# Patient Record
Sex: FEMALE | Employment: OTHER | URBAN - METROPOLITAN AREA
[De-identification: names, ages, dates, MRNs, and addresses within clinical notes are randomized per-mention and may not be internally consistent; named-entity substitution may affect disease eponyms.]

---

## 2017-09-16 ENCOUNTER — HOSPITAL ENCOUNTER (EMERGENCY)
Age: 72
Discharge: HOME | End: 2017-09-16
Attending: EMERGENCY MEDICINE
Payer: MEDICARE

## 2017-09-16 ENCOUNTER — APPOINTMENT (OUTPATIENT)
Dept: CT IMAGING | Age: 72
End: 2017-09-16
Attending: PHYSICIAN ASSISTANT
Payer: MEDICARE

## 2017-09-16 VITALS
HEART RATE: 97 BPM | DIASTOLIC BLOOD PRESSURE: 106 MMHG | WEIGHT: 225 LBS | RESPIRATION RATE: 20 BRPM | TEMPERATURE: 98.7 F | SYSTOLIC BLOOD PRESSURE: 147 MMHG | HEIGHT: 61 IN | BODY MASS INDEX: 42.48 KG/M2 | OXYGEN SATURATION: 96 %

## 2017-09-16 DIAGNOSIS — R31.9 URINARY TRACT INFECTION WITH HEMATURIA, SITE UNSPECIFIED: Primary | ICD-10-CM

## 2017-09-16 DIAGNOSIS — N39.0 URINARY TRACT INFECTION WITH HEMATURIA, SITE UNSPECIFIED: Primary | ICD-10-CM

## 2017-09-16 LAB
ALBUMIN SERPL-MCNC: 3.6 G/DL (ref 3.5–5)
ALBUMIN/GLOB SERPL: 0.8 {RATIO} (ref 1.1–2.2)
ALP SERPL-CCNC: 151 U/L (ref 45–117)
ALT SERPL-CCNC: 25 U/L (ref 12–78)
ANION GAP SERPL CALC-SCNC: 8 MMOL/L (ref 5–15)
APPEARANCE UR: ABNORMAL
AST SERPL-CCNC: 26 U/L (ref 15–37)
BACTERIA URNS QL MICRO: ABNORMAL /HPF
BASOPHILS # BLD: 0 K/UL (ref 0–0.1)
BASOPHILS NFR BLD: 0 % (ref 0–1)
BILIRUB SERPL-MCNC: 0.7 MG/DL (ref 0.2–1)
BILIRUB UR QL CFM: NEGATIVE
BUN SERPL-MCNC: 18 MG/DL (ref 6–20)
BUN/CREAT SERPL: 20 (ref 12–20)
CALCIUM SERPL-MCNC: 9.2 MG/DL (ref 8.5–10.1)
CHLORIDE SERPL-SCNC: 98 MMOL/L (ref 97–108)
CO2 SERPL-SCNC: 31 MMOL/L (ref 21–32)
COLOR UR: ABNORMAL
CREAT SERPL-MCNC: 0.9 MG/DL (ref 0.55–1.02)
EOSINOPHIL # BLD: 0.1 K/UL (ref 0–0.4)
EOSINOPHIL NFR BLD: 1 % (ref 0–7)
EPITH CASTS URNS QL MICRO: ABNORMAL /LPF
ERYTHROCYTE [DISTWIDTH] IN BLOOD BY AUTOMATED COUNT: 12.7 % (ref 11.5–14.5)
GLOBULIN SER CALC-MCNC: 4.8 G/DL (ref 2–4)
GLUCOSE BLD STRIP.AUTO-MCNC: 251 MG/DL (ref 65–100)
GLUCOSE SERPL-MCNC: 314 MG/DL (ref 65–100)
GLUCOSE UR STRIP.AUTO-MCNC: NEGATIVE MG/DL
HCT VFR BLD AUTO: 41.1 % (ref 35–47)
HGB BLD-MCNC: 14.2 G/DL (ref 11.5–16)
HGB UR QL STRIP: ABNORMAL
HYALINE CASTS URNS QL MICRO: ABNORMAL /LPF (ref 0–5)
KETONES UR QL STRIP.AUTO: 15 MG/DL
LEUKOCYTE ESTERASE UR QL STRIP.AUTO: ABNORMAL
LIPASE SERPL-CCNC: 113 U/L (ref 73–393)
LYMPHOCYTES # BLD: 2.8 K/UL (ref 0.8–3.5)
LYMPHOCYTES NFR BLD: 28 % (ref 12–49)
MCH RBC QN AUTO: 30.9 PG (ref 26–34)
MCHC RBC AUTO-ENTMCNC: 34.5 G/DL (ref 30–36.5)
MCV RBC AUTO: 89.5 FL (ref 80–99)
MONOCYTES # BLD: 0.7 K/UL (ref 0–1)
MONOCYTES NFR BLD: 7 % (ref 5–13)
NEUTS SEG # BLD: 6.3 K/UL (ref 1.8–8)
NEUTS SEG NFR BLD: 64 % (ref 32–75)
NITRITE UR QL STRIP.AUTO: POSITIVE
PH UR STRIP: 5 [PH] (ref 5–8)
PLATELET # BLD AUTO: 77 K/UL (ref 150–400)
POTASSIUM SERPL-SCNC: 4.4 MMOL/L (ref 3.5–5.1)
PROT SERPL-MCNC: 8.4 G/DL (ref 6.4–8.2)
PROT UR STRIP-MCNC: >300 MG/DL
RBC # BLD AUTO: 4.59 M/UL (ref 3.8–5.2)
RBC #/AREA URNS HPF: >100 /HPF (ref 0–5)
SERVICE CMNT-IMP: ABNORMAL
SODIUM SERPL-SCNC: 137 MMOL/L (ref 136–145)
SP GR UR REFRACTOMETRY: 1.02 (ref 1–1.03)
UA: UC IF INDICATED,UAUC: ABNORMAL
UROBILINOGEN UR QL STRIP.AUTO: 1 EU/DL (ref 0.2–1)
WBC # BLD AUTO: 9.9 K/UL (ref 3.6–11)
WBC URNS QL MICRO: >100 /HPF (ref 0–4)

## 2017-09-16 PROCEDURE — 87186 SC STD MICRODIL/AGAR DIL: CPT | Performed by: PHYSICIAN ASSISTANT

## 2017-09-16 PROCEDURE — 96365 THER/PROPH/DIAG IV INF INIT: CPT

## 2017-09-16 PROCEDURE — 96375 TX/PRO/DX INJ NEW DRUG ADDON: CPT

## 2017-09-16 PROCEDURE — 83690 ASSAY OF LIPASE: CPT | Performed by: PHYSICIAN ASSISTANT

## 2017-09-16 PROCEDURE — 85025 COMPLETE CBC W/AUTO DIFF WBC: CPT | Performed by: PHYSICIAN ASSISTANT

## 2017-09-16 PROCEDURE — 87086 URINE CULTURE/COLONY COUNT: CPT | Performed by: PHYSICIAN ASSISTANT

## 2017-09-16 PROCEDURE — 74176 CT ABD & PELVIS W/O CONTRAST: CPT

## 2017-09-16 PROCEDURE — 82962 GLUCOSE BLOOD TEST: CPT

## 2017-09-16 PROCEDURE — 87077 CULTURE AEROBIC IDENTIFY: CPT | Performed by: PHYSICIAN ASSISTANT

## 2017-09-16 PROCEDURE — 36415 COLL VENOUS BLD VENIPUNCTURE: CPT | Performed by: PHYSICIAN ASSISTANT

## 2017-09-16 PROCEDURE — 81001 URINALYSIS AUTO W/SCOPE: CPT | Performed by: PHYSICIAN ASSISTANT

## 2017-09-16 PROCEDURE — 74011250636 HC RX REV CODE- 250/636: Performed by: PHYSICIAN ASSISTANT

## 2017-09-16 PROCEDURE — 87147 CULTURE TYPE IMMUNOLOGIC: CPT | Performed by: PHYSICIAN ASSISTANT

## 2017-09-16 PROCEDURE — 74011000258 HC RX REV CODE- 258: Performed by: PHYSICIAN ASSISTANT

## 2017-09-16 PROCEDURE — 99283 EMERGENCY DEPT VISIT LOW MDM: CPT

## 2017-09-16 PROCEDURE — 80053 COMPREHEN METABOLIC PANEL: CPT | Performed by: PHYSICIAN ASSISTANT

## 2017-09-16 PROCEDURE — 96361 HYDRATE IV INFUSION ADD-ON: CPT

## 2017-09-16 RX ORDER — PANTOPRAZOLE SODIUM 20 MG/1
20 TABLET, DELAYED RELEASE ORAL DAILY
COMMUNITY
End: 2017-12-18

## 2017-09-16 RX ORDER — CEPHALEXIN 500 MG/1
500 CAPSULE ORAL 3 TIMES DAILY
Qty: 28 CAP | Refills: 0 | Status: SHIPPED | OUTPATIENT
Start: 2017-09-16 | End: 2017-09-23

## 2017-09-16 RX ORDER — KETOROLAC TROMETHAMINE 30 MG/ML
15 INJECTION, SOLUTION INTRAMUSCULAR; INTRAVENOUS
Status: COMPLETED | OUTPATIENT
Start: 2017-09-16 | End: 2017-09-16

## 2017-09-16 RX ADMIN — KETOROLAC TROMETHAMINE 15 MG: 30 INJECTION, SOLUTION INTRAMUSCULAR at 20:52

## 2017-09-16 RX ADMIN — CEFTRIAXONE SODIUM 1 G: 1 INJECTION, POWDER, FOR SOLUTION INTRAMUSCULAR; INTRAVENOUS at 20:50

## 2017-09-16 RX ADMIN — SODIUM CHLORIDE 1000 ML: 900 INJECTION, SOLUTION INTRAVENOUS at 19:40

## 2017-09-16 NOTE — ED PROVIDER NOTES
HPI Comments: 82 yo female with hx of diverticulitis, HTN, hemochromatosis, fibromyalgia, DM, pancreatic CA, depression and irregular heart beat here for evaluation of hematuria. States noticed blood in urine around 1800 this evening. Has noted to have bilateral back pain. +Nausea; no vomiting. Denies fever, CP, SOB. Former smoker. Patient is a 81 y.o. female presenting with hematuria. The history is provided by the patient. Blood in Urine    This is a new problem. The current episode started 1 to 2 hours ago. The problem occurs every urination. The quality of the pain is described as burning. The pain is at a severity of 8/10. The pain is mild. There has been no fever. Associated symptoms include chills, nausea, hematuria and flank pain. Pertinent negatives include no vomiting, no frequency, no abdominal pain and no back pain. She has tried nothing for the symptoms.         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    Unspecified sleep apnea     NO C-PAP       Past Surgical History:   Procedure Laterality Date    HX APPENDECTOMY      HX BREAST LUMPECTOMY Left     lumpectomy BENIGN    HX COLONOSCOPY      HX HYSTERECTOMY      hysto    HX ORTHOPAEDIC Left     shoulder CYST REMOVAL    HX OTHER SURGICAL  12/31/13    pyloric-sparing whipple,bx of portal and hepatic nodes by Dr Royce Duncan HX SALPINGO-OOPHORECTOMY Bilateral     HX TONSILLECTOMY      HX UROLOGICAL      BLADDER [facility]         Family History:   Problem Relation Age of Onset    Heart Failure Mother     Diabetes Mother     Hypertension Mother     Heart Failure Father     Kidney Disease Father      WAS ON DIALYSIS    Diabetes Sister     Depression Sister     Depression Sister     Other Sister      DIFFICULTY INTUBATING    Asthma Sister        Social History     Social History    Marital status:      Spouse name: N/A    Number of children: N/A    Years of education: N/A     Occupational History    Not on file. Social History Main Topics    Smoking status: Former Smoker     Packs/day: 1.00     Years: 17.00     Quit date: 12/27/1983    Smokeless tobacco: Never Used    Alcohol use No    Drug use: No    Sexual activity: Not on file     Other Topics Concern    Not on file     Social History Narrative         ALLERGIES: Latex; Tylenol [acetaminophen]; Claritin [loratadine]; Codeine; and Percocet [oxycodone-acetaminophen]    Review of Systems   Constitutional: Positive for chills. HENT: Negative for ear discharge. Eyes: Negative for photophobia, pain, discharge and visual disturbance. Respiratory: Negative for apnea, cough, chest tightness and shortness of breath. Cardiovascular: Negative for chest pain, palpitations and leg swelling. Gastrointestinal: Positive for nausea. Negative for abdominal distention, abdominal pain, blood in stool and vomiting. Genitourinary: Positive for flank pain and hematuria. Negative for difficulty urinating, dysuria and frequency. Musculoskeletal: Negative for back pain, gait problem, joint swelling, myalgias and neck pain. Skin: Negative for color change and pallor. Neurological: Negative for dizziness, syncope, weakness, numbness and headaches. Psychiatric/Behavioral: Negative for behavioral problems and confusion. The patient is not nervous/anxious. Vitals:    09/16/17 1916   BP: 127/87   Pulse: 97   Resp: 20   Temp: 98.7 °F (37.1 °C)   SpO2: 96%   Weight: 102.1 kg (225 lb)   Height: 5' 1\" (1.549 m)            Physical Exam   Constitutional: She is oriented to person, place, and time.  She appears well-developed and well-nourished. HENT:   Head: Normocephalic and atraumatic. Right Ear: External ear normal.   Left Ear: External ear normal.   Nose: Nose normal.   Mouth/Throat: Oropharynx is clear and moist.   Eyes: Conjunctivae and EOM are normal. Pupils are equal, round, and reactive to light. Right eye exhibits no discharge. Left eye exhibits no discharge. Neck: Normal range of motion. Neck supple. Cardiovascular: Normal rate, regular rhythm, normal heart sounds and intact distal pulses. Pulmonary/Chest: Effort normal and breath sounds normal.   Abdominal: Soft. Bowel sounds are normal. She exhibits no distension. There is tenderness (Mild suprapubic). There is no rebound and no guarding. No CVA tenderness    Musculoskeletal: Normal range of motion. She exhibits no edema or tenderness. Neurological: She is alert and oriented to person, place, and time. No cranial nerve deficit. Coordination normal.   Skin: Skin is warm and dry. No rash noted. Psychiatric: She has a normal mood and affect. Her behavior is normal. Judgment and thought content normal.   Nursing note and vitals reviewed. MDM  Number of Diagnoses or Management Options  Urinary tract infection with hematuria, site unspecified:   Diagnosis management comments: 82 yo female with hematuria x today; stable appearing; no fever or elevated WBC; abd soft; will treat UA with Ro[place]hpin and have close followup; return if fever or worsening of symptoms. EDWIN Lieberman         Amount and/or Complexity of Data Reviewed  Clinical lab tests: ordered and reviewed  Tests in the radiology section of CPT®: ordered and reviewed  Discuss the patient with other providers: yes  Independent visualization of images, tracings, or specimens: yes      ED Course       Procedures    Patient has been reassessed. Feeling much better. Reviewed labs, medications and radiographics with patient. Ready to discharge home.       Discussed case with attending Physician Amber; in to see. Agrees with care and will D/C with follow up.      9:42 PM  Patient's results have been reviewed with them. Patient and/or family have verbally conveyed their understanding and agreement of the patient's signs, symptoms, diagnosis, treatment and prognosis and additionally agree to follow up as recommended or return to the Emergency Room should their condition change prior to follow-up. Discharge instructions have also been provided to the patient with some educational information regarding their diagnosis as well a list of reasons why they would want to return to the ER prior to their follow-up appointment should their condition change. EDWIN Covington      Recent Results (from the past 24 hour(s))   CBC WITH AUTOMATED DIFF    Collection Time: 09/16/17  7:41 PM   Result Value Ref Range    WBC 9.9 3.6 - 11.0 K/uL    RBC 4.59 3.80 - 5.20 M/uL    HGB 14.2 11.5 - 16.0 g/dL    HCT 41.1 35.0 - 47.0 %    MCV 89.5 80.0 - 99.0 FL    MCH 30.9 26.0 - 34.0 PG    MCHC 34.5 30.0 - 36.5 g/dL    RDW 12.7 11.5 - 14.5 %    PLATELET 77 (L) 657 - 400 K/uL    NEUTROPHILS 64 32 - 75 %    LYMPHOCYTES 28 12 - 49 %    MONOCYTES 7 5 - 13 %    EOSINOPHILS 1 0 - 7 %    BASOPHILS 0 0 - 1 %    ABS. NEUTROPHILS 6.3 1.8 - 8.0 K/UL    ABS. LYMPHOCYTES 2.8 0.8 - 3.5 K/UL    ABS. MONOCYTES 0.7 0.0 - 1.0 K/UL    ABS. EOSINOPHILS 0.1 0.0 - 0.4 K/UL    ABS.  BASOPHILS 0.0 0.0 - 0.1 K/UL   METABOLIC PANEL, COMPREHENSIVE    Collection Time: 09/16/17  7:41 PM   Result Value Ref Range    Sodium 137 136 - 145 mmol/L    Potassium 4.4 3.5 - 5.1 mmol/L    Chloride 98 97 - 108 mmol/L    CO2 31 21 - 32 mmol/L    Anion gap 8 5 - 15 mmol/L    Glucose 314 (H) 65 - 100 mg/dL    BUN 18 6 - 20 MG/DL    Creatinine 0.90 0.55 - 1.02 MG/DL    BUN/Creatinine ratio 20 12 - 20      GFR est AA >60 >60 ml/min/1.73m2    GFR est non-AA >60 >60 ml/min/1.73m2    Calcium 9.2 8.5 - 10.1 MG/DL    Bilirubin, total 0.7 0.2 - 1.0 MG/DL    ALT (SGPT) 25 12 - 78 U/L    AST (SGOT) 26 15 - 37 U/L    Alk. phosphatase 151 (H) 45 - 117 U/L    Protein, total 8.4 (H) 6.4 - 8.2 g/dL    Albumin 3.6 3.5 - 5.0 g/dL    Globulin 4.8 (H) 2.0 - 4.0 g/dL    A-G Ratio 0.8 (L) 1.1 - 2.2     LIPASE    Collection Time: 09/16/17  7:41 PM   Result Value Ref Range    Lipase 113 73 - 393 U/L   URINALYSIS W/ REFLEX CULTURE    Collection Time: 09/16/17  7:41 PM   Result Value Ref Range    Color RED      Appearance TURBID (A) CLEAR      Specific gravity 1.025 1.003 - 1.030      pH (UA) 5.0 5.0 - 8.0      Protein >300 (A) NEG mg/dL    Glucose NEGATIVE  NEG mg/dL    Ketone 15 (A) NEG mg/dL    Blood LARGE (A) NEG      Urobilinogen 1.0 0.2 - 1.0 EU/dL    Nitrites POSITIVE (A) NEG      Leukocyte Esterase LARGE (A) NEG      Bilirubin UA, confirm NEGATIVE  NEG      WBC >100 (H) 0 - 4 /hpf    RBC >100 (H) 0 - 5 /hpf    Epithelial cells MODERATE (A) FEW /lpf    Bacteria 1+ (A) NEG /hpf    UA:UC IF INDICATED URINE CULTURE ORDERED (A) CNI      Hyaline cast 2-5 0 - 5 /lpf   GLUCOSE, POC    Collection Time: 09/16/17  9:32 PM   Result Value Ref Range    Glucose (POC) 251 (H) 65 - 100 mg/dL    Performed by Jovana Wiggins (PCT)        Ct Abd Pelv Wo Cont    Result Date: 9/16/2017  EXAM:  CT ABD PELV WO CONT INDICATION: flank pain/hematuria COMPARISON: 4/19/2015 CONTRAST:  None. TECHNIQUE: Thin axial images were obtained through the abdomen and pelvis. Coronal and sagittal reconstructions were generated. Oral contrast was not administered. CT dose reduction was achieved through use of a standardized protocol tailored for this examination and automatic exposure control for dose modulation. The absence of intravenous contrast material reduces the sensitivity for evaluation of the solid parenchymal organs of the abdomen. FINDINGS: LUNG BASES: Clear. INCIDENTALLY IMAGED HEART AND MEDIASTINUM: Unremarkable. LIVER: No mass or biliary dilatation. GALLBLADDER: Prior cholecystectomy SPLEEN: No mass. PANCREAS: No mass or ductal dilatation. ADRENALS: Unremarkable. KIDNEYS/URETERS: No mass, calculus, or hydronephrosis. STOMACH: Unremarkable. SMALL BOWEL: No dilatation or wall thickening. COLON: No dilatation or wall thickening. Multiple sigmoid diverticula. Ventral diastases containing a portion of the transverse colon. APPENDIX: Unremarkable. PERITONEUM: No ascites or pneumoperitoneum. RETROPERITONEUM: No lymphadenopathy or aortic aneurysm. REPRODUCTIVE ORGANS: Prior hysterectomy URINARY BLADDER: No mass or calculus. BONES: No destructive bone lesion. ADDITIONAL COMMENTS: Disc desiccation at the lumbosacral junction. Disc bulge at L4-5.      IMPRESSION: No acute abdominal or pelvic process seen

## 2017-09-17 NOTE — DISCHARGE INSTRUCTIONS
Urinary Tract Infection in Women: Care Instructions  Your Care Instructions    A urinary tract infection, or UTI, is a general term for an infection anywhere between the kidneys and the urethra (where urine comes out). Most UTIs are bladder infections. They often cause pain or burning when you urinate. UTIs are caused by bacteria and can be cured with antibiotics. Be sure to complete your treatment so that the infection goes away. Follow-up care is a key part of your treatment and safety. Be sure to make and go to all appointments, and call your doctor if you are having problems. It's also a good idea to know your test results and keep a list of the medicines you take. How can you care for yourself at home? · Take your antibiotics as directed. Do not stop taking them just because you feel better. You need to take the full course of antibiotics. · Drink extra water and other fluids for the next day or two. This may help wash out the bacteria that are causing the infection. (If you have kidney, heart, or liver disease and have to limit fluids, talk with your doctor before you increase your fluid intake.)  · Avoid drinks that are carbonated or have caffeine. They can irritate the bladder. · Urinate often. Try to empty your bladder each time. · To relieve pain, take a hot bath or lay a heating pad set on low over your lower belly or genital area. Never go to sleep with a heating pad in place. To prevent UTIs  · Drink plenty of water each day. This helps you urinate often, which clears bacteria from your system. (If you have kidney, heart, or liver disease and have to limit fluids, talk with your doctor before you increase your fluid intake.)  · Urinate when you need to. · Urinate right after you have sex. · Change sanitary pads often. · Avoid douches, bubble baths, feminine hygiene sprays, and other feminine hygiene products that have deodorants.   · After going to the bathroom, wipe from front to back.  When should you call for help? Call your doctor now or seek immediate medical care if:  · Symptoms such as fever, chills, nausea, or vomiting get worse or appear for the first time. · You have new pain in your back just below your rib cage. This is called flank pain. · There is new blood or pus in your urine. · You have any problems with your antibiotic medicine. Watch closely for changes in your health, and be sure to contact your doctor if:  · You are not getting better after taking an antibiotic for 2 days. · Your symptoms go away but then come back. Where can you learn more? Go to http://chung-vick.info/. Enter A093 in the search box to learn more about \"Urinary Tract Infection in Women: Care Instructions. \"  Current as of: November 28, 2016  Content Version: 11.3  © 9546-9529 [employer]. Care instructions adapted under license by [employer] (which disclaims liability or warranty for this information). If you have questions about a medical condition or this instruction, always ask your healthcare professional. [address] any warranty or liability for your use of this information. We hope that we have addressed all of your medical concerns. The examination and treatment you received in the Emergency Department were for an emergent problem and were not intended as complete care. It is important that you follow up with your healthcare provider(s) for ongoing care. If your symptoms worsen or do not improve as expected, and you are unable to reach your usual health care provider(s), you should return to the Emergency Department. Today's healthcare is undergoing tremendous change, and patient satisfaction surveys are one of the many tools to assess the quality of medical care. You may receive a survey from the [employer] organization regarding your experience in the Emergency Department.   I hope that your experience has been completely positive, particularly the medical care that I provided. As such, please participate in the survey; anything less than excellent does not meet my expectations or intentions. [address] [facility] and [facility] participate in nationally recognized quality of care measures. If your blood pressure is greater than 120/80, as reported below, we urge that you seek medical care to address the potential of high blood pressure, commonly known as hypertension. Hypertension can be hereditary or can be caused by certain medical conditions, pain, stress, or \"white coat syndrome. \"       Please make an appointment with your health care provider(s) for follow up of your Emergency Department visit. VITALS:   Patient Vitals for the past 8 hrs:   Temp Pulse Resp BP SpO2   09/16/17 1916 98.7 °F (37.1 °C) 97 20 127/87 96 %          Thank you for allowing us to provide you with medical care today. We realize that you have many choices for your emergency care needs. Please choose us in the future for any continued health care needs. Gabi Gabriel, [facility].   Office: [phone]            Recent Results (from the past 24 hour(s))   CBC WITH AUTOMATED DIFF    Collection Time: 09/16/17  7:41 PM   Result Value Ref Range    WBC 9.9 3.6 - 11.0 K/uL    RBC 4.59 3.80 - 5.20 M/uL    HGB 14.2 11.5 - 16.0 g/dL    HCT 41.1 35.0 - 47.0 %    MCV 89.5 80.0 - 99.0 FL    MCH 30.9 26.0 - 34.0 PG    MCHC 34.5 30.0 - 36.5 g/dL    RDW 12.7 11.5 - 14.5 %    PLATELET 77 (L) 882 - 400 K/uL    NEUTROPHILS 64 32 - 75 %    LYMPHOCYTES 28 12 - 49 %    MONOCYTES 7 5 - 13 %    EOSINOPHILS 1 0 - 7 %    BASOPHILS 0 0 - 1 %    ABS. NEUTROPHILS 6.3 1.8 - 8.0 K/UL    ABS. LYMPHOCYTES 2.8 0.8 - 3.5 K/UL    ABS. MONOCYTES 0.7 0.0 - 1.0 K/UL    ABS. EOSINOPHILS 0.1 0.0 - 0.4 K/UL    ABS.  BASOPHILS 0.0 0.0 - 0.1 K/UL   METABOLIC PANEL, COMPREHENSIVE Collection Time: 09/16/17  7:41 PM   Result Value Ref Range    Sodium 137 136 - 145 mmol/L    Potassium 4.4 3.5 - 5.1 mmol/L    Chloride 98 97 - 108 mmol/L    CO2 31 21 - 32 mmol/L    Anion gap 8 5 - 15 mmol/L    Glucose 314 (H) 65 - 100 mg/dL    BUN 18 6 - 20 MG/DL    Creatinine 0.90 0.55 - 1.02 MG/DL    BUN/Creatinine ratio 20 12 - 20      GFR est AA >60 >60 ml/min/1.73m2    GFR est non-AA >60 >60 ml/min/1.73m2    Calcium 9.2 8.5 - 10.1 MG/DL    Bilirubin, total 0.7 0.2 - 1.0 MG/DL    ALT (SGPT) 25 12 - 78 U/L    AST (SGOT) 26 15 - 37 U/L    Alk. phosphatase 151 (H) 45 - 117 U/L    Protein, total 8.4 (H) 6.4 - 8.2 g/dL    Albumin 3.6 3.5 - 5.0 g/dL    Globulin 4.8 (H) 2.0 - 4.0 g/dL    A-G Ratio 0.8 (L) 1.1 - 2.2     LIPASE    Collection Time: 09/16/17  7:41 PM   Result Value Ref Range    Lipase 113 73 - 393 U/L   URINALYSIS W/ REFLEX CULTURE    Collection Time: 09/16/17  7:41 PM   Result Value Ref Range    Color RED      Appearance TURBID (A) CLEAR      Specific gravity 1.025 1.003 - 1.030      pH (UA) 5.0 5.0 - 8.0      Protein >300 (A) NEG mg/dL    Glucose NEGATIVE  NEG mg/dL    Ketone 15 (A) NEG mg/dL    Blood LARGE (A) NEG      Urobilinogen 1.0 0.2 - 1.0 EU/dL    Nitrites POSITIVE (A) NEG      Leukocyte Esterase LARGE (A) NEG      Bilirubin UA, confirm NEGATIVE  NEG      WBC >100 (H) 0 - 4 /hpf    RBC >100 (H) 0 - 5 /hpf    Epithelial cells MODERATE (A) FEW /lpf    Bacteria 1+ (A) NEG /hpf    UA:UC IF INDICATED URINE CULTURE ORDERED (A) CNI      Hyaline cast 2-5 0 - 5 /lpf       Ct Abd Pelv Wo Cont    Result Date: 9/16/2017  EXAM:  CT ABD PELV WO CONT INDICATION: flank pain/hematuria COMPARISON: 4/19/2015 CONTRAST:  None. TECHNIQUE: Thin axial images were obtained through the abdomen and pelvis. Coronal and sagittal reconstructions were generated. Oral contrast was not administered.  CT dose reduction was achieved through use of a standardized protocol tailored for this examination and automatic exposure control for dose modulation. The absence of intravenous contrast material reduces the sensitivity for evaluation of the solid parenchymal organs of the abdomen. FINDINGS: LUNG BASES: Clear. INCIDENTALLY IMAGED HEART AND MEDIASTINUM: Unremarkable. LIVER: No mass or biliary dilatation. GALLBLADDER: Prior cholecystectomy SPLEEN: No mass. PANCREAS: No mass or ductal dilatation. ADRENALS: Unremarkable. KIDNEYS/URETERS: No mass, calculus, or hydronephrosis. STOMACH: Unremarkable. SMALL BOWEL: No dilatation or wall thickening. COLON: No dilatation or wall thickening. Multiple sigmoid diverticula. Ventral diastases containing a portion of the transverse colon. APPENDIX: Unremarkable. PERITONEUM: No ascites or pneumoperitoneum. RETROPERITONEUM: No lymphadenopathy or aortic aneurysm. REPRODUCTIVE ORGANS: Prior hysterectomy URINARY BLADDER: No mass or calculus. BONES: No destructive bone lesion. ADDITIONAL COMMENTS: Disc desiccation at the lumbosacral junction. Disc bulge at L4-5.      IMPRESSION: No acute abdominal or pelvic process seen

## 2017-09-19 LAB
BACTERIA SPEC CULT: ABNORMAL
BACTERIA SPEC CULT: ABNORMAL
CC UR VC: ABNORMAL
SERVICE CMNT-IMP: ABNORMAL

## 2017-09-19 RX ORDER — NITROFURANTOIN MACROCRYSTALS 50 MG/1
50 CAPSULE ORAL 4 TIMES DAILY
Qty: 20 CAP | Refills: 0 | Status: SHIPPED | OUTPATIENT
Start: 2017-09-19 | End: 2017-09-24

## 2017-09-26 ENCOUNTER — HOSPITAL ENCOUNTER (EMERGENCY)
Age: 72
Discharge: HOME | End: 2017-09-26
Attending: EMERGENCY MEDICINE
Payer: MEDICARE

## 2017-09-26 VITALS
TEMPERATURE: 98.3 F | WEIGHT: 223 LBS | HEIGHT: 61 IN | HEART RATE: 78 BPM | BODY MASS INDEX: 42.1 KG/M2 | RESPIRATION RATE: 17 BRPM | OXYGEN SATURATION: 77 % | DIASTOLIC BLOOD PRESSURE: 73 MMHG | SYSTOLIC BLOOD PRESSURE: 134 MMHG

## 2017-09-26 DIAGNOSIS — N82.4 FISTULA, COLOVAGINAL: Primary | ICD-10-CM

## 2017-09-26 PROCEDURE — 99284 EMERGENCY DEPT VISIT MOD MDM: CPT

## 2017-09-26 PROCEDURE — 74011250637 HC RX REV CODE- 250/637: Performed by: EMERGENCY MEDICINE

## 2017-09-26 RX ORDER — IBUPROFEN 600 MG/1
600 TABLET ORAL
Qty: 20 TAB | Refills: 0 | Status: SHIPPED | OUTPATIENT
Start: 2017-09-26 | End: 2017-12-18

## 2017-09-26 RX ORDER — IBUPROFEN 600 MG/1
600 TABLET ORAL
Status: COMPLETED | OUTPATIENT
Start: 2017-09-26 | End: 2017-09-26

## 2017-09-26 RX ADMIN — IBUPROFEN 600 MG: 600 TABLET, FILM COATED ORAL at 15:03

## 2017-09-26 NOTE — ED TRIAGE NOTES
The patient states she was seen here in the ER 9 days ago and was diagnosed with a bladder infection and was given Keflex 500 mgs and Nitrofur 50 mg. She followed up with OBGYN and had a CT scan yesterday and have not received the results at this time. States she can not stand the pain at this time. The patient also states she has been passing fecal matter from her vagina since Sunday.

## 2017-09-26 NOTE — DISCHARGE INSTRUCTIONS
VITALS:   Patient Vitals for the past 8 hrs:   Temp Pulse Resp BP SpO2   09/26/17 1535 - 78 16 131/77 98 %   09/26/17 1303 98.3 °F (36.8 °C) 77 16 130/56 96 %                  No results found for this or any previous visit (from the past 24 hour(s)). No results found.

## 2017-09-26 NOTE — ED NOTES
Rounding on patient and aware of waiting of CT results from [address] advises her pain is improved with less cramping pain.

## 2017-09-27 NOTE — ED PROVIDER NOTES
Patient is a 81 y.o. female presenting with abdominal pain. The history is provided by the patient. Abdominal Pain    This is a new problem. The current episode started more than 1 week ago. The problem occurs constantly. The problem has not changed since onset. The pain is located in the perineum. The quality of the pain is cramping. The pain is at a severity of 10/10. Associated symptoms include back pain. Pertinent negatives include no fever, no diarrhea, no nausea and no dysuria. The patient's surgical history includes appendectomy and hysterectomy. 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    Unspecified sleep apnea     NO C-PAP       Past Surgical History:   Procedure Laterality Date    HX APPENDECTOMY      HX BREAST LUMPECTOMY Left     lumpectomy BENIGN    HX COLONOSCOPY      HX HYSTERECTOMY      hysto    HX ORTHOPAEDIC Left     shoulder CYST REMOVAL    HX OTHER SURGICAL  12/31/13    pyloric-sparing whipple,bx of portal and hepatic nodes by Dr Mauro Montiel HX SALPINGO-OOPHORECTOMY Bilateral     HX TONSILLECTOMY      HX UROLOGICAL      BLADDER SILVANA         Family History:   Problem Relation Age of Onset    Heart Failure Mother     Diabetes Mother     Hypertension Mother     Heart Failure Father     Kidney Disease Father      WAS ON DIALYSIS    Diabetes Sister     Depression Sister     Depression Sister     Other Sister      DIFFICULTY INTUBATING    Asthma Sister        Social History     Social History    Marital status:      Spouse name: N/A    Number of children: N/A    Years of education: N/A     Occupational History    Not on file. Social History Main Topics    Smoking status: Former Smoker     Packs/day: 1.00     Years: 17.00     Quit date: 12/27/1983    Smokeless tobacco: Never Used    Alcohol use No    Drug use: No    Sexual activity: Not on file     Other Topics Concern    Not on file     Social History Narrative         ALLERGIES: Latex; Tylenol [acetaminophen]; Claritin [loratadine]; Codeine; and Percocet [oxycodone-acetaminophen]    Review of Systems   Constitutional: Negative for chills and fever. Respiratory: Negative for chest tightness and shortness of breath. Gastrointestinal: Positive for abdominal pain. Negative for diarrhea and nausea. Genitourinary: Positive for pelvic pain and vaginal discharge. Negative for dysuria. Musculoskeletal: Positive for back pain. All other systems reviewed and are negative. Vitals:    09/26/17 1303 09/26/17 1535 09/26/17 1727 09/26/17 1728   BP: 130/56 131/77 134/73 134/73   Pulse: 77 78     Resp: 16 16  17   Temp: 98.3 °F (36.8 °C)      SpO2: 96% 98% 93% (!) 77%   Weight: 101.2 kg (223 lb)      Height: 5' 1\" (1.549 m)               Physical Exam   Constitutional: She is oriented to person, place, and time. She appears well-developed and well-nourished. No distress. HENT:   Head: Normocephalic and atraumatic. Eyes: Conjunctivae and EOM are normal.   Neck: Normal range of motion. Cardiovascular: Normal rate, regular rhythm, normal heart sounds and intact distal pulses. No murmur heard. Pulmonary/Chest: Effort normal and breath sounds normal. No stridor. No respiratory distress. Abdominal: Soft. Bowel sounds are normal. There is no tenderness. Genitourinary:   Genitourinary Comments: See procedure note   Musculoskeletal: Normal range of motion. She exhibits no edema, tenderness or deformity. Neurological: She is alert and oriented to person, place, and time. No cranial nerve deficit.    Skin: Skin is warm and dry. She is not diaphoretic. Psychiatric: She has a normal mood and affect. Nursing note and vitals reviewed. 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 Jonathan Carty will have patient seen in office this week)      ED Course       Pelvic Exam  Date/Time: 9/27/2017 7:45 AM  Performed by: attending  Type of exam performed: speculum and bimanual.    External genitalia appearance: normal.    Vaginal exam:  discharge. The amount of discharge was:  mild. The discharge was yellow and thick. Cervix assessment: no cervcix noted. Specimen(s) collected:  none. Bimanual exam: mass in vaginal floor with some drainage.     Patient tolerance: Patient tolerated the procedure well with no immediate complications

## 2017-10-05 ENCOUNTER — HOSPITAL ENCOUNTER (OUTPATIENT)
Dept: CT IMAGING | Age: 72
Discharge: HOME | End: 2017-10-05
Attending: COLON & RECTAL SURGERY
Payer: MEDICARE

## 2017-10-05 DIAGNOSIS — N82.3 RECTOVAGINAL FISTULA: ICD-10-CM

## 2017-10-05 DIAGNOSIS — K57.32 DIVERTICULITIS OF COLON (WITHOUT MENTION OF HEMORRHAGE)(562.11): ICD-10-CM

## 2017-10-05 PROCEDURE — 74177 CT ABD & PELVIS W/CONTRAST: CPT

## 2017-10-05 PROCEDURE — 74011636320 HC RX REV CODE- 636/320: Performed by: RADIOLOGY

## 2017-10-05 RX ADMIN — IOPAMIDOL 100 ML: 755 INJECTION, SOLUTION INTRAVENOUS at 13:00

## 2017-10-05 RX ADMIN — DIATRIZOATE MEGLUMINE AND DIATRIZOATE SODIUM 60 ML: 660; 100 LIQUID ORAL; RECTAL at 13:00

## 2017-12-08 ENCOUNTER — HOSPITAL ENCOUNTER (OUTPATIENT)
Dept: GENERAL RADIOLOGY | Age: 72
Discharge: HOME | End: 2017-12-08
Attending: COLON & RECTAL SURGERY
Payer: MEDICARE

## 2017-12-08 DIAGNOSIS — Z01.818 PREOP EXAMINATION: ICD-10-CM

## 2017-12-08 PROCEDURE — 74270 X-RAY XM COLON 1CNTRST STD: CPT

## 2017-12-18 ENCOUNTER — HOSPITAL ENCOUNTER (OUTPATIENT)
Dept: PREADMISSION TESTING | Age: 72
Discharge: HOME | DRG: 331 | End: 2017-12-18
Payer: MEDICARE

## 2017-12-18 ENCOUNTER — HOSPITAL ENCOUNTER (OUTPATIENT)
Dept: GENERAL RADIOLOGY | Age: 72
Discharge: HOME | DRG: 331 | End: 2017-12-18
Attending: COLON & RECTAL SURGERY
Payer: MEDICARE

## 2017-12-18 VITALS
RESPIRATION RATE: 20 BRPM | BODY MASS INDEX: 38.89 KG/M2 | TEMPERATURE: 99.1 F | HEART RATE: 75 BPM | HEIGHT: 61 IN | SYSTOLIC BLOOD PRESSURE: 126 MMHG | OXYGEN SATURATION: 96 % | DIASTOLIC BLOOD PRESSURE: 70 MMHG | WEIGHT: 206 LBS

## 2017-12-18 LAB
ALBUMIN SERPL-MCNC: 3.6 G/DL (ref 3.5–5)
ALBUMIN/GLOB SERPL: 0.8 {RATIO} (ref 1.1–2.2)
ALP SERPL-CCNC: 157 U/L (ref 45–117)
ALT SERPL-CCNC: 48 U/L (ref 12–78)
ANION GAP SERPL CALC-SCNC: 11 MMOL/L (ref 5–15)
AST SERPL-CCNC: 35 U/L (ref 15–37)
BASOPHILS # BLD: 0 K/UL (ref 0–0.1)
BASOPHILS NFR BLD: 0 % (ref 0–1)
BILIRUB SERPL-MCNC: 0.5 MG/DL (ref 0.2–1)
BUN SERPL-MCNC: 23 MG/DL (ref 6–20)
BUN/CREAT SERPL: 20 (ref 12–20)
CALCIUM SERPL-MCNC: 9.1 MG/DL (ref 8.5–10.1)
CHLORIDE SERPL-SCNC: 101 MMOL/L (ref 97–108)
CO2 SERPL-SCNC: 25 MMOL/L (ref 21–32)
CREAT SERPL-MCNC: 1.14 MG/DL (ref 0.55–1.02)
EOSINOPHIL # BLD: 0.1 K/UL (ref 0–0.4)
EOSINOPHIL NFR BLD: 2 % (ref 0–7)
ERYTHROCYTE [DISTWIDTH] IN BLOOD BY AUTOMATED COUNT: 13.1 % (ref 11.5–14.5)
GLOBULIN SER CALC-MCNC: 4.3 G/DL (ref 2–4)
GLUCOSE SERPL-MCNC: 158 MG/DL (ref 65–100)
HCT VFR BLD AUTO: 36.6 % (ref 35–47)
HGB BLD-MCNC: 12.4 G/DL (ref 11.5–16)
LYMPHOCYTES # BLD: 2.3 K/UL (ref 0.8–3.5)
LYMPHOCYTES NFR BLD: 29 % (ref 12–49)
MCH RBC QN AUTO: 30.4 PG (ref 26–34)
MCHC RBC AUTO-ENTMCNC: 33.9 G/DL (ref 30–36.5)
MCV RBC AUTO: 89.7 FL (ref 80–99)
MONOCYTES # BLD: 0.5 K/UL (ref 0–1)
MONOCYTES NFR BLD: 7 % (ref 5–13)
NEUTS SEG # BLD: 4.9 K/UL (ref 1.8–8)
NEUTS SEG NFR BLD: 62 % (ref 32–75)
PLATELET # BLD AUTO: 87 K/UL (ref 150–400)
POTASSIUM SERPL-SCNC: 4.6 MMOL/L (ref 3.5–5.1)
PROT SERPL-MCNC: 7.9 G/DL (ref 6.4–8.2)
RBC # BLD AUTO: 4.08 M/UL (ref 3.8–5.2)
SODIUM SERPL-SCNC: 137 MMOL/L (ref 136–145)
WBC # BLD AUTO: 7.9 K/UL (ref 3.6–11)

## 2017-12-18 PROCEDURE — 85025 COMPLETE CBC W/AUTO DIFF WBC: CPT | Performed by: COLON & RECTAL SURGERY

## 2017-12-18 PROCEDURE — 36415 COLL VENOUS BLD VENIPUNCTURE: CPT | Performed by: COLON & RECTAL SURGERY

## 2017-12-18 PROCEDURE — 80053 COMPREHEN METABOLIC PANEL: CPT | Performed by: COLON & RECTAL SURGERY

## 2017-12-18 PROCEDURE — 93005 ELECTROCARDIOGRAM TRACING: CPT

## 2017-12-18 PROCEDURE — 71020 XR CHEST PA LAT: CPT

## 2017-12-18 NOTE — PERIOP NOTES
[facility]  PREOPERATIVE INSTRUCTIONS    Surgery Date:   12/20/2017      Surgery arrival time given by surgeon: NO  (If [facility] staff will call you between 3pm - 7pm the day before surgery with your arrival time. If your surgery is on a Monday, we will call you the preceding Friday. Please call [phone] after 7pm if you did not receive your arrival time.)  1. Report  to the 2nd Floor Admitting Desk on the day of your surgery. Bring your insurance card, photo identification, and any copayment (if applicable). 2. You must have a responsible adult to drive you home and stay with you the first 24 hours after surgery if you are going home the same day of your surgery. 3. Nothing to eat or drink after midnight the night before surgery. This means NO water, gum, mints, coffee, juice, etc.    4. MEDICATIONS TO TAKE THE MORNING OF SURGERY WITH A SIP OF WATER:Prilosec,Metoprolol,Cymbalta  5. No alcoholic beverages 24 hours before and after your surgery. 6. If you are being admitted to the hospital,please leave personal belongings/luggage in your car until you have an assigned hospital room number. ( The hospital discharge time is 12 PM NOON. Your adult  should be at the hospital prior to the noon discharge time unless otherwise instructed.)   7. STOP Aspirin and/or any non-steroidal anti-inflammatory drugs (i.e. Ibuprofen, Naproxen, Advil, Aleve) as directed by your surgeon. You may take Tylenol. Stop herbal supplements 1 week prior to  surgery. 8. If you are currently taking Plavix, Coumadin,or any other blood-thinning/ anticoagulant medication contact your surgeon for instructions. 9. Wear comfortable clothes. Wear your glasses instead of contacts. Please leave all money, jewelry and valuables at home. No make up, particularly mascara, the day of surgery. 10.  REMOVE ALL body piercings, rings,and jewelry and leave at home. Wear your hair loose or down, no pony-tails, buns, or any metal hair clips.    11. If you shower the morning of surgery, please do not apply any lotions, powders, or deodorants afterwards. Do not shave any body area within 24 hours of your surgery. 12. Please follow all instructions to avoid any potential surgical cancellation. 13. Should your physical condition change, (i.e. fever, cold, flu, etc.) please notify your surgeon as soon as possible. 14. It is important to be on time. If a situation occurs where you may be delayed, please call:  [phone] / [phone] on the day of surgery. 15. The [facility] staff can be reached at [phone]. 16. Special instructions: free  parking,Bring completed PTA medication list with you on the day of your surgery. call  about liquid diet  17. The patient was contacted  in person. She  verbalize  understanding of all instructions does not  need reinforcement.

## 2017-12-18 NOTE — PERIOP NOTES
CMP and CBC results from 12/18/17 faxed via EMR to [facility]. Patient states that the [facility] is aware that she refuses all blood or blood products.

## 2017-12-19 ENCOUNTER — ANESTHESIA EVENT (OUTPATIENT)
Dept: SURGERY | Age: 72
DRG: 331 | End: 2017-12-19
Payer: MEDICARE

## 2017-12-19 LAB
ATRIAL RATE: 69 BPM
CALCULATED P AXIS, ECG09: 31 DEGREES
CALCULATED R AXIS, ECG10: -23 DEGREES
CALCULATED T AXIS, ECG11: 32 DEGREES
DIAGNOSIS, 93000: NORMAL
P-R INTERVAL, ECG05: 158 MS
Q-T INTERVAL, ECG07: 404 MS
QRS DURATION, ECG06: 90 MS
QTC CALCULATION (BEZET), ECG08: 432 MS
VENTRICULAR RATE, ECG03: 69 BPM

## 2017-12-19 NOTE — PERIOP NOTES
Patient with history of +ESBL in urine on 9/16/2017. Enter placed in [facility] under signed and held for contact isolation.   DOS: 12/20/2017

## 2017-12-19 NOTE — PERIOP NOTES
I spoke to Zuleika Velázquez Dr and she reviewed the chart result from 9/16/2017 and confirmed that the patient needs to be on contact isolation for 12/20/17 surgery.

## 2017-12-20 ENCOUNTER — ANESTHESIA (OUTPATIENT)
Dept: SURGERY | Age: 72
DRG: 331 | End: 2017-12-20
Payer: MEDICARE

## 2017-12-20 ENCOUNTER — HOSPITAL ENCOUNTER (INPATIENT)
Age: 72
LOS: 6 days | Discharge: HOME HEALTH | DRG: 331 | End: 2017-12-26
Attending: COLON & RECTAL SURGERY | Admitting: COLON & RECTAL SURGERY
Payer: MEDICARE

## 2017-12-20 DIAGNOSIS — Z90.49 S/P SMALL BOWEL RESECTION: Primary | ICD-10-CM

## 2017-12-20 PROBLEM — Z93.2 ILEOSTOMY STATUS (HCC): Status: ACTIVE | Noted: 2017-12-20

## 2017-12-20 LAB
ANION GAP SERPL CALC-SCNC: 9 MMOL/L (ref 5–15)
BASOPHILS # BLD: 0 K/UL (ref 0–0.1)
BASOPHILS NFR BLD: 0 % (ref 0–1)
BUN SERPL-MCNC: 19 MG/DL (ref 6–20)
BUN/CREAT SERPL: 18 (ref 12–20)
CALCIUM SERPL-MCNC: 8.3 MG/DL (ref 8.5–10.1)
CHLORIDE SERPL-SCNC: 103 MMOL/L (ref 97–108)
CO2 SERPL-SCNC: 25 MMOL/L (ref 21–32)
CREAT SERPL-MCNC: 1.05 MG/DL (ref 0.55–1.02)
EOSINOPHIL # BLD: 0.1 K/UL (ref 0–0.4)
EOSINOPHIL NFR BLD: 1 % (ref 0–7)
ERYTHROCYTE [DISTWIDTH] IN BLOOD BY AUTOMATED COUNT: 13.1 % (ref 11.5–14.5)
GLUCOSE BLD STRIP.AUTO-MCNC: 106 MG/DL (ref 65–100)
GLUCOSE BLD STRIP.AUTO-MCNC: 144 MG/DL (ref 65–100)
GLUCOSE BLD STRIP.AUTO-MCNC: 203 MG/DL (ref 65–100)
GLUCOSE SERPL-MCNC: 154 MG/DL (ref 65–100)
HCT VFR BLD AUTO: 31 % (ref 35–47)
HGB BLD-MCNC: 10.8 G/DL (ref 11.5–16)
LYMPHOCYTES # BLD: 2.3 K/UL (ref 0.8–3.5)
LYMPHOCYTES NFR BLD: 18 % (ref 12–49)
MCH RBC QN AUTO: 30.4 PG (ref 26–34)
MCHC RBC AUTO-ENTMCNC: 34.8 G/DL (ref 30–36.5)
MCV RBC AUTO: 87.3 FL (ref 80–99)
MONOCYTES # BLD: 0.7 K/UL (ref 0–1)
MONOCYTES NFR BLD: 5 % (ref 5–13)
NEUTS SEG # BLD: 9.8 K/UL (ref 1.8–8)
NEUTS SEG NFR BLD: 76 % (ref 32–75)
PLATELET # BLD AUTO: 101 K/UL (ref 150–400)
POTASSIUM SERPL-SCNC: 4.6 MMOL/L (ref 3.5–5.1)
RBC # BLD AUTO: 3.55 M/UL (ref 3.8–5.2)
SERVICE CMNT-IMP: ABNORMAL
SODIUM SERPL-SCNC: 137 MMOL/L (ref 136–145)
WBC # BLD AUTO: 12.8 K/UL (ref 3.6–11)

## 2017-12-20 PROCEDURE — 74011250636 HC RX REV CODE- 250/636: Performed by: ANESTHESIOLOGY

## 2017-12-20 PROCEDURE — 77030008684 HC TU ET CUF COVD -B: Performed by: ANESTHESIOLOGY

## 2017-12-20 PROCEDURE — 36415 COLL VENOUS BLD VENIPUNCTURE: CPT | Performed by: COLON & RECTAL SURGERY

## 2017-12-20 PROCEDURE — 76210000016 HC OR PH I REC 1 TO 1.5 HR: Performed by: COLON & RECTAL SURGERY

## 2017-12-20 PROCEDURE — 77030018809 HC RETRCTR ALXSO DISP AMR -B: Performed by: COLON & RECTAL SURGERY

## 2017-12-20 PROCEDURE — 74011250636 HC RX REV CODE- 250/636

## 2017-12-20 PROCEDURE — 88304 TISSUE EXAM BY PATHOLOGIST: CPT | Performed by: COLON & RECTAL SURGERY

## 2017-12-20 PROCEDURE — 77030026438 HC STYL ET INTUB CARD -A: Performed by: ANESTHESIOLOGY

## 2017-12-20 PROCEDURE — 77030021678 HC GLIDESCP STAT DISP VERT -B: Performed by: ANESTHESIOLOGY

## 2017-12-20 PROCEDURE — 77030018521 HC STPLR ENDOSCOPIC J&J -C: Performed by: COLON & RECTAL SURGERY

## 2017-12-20 PROCEDURE — 76060000041 HC ANESTHESIA 5 TO 5.5 HR: Performed by: COLON & RECTAL SURGERY

## 2017-12-20 PROCEDURE — 82962 GLUCOSE BLOOD TEST: CPT

## 2017-12-20 PROCEDURE — 77030011244 HC DRN WND HUBLS J&J -B: Performed by: COLON & RECTAL SURGERY

## 2017-12-20 PROCEDURE — 77030013567 HC DRN WND RESERV BARD -A: Performed by: COLON & RECTAL SURGERY

## 2017-12-20 PROCEDURE — 74011000250 HC RX REV CODE- 250

## 2017-12-20 PROCEDURE — 0DNW0ZZ RELEASE PERITONEUM, OPEN APPROACH: ICD-10-PCS | Performed by: COLON & RECTAL SURGERY

## 2017-12-20 PROCEDURE — 65270000029 HC RM PRIVATE

## 2017-12-20 PROCEDURE — 74011000258 HC RX REV CODE- 258: Performed by: COLON & RECTAL SURGERY

## 2017-12-20 PROCEDURE — 77030034850: Performed by: COLON & RECTAL SURGERY

## 2017-12-20 PROCEDURE — 76010000137 HC OR TIME 5 TO 5.5 HR: Performed by: COLON & RECTAL SURGERY

## 2017-12-20 PROCEDURE — 74011250636 HC RX REV CODE- 250/636: Performed by: COLON & RECTAL SURGERY

## 2017-12-20 PROCEDURE — 74011636637 HC RX REV CODE- 636/637: Performed by: COLON & RECTAL SURGERY

## 2017-12-20 PROCEDURE — 77030002996 HC SUT SLK J&J -A: Performed by: COLON & RECTAL SURGERY

## 2017-12-20 PROCEDURE — 74011250637 HC RX REV CODE- 250/637: Performed by: COLON & RECTAL SURGERY

## 2017-12-20 PROCEDURE — 77030026102 HC DEV TISS ENSEAL G2 J&J -F: Performed by: COLON & RECTAL SURGERY

## 2017-12-20 PROCEDURE — 85025 COMPLETE CBC W/AUTO DIFF WBC: CPT | Performed by: COLON & RECTAL SURGERY

## 2017-12-20 PROCEDURE — 0DBB0ZZ EXCISION OF ILEUM, OPEN APPROACH: ICD-10-PCS | Performed by: COLON & RECTAL SURGERY

## 2017-12-20 PROCEDURE — 77030018836 HC SOL IRR NACL ICUM -A: Performed by: COLON & RECTAL SURGERY

## 2017-12-20 PROCEDURE — 88307 TISSUE EXAM BY PATHOLOGIST: CPT | Performed by: COLON & RECTAL SURGERY

## 2017-12-20 PROCEDURE — 77030011264 HC ELECTRD BLD EXT COVD -A: Performed by: COLON & RECTAL SURGERY

## 2017-12-20 PROCEDURE — 77030002916 HC SUT ETHLN J&J -A: Performed by: COLON & RECTAL SURGERY

## 2017-12-20 PROCEDURE — 77030020782 HC GWN BAIR PAWS FLX 3M -B

## 2017-12-20 PROCEDURE — C1765 ADHESION BARRIER: HCPCS | Performed by: COLON & RECTAL SURGERY

## 2017-12-20 PROCEDURE — 80048 BASIC METABOLIC PNL TOTAL CA: CPT | Performed by: COLON & RECTAL SURGERY

## 2017-12-20 PROCEDURE — 77030002966 HC SUT PDS J&J -A: Performed by: COLON & RECTAL SURGERY

## 2017-12-20 PROCEDURE — 77030012890

## 2017-12-20 PROCEDURE — 77030018673: Performed by: COLON & RECTAL SURGERY

## 2017-12-20 PROCEDURE — C9290 INJ, BUPIVACAINE LIPOSOME: HCPCS | Performed by: COLON & RECTAL SURGERY

## 2017-12-20 PROCEDURE — 77030002933 HC SUT MCRYL J&J -A: Performed by: COLON & RECTAL SURGERY

## 2017-12-20 PROCEDURE — 77030011265 HC ELECTRD BLD HEX COVD -A: Performed by: COLON & RECTAL SURGERY

## 2017-12-20 PROCEDURE — 77030031139 HC SUT VCRL2 J&J -A: Performed by: COLON & RECTAL SURGERY

## 2017-12-20 PROCEDURE — 77030011640 HC PAD GRND REM COVD -A: Performed by: COLON & RECTAL SURGERY

## 2017-12-20 PROCEDURE — 77030009978 HC RELD STPLR TCR J&J -B: Performed by: COLON & RECTAL SURGERY

## 2017-12-20 PROCEDURE — 77030032490 HC SLV COMPR SCD KNE COVD -B: Performed by: COLON & RECTAL SURGERY

## 2017-12-20 PROCEDURE — 77030027138 HC INCENT SPIROMETER -A

## 2017-12-20 PROCEDURE — 77030032490 HC SLV COMPR SCD KNE COVD -B

## 2017-12-20 RX ORDER — SODIUM CHLORIDE 0.9 % (FLUSH) 0.9 %
5-10 SYRINGE (ML) INJECTION EVERY 8 HOURS
Status: DISCONTINUED | OUTPATIENT
Start: 2017-12-20 | End: 2017-12-26 | Stop reason: HOSPADM

## 2017-12-20 RX ORDER — ALVIMOPAN 12 MG/1
12 CAPSULE ORAL ONCE
Status: COMPLETED | OUTPATIENT
Start: 2017-12-20 | End: 2017-12-20

## 2017-12-20 RX ORDER — ENOXAPARIN SODIUM 100 MG/ML
40 INJECTION SUBCUTANEOUS EVERY 24 HOURS
Status: DISCONTINUED | OUTPATIENT
Start: 2017-12-21 | End: 2017-12-26 | Stop reason: HOSPADM

## 2017-12-20 RX ORDER — PROPOFOL 10 MG/ML
INJECTION, EMULSION INTRAVENOUS AS NEEDED
Status: DISCONTINUED | OUTPATIENT
Start: 2017-12-20 | End: 2017-12-20 | Stop reason: HOSPADM

## 2017-12-20 RX ORDER — SUCCINYLCHOLINE CHLORIDE 20 MG/ML
INJECTION INTRAMUSCULAR; INTRAVENOUS AS NEEDED
Status: DISCONTINUED | OUTPATIENT
Start: 2017-12-20 | End: 2017-12-20 | Stop reason: HOSPADM

## 2017-12-20 RX ORDER — SODIUM CHLORIDE, SODIUM LACTATE, POTASSIUM CHLORIDE, CALCIUM CHLORIDE 600; 310; 30; 20 MG/100ML; MG/100ML; MG/100ML; MG/100ML
125 INJECTION, SOLUTION INTRAVENOUS CONTINUOUS
Status: DISCONTINUED | OUTPATIENT
Start: 2017-12-20 | End: 2017-12-20 | Stop reason: HOSPADM

## 2017-12-20 RX ORDER — METOPROLOL TARTRATE 50 MG/1
50 TABLET ORAL 2 TIMES DAILY
Status: DISCONTINUED | OUTPATIENT
Start: 2017-12-21 | End: 2017-12-26 | Stop reason: HOSPADM

## 2017-12-20 RX ORDER — MAGNESIUM SULFATE 100 %
4 CRYSTALS MISCELLANEOUS AS NEEDED
Status: DISCONTINUED | OUTPATIENT
Start: 2017-12-20 | End: 2017-12-26 | Stop reason: HOSPADM

## 2017-12-20 RX ORDER — BACLOFEN 10 MG/1
10 TABLET ORAL
Status: DISCONTINUED | OUTPATIENT
Start: 2017-12-20 | End: 2017-12-26 | Stop reason: HOSPADM

## 2017-12-20 RX ORDER — MIDAZOLAM HYDROCHLORIDE 1 MG/ML
2 INJECTION, SOLUTION INTRAMUSCULAR; INTRAVENOUS
Status: DISCONTINUED | OUTPATIENT
Start: 2017-12-20 | End: 2017-12-20 | Stop reason: HOSPADM

## 2017-12-20 RX ORDER — MORPHINE SULFATE IN 0.9 % NACL 150MG/30ML
PATIENT CONTROLLED ANALGESIA SYRINGE INTRAVENOUS
Status: DISCONTINUED | OUTPATIENT
Start: 2017-12-20 | End: 2017-12-21

## 2017-12-20 RX ORDER — GLYCOPYRROLATE 0.2 MG/ML
INJECTION INTRAMUSCULAR; INTRAVENOUS AS NEEDED
Status: DISCONTINUED | OUTPATIENT
Start: 2017-12-20 | End: 2017-12-20 | Stop reason: HOSPADM

## 2017-12-20 RX ORDER — DEXTROSE, SODIUM CHLORIDE, AND POTASSIUM CHLORIDE 5; .45; .15 G/100ML; G/100ML; G/100ML
125 INJECTION INTRAVENOUS CONTINUOUS
Status: DISCONTINUED | OUTPATIENT
Start: 2017-12-20 | End: 2017-12-21

## 2017-12-20 RX ORDER — ONDANSETRON 2 MG/ML
4 INJECTION INTRAMUSCULAR; INTRAVENOUS
Status: DISCONTINUED | OUTPATIENT
Start: 2017-12-20 | End: 2017-12-26 | Stop reason: HOSPADM

## 2017-12-20 RX ORDER — DIPHENHYDRAMINE HYDROCHLORIDE 50 MG/ML
12.5 INJECTION, SOLUTION INTRAMUSCULAR; INTRAVENOUS AS NEEDED
Status: DISCONTINUED | OUTPATIENT
Start: 2017-12-20 | End: 2017-12-20 | Stop reason: HOSPADM

## 2017-12-20 RX ORDER — NALOXONE HYDROCHLORIDE 0.4 MG/ML
0.2 INJECTION, SOLUTION INTRAMUSCULAR; INTRAVENOUS; SUBCUTANEOUS
Status: DISCONTINUED | OUTPATIENT
Start: 2017-12-20 | End: 2017-12-26 | Stop reason: HOSPADM

## 2017-12-20 RX ORDER — METOCLOPRAMIDE HYDROCHLORIDE 5 MG/ML
10 INJECTION INTRAMUSCULAR; INTRAVENOUS EVERY 8 HOURS
Status: DISCONTINUED | OUTPATIENT
Start: 2017-12-20 | End: 2017-12-26

## 2017-12-20 RX ORDER — SODIUM CHLORIDE 0.9 % (FLUSH) 0.9 %
5-10 SYRINGE (ML) INJECTION AS NEEDED
Status: DISCONTINUED | OUTPATIENT
Start: 2017-12-20 | End: 2017-12-26 | Stop reason: HOSPADM

## 2017-12-20 RX ORDER — NALOXONE HYDROCHLORIDE 0.4 MG/ML
0.2 INJECTION, SOLUTION INTRAMUSCULAR; INTRAVENOUS; SUBCUTANEOUS
Status: DISCONTINUED | OUTPATIENT
Start: 2017-12-20 | End: 2017-12-23

## 2017-12-20 RX ORDER — ONDANSETRON 2 MG/ML
INJECTION INTRAMUSCULAR; INTRAVENOUS AS NEEDED
Status: DISCONTINUED | OUTPATIENT
Start: 2017-12-20 | End: 2017-12-20 | Stop reason: HOSPADM

## 2017-12-20 RX ORDER — NEOSTIGMINE METHYLSULFATE 1 MG/ML
INJECTION INTRAVENOUS AS NEEDED
Status: DISCONTINUED | OUTPATIENT
Start: 2017-12-20 | End: 2017-12-20 | Stop reason: HOSPADM

## 2017-12-20 RX ORDER — HYDROMORPHONE HYDROCHLORIDE 2 MG/ML
.25-1 INJECTION, SOLUTION INTRAMUSCULAR; INTRAVENOUS; SUBCUTANEOUS
Status: DISCONTINUED | OUTPATIENT
Start: 2017-12-20 | End: 2017-12-20 | Stop reason: HOSPADM

## 2017-12-20 RX ORDER — DULOXETIN HYDROCHLORIDE 30 MG/1
120 CAPSULE, DELAYED RELEASE ORAL DAILY
Status: DISCONTINUED | OUTPATIENT
Start: 2017-12-21 | End: 2017-12-26 | Stop reason: HOSPADM

## 2017-12-20 RX ORDER — LORAZEPAM 2 MG/ML
1 INJECTION INTRAMUSCULAR
Status: DISCONTINUED | OUTPATIENT
Start: 2017-12-20 | End: 2017-12-23

## 2017-12-20 RX ORDER — INSULIN LISPRO 100 [IU]/ML
INJECTION, SOLUTION INTRAVENOUS; SUBCUTANEOUS EVERY 6 HOURS
Status: DISCONTINUED | OUTPATIENT
Start: 2017-12-21 | End: 2017-12-23

## 2017-12-20 RX ORDER — FENTANYL CITRATE 50 UG/ML
INJECTION, SOLUTION INTRAMUSCULAR; INTRAVENOUS AS NEEDED
Status: DISCONTINUED | OUTPATIENT
Start: 2017-12-20 | End: 2017-12-20 | Stop reason: HOSPADM

## 2017-12-20 RX ORDER — PANTOPRAZOLE SODIUM 40 MG/1
40 TABLET, DELAYED RELEASE ORAL DAILY
Status: DISCONTINUED | OUTPATIENT
Start: 2017-12-21 | End: 2017-12-26 | Stop reason: HOSPADM

## 2017-12-20 RX ORDER — DEXTROSE 50 % IN WATER (D50W) INTRAVENOUS SYRINGE
12.5-25 AS NEEDED
Status: DISCONTINUED | OUTPATIENT
Start: 2017-12-20 | End: 2017-12-26 | Stop reason: HOSPADM

## 2017-12-20 RX ORDER — FLUMAZENIL 0.1 MG/ML
0.2 INJECTION INTRAVENOUS
Status: DISCONTINUED | OUTPATIENT
Start: 2017-12-20 | End: 2017-12-20 | Stop reason: HOSPADM

## 2017-12-20 RX ORDER — ROCURONIUM BROMIDE 10 MG/ML
INJECTION, SOLUTION INTRAVENOUS AS NEEDED
Status: DISCONTINUED | OUTPATIENT
Start: 2017-12-20 | End: 2017-12-20 | Stop reason: HOSPADM

## 2017-12-20 RX ORDER — MIDAZOLAM HYDROCHLORIDE 1 MG/ML
INJECTION, SOLUTION INTRAMUSCULAR; INTRAVENOUS AS NEEDED
Status: DISCONTINUED | OUTPATIENT
Start: 2017-12-20 | End: 2017-12-20 | Stop reason: HOSPADM

## 2017-12-20 RX ORDER — LIDOCAINE HYDROCHLORIDE 10 MG/ML
0.1 INJECTION, SOLUTION EPIDURAL; INFILTRATION; INTRACAUDAL; PERINEURAL AS NEEDED
Status: DISCONTINUED | OUTPATIENT
Start: 2017-12-20 | End: 2017-12-20 | Stop reason: HOSPADM

## 2017-12-20 RX ORDER — TRIAMTERENE/HYDROCHLOROTHIAZID 37.5-25 MG
1 TABLET ORAL DAILY
Status: DISCONTINUED | OUTPATIENT
Start: 2017-12-21 | End: 2017-12-26 | Stop reason: HOSPADM

## 2017-12-20 RX ADMIN — ROCURONIUM BROMIDE 10 MG: 10 INJECTION, SOLUTION INTRAVENOUS at 17:25

## 2017-12-20 RX ADMIN — SODIUM CHLORIDE, SODIUM LACTATE, POTASSIUM CHLORIDE, AND CALCIUM CHLORIDE 125 ML/HR: 600; 310; 30; 20 INJECTION, SOLUTION INTRAVENOUS at 12:16

## 2017-12-20 RX ADMIN — SODIUM CHLORIDE, SODIUM LACTATE, POTASSIUM CHLORIDE, AND CALCIUM CHLORIDE: 600; 310; 30; 20 INJECTION, SOLUTION INTRAVENOUS at 17:28

## 2017-12-20 RX ADMIN — FENTANYL CITRATE 50 MCG: 50 INJECTION, SOLUTION INTRAMUSCULAR; INTRAVENOUS at 15:27

## 2017-12-20 RX ADMIN — FENTANYL CITRATE 50 MCG: 50 INJECTION, SOLUTION INTRAMUSCULAR; INTRAVENOUS at 15:58

## 2017-12-20 RX ADMIN — PROPOFOL 150 MG: 10 INJECTION, EMULSION INTRAVENOUS at 15:34

## 2017-12-20 RX ADMIN — ROCURONIUM BROMIDE 10 MG: 10 INJECTION, SOLUTION INTRAVENOUS at 16:45

## 2017-12-20 RX ADMIN — FENTANYL CITRATE 50 MCG: 50 INJECTION, SOLUTION INTRAMUSCULAR; INTRAVENOUS at 16:16

## 2017-12-20 RX ADMIN — FENTANYL CITRATE 50 MCG: 50 INJECTION, SOLUTION INTRAMUSCULAR; INTRAVENOUS at 18:13

## 2017-12-20 RX ADMIN — Medication: at 21:06

## 2017-12-20 RX ADMIN — FENTANYL CITRATE 50 MCG: 50 INJECTION, SOLUTION INTRAMUSCULAR; INTRAVENOUS at 15:23

## 2017-12-20 RX ADMIN — SUCCINYLCHOLINE CHLORIDE 100 MG: 20 INJECTION INTRAMUSCULAR; INTRAVENOUS at 15:34

## 2017-12-20 RX ADMIN — HYDROMORPHONE HYDROCHLORIDE 1 MG: 2 INJECTION INTRAMUSCULAR; INTRAVENOUS; SUBCUTANEOUS at 20:58

## 2017-12-20 RX ADMIN — HYDROMORPHONE HYDROCHLORIDE 1 MG: 2 INJECTION INTRAMUSCULAR; INTRAVENOUS; SUBCUTANEOUS at 21:10

## 2017-12-20 RX ADMIN — CEFOXITIN SODIUM 2 G: 2 POWDER, FOR SOLUTION INTRAVENOUS at 22:54

## 2017-12-20 RX ADMIN — SODIUM CHLORIDE, SODIUM LACTATE, POTASSIUM CHLORIDE, AND CALCIUM CHLORIDE: 600; 310; 30; 20 INJECTION, SOLUTION INTRAVENOUS at 15:51

## 2017-12-20 RX ADMIN — DEXTROSE MONOHYDRATE, SODIUM CHLORIDE, AND POTASSIUM CHLORIDE 125 ML/HR: 50; 4.5; 1.49 INJECTION, SOLUTION INTRAVENOUS at 21:07

## 2017-12-20 RX ADMIN — FENTANYL CITRATE 50 MCG: 50 INJECTION, SOLUTION INTRAMUSCULAR; INTRAVENOUS at 17:02

## 2017-12-20 RX ADMIN — CEFOXITIN 2 G: 2 INJECTION, POWDER, FOR SOLUTION INTRAVENOUS at 15:23

## 2017-12-20 RX ADMIN — NEOSTIGMINE METHYLSULFATE 3 MG: 1 INJECTION INTRAVENOUS at 19:45

## 2017-12-20 RX ADMIN — ROCURONIUM BROMIDE 15 MG: 10 INJECTION, SOLUTION INTRAVENOUS at 16:13

## 2017-12-20 RX ADMIN — ROCURONIUM BROMIDE 5 MG: 10 INJECTION, SOLUTION INTRAVENOUS at 15:58

## 2017-12-20 RX ADMIN — ONDANSETRON 4 MG: 2 INJECTION INTRAMUSCULAR; INTRAVENOUS at 15:50

## 2017-12-20 RX ADMIN — METOCLOPRAMIDE 10 MG: 5 INJECTION, SOLUTION INTRAMUSCULAR; INTRAVENOUS at 22:53

## 2017-12-20 RX ADMIN — ROCURONIUM BROMIDE 30 MG: 10 INJECTION, SOLUTION INTRAVENOUS at 15:38

## 2017-12-20 RX ADMIN — SODIUM CHLORIDE, SODIUM LACTATE, POTASSIUM CHLORIDE, AND CALCIUM CHLORIDE: 600; 310; 30; 20 INJECTION, SOLUTION INTRAVENOUS at 20:04

## 2017-12-20 RX ADMIN — ROCURONIUM BROMIDE 10 MG: 10 INJECTION, SOLUTION INTRAVENOUS at 17:03

## 2017-12-20 RX ADMIN — FENTANYL CITRATE 50 MCG: 50 INJECTION, SOLUTION INTRAMUSCULAR; INTRAVENOUS at 17:30

## 2017-12-20 RX ADMIN — MIDAZOLAM HYDROCHLORIDE 2 MG: 1 INJECTION, SOLUTION INTRAMUSCULAR; INTRAVENOUS at 15:23

## 2017-12-20 RX ADMIN — FENTANYL CITRATE 50 MCG: 50 INJECTION, SOLUTION INTRAMUSCULAR; INTRAVENOUS at 16:44

## 2017-12-20 RX ADMIN — FENTANYL CITRATE 50 MCG: 50 INJECTION, SOLUTION INTRAMUSCULAR; INTRAVENOUS at 17:45

## 2017-12-20 RX ADMIN — FENTANYL CITRATE 50 MCG: 50 INJECTION, SOLUTION INTRAMUSCULAR; INTRAVENOUS at 18:04

## 2017-12-20 RX ADMIN — INSULIN LISPRO 3 UNITS: 100 INJECTION, SOLUTION INTRAVENOUS; SUBCUTANEOUS at 23:44

## 2017-12-20 RX ADMIN — GLYCOPYRROLATE 0.6 MG: 0.2 INJECTION INTRAMUSCULAR; INTRAVENOUS at 19:45

## 2017-12-20 RX ADMIN — ROCURONIUM BROMIDE 20 MG: 10 INJECTION, SOLUTION INTRAVENOUS at 18:14

## 2017-12-20 RX ADMIN — ALVIMOPAN 12 MG: 12 CAPSULE ORAL at 12:16

## 2017-12-20 RX ADMIN — ROCURONIUM BROMIDE 10 MG: 10 INJECTION, SOLUTION INTRAVENOUS at 19:10

## 2017-12-20 NOTE — IP AVS SNAPSHOT
[facility] 
 
 
 [address] [facility] 
[phone] Patient: Karl Garcia MRN: [id number] ZI About your hospitalization You were admitted on:  2017 You last received care in the:  SF 4M POST SURG ORT 2 You were discharged on:  2017 Why you were hospitalized Your primary diagnosis was:  Ileostomy Status (Hcc) Your diagnoses also included:  Essential Hypertension, Dm Type 2 (Diabetes Mellitus, Type 2) (Hcc), Anxiety, Thrombocytopenia (Hcc) Things You Need To Do (next 8 weeks) Follow up with [facility] nursing Phone:  [phone] [facility] [address] [facility] Dec 27, 2017 Go to Antonino Guzman, [address] [place] [address] follow-up appointment. [facility] will call you to let you know a pick-up time. Phone:  [phone] [facility] [address] Discharge Orders None A check salvador indicates which time of day the medication should be taken. My Medications TAKE these medications as instructed Instructions Each Dose to Equal  
 Morning Noon Evening Bedtime ATIVAN 1 mg tablet Generic drug:  LORazepam  
   
Your last dose was: Your next dose is: Take 1 mg by mouth nightly. Indications: anxiety 1 mg  
    
   
   
   
  
 baclofen 10 mg tablet Commonly known as:  LIORESAL Your last dose was: Your next dose is: Take 10 mg by mouth daily as needed. 10 mg  
    
   
   
   
  
 CYMBALTA 60 mg capsule Generic drug:  DULoxetine Your last dose was: Your next dose is: Take 120 mg by mouth daily. 120 mg  
    
   
   
   
  
 * insulin lispro 100 unit/mL injection Commonly known as:  HUMALOG Your last dose was: Your next dose is: 20 Units by SubCUTAneous route two (2) times a day. Before breakfast and lunch 20 Units * insulin lispro 100 unit/mL injection Commonly known as:  HUMALOG Your last dose was: Your next dose is:    
   
   
 24 Units by SubCUTAneous route every evening. Before dinner 24 Units MAXZIDE-25MG 37.5-25 mg per tablet Generic drug:  triamterene-hydroCHLOROthiazide Your last dose was: Your next dose is: Take 1 Tab by mouth daily. Indications: HYPERTENSION  
 1 Tab  
    
   
   
   
  
 metoprolol tartrate 50 mg tablet Commonly known as:  LOPRESSOR Your last dose was: Your next dose is: Take 50 mg by mouth two (2) times a day. 50 mg MOTRIN 600 mg tablet Generic drug:  ibuprofen Your last dose was: Your next dose is: Take 600 mg by mouth every six (6) hours as needed for Pain. 600 mg NovoLIN N 100 unit/mL injection Generic drug:  insulin NPH Your last dose was: Your next dose is:    
   
   
 74 Units by SubCUTAneous route nightly. 74 Units  
    
   
   
   
  
 omeprazole 40 mg capsule Commonly known as:  PRILOSEC Your last dose was: Your next dose is: Take 40 mg by mouth daily. 40 mg  
    
   
   
   
  
 traMADol 50 mg tablet Commonly known as:  ULTRAM  
   
Your last dose was: Your next dose is: Take 1 Tab by mouth every six (6) hours as needed. Max Daily Amount: 200 mg.  
 50 mg  
    
   
   
   
  
 ZOFRAN (AS HYDROCHLORIDE) 4 mg tablet Generic drug:  ondansetron hcl Your last dose was: Your next dose is: Take 4 mg by mouth every four (4) hours as needed for Nausea. 4 mg * Notice:   This list has 2 medication(s) that are the same as other medications prescribed for you. Read the directions carefully, and ask your doctor or other care provider to review them with you. Where to Get Your Medications Information on where to get these meds will be given to you by the nurse or doctor. ! Ask your nurse or doctor about these medications  
  traMADol 50 mg tablet Discharge Instructions Primitivo Navarro MD, FACS Maira Copeland. Eder Huerta MD, FACS Naye Condon MD, FACS Angela Jeffrey. Seema Campos MD, FACS Yobani Killian MD, FACS MD Ángel Wesley MD 
 
[facility] Discharge Instructions for Esthela Corado 1. Diagnosis: reversal loop ileostomy via midline incision, with complex abdominal closure 2. Regular diet 3. Do not drive for 2 weeks or until after your next doctors appointment. 4. May take a shower. 5. No lifting any objects weighing more than 25 pounds. Do not do any housework, such as vacuuming, scrubbing, etc for at least a month. 6. Wear your abdominal binder snug every day 7. When you get tired during the day, take naps, as you need your rest. 
8. Multiple bowel movements are normal each day for a while. 9. May walk as desired. May go up and down stairs. 10. Wound care: cover right abdominal open wound with clean, dry 4x4, twice a day and when wet 11. Please take all dressings off when in shower. Please gently was all your incisions with soap and water daily 12. Take pain medication as prescribed: (NO DRIVING WHILE ON PAIN MEDICATIONS). Ultram 50mg EVERY 4-6 HOURS AS NEEDED. Other Medications: Tylenol 650mg every 6 hours as needed for pain (OTC) 13. See me in the office in 10-14 days. Call as soon as discharged for an appointment . Call the Exchange [phone], if you have any questions or problems after office hours. Introducing [address]!    
 Dear Ariela Gutierrez: 
 Thank you for requesting a [employer] account. Our records indicate that you already have an active [employer] account. You can access your account anytime at [email or web address] Did you know that you can access your hospital and ER discharge instructions at any time in [employer]? You can also review all of your test results from your hospital stay or ER visit. Additional Information If you have questions, please visit the Frequently Asked Questions section of the [employer] website at [email or web address] Remember, [employer] is NOT to be used for urgent needs. For medical emergencies, dial 911. Now available from your iPhone and Android! Providers Seen During Your Hospitalization Provider Specialty Primary office phone Christiano Brown MD Colon and Rectal Surgery [phone] Your Primary Care Physician (PCP) Primary Care Physician Office Phone Office Fax Michelle Brandy [phone] You are allergic to the following Allergen Reactions Tylenol (Acetaminophen) Other (comments) Liver disorder. HAS HEMATOMACROSIS Claritin (Loratadine) Other (comments) Panic attack Codeine Other (comments)  
 hallucinations Percocet (Oxycodone-Acetaminophen) Other (comments)  
 hallucinations Recent Documentation Weight Breastfeeding? BMI OB Status Smoking Status 93.4 kg No 38.92 kg/m2 Hysterectomy Former Smoker Emergency Contacts Name Discharge Info Relation Home Work Mobile Margarette Estrella DISCHARGE CAREGIVER [3] Daughter [21] [phone] [facility] DISCHARGE CAREGIVER [3] Brother [24] [phone] Patient Belongings  The following personal items are in your possession at time of discharge: 
  Dental Appliances: None  Visual Aid: Glasses, At bedside   Hearing Aids/Status: Does not own  Home Medications: None   Jewelry: None Clothing: Pants, Undergarments, Shirt, Footwear, Socks (placed in PACU)    Other Valuables: None Please provide this summary of care documentation to your next provider. Signatures-by signing, you are acknowledging that this After Visit Summary has been reviewed with you and you have received a copy. Patient Signature:  ____________________________________________________________ Date:  ____________________________________________________________  
  
Nanetta Rockford Provider Signature:  ____________________________________________________________ Date:  ____________________________________________________________

## 2017-12-20 NOTE — H&P
Colon and Rectal Surgery History and Physical    Subjective:      Juana Benítez is a 81 y.o. female who is well known to me for a history of complicated diverticular disease, colovaginal fistula. Underwent laparoscopic sigmoid colectomy, colorectal anstomosis, diverting loop ileostomy in October.  Now in need of reversal. 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    Hyponatremia 09/11/2013    Cholangitis 09/11/2013    Hemochromatosis 09/10/2013    Type II or unspecified type diabetes mellitus without mention of complication, not stated as uncontrolled 09/10/2013    Unspecified essential hypertension 09/10/2013     Past Medical History:   Diagnosis Date    Arthritis     BACK    Depression     Diabetes ([address]     TYPE II    Diverticulitis     Fatty liver     hemachromatosis    Fibromyalgia     Hemochromatosis     Hypertension     Irregular heart beat     Pancreatic cancer ([address] 2013    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    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    Unspecified sleep apnea     NO C-PAP      Past Surgical History:   Procedure Laterality Date    HX APPENDECTOMY      HX BREAST LUMPECTOMY Left     lumpectomy BENIGN    HX COLECTOMY      sigmoid    HX COLONOSCOPY      HX HYSTERECTOMY      hysto    HX ORTHOPAEDIC Left     shoulder CYST REMOVAL    HX OTHER SURGICAL  12/31/13    pyloric-sparing whipple,bx of portal and hepatic nodes by Dr Rush Press      ileostomy    HX SALPINGO-OOPHORECTOMY Bilateral     HX TONSILLECTOMY      HX UROLOGICAL  2008    [facility]      Social History   Substance Use Topics    Smoking status: Former Smoker Packs/day: 1.00     Years: 17.00     Quit date: 12/27/1983    Smokeless tobacco: Never Used    Alcohol use No      Family History   Problem Relation Age of Onset    Heart Failure Mother     Diabetes Mother     Hypertension Mother     Heart Failure Father     Kidney Disease Father      WAS ON DIALYSIS    Diabetes Sister     Depression Sister     Depression Sister     Other Sister      DIFFICULTY INTUBATING    Asthma Sister       Prior to Admission medications    Medication Sig Start Date End Date Taking? Authorizing Provider   insulin NPH (NOVOLIN N) 100 unit/mL injection 74 Units by SubCUTAneous route nightly. Yes Historical Provider   metoprolol (LOPRESSOR) 50 mg tablet Take 50 mg by mouth two (2) times a day. Yes Historical Provider   omeprazole (PRILOSEC) 40 mg capsule Take 40 mg by mouth daily. Yes Historical Provider   baclofen (LIORESAL) 10 mg tablet Take 10 mg by mouth daily as needed. Yes Historical Provider   insulin lispro (HUMALOG) 100 unit/mL injection 20 Units by SubCUTAneous route two (2) times a day. Before breakfast and lunch   Yes Historical Provider   insulin lispro (HUMALOG) 100 unit/mL injection 24 Units by SubCUTAneous route every evening. Before dinner   Yes Historical Provider   triamterene-hydrochlorothiazide (MAXZIDE-25MG) 37.5-25 mg per tablet Take 1 Tab by mouth daily. Indications: HYPERTENSION   Yes Phys Other, MD   DULoxetine (CYMBALTA) 60 mg capsule Take 120 mg by mouth two (2) times a day. Yes Phys Other, MD   LORazepam (ATIVAN) 1 mg tablet Take 1 mg by mouth nightly. Indications: anxiety    Historical Provider   ondansetron hcl (ZOFRAN) 4 mg tablet Take 4 mg by mouth every four (4) hours as needed for Nausea. Historical Provider   ibuprofen (MOTRIN) 600 mg tablet Take 600 mg by mouth every six (6) hours as needed for Pain. Historical Provider     Allergies   Allergen Reactions    Tylenol [Acetaminophen] Other (comments)     Liver disorder.  HAS HEMATOMACROSIS    Claritin [Loratadine] Other (comments)     Panic attack    Codeine Other (comments)     hallucinations    Percocet [Oxycodone-Acetaminophen] Other (comments)     hallucinations        Review of Systems:    A comprehensive review of systems was negative except for that written in the History of Present Illness. Objective:     Visit Vitals    /83 (BP 1 Location: Right arm, BP Patient Position: At rest)    Pulse 67    Temp 98.3 °F (36.8 °C)    Resp 17    SpO2 96%        Physical Exam:   Gen: a&o xe, nad  Lungs: CTA b  CV: rrr  Abd: loop ileostomy right abdomen    Imaging:  images and reports reviewed    Lab Review:    Recent Results (from the past 24 hour(s))   GLUCOSE, POC    Collection Time: 12/20/17 12:15 PM   Result Value Ref Range    Glucose (POC) 106 (H) 65 - 100 mg/dL    Performed by Braden Sylvester and radiology: images and reports reviewed      Assessment:     Ileostomy status    Plan:     Proceed with reversal loop ileostomy. Reviewed risks, benefits, alternatives with pt. Understands risks and agrees to proceed. All questions answered. Pt refusing type and screen, refusing blood products if needed as she is a Jehova's witness.     Signed By: Pito Cali MD     December 20, 2017

## 2017-12-20 NOTE — ANESTHESIA PREPROCEDURE EVALUATION
Anesthetic History   No history of anesthetic complications            Review of Systems / Medical History  Patient summary reviewed, nursing notes reviewed and pertinent labs reviewed    Pulmonary        Sleep apnea           Neuro/Psych         Psychiatric history     Cardiovascular    Hypertension                   GI/Hepatic/Renal           Liver disease     Endo/Other    Diabetes    Arthritis     Other Findings   Comments: Hemochromatosis controlled with phlebotomy,          Physical Exam    Airway  Mallampati: III  TM Distance: < 4 cm  Neck ROM: normal range of motion   Mouth opening: Diminished (comment)     Cardiovascular    Rhythm: regular  Rate: normal         Dental    Dentition: Bridges and Caps/crowns     Pulmonary  Breath sounds clear to auscultation               Abdominal         Other Findings            Anesthetic Plan    ASA: 3  Anesthesia type: general            Anesthetic plan and risks discussed with: Patient

## 2017-12-21 LAB
ANION GAP SERPL CALC-SCNC: 7 MMOL/L (ref 5–15)
BASOPHILS # BLD: 0 K/UL (ref 0–0.1)
BASOPHILS NFR BLD: 0 % (ref 0–1)
BUN SERPL-MCNC: 18 MG/DL (ref 6–20)
BUN/CREAT SERPL: 16 (ref 12–20)
CALCIUM SERPL-MCNC: 8.1 MG/DL (ref 8.5–10.1)
CHLORIDE SERPL-SCNC: 103 MMOL/L (ref 97–108)
CO2 SERPL-SCNC: 27 MMOL/L (ref 21–32)
CREAT SERPL-MCNC: 1.12 MG/DL (ref 0.55–1.02)
EOSINOPHIL # BLD: 0 K/UL (ref 0–0.4)
EOSINOPHIL NFR BLD: 0 % (ref 0–7)
ERYTHROCYTE [DISTWIDTH] IN BLOOD BY AUTOMATED COUNT: 13.2 % (ref 11.5–14.5)
GLUCOSE BLD STRIP.AUTO-MCNC: 173 MG/DL (ref 65–100)
GLUCOSE BLD STRIP.AUTO-MCNC: 205 MG/DL (ref 65–100)
GLUCOSE BLD STRIP.AUTO-MCNC: 237 MG/DL (ref 65–100)
GLUCOSE SERPL-MCNC: 256 MG/DL (ref 65–100)
HCT VFR BLD AUTO: 32.8 % (ref 35–47)
HGB BLD-MCNC: 11.3 G/DL (ref 11.5–16)
LYMPHOCYTES # BLD: 0.9 K/UL (ref 0.8–3.5)
LYMPHOCYTES NFR BLD: 8 % (ref 12–49)
MAGNESIUM SERPL-MCNC: 1.6 MG/DL (ref 1.6–2.4)
MCH RBC QN AUTO: 30.7 PG (ref 26–34)
MCHC RBC AUTO-ENTMCNC: 34.5 G/DL (ref 30–36.5)
MCV RBC AUTO: 89.1 FL (ref 80–99)
MONOCYTES # BLD: 0.8 K/UL (ref 0–1)
MONOCYTES NFR BLD: 6 % (ref 5–13)
NEUTS SEG # BLD: 10.6 K/UL (ref 1.8–8)
NEUTS SEG NFR BLD: 86 % (ref 32–75)
PHOSPHATE SERPL-MCNC: 5.5 MG/DL (ref 2.6–4.7)
PLATELET # BLD AUTO: 172 K/UL (ref 150–400)
POTASSIUM SERPL-SCNC: 5.1 MMOL/L (ref 3.5–5.1)
RBC # BLD AUTO: 3.68 M/UL (ref 3.8–5.2)
SERVICE CMNT-IMP: ABNORMAL
SODIUM SERPL-SCNC: 137 MMOL/L (ref 136–145)
WBC # BLD AUTO: 12.4 K/UL (ref 3.6–11)

## 2017-12-21 PROCEDURE — 74011636637 HC RX REV CODE- 636/637: Performed by: COLON & RECTAL SURGERY

## 2017-12-21 PROCEDURE — 36415 COLL VENOUS BLD VENIPUNCTURE: CPT | Performed by: COLON & RECTAL SURGERY

## 2017-12-21 PROCEDURE — 77030036554

## 2017-12-21 PROCEDURE — 74011000258 HC RX REV CODE- 258: Performed by: COLON & RECTAL SURGERY

## 2017-12-21 PROCEDURE — 85025 COMPLETE CBC W/AUTO DIFF WBC: CPT | Performed by: COLON & RECTAL SURGERY

## 2017-12-21 PROCEDURE — 83036 HEMOGLOBIN GLYCOSYLATED A1C: CPT | Performed by: COLON & RECTAL SURGERY

## 2017-12-21 PROCEDURE — 83735 ASSAY OF MAGNESIUM: CPT | Performed by: COLON & RECTAL SURGERY

## 2017-12-21 PROCEDURE — 74011250637 HC RX REV CODE- 250/637: Performed by: COLON & RECTAL SURGERY

## 2017-12-21 PROCEDURE — 65270000029 HC RM PRIVATE

## 2017-12-21 PROCEDURE — 77010033678 HC OXYGEN DAILY

## 2017-12-21 PROCEDURE — 82962 GLUCOSE BLOOD TEST: CPT

## 2017-12-21 PROCEDURE — 80048 BASIC METABOLIC PNL TOTAL CA: CPT | Performed by: COLON & RECTAL SURGERY

## 2017-12-21 PROCEDURE — 74011250636 HC RX REV CODE- 250/636: Performed by: COLON & RECTAL SURGERY

## 2017-12-21 PROCEDURE — 84100 ASSAY OF PHOSPHORUS: CPT | Performed by: COLON & RECTAL SURGERY

## 2017-12-21 RX ORDER — TRAMADOL HYDROCHLORIDE 50 MG/1
50 TABLET ORAL
Status: DISCONTINUED | OUTPATIENT
Start: 2017-12-21 | End: 2017-12-26 | Stop reason: HOSPADM

## 2017-12-21 RX ORDER — INSULIN GLARGINE 100 [IU]/ML
19 INJECTION, SOLUTION SUBCUTANEOUS DAILY
Status: DISCONTINUED | OUTPATIENT
Start: 2017-12-21 | End: 2017-12-26 | Stop reason: HOSPADM

## 2017-12-21 RX ORDER — HYDROMORPHONE HYDROCHLORIDE 2 MG/ML
0.5 INJECTION, SOLUTION INTRAMUSCULAR; INTRAVENOUS; SUBCUTANEOUS
Status: DISCONTINUED | OUTPATIENT
Start: 2017-12-21 | End: 2017-12-26 | Stop reason: HOSPADM

## 2017-12-21 RX ORDER — KETOROLAC TROMETHAMINE 30 MG/ML
15 INJECTION, SOLUTION INTRAMUSCULAR; INTRAVENOUS EVERY 8 HOURS
Status: COMPLETED | OUTPATIENT
Start: 2017-12-21 | End: 2017-12-26

## 2017-12-21 RX ORDER — SODIUM CHLORIDE 9 MG/ML
75 INJECTION, SOLUTION INTRAVENOUS CONTINUOUS
Status: DISCONTINUED | OUTPATIENT
Start: 2017-12-21 | End: 2017-12-22

## 2017-12-21 RX ADMIN — METOCLOPRAMIDE 10 MG: 5 INJECTION, SOLUTION INTRAMUSCULAR; INTRAVENOUS at 06:16

## 2017-12-21 RX ADMIN — ENOXAPARIN SODIUM 40 MG: 40 INJECTION SUBCUTANEOUS at 12:28

## 2017-12-21 RX ADMIN — Medication 10 ML: at 14:30

## 2017-12-21 RX ADMIN — METOCLOPRAMIDE 10 MG: 5 INJECTION, SOLUTION INTRAMUSCULAR; INTRAVENOUS at 22:51

## 2017-12-21 RX ADMIN — SODIUM CHLORIDE 75 ML/HR: 900 INJECTION, SOLUTION INTRAVENOUS at 20:22

## 2017-12-21 RX ADMIN — PANTOPRAZOLE SODIUM 40 MG: 40 TABLET, DELAYED RELEASE ORAL at 08:35

## 2017-12-21 RX ADMIN — METOPROLOL TARTRATE 50 MG: 50 TABLET ORAL at 18:50

## 2017-12-21 RX ADMIN — CEFOXITIN SODIUM 2 G: 2 POWDER, FOR SOLUTION INTRAVENOUS at 06:16

## 2017-12-21 RX ADMIN — KETOROLAC TROMETHAMINE 15 MG: 30 INJECTION, SOLUTION INTRAMUSCULAR at 14:26

## 2017-12-21 RX ADMIN — Medication 10 ML: at 06:17

## 2017-12-21 RX ADMIN — DULOXETINE HYDROCHLORIDE 120 MG: 30 CAPSULE, DELAYED RELEASE ORAL at 12:28

## 2017-12-21 RX ADMIN — METOPROLOL TARTRATE 50 MG: 50 TABLET ORAL at 08:35

## 2017-12-21 RX ADMIN — METOCLOPRAMIDE 10 MG: 5 INJECTION, SOLUTION INTRAMUSCULAR; INTRAVENOUS at 14:26

## 2017-12-21 RX ADMIN — TRIAMTERENE AND HYDROCHLOROTHIAZIDE 1 TABLET: 37.5; 25 TABLET ORAL at 08:35

## 2017-12-21 RX ADMIN — LORAZEPAM 1 MG: 2 INJECTION INTRAMUSCULAR; INTRAVENOUS at 20:37

## 2017-12-21 RX ADMIN — HYDROMORPHONE HYDROCHLORIDE 0.5 MG: 2 INJECTION INTRAMUSCULAR; INTRAVENOUS; SUBCUTANEOUS at 14:26

## 2017-12-21 RX ADMIN — INSULIN LISPRO 3 UNITS: 100 INJECTION, SOLUTION INTRAVENOUS; SUBCUTANEOUS at 06:15

## 2017-12-21 RX ADMIN — SODIUM CHLORIDE 75 ML/HR: 900 INJECTION, SOLUTION INTRAVENOUS at 08:35

## 2017-12-21 RX ADMIN — Medication 10 ML: at 22:51

## 2017-12-21 RX ADMIN — INSULIN GLARGINE 19 UNITS: 100 INJECTION, SOLUTION SUBCUTANEOUS at 12:29

## 2017-12-21 RX ADMIN — INSULIN LISPRO 3 UNITS: 100 INJECTION, SOLUTION INTRAVENOUS; SUBCUTANEOUS at 12:31

## 2017-12-21 RX ADMIN — INSULIN LISPRO 2 UNITS: 100 INJECTION, SOLUTION INTRAVENOUS; SUBCUTANEOUS at 18:49

## 2017-12-21 RX ADMIN — HYDROMORPHONE HYDROCHLORIDE 0.5 MG: 2 INJECTION INTRAMUSCULAR; INTRAVENOUS; SUBCUTANEOUS at 19:08

## 2017-12-21 RX ADMIN — KETOROLAC TROMETHAMINE 15 MG: 30 INJECTION, SOLUTION INTRAMUSCULAR at 22:51

## 2017-12-21 RX ADMIN — LORAZEPAM 1 MG: 2 INJECTION INTRAMUSCULAR; INTRAVENOUS at 09:35

## 2017-12-21 RX ADMIN — Medication 10 ML: at 05:05

## 2017-12-21 RX ADMIN — DEXTROSE MONOHYDRATE, SODIUM CHLORIDE, AND POTASSIUM CHLORIDE 125 ML/HR: 50; 4.5; 1.49 INJECTION, SOLUTION INTRAVENOUS at 05:05

## 2017-12-21 NOTE — PROGRESS NOTES
0930: Patient with complaints of anxiety. Pulling at lines and gown. Reports \"I just need to get out of this bed. Reports taking ativan 3 times daily PRN. Assisted to chair. IV ativan given. Will continue to monitor. 1340: Patient reports increased anxiety with morphine PCA. Reports 9/10 pain to abdomen. Requesting a change in pain medication. Notified Dr. Sheila Tucker. Orders to discontinue morphine PCA. Orders for dilaudid, toradol and tramadol. Will continue to monitor pain. 1415: Patient dressing changed per order. Patient tolerated well. Reports decrease in pain after medication dose. No complaints of anxiety. Bedside shift change report given to [address] (oncoming nurse) by Belinda Husain RN (offgoing nurse). Report included the following information SBAR, Kardex, Intake/Output, MAR and Recent Results.

## 2017-12-21 NOTE — PROGRESS NOTES
Rounded on patient. Patient speaks fluent Tulio Sandoval. No more cultural navigation needs at this time.

## 2017-12-21 NOTE — PROGRESS NOTES
12/21/2017  4:22 PM  CM met with pt for assessment. Demographics and PCP were confirmed. Pt is a 81year old,  female who lives in a private residence alone (0 exterior steps, 0 interior steps). PTA, pt was able to complete ADLs with the use of DME. Pt has a RW to assist during recovery. Pt has prescription drug coverage, and prefers [facility] DR LEONEL MARCOS at [facility]. No discharge service needs indicated. CM will continue to monitor for finalized discharge recommendations. Damien Hummel MA    Care Management Interventions  PCP Verified by CM: Yes Marisela Camargo  Palliative Care Criteria Met (RRAT>21 & CHF Dx)?: No  Mode of Transport at Discharge:  Other (see comment) (Friend)  MyChart Signup: No  Discharge Durable Medical Equipment: No  Physical Therapy Consult: No  Occupational Therapy Consult: No  Speech Therapy Consult: No  Current Support Network: Lives Alone  Confirm Follow Up Transport: Friends  Plan discussed with Pt/Family/Caregiver: Yes  Discharge Location  Discharge Placement: Home with family assistance

## 2017-12-21 NOTE — PROGRESS NOTES
CRS  Pt without complaints  Flowsheet, labs reviewed  Abd: dressing intact  Drains: serosang    Plan:  Clears, hyperglycemia- adjust maint ivf, take dextrose out. Await diabetes team recs. Keep gandara today. Mobilize. Abdominal binder at all times.  pulm toilet

## 2017-12-21 NOTE — BRIEF OP NOTE
BRIEF OPERATIVE NOTE    Date of Procedure: 12/20/2017   Preoperative Diagnosis: 1) loop ileostomy status, 2) history of sigmoid colectomy, colorectal anastomosis, diverting loop ileostomy for diverticulitis with colovaginal fistula  Postoperative Diagnosis: 1)same, 2)dense intra-abdominal adhesions   Procedure(s):  1) laparotomy, 2) lysis of adhesions >2hrs, 3) sb resection x2 (loop ileostomy, additional segment of terminal ileum), 4) side-to-side terminal ileal to terminal ileal anastomosis, 5) repair serosal rent terminal ileum, 6) mobilization of fascial flaps with closure of abdomen with interrupted figure of 8, 7) 19Fr drain placement into abdominal cavity (superior drain), 8)19Fr drain placement anterior to fascial closure    Surgeon(s) and Role:     * Samantha Medina MD - Primary         Assistant Staff:       Surgical Staff:  Circ-1: Nabeel Gleason RN  Circ-Relief: Treva Hadley RN  Scrub Tech-1: Francisco J Washburn  Surg Asst-1: Mushtaq Zheng;  Oryao Hendrickson  Float Staff: Kush Daniels  Event Time In   Incision Start 1559   Incision Close      Anesthesia: General   Estimated Blood Loss: 150mL  Specimens:   ID Type Source Tests Collected by Time Destination   1 : loop ileostomy, small bowel Preservative Abdomen  Samantha Medina MD 12/20/2017 6368 Pathology      Findings: dense intra-abdominal adhesions requiring midline laparotomy to reverse loop ileostomy   Complications: none  Implants: * No implants in log *

## 2017-12-21 NOTE — ANESTHESIA POSTPROCEDURE EVALUATION
Post-Anesthesia Evaluation and Assessment    Patient: Lisa Stafford MRN: [id number]  SSN: [id number]    YOB: 1945  Age: 81 y.o. Sex: female       Cardiovascular Function/Vital Signs  Visit Vitals    /48    Pulse 77    Temp 36.8 °C (98.2 °F)    Resp 10    SpO2 100%       Patient is status post general anesthesia for Procedure(s): ILEOSTOMY REVERSAL, lysis of adhensions. .    Nausea/Vomiting: None    Postoperative hydration reviewed and adequate. Pain:  Pain Scale 1: Numeric (0 - 10) (12/20/17 2111)  Pain Intensity 1: 8 (12/20/17 2111)   Managed    Neurological Status:   Neuro (WDL): Within Defined Limits (12/20/17 2112)  Neuro  Neurologic State: Alert;Eyes open spontaneously (12/20/17 2112)  Orientation Level: Oriented X4 (12/20/17 2112)  Cognition: Follows commands (12/20/17 2112)  Speech: Clear (12/20/17 2112)  LUE Motor Response: Purposeful (12/20/17 2112)  LLE Motor Response: Purposeful (12/20/17 2112)  RUE Motor Response: Purposeful (12/20/17 2112)  RLE Motor Response: Purposeful (12/20/17 2112)   At baseline    Mental Status and Level of Consciousness: Arousable    Pulmonary Status:   O2 Device: Nasal cannula (12/20/17 2112)   Adequate oxygenation and airway patent    Complications related to anesthesia: None    Post-anesthesia assessment completed.  No concerns    Signed By: Brian Parish MD     December 20, 2017

## 2017-12-21 NOTE — PERIOP NOTES
TRANSFER - OUT REPORT:    Verbal report given to Neil(name) on Kirsten Kaur  being transferred to [facility] routine post - op       Report consisted of patients Situation, Background, Assessment and   Recommendations(SBAR). Information from the following report(s) SBAR, Kardex, Procedure Summary, Intake/Output, MAR and Recent Results was reviewed with the receiving nurse. Lines:   Peripheral IV 12/20/17 Left Forearm (Active)   Site Assessment Clean, dry, & intact 12/20/2017  9:27 PM   Phlebitis Assessment 0 12/20/2017  9:27 PM   Infiltration Assessment 0 12/20/2017  9:27 PM   Dressing Status Clean, dry, & intact; Occlusive 12/20/2017  9:27 PM   Dressing Type Tape;Transparent 12/20/2017  9:27 PM   Hub Color/Line Status Pink; Infusing 12/20/2017  9:27 PM   Action Taken Open ports on tubing capped 12/20/2017 12:14 PM   Alcohol Cap Used Yes 12/20/2017 12:14 PM        Opportunity for questions and clarification was provided.       Patient transported with:   O2 @ 2 liters  Registered Nurse

## 2017-12-21 NOTE — PROGRESS NOTES
Bedside and Verbal shift change report given to Keith Mejia (oncoming nurse) by Nory Ortez RN (offgoing nurse). Report included the following information SBAR, Kardex, Procedure Summary, MAR and Accordion.

## 2017-12-21 NOTE — PROGRESS NOTES
Primary Nurse Francy Fernandez and Ravi Joya RN performed a dual skin assessment on this patient Impairment noted- see wound doc flow sheet  Anderson score is 18

## 2017-12-21 NOTE — DIABETES MGMT
DTC Consult Note         Recommendations/ Comments:  Plan:   1. Hemoglobin A1C with next lab draw   2. Continuing Humalog every 6 hours while NPO; switch to before meals and bedtime when resuming po - normal sensitivity scale   3. Start Lantus 19 units while inpatient      Obtained weight per RN: 206lb - 93.6kg     Entered into [address] [place] [address] - MD Mamta Choi. Omer Lazcano, MPH, RN, BSN, [address]   [phone] (office)  [phone] (pager)        CrCl cannot be calculated (Unknown ideal weight.). POC Glucose last 24hrs:   Lab Results   Component Value Date/Time     (H) 12/21/2017 04:10 AM     (H) 12/20/2017 08:36 PM    GLUCPOC 237 (H) 12/21/2017 05:09 AM    GLUCPOC 203 (H) 12/20/2017 11:18 PM    GLUCPOC 144 (H) 12/20/2017 08:51 PM        Inpatient medications for glucose management:  1. Correction Scale: Lispro (Humalog) Normal Sensitivity scale to cover for glucose > 139 mg/dL Every 6 hours     Consult received from Pito Cali MD for  []    Assessment of home management  []    Meal planning  []    Meter / Monitoring  []    New Diagnosis  []    Insulin education  []    Insulin pump notification  []    Medication recommendations  [x]    Hospital glucose management  []    Angelatopher Mcdaniel  []    Outpatient Education - Information forwarded to [address] [place] [address] who will follow-up with pt 1 week after discharge    Chart reviewed and initial evaluation complete on Cb Vivar is a 82 yo female with a past medical history relevant for  DM type 2, per Dr. Pito Cali MD's H&P dated 12/20/2017. Lab Results   Component Value Date/Time    Hemoglobin A1c 7.7 04/20/2015 02:30 AM       Thank you. Mamta Shukla.  Omer Lazcano, MPH, BSN, [address] [place] [address]

## 2017-12-21 NOTE — PROGRESS NOTES
Problem: Falls - Risk of  Goal: *Absence of Falls  Document Ghassan Fall Risk and appropriate interventions in the flowsheet.    Outcome: Progressing Towards Goal  Fall Risk Interventions:  Mobility Interventions: Bed/chair exit alarm, Communicate number of staff needed for ambulation/transfer, Mechanical lift, OT consult for ADLs, Patient to call before getting OOB         Medication Interventions: Bed/chair exit alarm, Patient to call before getting OOB, Teach patient to arise slowly    Elimination Interventions: Bed/chair exit alarm, Call light in reach, Patient to call for help with toileting needs, Toilet paper/wipes in reach, Toileting schedule/hourly rounds

## 2017-12-22 LAB
ANION GAP SERPL CALC-SCNC: 5 MMOL/L (ref 5–15)
BASOPHILS # BLD: 0 K/UL (ref 0–0.1)
BASOPHILS NFR BLD: 0 % (ref 0–1)
BUN SERPL-MCNC: 14 MG/DL (ref 6–20)
BUN/CREAT SERPL: 15 (ref 12–20)
CALCIUM SERPL-MCNC: 8.2 MG/DL (ref 8.5–10.1)
CHLORIDE SERPL-SCNC: 101 MMOL/L (ref 97–108)
CO2 SERPL-SCNC: 28 MMOL/L (ref 21–32)
CREAT SERPL-MCNC: 0.94 MG/DL (ref 0.55–1.02)
EOSINOPHIL # BLD: 0.3 K/UL (ref 0–0.4)
EOSINOPHIL NFR BLD: 3 % (ref 0–7)
ERYTHROCYTE [DISTWIDTH] IN BLOOD BY AUTOMATED COUNT: 13.6 % (ref 11.5–14.5)
EST. AVERAGE GLUCOSE BLD GHB EST-MCNC: 120 MG/DL
GLUCOSE BLD STRIP.AUTO-MCNC: 123 MG/DL (ref 65–100)
GLUCOSE BLD STRIP.AUTO-MCNC: 154 MG/DL (ref 65–100)
GLUCOSE BLD STRIP.AUTO-MCNC: 156 MG/DL (ref 65–100)
GLUCOSE BLD STRIP.AUTO-MCNC: 160 MG/DL (ref 65–100)
GLUCOSE BLD STRIP.AUTO-MCNC: 161 MG/DL (ref 65–100)
GLUCOSE BLD STRIP.AUTO-MCNC: 180 MG/DL (ref 65–100)
GLUCOSE BLD STRIP.AUTO-MCNC: 82 MG/DL (ref 65–100)
GLUCOSE SERPL-MCNC: 184 MG/DL (ref 65–100)
HBA1C MFR BLD: 5.8 % (ref 4.2–6.3)
HCT VFR BLD AUTO: 29.7 % (ref 35–47)
HGB BLD-MCNC: 9.9 G/DL (ref 11.5–16)
LYMPHOCYTES # BLD: 1.5 K/UL (ref 0.8–3.5)
LYMPHOCYTES NFR BLD: 13 % (ref 12–49)
MAGNESIUM SERPL-MCNC: 1.7 MG/DL (ref 1.6–2.4)
MCH RBC QN AUTO: 30.1 PG (ref 26–34)
MCHC RBC AUTO-ENTMCNC: 33.3 G/DL (ref 30–36.5)
MCV RBC AUTO: 90.3 FL (ref 80–99)
MONOCYTES # BLD: 0.9 K/UL (ref 0–1)
MONOCYTES NFR BLD: 8 % (ref 5–13)
NEUTS SEG # BLD: 8.3 K/UL (ref 1.8–8)
NEUTS SEG NFR BLD: 76 % (ref 32–75)
PHOSPHATE SERPL-MCNC: 2.3 MG/DL (ref 2.6–4.7)
PLATELET # BLD AUTO: 93 K/UL (ref 150–400)
POTASSIUM SERPL-SCNC: 4.5 MMOL/L (ref 3.5–5.1)
RBC # BLD AUTO: 3.29 M/UL (ref 3.8–5.2)
SERVICE CMNT-IMP: ABNORMAL
SERVICE CMNT-IMP: NORMAL
SODIUM SERPL-SCNC: 134 MMOL/L (ref 136–145)
WBC # BLD AUTO: 11 K/UL (ref 3.6–11)

## 2017-12-22 PROCEDURE — 82962 GLUCOSE BLOOD TEST: CPT

## 2017-12-22 PROCEDURE — 85025 COMPLETE CBC W/AUTO DIFF WBC: CPT | Performed by: COLON & RECTAL SURGERY

## 2017-12-22 PROCEDURE — 83735 ASSAY OF MAGNESIUM: CPT | Performed by: COLON & RECTAL SURGERY

## 2017-12-22 PROCEDURE — 65270000029 HC RM PRIVATE

## 2017-12-22 PROCEDURE — 36415 COLL VENOUS BLD VENIPUNCTURE: CPT | Performed by: COLON & RECTAL SURGERY

## 2017-12-22 PROCEDURE — 74011250637 HC RX REV CODE- 250/637: Performed by: COLON & RECTAL SURGERY

## 2017-12-22 PROCEDURE — 74011250636 HC RX REV CODE- 250/636: Performed by: COLON & RECTAL SURGERY

## 2017-12-22 PROCEDURE — 84100 ASSAY OF PHOSPHORUS: CPT | Performed by: COLON & RECTAL SURGERY

## 2017-12-22 PROCEDURE — 80048 BASIC METABOLIC PNL TOTAL CA: CPT | Performed by: COLON & RECTAL SURGERY

## 2017-12-22 PROCEDURE — 74011636637 HC RX REV CODE- 636/637: Performed by: COLON & RECTAL SURGERY

## 2017-12-22 RX ORDER — TRAMADOL HYDROCHLORIDE 50 MG/1
50 TABLET ORAL
Qty: 50 TAB | Refills: 0 | Status: SHIPPED | OUTPATIENT
Start: 2017-12-22 | End: 2018-02-10

## 2017-12-22 RX ORDER — INSULIN LISPRO 100 [IU]/ML
20 INJECTION, SOLUTION INTRAVENOUS; SUBCUTANEOUS
Status: DISCONTINUED | OUTPATIENT
Start: 2017-12-23 | End: 2017-12-22

## 2017-12-22 RX ORDER — INSULIN LISPRO 100 [IU]/ML
24 INJECTION, SOLUTION INTRAVENOUS; SUBCUTANEOUS
Status: DISCONTINUED | OUTPATIENT
Start: 2017-12-22 | End: 2017-12-22

## 2017-12-22 RX ADMIN — LORAZEPAM 1 MG: 2 INJECTION INTRAMUSCULAR; INTRAVENOUS at 18:44

## 2017-12-22 RX ADMIN — INSULIN LISPRO 2 UNITS: 100 INJECTION, SOLUTION INTRAVENOUS; SUBCUTANEOUS at 06:32

## 2017-12-22 RX ADMIN — PANTOPRAZOLE SODIUM 40 MG: 40 TABLET, DELAYED RELEASE ORAL at 09:03

## 2017-12-22 RX ADMIN — INSULIN LISPRO 24 UNITS: 100 INJECTION, SOLUTION INTRAVENOUS; SUBCUTANEOUS at 18:04

## 2017-12-22 RX ADMIN — KETOROLAC TROMETHAMINE 15 MG: 30 INJECTION, SOLUTION INTRAMUSCULAR at 22:16

## 2017-12-22 RX ADMIN — Medication 10 ML: at 22:17

## 2017-12-22 RX ADMIN — INSULIN LISPRO 2 UNITS: 100 INJECTION, SOLUTION INTRAVENOUS; SUBCUTANEOUS at 18:04

## 2017-12-22 RX ADMIN — KETOROLAC TROMETHAMINE 15 MG: 30 INJECTION, SOLUTION INTRAMUSCULAR at 18:05

## 2017-12-22 RX ADMIN — DULOXETINE HYDROCHLORIDE 120 MG: 30 CAPSULE, DELAYED RELEASE ORAL at 09:03

## 2017-12-22 RX ADMIN — METOCLOPRAMIDE 10 MG: 5 INJECTION, SOLUTION INTRAMUSCULAR; INTRAVENOUS at 06:32

## 2017-12-22 RX ADMIN — INSULIN GLARGINE 19 UNITS: 100 INJECTION, SOLUTION SUBCUTANEOUS at 09:02

## 2017-12-22 RX ADMIN — HYDROMORPHONE HYDROCHLORIDE 0.5 MG: 2 INJECTION INTRAMUSCULAR; INTRAVENOUS; SUBCUTANEOUS at 14:02

## 2017-12-22 RX ADMIN — METOCLOPRAMIDE 10 MG: 5 INJECTION, SOLUTION INTRAMUSCULAR; INTRAVENOUS at 22:16

## 2017-12-22 RX ADMIN — Medication 10 ML: at 18:09

## 2017-12-22 RX ADMIN — KETOROLAC TROMETHAMINE 15 MG: 30 INJECTION, SOLUTION INTRAMUSCULAR at 06:32

## 2017-12-22 RX ADMIN — METOPROLOL TARTRATE 50 MG: 50 TABLET ORAL at 09:03

## 2017-12-22 RX ADMIN — METOCLOPRAMIDE 10 MG: 5 INJECTION, SOLUTION INTRAMUSCULAR; INTRAVENOUS at 18:07

## 2017-12-22 RX ADMIN — INSULIN LISPRO 2 UNITS: 100 INJECTION, SOLUTION INTRAVENOUS; SUBCUTANEOUS at 12:25

## 2017-12-22 RX ADMIN — HYDROMORPHONE HYDROCHLORIDE 0.5 MG: 2 INJECTION INTRAMUSCULAR; INTRAVENOUS; SUBCUTANEOUS at 04:25

## 2017-12-22 RX ADMIN — TRAMADOL HYDROCHLORIDE 50 MG: 50 TABLET, FILM COATED ORAL at 12:25

## 2017-12-22 RX ADMIN — ENOXAPARIN SODIUM 40 MG: 40 INJECTION SUBCUTANEOUS at 12:25

## 2017-12-22 RX ADMIN — INSULIN LISPRO 2 UNITS: 100 INJECTION, SOLUTION INTRAVENOUS; SUBCUTANEOUS at 00:59

## 2017-12-22 RX ADMIN — METOPROLOL TARTRATE 50 MG: 50 TABLET ORAL at 18:04

## 2017-12-22 NOTE — DISCHARGE INSTRUCTIONS
Primitivo Brown MD, [address] Nina Ortiz MD, [address] [facility] Nela Montez MD, FACS  Pauly Camp. Lynsey Redding MD, Yissel Silverio MD, MD Isis Syed MD    [facility] Discharge Instructions for Hilario Serna    1. Diagnosis: reversal loop ileostomy via midline incision, with complex abdominal closure  2. Regular diet  3. Do not drive for 2 weeks or until after your next doctors appointment. 4. May take a shower. 5. No lifting any objects weighing more than 25 pounds. Do not do any housework, such as vacuuming, scrubbing, etc for at least a month. 6. Wear your abdominal binder snug every day      7. When you get tired during the day, take naps, as you need your rest.  8. Multiple bowel movements are normal each day for a while. 9. May walk as desired. May go up and down stairs. 10. Wound care: cover right abdominal open wound with clean, dry 4x4, twice a day and when wet  11. Please take all dressings off when in shower. Please gently was all your incisions with soap and water daily    12. Take pain medication as prescribed: (NO DRIVING WHILE ON PAIN MEDICATIONS). Ultram 50mg EVERY 4-6 HOURS AS NEEDED. Other Medications: Tylenol 650mg every 6 hours as needed for pain (OTC)    13. See me in the office in 10-14 days. Call as soon as discharged for an appointment . Call the Exchange [phone], if you have any questions or problems after office hours.

## 2017-12-22 NOTE — OP NOTES
[address] REPORT    Trinidad Sams  MR#: [id number]  : 1945  ACCOUNT #: [de-identified]   DATE OF SERVICE: 2017    PREOPERATIVE DIAGNOSES  1. Loop ileostomy status. 2.  History of sigmoid colectomy, colorectal anastomosis, diverting loop ileostomy for diverticulitis with colovaginal fistula (10/2017). POSTOPERATIVE DIAGNOSES  1. Loop ileostomy status. 2.  History of sigmoid colectomy, colorectal anastomosis, diverting loop ileostomy for diverticulitis with colovaginal fistula (10/2017). 3.  Dense intra-abdominal adhesions. PROCEDURES PERFORMED  1. Laparotomy. 2.  Lysis of adhesions greater than 2 hours. 3.  Small bowel resection x2 (loop ileostomy, additional segment of terminal ileum). 4.  Side-to-side terminal ileal to terminal ileal anastomosis. 5.  Repair of serosal rent in the terminal ileum. 6.  Mobilization of fascial flaps with closure of abdomen with interrupted figure-of-eight fascial stitches. 7.  A 19-Qatari drain placement into the abdominal cavity. 8.  A 19-Qatari drain cavity anterior to the fascial closure. SURGEON: Cara Finley MD.      ANESTHESIA:  GET.    ESTIMATED BLOOD LOSS:  150 mL. URINE OUTPUT: 50 mL. FLUIDS: 3 liters of crystalloid. SPECIMENS REMOVED: loop ileostomy. COMPLICATIONS: none. INDICATIONS:  A very pleasant 61-year-old female who is well known to me for history of a colovaginal fistula secondary to diverticular disease. She has undergone a sigmoid colectomy with diverting loop ileostomy back in October. She has recovered. She has undergone a barium enema with no findings of a stricture or fistula. She has also undergone a flexible sigmoidoscopy with no findings of a stricture or obvious fistula. Therefore, she is here today for reversal.      FINDINGS: Initially, I tried to disconnect the loop ileostomy from the skin and subcu tissue down to the fascia.   My hope was to simply free this up and perform the anastomosis extracorporeally before delivering this back through the aperture. However, there were dense adhesions at the level of fascia. Therefore, a midline laparotomy incision was needed. There were dense intra-abdominal adhesions, which required over 2 hours in order to be able to free up the two ends of small bowel to perform the anastomosis. DESCRIPTION OF PROCEDURE: The patient was brought to the operating theater and a standard pause observed by the OR staff with the patient's name and procedure clarified by all. She was placed supine on the operating room table, and padded according to standard  protocol. She was then prepped and draped in standard surgical fashion. Please note that the loop ileostomy had already been oversewn prior to prepping and draping. I first disconnected the loop ileostomy from the surrounding skin on the right abdomen. Dissection proceeded down through subcutaneous tissue down towards the rectus sheath. However, there were dense adhesions noted circumferentially. At the level of the rectus sheath, I tried to mobilize the loop ileostomy off of the fascia. However, the small bowel loops were interdigitating with the rectus sheath. Therefore, despite trying to perform meticulous dissection, I ultimately decided to perform a midline laparotomy incision for better visualization and to have a better sense of the intra-abdominal adhesions. Next, a midline incision was made through her previous midline incision. Dissection proceeded through subcutaneous tissue until the fascia was encountered. Please note that the fascia and subcu tissue were quite thickened and edematous. Upon gaining entrance to abdominal cavity, swept away adhesions to the anterior abdominal wall. Next, Kocher clamps were placed on each fascial margin. There were dense adhesions noted to the anterior abdominal walls bilaterally.   Eventually, I was able to free up the loops of the small bowel off of the anterior abdominal wall. Next, I inspected the loop ileostomy as it was exiting out the right abdominal wall. At this point, I was able to free up the loop ileostomy from the aperture. I delivered this into the abdominal cavity. At this point, it was apparent that there was not sufficient mobility of each limb of small bowel in order to perform a primary anastomosis. Therefore, this required extensive lysis of adhesions between loops of small bowel, especially proximal limb. Eventually, I was able to free up enough mobility of the terminal ileum to perform a primary anastomosis. Therefore, I proceeded first with resection of the loop ileostomy. Using a 75 mm ZION stapler, blue cartridge, I divided the small bowel upstream and downstream from the loop ileostomy. Next, the remaining mesentery to the CMT section of loop ileostomy was then divided using the EnSeal energy device. In this manner, the small bowel resection x1, or resection of ileostomy was performed. Next, I prepared for a side-to-side anastomosis. An additional length of terminal ileum was resected, which looked particularly ragged. This will be sent with the first specimen. This is the second small bowel resection. Next, in a controlled fashion, enterotomies were created to accommodate the stapler. A side-to-side anastomosis was then performed between the two limbs of terminal ileum. Next, reload was used to amputate off the enterotomies en bloc. Next, I palpated the anastomosis. This appeared to be at least 2 cm in dimension and probably closer to 3. Next, a side-to-side anastomosis was then oversewn using interrupted 3-0 silk Lembert stitches. The mesenteric defect was then closed using 3-0 silk stitches. Next, irrigation was performed. I inspected the loops of small bowel that I was able to free up.   Please note that it was far too hazardous to try to free up all of the loops of small bowel.  There was one area of questionable serosal rent. Nonetheless, this was oversewn using interrupted 3-0 silk Lembert stitches. This was in the terminal ileum just downstream from the anastomosis. Next, Tisseel was placed over the anastomosis. I reinspected all quadrants. Next, I closed the aperture first posteriorly by reapproximating the posterior rectus sheath and peritoneum over the defect using interrupted 0 PDS stitches. Next, all sponge count and instrument counts correct, and Exparel was injected into both rectus sheaths. Next, a 6 pack of Seprafilm was placed over the viscera. The fascia appeared to be of quite poor quality. Therefore, I decided to mobilize the fascial margins almost to the anterior axillary line bilaterally. At the midline, there was pretty poor quality again noted. Next, I closed the anterior rectus sheath over the level of the aperture using interrupted 0 PDS figure-of-eight stitches. Next, interrupted figure-of-eight 0 PDS stitches were used to reapproximate the fascia at the midline incision. Next, there was an extensive cavity between the skin and fascia. She has a rather large pannus. Therefore, a drain was placed anterior to the rectus sheath in between the subcutaneous fat in the fascia anteriorly. Please note that a 23 Russian drain had previously been placed into the abdominal cavity prior to closing the fascia. Next, the subcutaneous fat was then reapproximated using 2-0 Vicryl. The skin was then reapproximated using 2-0 nylon mattress stitches. The previous ileostomy site was reapproximated with one 2-0 nylon stitch. This was then packed using a Kerlix, Sami, and 2-0 nylon was used to secure the abdominal drain to the abdominal wall. The procedure was then terminated. TING Leyva MD       [facility] / Stephanie Duran  D: 12/20/2017 20:13     T: 12/21/2017 09:34  JOB #: [id number]  CC: Marisol Celaya MD

## 2017-12-22 NOTE — PROGRESS NOTES
CRS  Pt sitting in a chair. +flatus  Flowsheet, labs reviewed  Abd: soft, approp tender  Packing in RLQ incision  Midline incision intact  Drains: serosang    Plan:  Adv diet, dc gandara, restart home diabetic meds, mobilize.  Partners covering this weekend

## 2017-12-22 NOTE — PROGRESS NOTES
Bedside and Verbal shift change report given to April RN (oncoming nurse) by Luis Alberto Bernard RN (offgoing nurse). Report included the following information SBAR, Kardex, Procedure Summary, Intake/Output, MAR and Recent Results.

## 2017-12-22 NOTE — DIABETES MGMT
DTC Progress Note        Recommendations/ Comments: Glucose was > 180, now is within target range. Will continue to follow as needed. Estimated Creatinine Clearance: 58 mL/min (based on Cr of 0.94). POC Glucose last 24hrs:   Lab Results   Component Value Date/Time     (H) 12/22/2017 04:26 AM    GLUCPOC 156 (H) 12/22/2017 07:28 AM    GLUCPOC 180 (H) 12/22/2017 05:13 AM    GLUCPOC 160 (H) 12/22/2017 12:53 AM        Inpatient medications for glucose management:  1. Correction Scale: Lispro (Humalog) Normal Sensitivity scale to cover for glucose > 139 mg/dL Every 6 hours     Consult received from Shanice Hester MD for  []    Assessment of home management  []    Meal planning  []    Meter / Monitoring  []    New Diagnosis  []    Insulin education  []    Insulin pump notification  []    Medication recommendations  [x]    Hospital glucose management  []    Jaylon Gee  []    Outpatient Education - Information forwarded to [address] [place] [address] who will follow-up with pt 1 week after discharge    Chart reviewed and initial evaluation complete on Cb Vivar is a 80 yo female with a past medical history relevant for  DM type 2, per Dr. Shanice Hester MD's H&P dated 12/20/2017. Lab Results   Component Value Date/Time    Hemoglobin A1c 5.8 12/21/2017 04:10 AM    Hemoglobin A1c 7.7 04/20/2015 02:30 AM       Thank you. Arturo Marks.  Torres Thompson, MPH, BSN, [address]

## 2017-12-22 NOTE — PROGRESS NOTES
Bedside and Verbal shift change report given to Reji Sims RN (oncoming nurse) by Margaret Melvin RN (offgoing nurse). Report included the following information SBAR, Kardex, Procedure Summary, Intake/Output, MAR and Recent Results.

## 2017-12-23 PROBLEM — F41.9 ANXIETY: Status: ACTIVE | Noted: 2017-12-23

## 2017-12-23 LAB
GLUCOSE BLD STRIP.AUTO-MCNC: 117 MG/DL (ref 65–100)
GLUCOSE BLD STRIP.AUTO-MCNC: 130 MG/DL (ref 65–100)
GLUCOSE BLD STRIP.AUTO-MCNC: 146 MG/DL (ref 65–100)
GLUCOSE BLD STRIP.AUTO-MCNC: 158 MG/DL (ref 65–100)
SERVICE CMNT-IMP: ABNORMAL

## 2017-12-23 PROCEDURE — 74011250637 HC RX REV CODE- 250/637: Performed by: INTERNAL MEDICINE

## 2017-12-23 PROCEDURE — 74011636637 HC RX REV CODE- 636/637: Performed by: COLON & RECTAL SURGERY

## 2017-12-23 PROCEDURE — 74011250636 HC RX REV CODE- 250/636: Performed by: COLON & RECTAL SURGERY

## 2017-12-23 PROCEDURE — 65270000029 HC RM PRIVATE

## 2017-12-23 PROCEDURE — 82962 GLUCOSE BLOOD TEST: CPT

## 2017-12-23 PROCEDURE — 74011250637 HC RX REV CODE- 250/637: Performed by: COLON & RECTAL SURGERY

## 2017-12-23 RX ORDER — LORAZEPAM 1 MG/1
1 TABLET ORAL
Status: DISCONTINUED | OUTPATIENT
Start: 2017-12-23 | End: 2017-12-26 | Stop reason: HOSPADM

## 2017-12-23 RX ORDER — LORAZEPAM 1 MG/1
1 TABLET ORAL EVERY 4 HOURS
Status: DISCONTINUED | OUTPATIENT
Start: 2017-12-23 | End: 2017-12-26

## 2017-12-23 RX ORDER — INSULIN LISPRO 100 [IU]/ML
INJECTION, SOLUTION INTRAVENOUS; SUBCUTANEOUS
Status: DISCONTINUED | OUTPATIENT
Start: 2017-12-23 | End: 2017-12-26 | Stop reason: HOSPADM

## 2017-12-23 RX ADMIN — Medication 10 ML: at 14:31

## 2017-12-23 RX ADMIN — METOCLOPRAMIDE 10 MG: 5 INJECTION, SOLUTION INTRAMUSCULAR; INTRAVENOUS at 14:30

## 2017-12-23 RX ADMIN — INSULIN GLARGINE 19 UNITS: 100 INJECTION, SOLUTION SUBCUTANEOUS at 10:16

## 2017-12-23 RX ADMIN — METOCLOPRAMIDE 10 MG: 5 INJECTION, SOLUTION INTRAMUSCULAR; INTRAVENOUS at 06:40

## 2017-12-23 RX ADMIN — Medication 10 ML: at 23:12

## 2017-12-23 RX ADMIN — METOPROLOL TARTRATE 50 MG: 50 TABLET ORAL at 19:52

## 2017-12-23 RX ADMIN — DULOXETINE HYDROCHLORIDE 120 MG: 30 CAPSULE, DELAYED RELEASE ORAL at 10:16

## 2017-12-23 RX ADMIN — METOCLOPRAMIDE 10 MG: 5 INJECTION, SOLUTION INTRAMUSCULAR; INTRAVENOUS at 23:12

## 2017-12-23 RX ADMIN — LORAZEPAM 1 MG: 1 TABLET ORAL at 19:52

## 2017-12-23 RX ADMIN — PANTOPRAZOLE SODIUM 40 MG: 40 TABLET, DELAYED RELEASE ORAL at 10:17

## 2017-12-23 RX ADMIN — HYDROMORPHONE HYDROCHLORIDE 0.5 MG: 2 INJECTION INTRAMUSCULAR; INTRAVENOUS; SUBCUTANEOUS at 04:24

## 2017-12-23 RX ADMIN — KETOROLAC TROMETHAMINE 15 MG: 30 INJECTION, SOLUTION INTRAMUSCULAR at 14:30

## 2017-12-23 RX ADMIN — Medication 10 ML: at 06:40

## 2017-12-23 RX ADMIN — LORAZEPAM 1 MG: 1 TABLET ORAL at 23:12

## 2017-12-23 RX ADMIN — KETOROLAC TROMETHAMINE 15 MG: 30 INJECTION, SOLUTION INTRAMUSCULAR at 23:13

## 2017-12-23 RX ADMIN — TRIAMTERENE AND HYDROCHLOROTHIAZIDE 1 TABLET: 37.5; 25 TABLET ORAL at 10:16

## 2017-12-23 RX ADMIN — INSULIN LISPRO 2 UNITS: 100 INJECTION, SOLUTION INTRAVENOUS; SUBCUTANEOUS at 12:00

## 2017-12-23 RX ADMIN — LORAZEPAM 1 MG: 2 INJECTION INTRAMUSCULAR; INTRAVENOUS at 10:16

## 2017-12-23 RX ADMIN — METOPROLOL TARTRATE 50 MG: 50 TABLET ORAL at 10:17

## 2017-12-23 RX ADMIN — KETOROLAC TROMETHAMINE 15 MG: 30 INJECTION, SOLUTION INTRAMUSCULAR at 06:40

## 2017-12-23 RX ADMIN — ENOXAPARIN SODIUM 40 MG: 40 INJECTION SUBCUTANEOUS at 14:29

## 2017-12-23 NOTE — PROGRESS NOTES
Bedside, Verbal and Written shift change report given to Galindo Oh (oncoming nurse) by Royal Baumann RN (offgoing nurse). Report included the following information SBAR, Kardex, Intake/Output, MAR and Recent Results.

## 2017-12-23 NOTE — PROGRESS NOTES
Bedside shift change report given to Ashley (oncoming nurse) by Tanmay whittaker (offgoing nurse). Report included the following information SBAR, Kardex, Intake/Output and MAR.

## 2017-12-23 NOTE — PROGRESS NOTES
Says she is anxious this am  +flatus. No bms yet  Visit Vitals    /67 (BP 1 Location: Right arm, BP Patient Position: Sitting)    Pulse 80    Temp 98 °F (36.7 °C)    Resp 16    Wt 93.4 kg (206 lb)    SpO2 98%    Breastfeeding No    BMI 38.92 kg/m2       Date 12/22/17 0700 - 12/23/17 0659 12/23/17 0700 - 12/24/17 0659   Shift [id number] 1900-0659 24 Hour Total 0700-1859 1900-0659 24 Hour Total   I  N  T  A  K  E   Shift Total  (mL/kg)         O  U  T  P  U  T   Urine  (mL/kg/hr) 350  (0.3)  350  (0.2)         Urine Voided 100  100         Urine Output (mL) ([REMOVED] Urinary Catheter 12/20/17 2- way; Law) 250  250       Drains 55 70 125         Output (ml) (Shaun Drain #1 12/20/17 Left;Mid Abdomen) 30 40 70         Output (ml) (Marce Naegeli #2 12/20/17 Left Abdomen) 25 30 55       Shift Total  (mL/kg) 405  (4.3) 70  (0.7) 475  (5.1)      NET -405 -70 -475      Weight (kg) 93.4 93.4 93.4 93.4 93.4 93.4     abd softly  Distended  Drains serosang    A/P continue present management  Ativan for anxiety  ambulation

## 2017-12-24 LAB
ANION GAP SERPL CALC-SCNC: 12 MMOL/L (ref 5–15)
BUN SERPL-MCNC: 11 MG/DL (ref 6–20)
BUN/CREAT SERPL: 13 (ref 12–20)
CALCIUM SERPL-MCNC: 8.7 MG/DL (ref 8.5–10.1)
CHLORIDE SERPL-SCNC: 102 MMOL/L (ref 97–108)
CO2 SERPL-SCNC: 25 MMOL/L (ref 21–32)
CREAT SERPL-MCNC: 0.85 MG/DL (ref 0.55–1.02)
ERYTHROCYTE [DISTWIDTH] IN BLOOD BY AUTOMATED COUNT: 13.3 % (ref 11.5–14.5)
GLUCOSE BLD STRIP.AUTO-MCNC: 108 MG/DL (ref 65–100)
GLUCOSE BLD STRIP.AUTO-MCNC: 135 MG/DL (ref 65–100)
GLUCOSE BLD STRIP.AUTO-MCNC: 167 MG/DL (ref 65–100)
GLUCOSE BLD STRIP.AUTO-MCNC: 176 MG/DL (ref 65–100)
GLUCOSE SERPL-MCNC: 122 MG/DL (ref 65–100)
HCT VFR BLD AUTO: 30.2 % (ref 35–47)
HGB BLD-MCNC: 10.1 G/DL (ref 11.5–16)
MAGNESIUM SERPL-MCNC: 1.5 MG/DL (ref 1.6–2.4)
MCH RBC QN AUTO: 30.1 PG (ref 26–34)
MCHC RBC AUTO-ENTMCNC: 33.4 G/DL (ref 30–36.5)
MCV RBC AUTO: 90.1 FL (ref 80–99)
PHOSPHATE SERPL-MCNC: 2.3 MG/DL (ref 2.6–4.7)
PLATELET # BLD AUTO: 128 K/UL (ref 150–400)
POTASSIUM SERPL-SCNC: 3.5 MMOL/L (ref 3.5–5.1)
RBC # BLD AUTO: 3.35 M/UL (ref 3.8–5.2)
SERVICE CMNT-IMP: ABNORMAL
SODIUM SERPL-SCNC: 139 MMOL/L (ref 136–145)
WBC # BLD AUTO: 8.2 K/UL (ref 3.6–11)

## 2017-12-24 PROCEDURE — 85027 COMPLETE CBC AUTOMATED: CPT | Performed by: INTERNAL MEDICINE

## 2017-12-24 PROCEDURE — 36415 COLL VENOUS BLD VENIPUNCTURE: CPT | Performed by: INTERNAL MEDICINE

## 2017-12-24 PROCEDURE — 74011250637 HC RX REV CODE- 250/637: Performed by: INTERNAL MEDICINE

## 2017-12-24 PROCEDURE — 80048 BASIC METABOLIC PNL TOTAL CA: CPT | Performed by: INTERNAL MEDICINE

## 2017-12-24 PROCEDURE — 65270000029 HC RM PRIVATE

## 2017-12-24 PROCEDURE — 82962 GLUCOSE BLOOD TEST: CPT

## 2017-12-24 PROCEDURE — 74011636637 HC RX REV CODE- 636/637: Performed by: INTERNAL MEDICINE

## 2017-12-24 PROCEDURE — 74011636637 HC RX REV CODE- 636/637: Performed by: COLON & RECTAL SURGERY

## 2017-12-24 PROCEDURE — 74011250636 HC RX REV CODE- 250/636: Performed by: COLON & RECTAL SURGERY

## 2017-12-24 PROCEDURE — 74011250637 HC RX REV CODE- 250/637: Performed by: COLON & RECTAL SURGERY

## 2017-12-24 PROCEDURE — 74011250636 HC RX REV CODE- 250/636: Performed by: INTERNAL MEDICINE

## 2017-12-24 PROCEDURE — 83735 ASSAY OF MAGNESIUM: CPT | Performed by: INTERNAL MEDICINE

## 2017-12-24 PROCEDURE — 84100 ASSAY OF PHOSPHORUS: CPT | Performed by: INTERNAL MEDICINE

## 2017-12-24 RX ORDER — MAGNESIUM SULFATE 1 G/100ML
1 INJECTION INTRAVENOUS
Status: DISPENSED | OUTPATIENT
Start: 2017-12-24 | End: 2017-12-24

## 2017-12-24 RX ORDER — MAGNESIUM SULFATE 1 G/100ML
1 INJECTION INTRAVENOUS ONCE
Status: COMPLETED | OUTPATIENT
Start: 2017-12-24 | End: 2017-12-24

## 2017-12-24 RX ADMIN — METOCLOPRAMIDE 10 MG: 5 INJECTION, SOLUTION INTRAMUSCULAR; INTRAVENOUS at 06:41

## 2017-12-24 RX ADMIN — LORAZEPAM 1 MG: 1 TABLET ORAL at 04:02

## 2017-12-24 RX ADMIN — LORAZEPAM 1 MG: 1 TABLET ORAL at 10:43

## 2017-12-24 RX ADMIN — KETOROLAC TROMETHAMINE 15 MG: 30 INJECTION, SOLUTION INTRAMUSCULAR at 17:45

## 2017-12-24 RX ADMIN — LORAZEPAM 1 MG: 1 TABLET ORAL at 23:43

## 2017-12-24 RX ADMIN — METOCLOPRAMIDE 10 MG: 5 INJECTION, SOLUTION INTRAMUSCULAR; INTRAVENOUS at 17:44

## 2017-12-24 RX ADMIN — KETOROLAC TROMETHAMINE 15 MG: 30 INJECTION, SOLUTION INTRAMUSCULAR at 22:22

## 2017-12-24 RX ADMIN — LORAZEPAM 1 MG: 1 TABLET ORAL at 13:48

## 2017-12-24 RX ADMIN — INSULIN LISPRO 2 UNITS: 100 INJECTION, SOLUTION INTRAVENOUS; SUBCUTANEOUS at 17:44

## 2017-12-24 RX ADMIN — KETOROLAC TROMETHAMINE 15 MG: 30 INJECTION, SOLUTION INTRAMUSCULAR at 06:41

## 2017-12-24 RX ADMIN — Medication 10 ML: at 06:42

## 2017-12-24 RX ADMIN — Medication 10 ML: at 13:49

## 2017-12-24 RX ADMIN — LORAZEPAM 1 MG: 1 TABLET ORAL at 21:08

## 2017-12-24 RX ADMIN — INSULIN GLARGINE 19 UNITS: 100 INJECTION, SOLUTION SUBCUTANEOUS at 10:42

## 2017-12-24 RX ADMIN — MAGNESIUM SULFATE HEPTAHYDRATE 1 G: 1 INJECTION, SOLUTION INTRAVENOUS at 17:43

## 2017-12-24 RX ADMIN — METOPROLOL TARTRATE 50 MG: 50 TABLET ORAL at 17:45

## 2017-12-24 RX ADMIN — DULOXETINE HYDROCHLORIDE 120 MG: 30 CAPSULE, DELAYED RELEASE ORAL at 10:43

## 2017-12-24 RX ADMIN — ENOXAPARIN SODIUM 40 MG: 40 INJECTION SUBCUTANEOUS at 11:58

## 2017-12-24 RX ADMIN — MAGNESIUM SULFATE HEPTAHYDRATE 1 G: 1 INJECTION, SOLUTION INTRAVENOUS at 10:44

## 2017-12-24 RX ADMIN — LORAZEPAM 1 MG: 1 TABLET ORAL at 17:45

## 2017-12-24 RX ADMIN — PANTOPRAZOLE SODIUM 40 MG: 40 TABLET, DELAYED RELEASE ORAL at 10:43

## 2017-12-24 RX ADMIN — TRIAMTERENE AND HYDROCHLOROTHIAZIDE 1 TABLET: 37.5; 25 TABLET ORAL at 10:43

## 2017-12-24 RX ADMIN — INSULIN LISPRO 2 UNITS: 100 INJECTION, SOLUTION INTRAVENOUS; SUBCUTANEOUS at 11:58

## 2017-12-24 RX ADMIN — METOCLOPRAMIDE 10 MG: 5 INJECTION, SOLUTION INTRAMUSCULAR; INTRAVENOUS at 22:22

## 2017-12-24 RX ADMIN — Medication 10 ML: at 21:08

## 2017-12-24 RX ADMIN — METOPROLOL TARTRATE 50 MG: 50 TABLET ORAL at 10:43

## 2017-12-24 NOTE — ROUTINE PROCESS
1940: Received patient in bed, with day shift nurse changing abd dressing. No s/s of distress at this time, will continue monitor. 2033: Patient seen by Dr. Minda Mckeon, new orders received. Bedside and Verbal shift change report given to Shabbir Jones (oncoming nurse) by Chang Merida (offgoing nurse). Report included the following information SBAR, Kardex, Intake/Output and MAR.

## 2017-12-24 NOTE — PROGRESS NOTES
Feels better  Appreciate Hospitalist assistance  Visit Vitals    /79 (BP 1 Location: Right arm, BP Patient Position: At rest)    Pulse 79    Temp 98.1 °F (36.7 °C)    Resp 16    Wt 93.4 kg (206 lb)    SpO2 94%    Breastfeeding No    BMI 38.92 kg/m2       Date 12/23/17 0700 - 12/24/17 0659 12/24/17 0700 - 12/25/17 0659   Shift 0700-1859 1900-0659 24 Hour Total 0700-1859 1900-0659 24 Hour Total   I  N  T  A  K  E   Shift Total  (mL/kg)         O  U  T  P  U  T   Urine  (mL/kg/hr)            Urine Occurrence(s)  2 x 2 x       Drains 90 35 125 55  55      Output (ml) (Shaun Drain #1 12/20/17 Left;Mid Abdomen) 20 5 25 30  30      Output (ml) (Shaun Drain #2 12/20/17 Left Abdomen) 70 30 100 25  25    Shift Total  (mL/kg) 90  (1) 35  (0.4) 125  (1.3) 55  (0.6)  55  (0.6)   NET -90 -35 -125 -55  -55   Weight (kg) 93.4 93.4 93.4 93.4 93.4 93.4     abd soft    A/P had 2bms   isnt interested in more food.  Continue full liquids for now  Home in the next few days

## 2017-12-24 NOTE — PROGRESS NOTES
Jg Chung [place] [address]  [address] [facility], [address] [place] [address]  [phone]      Medical Progress Note      NAME: Ira Tomas   :  1945  MRM:  [id number]    Date/Time: 2017  11:20 AM         Subjective:     Chief Complaint:  \"I'm okay\"     Pt no longer anxious once home dosing of ativan resumed. No complaints. Tolerating diet. No BM yet. ROS:  (bold if positive, if negative)      Tolerating diet      Objective:       Vitals:          Last 24hrs VS reviewed since prior progress note. Most recent are:    Visit Vitals    /70 (BP 1 Location: Right arm, BP Patient Position: At rest)    Pulse 85    Temp 97.5 °F (36.4 °C)    Resp 16    Wt 93.4 kg (206 lb)    SpO2 98%    Breastfeeding No    BMI 38.92 kg/m2     SpO2 Readings from Last 6 Encounters:   17 98%   17 96%   17 (!) 77%   17 96%   10/30/15 97%   04/22/15 96%    O2 Flow Rate (L/min): 3 l/min     Intake/Output Summary (Last 24 hours) at 17 1120  Last data filed at 17 2314   Gross per 24 hour   Intake                0 ml   Output              125 ml   Net             -125 ml          Exam:     Physical Exam:    Gen:  Well-developed, well-nourished, in no acute distress  HEENT:  Pink conjunctivae, PERRL, hearing intact to voice, moist mucous membranes  Neck:  Supple, without masses, thyroid non-tender  Resp:  No accessory muscle use, clear breath sounds without wheezes rales or rhonchi  Card:  No murmurs, normal S1, S2 without thrills, bruits or peripheral edema  Abd: Binder in place. Dressing C/D/I. AILEEN drains bilaterally with serosanguinous output.  Normoactive BS   Musc:  No cyanosis or clubbing  Skin:  No rashes or ulcers, skin turgor is good  Neuro:  Cranial nerves 3-12 are grossly intact,  strength is 5/5 bilaterally and dorsi / plantarflexion is 5/5 bilaterally, follows commands appropriately  Psych:  Good insight, oriented to person, place and time, alert      Medications Reviewed: (see below)    Lab Data Reviewed: (see below)    ______________________________________________________________________    Medications:     Current Facility-Administered Medications   Medication Dose Route Frequency    insulin lispro (HUMALOG) injection   SubCUTAneous QID WITH MEALS    LORazepam (ATIVAN) tablet 1 mg  1 mg Oral Q4H    LORazepam (ATIVAN) tablet 1 mg  1 mg Oral QHS PRN    insulin glargine (LANTUS) injection 19 Units  19 Units SubCUTAneous DAILY    traMADol (ULTRAM) tablet 50 mg  50 mg Oral Q6H PRN    ketorolac (TORADOL) injection 15 mg  15 mg IntraVENous Q8H    HYDROmorphone (DILAUDID) injection 0.5 mg  0.5 mg IntraVENous Q4H PRN    baclofen (LIORESAL) tablet 10 mg  10 mg Oral DAILY PRN    DULoxetine (CYMBALTA) capsule 120 mg  120 mg Oral DAILY    metoprolol tartrate (LOPRESSOR) tablet 50 mg  50 mg Oral BID    pantoprazole (PROTONIX) tablet 40 mg  40 mg Oral DAILY    triamterene-hydroCHLOROthiazide (MAXZIDE) 37.5-25 mg per tablet 1 Tab  1 Tab Oral DAILY    sodium chloride (NS) flush 5-10 mL  5-10 mL IntraVENous Q8H    sodium chloride (NS) flush 5-10 mL  5-10 mL IntraVENous PRN    naloxone (NARCAN) injection 0.2 mg  0.2 mg IntraVENous EVERY 2 MINUTES AS NEEDED    ondansetron (ZOFRAN) injection 4 mg  4 mg IntraVENous Q4H PRN    enoxaparin (LOVENOX) injection 40 mg  40 mg SubCUTAneous Q24H    glucose chewable tablet 16 g  4 Tab Oral PRN    dextrose (D50W) injection syrg 12.5-25 g  12.5-25 g IntraVENous PRN    glucagon (GLUCAGEN) injection 1 mg  1 mg IntraMUSCular PRN    metoclopramide HCl (REGLAN) injection 10 mg  10 mg IntraVENous Q8H            Lab Review:     Recent Labs      12/24/17   0416  12/22/17   0426   WBC  8.2  11.0   HGB  10.1*  9.9*   HCT  30.2*  29.7*   PLT  128*  93*     Recent Labs      12/24/17   0416  12/22/17   0426   NA  139  134*   K  3.5  4.5   CL  102  101   CO2  25  28   GLU  122*  184*   BUN  11  14   CREA  0.85  0.94   CA 8.7  8.2*   MG  1.5*  1.7   PHOS  2.3*  2.3*     No components found for: [place]         Assessment / Plan:   Colovaginal fisula s/p sigmoid colectomy: now s/p reversal loop ileostomy  -defer management of pain, DVT prophylaxis and discharge planning to surgery   -cautious use of toradol considering concurrent HCTZ use (watch K and Cr)       DM type 2 (diabetes mellitus, type 2) (HCC) (9/10/2013)  -ISS   -BG checks AC TID and qHS   -continue current dose of Lantus   -will ask pharmacy to med rec pt as unusual that she would typically be taking NPH just qHS       Essential hypertension (9/10/2013)  -continue home Maxzide and metoprolol   -monitor K and Mg      Thrombocytopenia ([address] (4/20/2015) - appears to be chronic  -monitor while on Lovenox       Anxiety (12/23/2017)  -resume ativan; would argue that this medication at that frequency is not ideal however, would need to be tapered as could go in benzo withdrawal. Would likely benefit from outpt psych eval to perhaps start a different SSRI or SNRI for anxiety with ativan PRN   -continue Cymbalta     Will sign off. Thank you for the consult.  Please call with questions/concerns or if pt has a clinical change    Total time spent with patient: [phone] [facility] discussed with: Patient    Discussed:  Code Status, Care Plan and D/C Planning    Prophylaxis:  Lovenox    Disposition:  As per surgery           ___________________________________________________    Attending Physician: Yanelis Patricia MD

## 2017-12-24 NOTE — PROGRESS NOTES
[facility]: MED RECONCILIATION  (Consult for clarification of medication list)    Comments/Recommendations:   · Pharmacist confirmed with patient and [facility] DR LEONEL MARCOS at [facility] that patient has a prescription for Novolin NPH 74 units SQ nightly    Information obtained from: patient, [facility] pharmacy    Significant PMH/Disease States:   Past Medical History:   Diagnosis Date    Arthritis     BACK    Depression     Diabetes ([facility] [address]     TYPE II    Difficult intubation     easy mask ventilation but required videolaryngoscope to intubate    Diverticulitis     Fatty liver     hemachromatosis    Fibromyalgia     Hemochromatosis     Hypertension     Irregular heart beat     Pancreatic cancer ([facility] [address] 2013    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 Allergies: Tylenol [acetaminophen]; Claritin [loratadine]; Codeine; and Percocet [oxycodone-acetaminophen]    Prior to Admission Medications:     Medication Documentation Review Audit       Reviewed by ALEXANDRA DalalD (Pharmacist) on 12/24/17 at 1404         Medication Sig Documenting Provider Last Dose Status Taking?      baclofen (LIORESAL) 10 mg tablet Take 10 mg by mouth daily as needed. Historical Provider 12/19/2017 2000 Active Yes    DULoxetine (CYMBALTA) 60 mg capsule Take 120 mg by mouth daily. Karrie Garcia MD 12/20/2017 0900 Active Yes             Med Note COLTEN Sutton Apr 19, 2015  7:57 PM): .      ibuprofen (MOTRIN) 600 mg tablet Take 600 mg by mouth every six (6) hours as needed for Pain. Historical Provider Not Taking Unknown time Active No             Med Note COLTEN Sutton Apr 19, 2015  7:57 PM): .      insulin lispro (HUMALOG) 100 unit/mL injection 20 Units by SubCUTAneous route two (2) times a day.  Before breakfast and lunch Historical Provider 12/20/2017 Unknown time Active Yes             Med Note (Jason Manzo   Wed Dec 20, 2017 12:37 PM): Took 10 units      insulin lispro (HUMALOG) 100 unit/mL injection 24 Units by SubCUTAneous route every evening. Before dinner Historical Provider 12/19/2017 1800 Active Yes             Med Note (COLTEN CROWE   Kelsie Apr 19, 2015  8:00 PM): Before dinner      insulin NPH (NOVOLIN N) 100 unit/mL injection 74 Units by SubCUTAneous route nightly. Historical Provider 12/19/2017 2200 Active Yes    LORazepam (ATIVAN) 1 mg tablet Take 1 mg by mouth nightly. Indications: anxiety Historical Provider Not Taking Unknown time Active No    metoprolol (LOPRESSOR) 50 mg tablet Take 50 mg by mouth two (2) times a day. Historical Provider 12/20/2017 0900 Active Yes    omeprazole (PRILOSEC) 40 mg capsule Take 40 mg by mouth daily. Historical Provider 12/20/2017 0900 Active Yes             Med Note Jessica Barrett   Mon Dec 18, 2017  1:34 PM):        ondansetron hcl (ZOFRAN) 4 mg tablet Take 4 mg by mouth every four (4) hours as needed for Nausea. Historical Provider Not Taking Unknown time Active No    triamterene-hydrochlorothiazide (MAXZIDE-25MG) 37.5-25 mg per tablet Take 1 Tab by mouth daily. Indications: HYPERTENSION Phys Other, MD 12/19/2017 1400 Active Yes                  Thank you for the consult,  Ike Chadwick

## 2017-12-24 NOTE — CONSULTS
Jg Chung [facility] [place] [address]  [address] [facility], [address] [place] [address]  [phone]    Consult History and Physical Exam    NAME:  Jeni Agustin   :   1945   MRN:  [id number]     PCP:  Idris Manzo MD   Referring: Jose Lucas MD     Date/Time:  2017         Subjective:   REASON FOR CONSULT: anxiety    CHIEF COMPLAINT: \"I'm anxious\"     HISTORY OF PRESENT ILLNESS:     The patient is a 82 yo hx of HTN, DM, colovaginal fistula s/p sigmoid colectomy, now s/p reversal loop ileostomy. Medicine consulted for medical management. The patient tolerated her surgery well. Denied chest pain, SOB, fevers, chills, nausea, vomiting, diarrhea. No BMs yet, but has flatus. Today, the patient has had increasing anxiety. At home, she takes ativan 1mg every 4h and she has not gotten it here. Allergies   Allergen Reactions    Tylenol [Acetaminophen] Other (comments)     Liver disorder. HAS HEMATOMACROSIS    Claritin [Loratadine] Other (comments)     Panic attack    Codeine Other (comments)     hallucinations    Percocet [Oxycodone-Acetaminophen] Other (comments)     hallucinations        Prior to Admission medications    Medication Sig Start Date End Date Taking? Authorizing Provider   traMADol (ULTRAM) 50 mg tablet Take 1 Tab by mouth every six (6) hours as needed. Max Daily Amount: 200 mg. 17  Yes Jose Lucas MD   insulin NPH (NOVOLIN N) 100 unit/mL injection 74 Units by SubCUTAneous route nightly. Yes Historical Provider   metoprolol (LOPRESSOR) 50 mg tablet Take 50 mg by mouth two (2) times a day. Yes Historical Provider   omeprazole (PRILOSEC) 40 mg capsule Take 40 mg by mouth daily. Yes Historical Provider   baclofen (LIORESAL) 10 mg tablet Take 10 mg by mouth daily as needed. Yes Historical Provider   insulin lispro (HUMALOG) 100 unit/mL injection 20 Units by SubCUTAneous route two (2) times a day.  Before breakfast and lunch   Yes Historical Provider   insulin lispro (HUMALOG) 100 unit/mL injection 24 Units by SubCUTAneous route every evening. Before dinner   Yes Historical Provider   triamterene-hydrochlorothiazide (MAXZIDE-25MG) 37.5-25 mg per tablet Take 1 Tab by mouth daily. Indications: HYPERTENSION   Yes Phys Other, MD   DULoxetine (CYMBALTA) 60 mg capsule Take 120 mg by mouth daily. Yes Phys Other, MD   LORazepam (ATIVAN) 1 mg tablet Take 1 mg by mouth nightly. Indications: anxiety    Historical Provider   ondansetron hcl (ZOFRAN) 4 mg tablet Take 4 mg by mouth every four (4) hours as needed for Nausea. Historical Provider   ibuprofen (MOTRIN) 600 mg tablet Take 600 mg by mouth every six (6) hours as needed for Pain.     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    Unspecified sleep apnea     NO C-PAP        Past Surgical History:   Procedure Laterality Date    HX APPENDECTOMY      HX BREAST LUMPECTOMY Left     lumpectomy BENIGN    HX COLECTOMY      sigmoid    HX COLONOSCOPY      HX HYSTERECTOMY      hysto    HX ORTHOPAEDIC Left     shoulder CYST REMOVAL    HX OTHER SURGICAL  12/31/13    pyloric-sparing whipple,bx of portal and hepatic nodes by Dr Santo Plantopher      ileostomy    HX SALPINGO-OOPHORECTOMY Bilateral     HX TONSILLECTOMY      HX UROLOGICAL  2008    [facility]       Social History   Substance Use Topics    Smoking status: Former Smoker     Packs/day: 1.00     Years: 17.00     Quit date: 12/27/1983    Smokeless tobacco: Never Used    Alcohol use No Family History   Problem Relation Age of Onset    Heart Failure Mother     Diabetes Mother     Hypertension Mother     Heart Failure Father     Kidney Disease Father      WAS ON DIALYSIS    Diabetes Sister     Depression Sister     Depression Sister     Other Sister      DIFFICULTY INTUBATING    Asthma Sister         Review of Systems:  (bold if positive, if negative)    Gen:  Eyes:  ENT:  CVS:  Pulm:  GI:    :    MS:  Skin:  Psych:  , anxiety,Endo:    Hem:  Renal:    Neuro:          Objective:      VITALS:    Vital signs reviewed; most recent are:    Visit Vitals    /67 (BP 1 Location: Right arm, BP Patient Position: At rest)    Pulse 73    Temp 98 °F (36.7 °C)    Resp 14    Wt 93.4 kg (206 lb)    SpO2 97%    Breastfeeding No    BMI 38.92 kg/m2     SpO2 Readings from Last 6 Encounters:   12/23/17 97%   12/18/17 96%   09/26/17 (!) 77%   09/16/17 96%   10/30/15 97%   04/22/15 96%    O2 Flow Rate (L/min): 3 l/min     Intake/Output Summary (Last 24 hours) at 12/23/17 1930  Last data filed at 12/23/17 1626   Gross per 24 hour   Intake                0 ml   Output              160 ml   Net             -160 ml        Exam:     Physical Exam:    Gen:  Well-developed, well-nourished, obese, mild distress  HEENT:  Pink conjunctivae, PERRL, hearing intact to voice, moist mucous membranes  Neck:  Supple, without masses, thyroid non-tender  Resp:  No accessory muscle use, clear breath sounds without wheezes rales or rhonchi  Card:  No murmurs, normal S1, S2 without thrills, bruits or peripheral edema  Abd:  Soft, non-tender, non-distended, normoactive bowel sounds are present, wound dressing c/d/i. Had drain  Lymph:  No cervical adenopathy  Musc:  No cyanosis or clubbing  Skin:  No rashes  Neuro:  Cranial nerves 3-12 are grossly intact, follows commands appropriately  Psych:  Alert with good insight.   Oriented to person, place, and time     Labs:    Recent Labs      12/22/17   0426   WBC  11.0 HGB  9.9*   HCT  29.7*   PLT  93*     Recent Labs      12/22/17   0426   NA  134*   K  4.5   CL  101   CO2  28   GLU  184*   BUN  14   CREA  0.94   CA  8.2*   MG  1.7   PHOS  2.3*     Lab Results   Component Value Date/Time    Glucose (POC) 146 12/23/2017 05:08 PM    Glucose (POC) 158 12/23/2017 11:47 AM       No results for input(s): INR in the last 72 hours. No lab exists for component: INREXT    **Old Records reviewed in [facility]**       Assessment/Plan:       Principal Problem:    80 yo hx of HTN, DM, colovaginal fistula s/p sigmoid colectomy, now s/p reversal loop ileostomy. Medicine consulted for medical management    1) Colovaginal fisula s/p sigmoid colectomy: now s/p reversal loop ileostomy. Management of wound care, therapy, DVT prophylaxis, pain control, per Gen surg. Would limit Toradol use given concominant use of triamterene/HCTZ. This could cause renal failure and hyperkalemia! 2) Anxiety: acute on chronic. Will resume home ativan every 4h. Cont cymbalta    3) HTN: cont metoprolol, triamterene/HCTZ. Watch potassium closely    4) DM type 2 (diabetes mellitus, type 2): controlled, A1C 5.8%. On Lantus due to poor intake. Cont SSI    5) Thrombocytopenia: likely from surg.   Will monitor closely    Total time spent with patient: [address] [place] [address] discussed with: Patient, nursing    Discussed:  Care Plan    Prophylaxis:  Lovenox           ___________________________________________________    Attending Physician: Tari Adams MD

## 2017-12-25 LAB
ANION GAP SERPL CALC-SCNC: 10 MMOL/L (ref 5–15)
BASOPHILS # BLD: 0 K/UL (ref 0–0.1)
BASOPHILS NFR BLD: 0 % (ref 0–1)
BUN SERPL-MCNC: 10 MG/DL (ref 6–20)
BUN/CREAT SERPL: 11 (ref 12–20)
CALCIUM SERPL-MCNC: 8.7 MG/DL (ref 8.5–10.1)
CHLORIDE SERPL-SCNC: 101 MMOL/L (ref 97–108)
CO2 SERPL-SCNC: 26 MMOL/L (ref 21–32)
CREAT SERPL-MCNC: 0.87 MG/DL (ref 0.55–1.02)
EOSINOPHIL # BLD: 0.4 K/UL (ref 0–0.4)
EOSINOPHIL NFR BLD: 6 % (ref 0–7)
ERYTHROCYTE [DISTWIDTH] IN BLOOD BY AUTOMATED COUNT: 13.4 % (ref 11.5–14.5)
GLUCOSE BLD STRIP.AUTO-MCNC: 119 MG/DL (ref 65–100)
GLUCOSE BLD STRIP.AUTO-MCNC: 120 MG/DL (ref 65–100)
GLUCOSE BLD STRIP.AUTO-MCNC: 122 MG/DL (ref 65–100)
GLUCOSE BLD STRIP.AUTO-MCNC: 145 MG/DL (ref 65–100)
GLUCOSE SERPL-MCNC: 99 MG/DL (ref 65–100)
HCT VFR BLD AUTO: 26.9 % (ref 35–47)
HGB BLD-MCNC: 9.4 G/DL (ref 11.5–16)
LYMPHOCYTES # BLD: 2.5 K/UL (ref 0.8–3.5)
LYMPHOCYTES NFR BLD: 35 % (ref 12–49)
MAGNESIUM SERPL-MCNC: 1.8 MG/DL (ref 1.6–2.4)
MCH RBC QN AUTO: 30.5 PG (ref 26–34)
MCHC RBC AUTO-ENTMCNC: 34.9 G/DL (ref 30–36.5)
MCV RBC AUTO: 87.3 FL (ref 80–99)
MONOCYTES # BLD: 0.7 K/UL (ref 0–1)
MONOCYTES NFR BLD: 10 % (ref 5–13)
NEUTS SEG # BLD: 3.5 K/UL (ref 1.8–8)
NEUTS SEG NFR BLD: 49 % (ref 32–75)
PLATELET # BLD AUTO: 161 K/UL (ref 150–400)
POTASSIUM SERPL-SCNC: 3.1 MMOL/L (ref 3.5–5.1)
RBC # BLD AUTO: 3.08 M/UL (ref 3.8–5.2)
SERVICE CMNT-IMP: ABNORMAL
SODIUM SERPL-SCNC: 137 MMOL/L (ref 136–145)
WBC # BLD AUTO: 7.1 K/UL (ref 3.6–11)

## 2017-12-25 PROCEDURE — 65270000029 HC RM PRIVATE

## 2017-12-25 PROCEDURE — 36415 COLL VENOUS BLD VENIPUNCTURE: CPT | Performed by: INTERNAL MEDICINE

## 2017-12-25 PROCEDURE — 82962 GLUCOSE BLOOD TEST: CPT

## 2017-12-25 PROCEDURE — 74011250637 HC RX REV CODE- 250/637: Performed by: COLON & RECTAL SURGERY

## 2017-12-25 PROCEDURE — 74011250636 HC RX REV CODE- 250/636: Performed by: COLON & RECTAL SURGERY

## 2017-12-25 PROCEDURE — 80048 BASIC METABOLIC PNL TOTAL CA: CPT | Performed by: INTERNAL MEDICINE

## 2017-12-25 PROCEDURE — 85025 COMPLETE CBC W/AUTO DIFF WBC: CPT | Performed by: INTERNAL MEDICINE

## 2017-12-25 PROCEDURE — 74011636637 HC RX REV CODE- 636/637: Performed by: INTERNAL MEDICINE

## 2017-12-25 PROCEDURE — 83735 ASSAY OF MAGNESIUM: CPT | Performed by: INTERNAL MEDICINE

## 2017-12-25 PROCEDURE — 74011636637 HC RX REV CODE- 636/637: Performed by: COLON & RECTAL SURGERY

## 2017-12-25 PROCEDURE — 74011250637 HC RX REV CODE- 250/637: Performed by: INTERNAL MEDICINE

## 2017-12-25 RX ADMIN — METOCLOPRAMIDE 10 MG: 5 INJECTION, SOLUTION INTRAMUSCULAR; INTRAVENOUS at 23:08

## 2017-12-25 RX ADMIN — PANTOPRAZOLE SODIUM 40 MG: 40 TABLET, DELAYED RELEASE ORAL at 08:59

## 2017-12-25 RX ADMIN — LORAZEPAM 1 MG: 1 TABLET ORAL at 08:59

## 2017-12-25 RX ADMIN — INSULIN LISPRO 2 UNITS: 100 INJECTION, SOLUTION INTRAVENOUS; SUBCUTANEOUS at 11:57

## 2017-12-25 RX ADMIN — Medication 10 ML: at 05:44

## 2017-12-25 RX ADMIN — METOPROLOL TARTRATE 50 MG: 50 TABLET ORAL at 17:18

## 2017-12-25 RX ADMIN — KETOROLAC TROMETHAMINE 15 MG: 30 INJECTION, SOLUTION INTRAMUSCULAR at 06:02

## 2017-12-25 RX ADMIN — ENOXAPARIN SODIUM 40 MG: 40 INJECTION SUBCUTANEOUS at 11:57

## 2017-12-25 RX ADMIN — Medication 10 ML: at 23:08

## 2017-12-25 RX ADMIN — DULOXETINE HYDROCHLORIDE 120 MG: 30 CAPSULE, DELAYED RELEASE ORAL at 08:59

## 2017-12-25 RX ADMIN — Medication 10 ML: at 17:19

## 2017-12-25 RX ADMIN — METOPROLOL TARTRATE 50 MG: 50 TABLET ORAL at 09:00

## 2017-12-25 RX ADMIN — HYDROMORPHONE HYDROCHLORIDE 0.5 MG: 2 INJECTION INTRAMUSCULAR; INTRAVENOUS; SUBCUTANEOUS at 19:29

## 2017-12-25 RX ADMIN — LORAZEPAM 1 MG: 1 TABLET ORAL at 17:18

## 2017-12-25 RX ADMIN — TRIAMTERENE AND HYDROCHLOROTHIAZIDE 1 TABLET: 37.5; 25 TABLET ORAL at 08:59

## 2017-12-25 RX ADMIN — INSULIN GLARGINE 19 UNITS: 100 INJECTION, SOLUTION SUBCUTANEOUS at 09:00

## 2017-12-25 RX ADMIN — Medication 10 ML: at 05:43

## 2017-12-25 RX ADMIN — LORAZEPAM 1 MG: 1 TABLET ORAL at 03:16

## 2017-12-25 RX ADMIN — METOCLOPRAMIDE 10 MG: 5 INJECTION, SOLUTION INTRAMUSCULAR; INTRAVENOUS at 06:02

## 2017-12-25 RX ADMIN — KETOROLAC TROMETHAMINE 15 MG: 30 INJECTION, SOLUTION INTRAMUSCULAR at 23:07

## 2017-12-25 RX ADMIN — METOCLOPRAMIDE 10 MG: 5 INJECTION, SOLUTION INTRAMUSCULAR; INTRAVENOUS at 17:18

## 2017-12-25 RX ADMIN — LORAZEPAM 1 MG: 1 TABLET ORAL at 19:20

## 2017-12-25 RX ADMIN — LORAZEPAM 1 MG: 1 TABLET ORAL at 23:08

## 2017-12-25 RX ADMIN — LORAZEPAM 1 MG: 1 TABLET ORAL at 11:58

## 2017-12-25 RX ADMIN — KETOROLAC TROMETHAMINE 15 MG: 30 INJECTION, SOLUTION INTRAMUSCULAR at 17:18

## 2017-12-25 NOTE — PROGRESS NOTES
No issues today  Tolerating diet  Visit Vitals    /83 (BP 1 Location: Right arm, BP Patient Position: At rest)    Pulse 77    Temp 98.1 °F (36.7 °C)    Resp 18    Wt 93.4 kg (206 lb)    SpO2 96%    Breastfeeding No    BMI 38.92 kg/m2       Date 12/24/17 0700 - 12/25/17 0659 12/25/17 0700 - 12/26/17 0659   Shift 0700-1859 1900-0659 24 Hour Total 0700-1859 1900-0659 24 Hour Total   I  N  T  A  K  E   Shift Total  (mL/kg)         O  U  T  P  U  T   Drains 55 35 90         Output (ml) (Shaun Drain #1 12/20/17 Left;Mid Abdomen) 30 25 55         Output (ml) (Shaun Drain #2 12/20/17 Left Abdomen) 25 10 35       Shift Total  (mL/kg) 55  (0.6) 35  (0.4) 90  (1)      NET -55 -35 -90      Weight (kg) 93.4 93.4 93.4 93.4 93.4 93.4     abd soft    A/p doing well  Home tomorrow perhaps.  Dr. Ema Kuo to return tomorrow

## 2017-12-25 NOTE — PROGRESS NOTES
Bedside shift change report given to Cheri (oncoming nurse) by Lamberto Potter (offgoing nurse). Report included the following information SBAR, Kardex, Procedure Summary, MAR and Recent Results.

## 2017-12-25 NOTE — PROGRESS NOTES
Bedside and Verbal shift change report given to Aleida Ontiveros (oncoming nurse) by Oxana Lawrence (offgoing nurse). Report included the following information SBAR, Kardex and MAR.

## 2017-12-26 VITALS
TEMPERATURE: 97.7 F | DIASTOLIC BLOOD PRESSURE: 82 MMHG | HEART RATE: 65 BPM | SYSTOLIC BLOOD PRESSURE: 146 MMHG | BODY MASS INDEX: 38.92 KG/M2 | OXYGEN SATURATION: 96 % | RESPIRATION RATE: 15 BRPM | WEIGHT: 206 LBS

## 2017-12-26 LAB
GLUCOSE BLD STRIP.AUTO-MCNC: 103 MG/DL (ref 65–100)
GLUCOSE BLD STRIP.AUTO-MCNC: 147 MG/DL (ref 65–100)
SERVICE CMNT-IMP: ABNORMAL
SERVICE CMNT-IMP: ABNORMAL

## 2017-12-26 PROCEDURE — 74011636637 HC RX REV CODE- 636/637: Performed by: INTERNAL MEDICINE

## 2017-12-26 PROCEDURE — 74011250637 HC RX REV CODE- 250/637: Performed by: INTERNAL MEDICINE

## 2017-12-26 PROCEDURE — 74011250637 HC RX REV CODE- 250/637: Performed by: COLON & RECTAL SURGERY

## 2017-12-26 PROCEDURE — 82962 GLUCOSE BLOOD TEST: CPT

## 2017-12-26 PROCEDURE — 74011636637 HC RX REV CODE- 636/637: Performed by: COLON & RECTAL SURGERY

## 2017-12-26 PROCEDURE — 74011250636 HC RX REV CODE- 250/636: Performed by: COLON & RECTAL SURGERY

## 2017-12-26 RX ADMIN — INSULIN GLARGINE 19 UNITS: 100 INJECTION, SOLUTION SUBCUTANEOUS at 09:42

## 2017-12-26 RX ADMIN — LORAZEPAM 1 MG: 1 TABLET ORAL at 05:06

## 2017-12-26 RX ADMIN — TRIAMTERENE AND HYDROCHLOROTHIAZIDE 1 TABLET: 37.5; 25 TABLET ORAL at 09:42

## 2017-12-26 RX ADMIN — METOPROLOL TARTRATE 50 MG: 50 TABLET ORAL at 09:42

## 2017-12-26 RX ADMIN — Medication 10 ML: at 13:10

## 2017-12-26 RX ADMIN — INSULIN LISPRO 2 UNITS: 100 INJECTION, SOLUTION INTRAVENOUS; SUBCUTANEOUS at 12:38

## 2017-12-26 RX ADMIN — DULOXETINE HYDROCHLORIDE 120 MG: 30 CAPSULE, DELAYED RELEASE ORAL at 09:42

## 2017-12-26 RX ADMIN — METOCLOPRAMIDE 10 MG: 5 INJECTION, SOLUTION INTRAMUSCULAR; INTRAVENOUS at 05:06

## 2017-12-26 RX ADMIN — KETOROLAC TROMETHAMINE 15 MG: 30 INJECTION, SOLUTION INTRAMUSCULAR at 05:06

## 2017-12-26 RX ADMIN — PANTOPRAZOLE SODIUM 40 MG: 40 TABLET, DELAYED RELEASE ORAL at 09:42

## 2017-12-26 RX ADMIN — Medication 10 ML: at 05:06

## 2017-12-26 RX ADMIN — TRAMADOL HYDROCHLORIDE 50 MG: 50 TABLET, FILM COATED ORAL at 13:10

## 2017-12-26 NOTE — PROGRESS NOTES
CRS  Pt without complaints  Flowsheet, labs reviewed  Abd: soft  RLQ: clean  Drains: serosang    Plan:  Dc home.  Home health for wound care

## 2017-12-26 NOTE — PROGRESS NOTES
Bedside and Verbal shift change report given to Erasmo Thacker RN (oncoming nurse) by Madi Lui RN (offgoing nurse). Report included the following information SBAR, Kardex, Procedure Summary, Intake/Output, MAR, Accordion and Recent Results.

## 2017-12-26 NOTE — PROGRESS NOTES
12/26/2017 10:22 AM [facility] has accepted pt.     12/26/2017 10:09 AM Home health order received. Met with pt. Pt reported she has had home health in the past through [facility] and requested to use this agency again. Referral and discharge sent to [facility] via [employer]. CM notified Alba Downing at [facility] who reported she will review referral. CM will follow up.  Ramona Deshpande, TOMW

## 2017-12-26 NOTE — PROGRESS NOTES
Problem: Falls - Risk of  Goal: *Absence of Falls  Document Ghassan Fall Risk and appropriate interventions in the flowsheet.    Outcome: Progressing Towards Goal  Fall Risk Interventions:  Mobility Interventions: Bed/chair exit alarm         Medication Interventions: Bed/chair exit alarm, Patient to call before getting OOB, Teach patient to arise slowly    Elimination Interventions: Bed/chair exit alarm, Call light in reach, Patient to call for help with toileting needs, Toileting schedule/hourly rounds

## 2017-12-26 NOTE — DISCHARGE SUMMARY
[address]    Mariposa Mejia  MR#: [id number]  : 1945  ACCOUNT #: [de-identified]   ADMIT DATE: 2017  DISCHARGE DATE: 2017    PREPROCEDURE DIAGNOSES:    1. Loop ileostomy status. 2.  History of sigmoid colectomy with diverting loop ileostomy for diverticular disease and colovaginal fistula. FINAL DIAGNOSES:   1. Loop ileostomy status. 2.  History of sigmoid colectomy with diverting loop ileostomy for diverticular disease and colovaginal fistula. 3.  Status post reversal of loop ileostomy with midline laparotomy, lysis of adhesions. CLINICAL COURSE:  A very pleasant 80-year-old female who is well known to me for history of diverticular disease in the past.  She has undergone a sigmoid colectomy with a diverting loop ileostomy in the past.  She is admitted electively for reversal of her loop ileostomy. Due to significant adhesions, a midline incision was needed. Extensive lysis of adhesion was performed. She was eventually discharged home, tolerating a diet. She will follow up in my office in 2 weeks or sooner should symptoms warrant. DISPOSITION: home      MD MANOJ Stinson/MATIAS  D: 2017 08:12     T: 2017 15:58  JOB #: [id number]

## 2018-02-07 NOTE — ROUTINE PROCESS
12/26/17  8:09AM  PCP JUN appt scheduled for tomorrow at [facility]. [facility] provides transportation and will call pt to indicate pick-up time.  Appt date and information added to AVS. Halie Ruiz CM Specialist (3) occasionally moist

## 2018-02-10 ENCOUNTER — APPOINTMENT (OUTPATIENT)
Dept: CT IMAGING | Age: 73
DRG: 389 | End: 2018-02-10
Attending: EMERGENCY MEDICINE
Payer: MEDICARE

## 2018-02-10 ENCOUNTER — HOSPITAL ENCOUNTER (INPATIENT)
Age: 73
LOS: 5 days | Discharge: HOME | DRG: 389 | End: 2018-02-15
Attending: EMERGENCY MEDICINE | Admitting: COLON & RECTAL SURGERY
Payer: MEDICARE

## 2018-02-10 ENCOUNTER — APPOINTMENT (OUTPATIENT)
Dept: GENERAL RADIOLOGY | Age: 73
DRG: 389 | End: 2018-02-10
Attending: EMERGENCY MEDICINE
Payer: MEDICARE

## 2018-02-10 DIAGNOSIS — K56.609 SBO (SMALL BOWEL OBSTRUCTION) (HCC): ICD-10-CM

## 2018-02-10 DIAGNOSIS — R11.14 BILIOUS VOMITING WITH NAUSEA: ICD-10-CM

## 2018-02-10 DIAGNOSIS — R10.84 ABDOMINAL PAIN, GENERALIZED: Primary | ICD-10-CM

## 2018-02-10 LAB
ALBUMIN SERPL-MCNC: 3.8 G/DL (ref 3.5–5)
ALBUMIN/GLOB SERPL: 0.8 {RATIO} (ref 1.1–2.2)
ALP SERPL-CCNC: 133 U/L (ref 45–117)
ALT SERPL-CCNC: 30 U/L (ref 12–78)
ANION GAP SERPL CALC-SCNC: 12 MMOL/L (ref 5–15)
APPEARANCE UR: CLEAR
AST SERPL-CCNC: 36 U/L (ref 15–37)
BACTERIA URNS QL MICRO: NEGATIVE /HPF
BASOPHILS # BLD: 0 K/UL (ref 0–0.1)
BASOPHILS NFR BLD: 0 % (ref 0–1)
BILIRUB SERPL-MCNC: 0.8 MG/DL (ref 0.2–1)
BILIRUB UR QL: NEGATIVE
BUN SERPL-MCNC: 18 MG/DL (ref 6–20)
BUN/CREAT SERPL: 19 (ref 12–20)
CALCIUM SERPL-MCNC: 10.2 MG/DL (ref 8.5–10.1)
CHLORIDE SERPL-SCNC: 95 MMOL/L (ref 97–108)
CO2 SERPL-SCNC: 25 MMOL/L (ref 21–32)
COLOR UR: ABNORMAL
CREAT SERPL-MCNC: 0.97 MG/DL (ref 0.55–1.02)
DIFFERENTIAL METHOD BLD: ABNORMAL
EOSINOPHIL # BLD: 0.1 K/UL (ref 0–0.4)
EOSINOPHIL NFR BLD: 1 % (ref 0–7)
EPITH CASTS URNS QL MICRO: ABNORMAL /LPF
ERYTHROCYTE [DISTWIDTH] IN BLOOD BY AUTOMATED COUNT: 12.3 % (ref 11.5–14.5)
GLOBULIN SER CALC-MCNC: 4.9 G/DL (ref 2–4)
GLUCOSE BLD STRIP.AUTO-MCNC: 106 MG/DL (ref 65–100)
GLUCOSE BLD STRIP.AUTO-MCNC: 93 MG/DL (ref 65–100)
GLUCOSE SERPL-MCNC: 176 MG/DL (ref 65–100)
GLUCOSE UR STRIP.AUTO-MCNC: NEGATIVE MG/DL
HCT VFR BLD AUTO: 39.1 % (ref 35–47)
HGB BLD-MCNC: 13 G/DL (ref 11.5–16)
HGB UR QL STRIP: NEGATIVE
HYALINE CASTS URNS QL MICRO: ABNORMAL /LPF (ref 0–5)
IMM GRANULOCYTES # BLD: 0 K/UL (ref 0–0.04)
IMM GRANULOCYTES NFR BLD AUTO: 0 % (ref 0–0.5)
KETONES UR QL STRIP.AUTO: NEGATIVE MG/DL
LACTATE SERPL-SCNC: 2.4 MMOL/L (ref 0.4–2)
LEUKOCYTE ESTERASE UR QL STRIP.AUTO: ABNORMAL
LIPASE SERPL-CCNC: 63 U/L (ref 73–393)
LYMPHOCYTES # BLD: 3 K/UL (ref 0.8–3.5)
LYMPHOCYTES NFR BLD: 22 % (ref 12–49)
MCH RBC QN AUTO: 29.3 PG (ref 26–34)
MCHC RBC AUTO-ENTMCNC: 33.2 G/DL (ref 30–36.5)
MCV RBC AUTO: 88.1 FL (ref 80–99)
MONOCYTES # BLD: 0.8 K/UL (ref 0–1)
MONOCYTES NFR BLD: 6 % (ref 5–13)
NEUTS SEG # BLD: 9.8 K/UL (ref 1.8–8)
NEUTS SEG NFR BLD: 71 % (ref 32–75)
NITRITE UR QL STRIP.AUTO: NEGATIVE
NRBC # BLD: 0 K/UL (ref 0–0.01)
NRBC BLD-RTO: 0 PER 100 WBC
PH UR STRIP: 7.5 [PH] (ref 5–8)
PLATELET # BLD AUTO: 121 K/UL (ref 150–400)
PMV BLD AUTO: 11.4 FL (ref 8.9–12.9)
POTASSIUM SERPL-SCNC: 4.4 MMOL/L (ref 3.5–5.1)
PROT SERPL-MCNC: 8.7 G/DL (ref 6.4–8.2)
PROT UR STRIP-MCNC: NEGATIVE MG/DL
RBC # BLD AUTO: 4.44 M/UL (ref 3.8–5.2)
RBC #/AREA URNS HPF: ABNORMAL /HPF (ref 0–5)
SERVICE CMNT-IMP: ABNORMAL
SERVICE CMNT-IMP: NORMAL
SODIUM SERPL-SCNC: 132 MMOL/L (ref 136–145)
SP GR UR REFRACTOMETRY: 1.02 (ref 1–1.03)
UR CULT HOLD, URHOLD: NORMAL
UROBILINOGEN UR QL STRIP.AUTO: 0.2 EU/DL (ref 0.2–1)
WBC # BLD AUTO: 13.8 K/UL (ref 3.6–11)
WBC URNS QL MICRO: ABNORMAL /HPF (ref 0–4)

## 2018-02-10 PROCEDURE — 87077 CULTURE AEROBIC IDENTIFY: CPT | Performed by: COLON & RECTAL SURGERY

## 2018-02-10 PROCEDURE — 74011000250 HC RX REV CODE- 250: Performed by: COLON & RECTAL SURGERY

## 2018-02-10 PROCEDURE — 74011250637 HC RX REV CODE- 250/637: Performed by: COLON & RECTAL SURGERY

## 2018-02-10 PROCEDURE — 77030032490 HC SLV COMPR SCD KNE COVD -B

## 2018-02-10 PROCEDURE — 74011636320 HC RX REV CODE- 636/320: Performed by: RADIOLOGY

## 2018-02-10 PROCEDURE — 87147 CULTURE TYPE IMMUNOLOGIC: CPT | Performed by: COLON & RECTAL SURGERY

## 2018-02-10 PROCEDURE — 77030012890

## 2018-02-10 PROCEDURE — 74011636320 HC RX REV CODE- 636/320: Performed by: EMERGENCY MEDICINE

## 2018-02-10 PROCEDURE — 77030019563 HC DEV ATTCH FEED HOLL -A

## 2018-02-10 PROCEDURE — 65270000029 HC RM PRIVATE

## 2018-02-10 PROCEDURE — 83605 ASSAY OF LACTIC ACID: CPT | Performed by: EMERGENCY MEDICINE

## 2018-02-10 PROCEDURE — 74177 CT ABD & PELVIS W/CONTRAST: CPT

## 2018-02-10 PROCEDURE — 77030008771 HC TU NG SALEM SUMP -A

## 2018-02-10 PROCEDURE — 93005 ELECTROCARDIOGRAM TRACING: CPT

## 2018-02-10 PROCEDURE — 87086 URINE CULTURE/COLONY COUNT: CPT | Performed by: COLON & RECTAL SURGERY

## 2018-02-10 PROCEDURE — 74022 RADEX COMPL AQT ABD SERIES: CPT

## 2018-02-10 PROCEDURE — 96374 THER/PROPH/DIAG INJ IV PUSH: CPT

## 2018-02-10 PROCEDURE — 87186 SC STD MICRODIL/AGAR DIL: CPT | Performed by: COLON & RECTAL SURGERY

## 2018-02-10 PROCEDURE — 36415 COLL VENOUS BLD VENIPUNCTURE: CPT | Performed by: EMERGENCY MEDICINE

## 2018-02-10 PROCEDURE — 82962 GLUCOSE BLOOD TEST: CPT

## 2018-02-10 PROCEDURE — 85025 COMPLETE CBC W/AUTO DIFF WBC: CPT | Performed by: EMERGENCY MEDICINE

## 2018-02-10 PROCEDURE — 81001 URINALYSIS AUTO W/SCOPE: CPT | Performed by: EMERGENCY MEDICINE

## 2018-02-10 PROCEDURE — 96375 TX/PRO/DX INJ NEW DRUG ADDON: CPT

## 2018-02-10 PROCEDURE — 74011250636 HC RX REV CODE- 250/636: Performed by: EMERGENCY MEDICINE

## 2018-02-10 PROCEDURE — 80053 COMPREHEN METABOLIC PANEL: CPT | Performed by: EMERGENCY MEDICINE

## 2018-02-10 PROCEDURE — 74011250636 HC RX REV CODE- 250/636: Performed by: COLON & RECTAL SURGERY

## 2018-02-10 PROCEDURE — 99285 EMERGENCY DEPT VISIT HI MDM: CPT

## 2018-02-10 PROCEDURE — 83690 ASSAY OF LIPASE: CPT | Performed by: EMERGENCY MEDICINE

## 2018-02-10 RX ORDER — IBUPROFEN 200 MG
600 TABLET ORAL
COMMUNITY
End: 2019-01-10

## 2018-02-10 RX ORDER — TRIAMTERENE/HYDROCHLOROTHIAZID 37.5-25 MG
1 TABLET ORAL DAILY
Status: DISCONTINUED | OUTPATIENT
Start: 2018-02-11 | End: 2018-02-10

## 2018-02-10 RX ORDER — DULOXETIN HYDROCHLORIDE 30 MG/1
120 CAPSULE, DELAYED RELEASE ORAL DAILY
Status: DISCONTINUED | OUTPATIENT
Start: 2018-02-11 | End: 2018-02-15 | Stop reason: HOSPADM

## 2018-02-10 RX ORDER — PANTOPRAZOLE SODIUM 40 MG/1
40 TABLET, DELAYED RELEASE ORAL
Status: DISCONTINUED | OUTPATIENT
Start: 2018-02-11 | End: 2018-02-11

## 2018-02-10 RX ORDER — HYDROMORPHONE HYDROCHLORIDE 2 MG/ML
0.5 INJECTION, SOLUTION INTRAMUSCULAR; INTRAVENOUS; SUBCUTANEOUS
Status: DISCONTINUED | OUTPATIENT
Start: 2018-02-10 | End: 2018-02-11

## 2018-02-10 RX ORDER — ONDANSETRON 2 MG/ML
4 INJECTION INTRAMUSCULAR; INTRAVENOUS
Status: DISCONTINUED | OUTPATIENT
Start: 2018-02-10 | End: 2018-02-15 | Stop reason: HOSPADM

## 2018-02-10 RX ORDER — HYDROMORPHONE HYDROCHLORIDE 2 MG/ML
1 INJECTION, SOLUTION INTRAMUSCULAR; INTRAVENOUS; SUBCUTANEOUS ONCE
Status: COMPLETED | OUTPATIENT
Start: 2018-02-10 | End: 2018-02-10

## 2018-02-10 RX ORDER — SODIUM CHLORIDE 0.9 % (FLUSH) 0.9 %
5-10 SYRINGE (ML) INJECTION AS NEEDED
Status: DISCONTINUED | OUTPATIENT
Start: 2018-02-10 | End: 2018-02-15 | Stop reason: HOSPADM

## 2018-02-10 RX ORDER — INSULIN LISPRO 100 [IU]/ML
20 INJECTION, SOLUTION INTRAVENOUS; SUBCUTANEOUS
COMMUNITY
End: 2020-02-27

## 2018-02-10 RX ORDER — DEXTROSE 50 % IN WATER (D50W) INTRAVENOUS SYRINGE
12.5-25 AS NEEDED
Status: DISCONTINUED | OUTPATIENT
Start: 2018-02-10 | End: 2018-02-15 | Stop reason: HOSPADM

## 2018-02-10 RX ORDER — ONDANSETRON 2 MG/ML
4 INJECTION INTRAMUSCULAR; INTRAVENOUS
Status: COMPLETED | OUTPATIENT
Start: 2018-02-10 | End: 2018-02-10

## 2018-02-10 RX ORDER — ONDANSETRON 4 MG/1
4 TABLET, ORALLY DISINTEGRATING ORAL
Status: DISCONTINUED | OUTPATIENT
Start: 2018-02-10 | End: 2018-02-15 | Stop reason: HOSPADM

## 2018-02-10 RX ORDER — BACLOFEN 10 MG/1
10 TABLET ORAL 2 TIMES DAILY
Status: DISCONTINUED | OUTPATIENT
Start: 2018-02-10 | End: 2018-02-15 | Stop reason: HOSPADM

## 2018-02-10 RX ORDER — METRONIDAZOLE 500 MG/100ML
500 INJECTION, SOLUTION INTRAVENOUS EVERY 8 HOURS
Status: DISCONTINUED | OUTPATIENT
Start: 2018-02-10 | End: 2018-02-14

## 2018-02-10 RX ORDER — INSULIN LISPRO 100 [IU]/ML
INJECTION, SOLUTION INTRAVENOUS; SUBCUTANEOUS
Status: DISCONTINUED | OUTPATIENT
Start: 2018-02-10 | End: 2018-02-11

## 2018-02-10 RX ORDER — ACETAMINOPHEN 325 MG/1
650 TABLET ORAL
Status: DISCONTINUED | OUTPATIENT
Start: 2018-02-10 | End: 2018-02-15 | Stop reason: HOSPADM

## 2018-02-10 RX ORDER — DIPHENHYDRAMINE HYDROCHLORIDE 50 MG/ML
12.5 INJECTION, SOLUTION INTRAMUSCULAR; INTRAVENOUS
Status: DISCONTINUED | OUTPATIENT
Start: 2018-02-10 | End: 2018-02-13

## 2018-02-10 RX ORDER — MAGNESIUM SULFATE 100 %
4 CRYSTALS MISCELLANEOUS AS NEEDED
Status: DISCONTINUED | OUTPATIENT
Start: 2018-02-10 | End: 2018-02-15 | Stop reason: HOSPADM

## 2018-02-10 RX ORDER — TRAMADOL HYDROCHLORIDE 50 MG/1
50 TABLET ORAL
Status: DISCONTINUED | OUTPATIENT
Start: 2018-02-10 | End: 2018-02-15 | Stop reason: HOSPADM

## 2018-02-10 RX ORDER — ENOXAPARIN SODIUM 100 MG/ML
40 INJECTION SUBCUTANEOUS EVERY 24 HOURS
Status: DISCONTINUED | OUTPATIENT
Start: 2018-02-10 | End: 2018-02-15 | Stop reason: HOSPADM

## 2018-02-10 RX ORDER — METOPROLOL TARTRATE 50 MG/1
50 TABLET ORAL 2 TIMES DAILY
Status: DISCONTINUED | OUTPATIENT
Start: 2018-02-10 | End: 2018-02-15 | Stop reason: HOSPADM

## 2018-02-10 RX ORDER — LORAZEPAM 1 MG/1
1 TABLET ORAL
Status: DISCONTINUED | OUTPATIENT
Start: 2018-02-10 | End: 2018-02-11

## 2018-02-10 RX ORDER — PANTOPRAZOLE SODIUM 40 MG/1
40 TABLET, DELAYED RELEASE ORAL DAILY
Status: ON HOLD | COMMUNITY
End: 2021-05-18 | Stop reason: SDUPTHER

## 2018-02-10 RX ORDER — LEVOFLOXACIN 5 MG/ML
750 INJECTION, SOLUTION INTRAVENOUS EVERY 24 HOURS
Status: DISCONTINUED | OUTPATIENT
Start: 2018-02-10 | End: 2018-02-14

## 2018-02-10 RX ORDER — SODIUM CHLORIDE 0.9 % (FLUSH) 0.9 %
5-10 SYRINGE (ML) INJECTION EVERY 8 HOURS
Status: DISCONTINUED | OUTPATIENT
Start: 2018-02-10 | End: 2018-02-15 | Stop reason: HOSPADM

## 2018-02-10 RX ORDER — SODIUM CHLORIDE AND POTASSIUM CHLORIDE .9; .15 G/100ML; G/100ML
SOLUTION INTRAVENOUS CONTINUOUS
Status: DISCONTINUED | OUTPATIENT
Start: 2018-02-10 | End: 2018-02-14

## 2018-02-10 RX ADMIN — ONDANSETRON 4 MG: 2 INJECTION INTRAMUSCULAR; INTRAVENOUS at 04:28

## 2018-02-10 RX ADMIN — BACLOFEN 10 MG: 10 TABLET ORAL at 17:07

## 2018-02-10 RX ADMIN — PROMETHAZINE HYDROCHLORIDE 12.5 MG: 25 INJECTION INTRAMUSCULAR; INTRAVENOUS at 08:52

## 2018-02-10 RX ADMIN — ENOXAPARIN SODIUM 40 MG: 40 INJECTION SUBCUTANEOUS at 20:57

## 2018-02-10 RX ADMIN — DIATRIZOATE MEGLUMINE AND DIATRIZOATE SODIUM 30 ML: 660; 100 LIQUID ORAL; RECTAL at 06:02

## 2018-02-10 RX ADMIN — METRONIDAZOLE 500 MG: 500 INJECTION, SOLUTION INTRAVENOUS at 23:05

## 2018-02-10 RX ADMIN — Medication 10 ML: at 21:00

## 2018-02-10 RX ADMIN — METRONIDAZOLE 500 MG: 500 INJECTION, SOLUTION INTRAVENOUS at 16:28

## 2018-02-10 RX ADMIN — LORAZEPAM 1 MG: 1 TABLET ORAL at 21:00

## 2018-02-10 RX ADMIN — SODIUM CHLORIDE AND POTASSIUM CHLORIDE: 9; 1.49 INJECTION, SOLUTION INTRAVENOUS at 17:09

## 2018-02-10 RX ADMIN — HYDROMORPHONE HYDROCHLORIDE 1 MG: 2 INJECTION INTRAMUSCULAR; INTRAVENOUS; SUBCUTANEOUS at 04:28

## 2018-02-10 RX ADMIN — LEVOFLOXACIN 750 MG: 5 INJECTION, SOLUTION INTRAVENOUS at 17:45

## 2018-02-10 RX ADMIN — METOPROLOL TARTRATE 50 MG: 50 TABLET ORAL at 17:07

## 2018-02-10 RX ADMIN — HYDROMORPHONE HYDROCHLORIDE 0.5 MG: 2 INJECTION INTRAMUSCULAR; INTRAVENOUS; SUBCUTANEOUS at 15:14

## 2018-02-10 RX ADMIN — HYDROMORPHONE HYDROCHLORIDE 0.5 MG: 2 INJECTION INTRAMUSCULAR; INTRAVENOUS; SUBCUTANEOUS at 20:58

## 2018-02-10 RX ADMIN — IOPAMIDOL 100 ML: 755 INJECTION, SOLUTION INTRAVENOUS at 05:12

## 2018-02-10 NOTE — ED NOTES
PT is placed back on intermit suction on her NG tube at her request as she states that it is assisting with the nausea. There is a 500 ml return of a yellow color liquid.

## 2018-02-10 NOTE — ED TRIAGE NOTES
Pt. Mekhi Rodriguez removed from reverse ileostomy site two weeks ago, states is now having drainage from site. Clear drainage.

## 2018-02-10 NOTE — ED NOTES
Report received from Maria Del Rosario Patiño, [facility]. Assumed care of pt. Bed locked and in low position with call bell within reach. Using AIDET-Introduced self as Primary RN and plan discussed with pt with understanding was verbalized. Pt denies additional complaints at this time. White board updated with this nurse's name. Patient advised that medical information will be discussed and it is their own responsibility to tell nurse if such conversation should not take place in the presence of visitors. Pt verbalizes understanding. Family at bedside.

## 2018-02-10 NOTE — ED NOTES
Bedside and Verbal shift change report given to Lizbeth Oh (oncoming nurse) by Kate Villegas RN (offgoing nurse). Report included the following information SBAR, ED Summary, MAR and Recent Results.

## 2018-02-10 NOTE — PROGRESS NOTES
Alert triggered for history of ESBL in urine. I spoke with the  who stated that the patient had to be placed in contact isolation pending a negative urine culture. I placed an order for contact isolation and urine culture (urine specimen already in lab). The patient has been placed in contact isolation. Education completed with her.

## 2018-02-10 NOTE — IP AVS SNAPSHOT
[address] 
[phone] Patient: Alba Woodward MRN: [id number] MMX:[id number] About your hospitalization You were admitted on:  February 10, 2018 You last received care in the:  SF 4M POST SURG ORT 1 You were discharged on:  February 15, 2018 Why you were hospitalized Your primary diagnosis was:  Sbo (Small Bowel Obstruction) Your diagnoses also included:  Essential Hypertension, Dm Type 2 (Diabetes Mellitus, Type 2) (Hcc), Thrombocytopenia (Hcc), Hyponatremia Follow-up Information Follow up With Details Comments Contact Info Amos Peralta MD   [address] [id number] 
[phone] Discharge Orders None A check salvador indicates which time of day the medication should be taken. My Medications START taking these medications Instructions Each Dose to Equal  
 Morning Noon Evening Bedtime  
 levoFLOXacin 750 mg tablet Commonly known as:  Carmelina Oziel Your next dose is: Tonight at 9pm  
   
 Take 1 Tab by mouth every twenty-four (24) hours. 750 mg CONTINUE taking these medications Instructions Each Dose to Equal  
 Morning Noon Evening Bedtime ATIVAN 1 mg tablet Generic drug:  LORazepam  
Your next dose is: Tonight at 9pm  
   
 Take 1 mg by mouth nightly. Indications: anxiety 1 mg  
    
   
   
   
  
 baclofen 10 mg tablet Commonly known as:  LIORESAL Your next dose is:  Tomorrow as needed Take 10 mg by mouth daily as needed. 10 mg  
    
   
   
   
  
 CYMBALTA 60 mg capsule Generic drug:  DULoxetine Your next dose is:  Tomorrow morning at 9am  
   
 Take 120 mg by mouth daily. 120 mg  
    
   
   
   
  
 ibuprofen 200 mg tablet Commonly known as:  MOTRIN Your next dose is:  As needed Take 600 mg by mouth every eight (8) hours as needed for Pain.   
 600 mg  
    
 * insulin lispro 100 unit/mL injection Commonly known as:  HUMALOG Your next dose is:  As directed 24 Units by SubCUTAneous route Daily (before dinner). Before dinner 24 Units * HumaLOG 100 unit/mL injection Generic drug:  insulin lispro Your next dose is:  As directed 20 Units by SubCUTAneous route Daily (before breakfast). 20 Units * HumaLOG 100 unit/mL injection Generic drug:  insulin lispro Your next dose is:  As directed 20 Units by SubCUTAneous route Daily (before lunch). 20 Units * MAXZIDE-25MG 37.5-25 mg per tablet Generic drug:  triamterene-hydroCHLOROthiazide Your next dose is:  Tomorrow morning at 9am  
   
 Take 1 Tab by mouth daily. Indications: HYPERTENSION  
 1 Tab * triamterene-hydroCHLOROthiazide 37.5-25 mg per tablet Commonly known as:  Fern Neth Your next dose is:  Tomorrow morning at 9am  
   
 Take 1 Tab by mouth daily. 1 Tab  
    
   
   
   
  
 metoprolol tartrate 50 mg tablet Commonly known as:  LOPRESSOR Your next dose is: Tonight at dinner time Take 50 mg by mouth two (2) times a day. 50 mg NovoLIN N 100 unit/mL injection Generic drug:  insulin NPH Your next dose is:  As directed 74 Units by SubCUTAneous route nightly. 74 Units PROTONIX 40 mg tablet Generic drug:  pantoprazole Your next dose is:  Tomorrow morning at 9am  
   
 Take 40 mg by mouth daily. 40 mg  
    
   
   
   
  
 ZOFRAN (AS HYDROCHLORIDE) 4 mg tablet Generic drug:  ondansetron hcl Your next dose is:  As needed Take 4 mg by mouth every four (4) hours as needed for Nausea. 4 mg * Notice: This list has 5 medication(s) that are the same as other medications prescribed for you.  Read the directions carefully, and ask your doctor or other care provider to review them with you. Where to Get Your Medications Information on where to get these meds will be given to you by the nurse or doctor. ! Ask your nurse or doctor about these medications  
  levoFLOXacin 750 mg tablet Discharge Instructions Bowel Blockage (Intestinal Obstruction): Care Instructions Your Care Instructions A bowel blockage, also called an intestinal obstruction, can prevent gas, fluids, or solids from moving through the intestines normally. It can cause constipation and, rarely, diarrhea. You may have pain, nausea, vomiting, and cramping. Most of the time, complete blockages require a stay in the hospital and possibly surgery. But if your bowel is only partly blocked, your doctor may tell you to wait until it clears on its own and you are able to pass gas and stool. If so, there are things you can do at home to help make you feel better. If you have had surgery for a bowel blockage, there are things you can do at home to make sure you heal well. You can also make some changes to keep your bowel from becoming blocked again. Follow-up care is a key part of your treatment and safety. Be sure to make and go to all appointments, and call your doctor if you are having problems. It's also a good idea to know your test results and keep a list of the medicines you take. How can you care for yourself at home? If your doctor has told you to wait at home for a blockage to clear on its own: · Follow your doctor's instructions. These may include eating a liquid diet to avoid complete blockage. · Be safe with medicines. Take your medicines exactly as prescribed. Call your doctor if you think you are having a problem with your medicine. · Put a heating pad set on low on your belly to relieve mild cramps and pain. To prevent another blockage · Try to eat smaller amounts of food more often.  For example, have 5 or 6 small meals throughout the day instead of 2 or 3 large meals. · Chew your food very well. Try to chew each bite about 20 times or until it is liquid. · Avoid high-fiber foods and raw fruits and vegetables with skins, husks, strings, or seeds. These can form a ball of undigested material that can cause a blockage if a part of your bowel is scarred or narrowed. · Check with your doctor before you eat whole-grain products or use a fiber supplement such as Citrucel or Metamucil. · To help you have regular bowel movements, eat at regular times, do not strain during a bowel movement, and drink at least 8 to 10 glasses of water each day. If you have kidney, heart, or liver disease and have to limit fluids, talk with your doctor or before you increase the amount of fluids you drink. · Drink high-calorie liquid formulas if your doctor says to. Severe symptoms may make it hard for your body to take in vitamins and minerals. · Get regular exercise. It helps you digest your food better. Get at least 30 minutes of physical activity on most days of the week. Walking is a good choice. When should you call for help? Call your doctor now or seek immediate medical care if: 
? · You have a fever. ? · You are vomiting. ? · You have new or worse belly pain. ? · You cannot pass stools or gas. ? Watch closely for changes in your health, and be sure to contact your doctor if you have any problems. Where can you learn more? Go to http://chung-vick.info/. Enter E514 in the search box to learn more about \"Bowel Blockage (Intestinal Obstruction): Care Instructions. \" Current as of: May 12, 2017 Content Version: 11.4 © 1019-5002 [employer]. Care instructions adapted under license by [employer] (which disclaims liability or warranty for this information).  If you have questions about a medical condition or this instruction, always ask your healthcare professional. [employer] disclaims any warranty or liability for your use of this information. Introducing [facility]! Dear Verona Mazariegos: Thank you for requesting a Micello account. Our records indicate that you already have an active Micello account. You can access your account anytime at [email or web address] Did you know that you can access your hospital and ER discharge instructions at any time in Micello? You can also review all of your test results from your hospital stay or ER visit. Additional Information If you have questions, please visit the Frequently Asked Questions section of the Micello website at [email or web address] Remember, Micello is NOT to be used for urgent needs. For medical emergencies, dial 911. Now available from your iPhone and Android! Providers Seen During Your Hospitalization Provider Specialty Primary office phone Rodolfo Cortez MD Emergency Medicine [phone] Shakeel Julian MD Colon and Rectal Surgery [phone] Your Primary Care Physician (PCP) Primary Care Physician Office Phone Office Fax Lurdes Saravia [phone] You are allergic to the following Allergen Reactions Tylenol (Acetaminophen) Other (comments) Liver disorder. HAS HEMATOMACROSIS Claritin (Loratadine) Other (comments) Panic attack Codeine Other (comments)  
 hallucinations Percocet (Oxycodone-Acetaminophen) Other (comments)  
 hallucinations Recent Documentation Height Weight BMI OB Status Smoking Status 1.55 m 92.1 kg 38.33 kg/m2 Hysterectomy Former Smoker Emergency Contacts Name Discharge Info Relation Home Work Mobile Margarette Estrella DISCHARGE CAREGIVER [3] Daughter [21] [phone] [facility] DISCHARGE CAREGIVER [3] Brother [24] [phone] Patient Belongings The following personal items are in your possession at time of discharge: 
  Dental Appliances: None  Visual Aid: Glasses      Home Medications: Sent home   Jewelry: None  Clothing: Jacket/Coat, Shirt, With patient, Pants    Other Valuables: None Please provide this summary of care documentation to your next provider. Signatures-by signing, you are acknowledging that this After Visit Summary has been reviewed with you and you have received a copy. Patient Signature:  ____________________________________________________________ Date:  ____________________________________________________________  
  
Nicole Payor Provider Signature:  ____________________________________________________________ Date:  ____________________________________________________________

## 2018-02-10 NOTE — PROGRESS NOTES
[facility]: MED RECONCILIATION    Comments/Recommendations:   Patient is awake, alert, and knowledgeable about home medications   Pharmacist called [facility]  Patient obtains medications from [address] [place] [address],[address] mail order  Fasting blood glucose in the morning 85 to 106 and 106 to 111 during the day. The patient reports frequent low blood glucose for which she takes orange juice and plans for follow up with her provider    Medications added:      None    Medications removed:    · Tramadol    Medications adjusted:    · Nonne    Allergies: Tylenol [acetaminophen]; Claritin [loratadine]; Codeine; and Percocet [oxycodone-acetaminophen]    Prior to Admission Medications:   Prior to Admission Medications   Prescriptions Last Dose Informant Patient Reported? Taking? DULoxetine (CYMBALTA) 60 mg capsule 2018 at Unknown time Self Yes Yes   Sig: Take 120 mg by mouth daily. LORazepam (ATIVAN) 1 mg tablet 2018 at Unknown time  Yes Yes   Sig: Take 1 mg by mouth nightly. Indications: anxiety   baclofen (LIORESAL) 10 mg tablet  Self Yes Yes   Sig: Take 10 mg by mouth daily as needed. ibuprofen (MOTRIN) 200 mg tablet 2018 at Unknown time  Yes Yes   Sig: Take 600 mg by mouth every eight (8) hours as needed for Pain. insulin NPH (NOVOLIN N) 100 unit/mL injection 2018 at Unknown time  Yes Yes   Si Units by SubCUTAneous route nightly. insulin lispro (HUMALOG) 100 unit/mL injection 2018 at Unknown time Self Yes Yes   Si Units by SubCUTAneous route Daily (before dinner). Before dinner    insulin lispro (HUMALOG) 100 unit/mL injection 2018 at Unknown time  Yes Yes   Si Units by SubCUTAneous route Daily (before breakfast). insulin lispro (HUMALOG) 100 unit/mL injection 2018 at Unknown time  Yes Yes   Si Units by SubCUTAneous route Daily (before lunch). metoprolol (LOPRESSOR) 50 mg tablet 2/10/2018 at Unknown time Self Yes Yes   Sig: Take 50 mg by mouth two (2) times a day. ondansetron hcl (ZOFRAN) 4 mg tablet 2/10/2018 at Unknown time Self Yes Yes   Sig: Take 4 mg by mouth every four (4) hours as needed for Nausea. pantoprazole (PROTONIX) 40 mg tablet 2/10/2018 at Unknown time  Yes Yes   Sig: Take 40 mg by mouth daily. triamterene-hydrochlorothiazide (MAXZIDE-25MG) 37.5-25 mg per tablet 2/10/2018 at Unknown time Self Yes Yes   Sig: Take 1 Tab by mouth daily.  Indications: HYPERTENSION      Facility-Administered Medications: None      Thank you,      Brandt Felipe, PharmD, BCPS

## 2018-02-10 NOTE — CONSULTS
Consult History and Physical Exam    NAME:  Kaur Giraldo   :   1945   MRN:  [id number]     PCP:  Collins Stallings MD   Referring: Jordy Wilson MD     Date/Time:  2/10/2018           Assessment/Plan:       DM type 2 (diabetes mellitus, type 2) ([facility] (9/10/2013): Patient is on an unusual insulin regimen at home. -- hold scheduled insulin while NPO   -- continue SSI, but change to lispro instead of regular    Essential hypertension (9/10/2013):   -- continue metoprolol    Hyponatremia (2013): Mild. Had brief bout of hyponatremia previously. Likely from IVVD due to vomiting.   -- continue NS   -- hold HCTZ / triamterene   -- repeat labs in AM    Lactic acidosis:  Noted on admission. Likely from IVVD due to vomiting and SBO. -- repeat lactate    Thrombocytopenia (HCC) (2015):  Chronic and mild. -- monitor while on lovenox    SBO (small bowel obstruction) (2/10/2018):  NGT in place. Symptoms much improved. -- further management per surgeon           Subjective:   REASON FOR CONSULT:  Management of DM and HTN    CHIEF COMPLAINT:  Abdominal pain and vomiting    HISTORY OF PRESENT ILLNESS:     Ms. Zane Hernandez is a 81 y.o.  female who is admitted for SBO. Ms. Zane Hernandez today complains of abdominal pain since 7 pm last night. No alleviating factors. Aggravated with palpation. Associated with intractable nausea and vomiting. No blood in emesis. No diarrhea or BM at home prior to admission. No fever, chills. Denies sick contacts. No chest pains or sob. Reports passing flatus since admission but no BM. Reports BP and DM well controlled at home at baseline.       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    Unspecified sleep apnea     NO C-PAP        Past Surgical History:   Procedure Laterality Date    HX APPENDECTOMY      HX BREAST LUMPECTOMY Left     lumpectomy BENIGN    HX COLECTOMY      sigmoid    HX COLONOSCOPY      HX HYSTERECTOMY      hysto    HX ORTHOPAEDIC Left     shoulder CYST REMOVAL    HX OTHER SURGICAL  12/31/13    pyloric-sparing whipple,bx of portal and hepatic nodes by Dr Dorothy Oglesby      ileostomy    HX SALPINGO-OOPHORECTOMY Bilateral     HX TONSILLECTOMY      HX UROLOGICAL  2008    BLADDER TUCK       Social History   Substance Use Topics    Smoking status: Former Smoker     Packs/day: 1.00     Years: 17.00     Quit date: 12/27/1983    Smokeless tobacco: Never Used    Alcohol use No        Family History   Problem Relation Age of Onset    Heart Failure Mother     Diabetes Mother     Hypertension Mother     Heart Failure Father     Kidney Disease Father      WAS ON DIALYSIS    Diabetes Sister     Depression Sister     Depression Sister     Other Sister      DIFFICULTY INTUBATING    Asthma Sister         Allergies   Allergen Reactions    Tylenol [Acetaminophen] Other (comments)     Liver disorder. HAS HEMATOMACROSIS    Claritin [Loratadine] Other (comments)     Panic attack    Codeine Other (comments)     hallucinations    Percocet [Oxycodone-Acetaminophen] Other (comments)     hallucinations        Prior to Admission medications    Medication Sig Start Date End Date Taking? Authorizing Provider   ibuprofen (MOTRIN) 200 mg tablet Take 600 mg by mouth every eight (8) hours as needed for Pain. Yes Historical Provider   insulin lispro (HUMALOG) 100 unit/mL injection 20 Units by SubCUTAneous route Daily (before breakfast).    Yes Historical Provider   insulin lispro (HUMALOG) 100 unit/mL injection 20 Units by SubCUTAneous route Daily (before lunch). Yes Historical Provider   pantoprazole (PROTONIX) 40 mg tablet Take 40 mg by mouth daily. Yes Historical Provider   insulin NPH (NOVOLIN N) 100 unit/mL injection 74 Units by SubCUTAneous route nightly. Yes Historical Provider   metoprolol (LOPRESSOR) 50 mg tablet Take 50 mg by mouth two (2) times a day. Yes Historical Provider   baclofen (LIORESAL) 10 mg tablet Take 10 mg by mouth daily as needed. Yes Historical Provider   insulin lispro (HUMALOG) 100 unit/mL injection 24 Units by SubCUTAneous route Daily (before dinner). Before dinner    Yes Historical Provider   LORazepam (ATIVAN) 1 mg tablet Take 1 mg by mouth nightly. Indications: anxiety   Yes Historical Provider   ondansetron hcl (ZOFRAN) 4 mg tablet Take 4 mg by mouth every four (4) hours as needed for Nausea. Yes Historical Provider   triamterene-hydrochlorothiazide (MAXZIDE-25MG) 37.5-25 mg per tablet Take 1 Tab by mouth daily. Indications: HYPERTENSION   Yes Karrie Garcia MD   DULoxetine (CYMBALTA) 60 mg capsule Take 120 mg by mouth daily.    Yes Karrie Garcia MD         Review of Systems:  (bold if positive, if negative)    Gen:  Eyes:  ENT:  CVS:  Pulm:  GI:  Abdominal pain, nausea, emesis,  :    MS:  Hem:  Renal:    Neuro:            Objective:      VITALS:    Vital signs reviewed; most recent are:    Visit Vitals    /61 (BP 1 Location: Left arm)    Pulse 67    Temp 98.4 °F (36.9 °C)    Resp 18    Wt 92.1 kg (203 lb)    SpO2 100%    BMI 38.36 kg/m2     SpO2 Readings from Last 6 Encounters:   02/10/18 100%   12/26/17 96%   12/18/17 96%   09/26/17 (!) 77%   09/16/17 96%   10/30/15 97%          Intake/Output Summary (Last 24 hours) at 02/10/18 1842  Last data filed at 02/10/18 1835   Gross per 24 hour   Intake                0 ml   Output             1300 ml   Net            -1300 ml            Exam:     Physical Exam:    Gen:  Well-developed, well-nourished, in no acute distress  HEENT:  Pink conjunctivae, hearing intact to voice, moist mucous membranes  Neck:  Supple  Resp:  No accessory muscle use, clear breath sounds without wheezes rales or rhonchi  Card:  No murmurs, normal S1, S2 without thrills or peripheral edema  Abd:  Soft, non-tender, distended, hypoactive bowel sounds are present  Musc:  No cyanosis  Skin:  No rashes, skin turgor is good  Neuro:  Cranial nerves 3-12 are grossly intact,  strength is 5/5 bilaterally, dorsi / plantarflexion strength is 5/5 bilaterally, follows commands appropriately  Psych:  Alert with good insight. Oriented to person, place, and time       Labs:    Recent Labs      02/10/18   0431   WBC  13.8*   HGB  13.0   HCT  39.1   PLT  121*     Recent Labs      02/10/18   0431   NA  132*   K  4.4   CL  95*   CO2  25   GLU  176*   BUN  18   CREA  0.97   CA  10.2*   ALB  3.8   TBILI  0.8   SGOT  36   ALT  30     Lab Results   Component Value Date/Time    Glucose (POC) 106 (H) 02/10/2018 04:44 PM    Glucose (POC) 147 (H) 12/26/2017 11:14 AM       No results for input(s): INR in the last 72 hours. No lab exists for component: INREXT      Reviewed prior medical records in preparation for this consult: Old medical records.     Surrogate decision maker:  daughter    Total time spent with patient: 48 [address] discussed with: Patient    Discussed:  Care Plan    Prophylaxis:  Lovenox           ___________________________________________________    Attending Physician: Roya Mckeon MD

## 2018-02-10 NOTE — ED NOTES
TRANSFER - OUT REPORT:    Verbal report given to Lnidsay RN(name) on [facility]  being transferred to [facility](unit) for routine progression of care       Report consisted of patients Situation, Background, Assessment and   Recommendations(SBAR). Information from the following report(s) SBAR, ED Summary, [facility] and Recent Results was reviewed with the receiving nurse. Lines:   Peripheral IV 02/10/18 Left Antecubital (Active)   Site Assessment Clean, dry, & intact 2/10/2018  4:36 AM   Phlebitis Assessment 0 2/10/2018  4:36 AM   Infiltration Assessment 0 2/10/2018  4:36 AM   Dressing Status Clean, dry, & intact 2/10/2018  4:36 AM   Hub Color/Line Status Pink 2/10/2018  4:36 AM       Peripheral IV 02/10/18 Right Antecubital (Active)        Opportunity for questions and clarification was provided.       Patient transported with:   [employer]

## 2018-02-10 NOTE — ED TRIAGE NOTES
Pt.brought in by EMS for abdominal pain since 1900 last night, states vomiting,denies diarrhea, states attempted to have BM but was very small. Pain all over abd. Pt. Was seen by PCP yesterday, was not having the pain then.

## 2018-02-10 NOTE — H&P
Colon and Rectal Surgery History and Physical    Subjective:      Maggie Calderon is a 81 y.o. female who had ex lap 2 months ago for diverting loop ileostomy takedown. Has been doing well albeit draining serosang fluid from midline wound. She developed abdominal pain, nausea, and vomiting yesterday afternoon. Had a bm 2 days ago. Presented to the ER and was found to have SBO and a small fluid collection in subq in midline incision. NGT has relieved her abdominal distention somewhat.      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    Hyponatremia 09/11/2013    Cholangitis 09/11/2013    Hemochromatosis 09/10/2013    DM type 2 (diabetes mellitus, type 2) ([address] 09/10/2013    Essential hypertension 09/10/2013     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    Unspecified sleep apnea     NO C-PAP      Past Surgical History:   Procedure Laterality Date    HX APPENDECTOMY      HX BREAST LUMPECTOMY Left     lumpectomy BENIGN    HX COLECTOMY      sigmoid    HX COLONOSCOPY      HX HYSTERECTOMY      hysto    HX ORTHOPAEDIC Left     shoulder CYST REMOVAL    HX OTHER SURGICAL  12/31/13    pyloric-sparing whipple,bx of portal and hepatic nodes by Dr David Vargas HX SALPINGO-OOPHORECTOMY Bilateral     HX TONSILLECTOMY      HX UROLOGICAL  2008    BLADDER TUCK      Social History   Substance Use Topics    Smoking status: Former Smoker     Packs/day: 1.00     Years: 17.00     Quit date: 12/27/1983    Smokeless tobacco: Never Used    Alcohol use No      Family History   Problem Relation Age of Onset    Heart Failure Mother     Diabetes Mother     Hypertension Mother     Heart Failure Father     Kidney Disease Father      WAS ON DIALYSIS    Diabetes Sister     Depression Sister     Depression Sister     Other Sister      DIFFICULTY INTUBATING    Asthma Sister       Prior to Admission medications    Medication Sig Start Date End Date Taking? Authorizing Provider   traMADol (ULTRAM) 50 mg tablet Take 1 Tab by mouth every six (6) hours as needed. Max Daily Amount: 200 mg. 12/22/17   Nisa Wei MD   insulin NPH (NOVOLIN N) 100 unit/mL injection 74 Units by SubCUTAneous route nightly. Historical Provider   metoprolol (LOPRESSOR) 50 mg tablet Take 50 mg by mouth two (2) times a day. Historical Provider   omeprazole (PRILOSEC) 40 mg capsule Take 40 mg by mouth daily. Historical Provider   baclofen (LIORESAL) 10 mg tablet Take 10 mg by mouth daily as needed. Historical Provider   insulin lispro (HUMALOG) 100 unit/mL injection 20 Units by SubCUTAneous route two (2) times a day. Before breakfast and lunch    Historical Provider   insulin lispro (HUMALOG) 100 unit/mL injection 24 Units by SubCUTAneous route every evening. Before dinner    Historical Provider   LORazepam (ATIVAN) 1 mg tablet Take 1 mg by mouth nightly. Indications: anxiety    Historical Provider   ondansetron hcl (ZOFRAN) 4 mg tablet Take 4 mg by mouth every four (4) hours as needed for Nausea. Historical Provider   ibuprofen (MOTRIN) 600 mg tablet Take 600 mg by mouth every six (6) hours as needed for Pain.     Historical Provider   triamterene-hydrochlorothiazide (MAXZIDE-25MG) 37.5-25 mg per tablet Take 1 Tab by mouth daily. Indications: HYPERTENSION    Phys Other, MD   DULoxetine (CYMBALTA) 60 mg capsule Take 120 mg by mouth daily. Phys Other, MD     Allergies   Allergen Reactions    Tylenol [Acetaminophen] Other (comments)     Liver disorder. HAS HEMATOMACROSIS    Claritin [Loratadine] Other (comments)     Panic attack    Codeine Other (comments)     hallucinations    Percocet [Oxycodone-Acetaminophen] Other (comments)     hallucinations        Review of Systems:    A comprehensive review of systems was negative except for that written in the History of Present Illness. Objective:     Visit Vitals    /86    Pulse 67    Temp 97.6 °F (36.4 °C)    Resp 19    Wt 92.1 kg (203 lb)    SpO2 96%    BMI 38.36 kg/m2        Physical Exam:   nad  Chest clear  Heart reg  adb soft with serous drainage from umbo. Mildly tender. No rebound or guarding  maex4  Grossly neuro intact    Imaging:  images and reports reviewed    Lab Review:    Recent Results (from the past 24 hour(s))   LACTIC ACID    Collection Time: 02/10/18  4:31 AM   Result Value Ref Range    Lactic acid 2.4 (HH) 0.4 - 2.0 MMOL/L   METABOLIC PANEL, COMPREHENSIVE    Collection Time: 02/10/18  4:31 AM   Result Value Ref Range    Sodium 132 (L) 136 - 145 mmol/L    Potassium 4.4 3.5 - 5.1 mmol/L    Chloride 95 (L) 97 - 108 mmol/L    CO2 25 21 - 32 mmol/L    Anion gap 12 5 - 15 mmol/L    Glucose 176 (H) 65 - 100 mg/dL    BUN 18 6 - 20 MG/DL    Creatinine 0.97 0.55 - 1.02 MG/DL    BUN/Creatinine ratio 19 12 - 20      GFR est AA >60 >60 ml/min/1.73m2    GFR est non-AA 57 (L) >60 ml/min/1.73m2    Calcium 10.2 (H) 8.5 - 10.1 MG/DL    Bilirubin, total 0.8 0.2 - 1.0 MG/DL    ALT (SGPT) 30 12 - 78 U/L    AST (SGOT) 36 15 - 37 U/L    Alk.  phosphatase 133 (H) 45 - 117 U/L    Protein, total 8.7 (H) 6.4 - 8.2 g/dL    Albumin 3.8 3.5 - 5.0 g/dL    Globulin 4.9 (H) 2.0 - 4.0 g/dL    A-G Ratio 0.8 (L) 1.1 - 2.2     LIPASE    Collection Time: 02/10/18  4:31 AM   Result Value Ref Range    Lipase 63 (L) 73 - 393 U/L   CBC WITH AUTOMATED DIFF    Collection Time: 02/10/18  4:31 AM   Result Value Ref Range    WBC 13.8 (H) 3.6 - 11.0 K/uL    RBC 4.44 3.80 - 5.20 M/uL    HGB 13.0 11.5 - 16.0 g/dL    HCT 39.1 35.0 - 47.0 %    MCV 88.1 80.0 - 99.0 FL    MCH 29.3 26.0 - 34.0 PG    MCHC 33.2 30.0 - 36.5 g/dL    RDW 12.3 11.5 - 14.5 %    PLATELET 587 (L) 874 - 400 K/uL    MPV 11.4 8.9 - 12.9 FL    NRBC 0.0 0  WBC    ABSOLUTE NRBC 0.00 0.00 - 0.01 K/uL    NEUTROPHILS 71 32 - 75 %    LYMPHOCYTES 22 12 - 49 %    MONOCYTES 6 5 - 13 %    EOSINOPHILS 1 0 - 7 %    BASOPHILS 0 0 - 1 %    IMMATURE GRANULOCYTES 0 0.0 - 0.5 %    ABS. NEUTROPHILS 9.8 (H) 1.8 - 8.0 K/UL    ABS. LYMPHOCYTES 3.0 0.8 - 3.5 K/UL    ABS. MONOCYTES 0.8 0.0 - 1.0 K/UL    ABS. EOSINOPHILS 0.1 0.0 - 0.4 K/UL    ABS. BASOPHILS 0.0 0.0 - 0.1 K/UL    ABS. IMM. GRANS. 0.0 0.00 - 0.04 K/UL    DF AUTOMATED     URINALYSIS W/MICROSCOPIC    Collection Time: 02/10/18  6:54 AM   Result Value Ref Range    Color DARK YELLOW      Appearance CLEAR CLEAR      Specific gravity 1.023 1.003 - 1.030      pH (UA) 7.5 5.0 - 8.0      Protein NEGATIVE  NEG mg/dL    Glucose NEGATIVE  NEG mg/dL    Ketone NEGATIVE  NEG mg/dL    Bilirubin NEGATIVE  NEG      Blood NEGATIVE  NEG      Urobilinogen 0.2 0.2 - 1.0 EU/dL    Nitrites NEGATIVE  NEG      Leukocyte Esterase TRACE (A) NEG      WBC 0-4 0 - 4 /hpf    RBC 0-5 0 - 5 /hpf    Epithelial cells FEW FEW /lpf    Bacteria NEGATIVE  NEG /hpf    Hyaline cast 0-2 0 - 5 /lpf   URINE CULTURE HOLD SAMPLE    Collection Time: 02/10/18  6:54 AM   Result Value Ref Range    Urine culture hold        URINE ON HOLD IN MICROBIOLOGY DEPT FOR 3 DAYS. IF UNPRESERVED URINE IS SUBMITTED, IT CANNOT BE USED FOR ADDITIONAL TESTING AFTER 24 HRS, RECOLLECTION WILL BE REQUIRED.        Labs and radiology: images and reports reviewed      Assessment:     SBO and midline wound    Plan:     Admit  Conservative management for sbo with ivf resuscitation and ngt  abx for midline fluid collection. This may eventually require exploration and drainage more formally. Will defer to Dr. Raine Flowers for further management of this.        Signed By: Rehan Lopez MD     February 10, 2018

## 2018-02-10 NOTE — ED PROVIDER NOTES
HPI Comments: Patient is a 63-year-old female who presents to the emergency Department with complaints of onset of abdominal pain that is diffuse in nature approximately 10 hours prior to presentation. Since the time of onset, she has had multiple bouts of emesis which has mostly been yellow in color. It is nonbloody. She states that she is passing gas has had area little in the way of bowel movements recently. She denies fever. She recently underwent an ileostomy reversal procedure and has been doing fine postoperatively since then. She also has a distant history of a Whipple procedure. She denies dysuria, urgency, frequency. She denies fever. She has had no recent sick contacts. She has no other complaints at this time. The history is provided by the patient.         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    Unspecified sleep apnea     NO C-PAP       Past Surgical History:   Procedure Laterality Date    HX APPENDECTOMY      HX BREAST LUMPECTOMY Left     lumpectomy BENIGN    HX COLECTOMY      sigmoid    HX COLONOSCOPY      HX HYSTERECTOMY      hysto    HX ORTHOPAEDIC Left     shoulder CYST REMOVAL    HX OTHER SURGICAL  12/31/13    pyloric-sparing whipple,bx of portal and hepatic nodes by Dr Cordon Number      ileostomy    HX SALPINGO-OOPHORECTOMY Bilateral     HX TONSILLECTOMY      HX UROLOGICAL  2008    BLADDER TUCK         Family History:   Problem Relation Age of Onset    Heart Failure Mother     Diabetes Mother     Hypertension Mother     Heart Failure Father     Kidney Disease Father      WAS ON DIALYSIS    Diabetes Sister     Depression Sister     Depression Sister     Other Sister      DIFFICULTY INTUBATING    Asthma Sister        Social History     Social History    Marital status:      Spouse name: N/A    Number of children: N/A    Years of education: N/A     Occupational History    Not on file. Social History Main Topics    Smoking status: Former Smoker     Packs/day: 1.00     Years: 17.00     Quit date: 12/27/1983    Smokeless tobacco: Never Used    Alcohol use No    Drug use: No    Sexual activity: Not on file     Other Topics Concern    Not on file     Social History Narrative         ALLERGIES: Tylenol [acetaminophen]; Claritin [loratadine]; Codeine; and Percocet [oxycodone-acetaminophen]    Review of Systems   Constitutional: Negative. Negative for appetite change, fever and unexpected weight change. HENT: Negative. Negative for ear pain, hearing loss, nosebleeds, rhinorrhea, sore throat and trouble swallowing. Respiratory: Negative. Negative for cough, chest tightness and shortness of breath. Cardiovascular: Negative. Negative for chest pain and palpitations. Gastrointestinal: Positive for abdominal distention, abdominal pain, nausea and vomiting. Negative for blood in stool. Endocrine: Negative. Genitourinary: Negative for dysuria and hematuria. Musculoskeletal: Negative. Negative for back pain and myalgias. Skin: Negative. Negative for rash. Allergic/Immunologic: Negative. Neurological: Negative. Negative for dizziness, syncope, weakness and numbness. Hematological: Negative. Psychiatric/Behavioral: Negative. All other systems reviewed and are negative.       Vitals:    02/10/18 0420   BP: 147/63   Pulse: 67   Resp: 19   Temp: 97.6 °F (36.4 °C)   SpO2: 100%   Weight: 92.1 kg (203 lb)            Physical Exam   Constitutional: She is oriented to person, place, and time. She appears well-developed and well-nourished. She appears distressed. Somewhat anxious appearing in moderate pain distress. Nontoxic. HENT:   Head: Normocephalic and atraumatic. Right Ear: External ear normal.   Left Ear: External ear normal.   Nose: Nose normal.   Mouth/Throat: Oropharynx is clear and moist.   Eyes: Conjunctivae and EOM are normal. Pupils are equal, round, and reactive to light. Neck: Normal range of motion. Neck supple. No JVD present. No thyromegaly present. Cardiovascular: Normal rate, regular rhythm, normal heart sounds and intact distal pulses. No murmur heard. Pulmonary/Chest: Effort normal and breath sounds normal. No respiratory distress. She has no wheezes. She has no rales. Abdominal: Soft. She exhibits distension. She exhibits no mass. There is tenderness. There is no rebound and no guarding. Abdomen is soft, mildly distended. She has hypoactive bowel sounds in all 4 quadrants. She is a healing midline incision with no acute drainage or surrounding erythema. She has no CVA tenderness. Musculoskeletal: Normal range of motion. She exhibits no edema. Neurological: She is alert and oriented to person, place, and time. No cranial nerve deficit. Skin: Skin is warm and dry. No rash noted. Psychiatric: She has a normal mood and affect. Her behavior is normal. Thought content normal.   Vitals reviewed. MDM  Number of Diagnoses or Management Options  Diagnosis management comments: Assessment and plan: Abdominal distention, vomiting. Concern for small bowel obstruction or partial small bowel obstruction. Comprehensive metabolic panel and lipase are unremarkable. Lactic acid is slightly elevated at 2.4. White blood cell count is elevated at 13. Acute abdominal series shows a few loops of dilated small bowel with air-fluid levels. There is air through to the rectum and films certainly not consistent with complete obstruction at this time.  Will obtain CT scan for further clarification. An NG tube was placed to the emergency department and one place to suction, approximately 1 L of yellowish gastric contents were removed with significant improvement in nausea, distention, and pain. 7:00 AM:  Change of shift. Case signed out to oncoming physician, Dr. Johny Kinney. Dr. Johny Kinney will followup on results of CT scan and colorectal surgery input to determine final disposition. Amount and/or Complexity of Data Reviewed  Clinical lab tests: ordered and reviewed  Tests in the radiology section of CPT®: ordered and reviewed  Review and summarize past medical records: yes  Discuss the patient with other providers: yes    Risk of Complications, Morbidity, and/or Mortality  Presenting problems: moderate  Diagnostic procedures: moderate  Management options: moderate    Patient Progress  Patient progress: stable        ED Course       Procedures    EKG interpretation: An EKG was performed at 4:29 AM. Rate is 67. It is a normal sinus rhythm. There is left axis deviation. ST segments are normal in appearance. There is no ectopy. There is no sign of acute injury pattern.

## 2018-02-11 LAB
ANION GAP SERPL CALC-SCNC: 7 MMOL/L (ref 5–15)
BUN SERPL-MCNC: 17 MG/DL (ref 6–20)
BUN/CREAT SERPL: 20 (ref 12–20)
CALCIUM SERPL-MCNC: 8.8 MG/DL (ref 8.5–10.1)
CHLORIDE SERPL-SCNC: 101 MMOL/L (ref 97–108)
CO2 SERPL-SCNC: 30 MMOL/L (ref 21–32)
CREAT SERPL-MCNC: 0.86 MG/DL (ref 0.55–1.02)
ERYTHROCYTE [DISTWIDTH] IN BLOOD BY AUTOMATED COUNT: 12.4 % (ref 11.5–14.5)
GLUCOSE BLD STRIP.AUTO-MCNC: 105 MG/DL (ref 65–100)
GLUCOSE BLD STRIP.AUTO-MCNC: 107 MG/DL (ref 65–100)
GLUCOSE BLD STRIP.AUTO-MCNC: 129 MG/DL (ref 65–100)
GLUCOSE BLD STRIP.AUTO-MCNC: 136 MG/DL (ref 65–100)
GLUCOSE SERPL-MCNC: 103 MG/DL (ref 65–100)
HCT VFR BLD AUTO: 34.4 % (ref 35–47)
HGB BLD-MCNC: 11.1 G/DL (ref 11.5–16)
LACTATE SERPL-SCNC: 0.8 MMOL/L (ref 0.4–2)
MCH RBC QN AUTO: 29.4 PG (ref 26–34)
MCHC RBC AUTO-ENTMCNC: 32.3 G/DL (ref 30–36.5)
MCV RBC AUTO: 91.2 FL (ref 80–99)
NRBC # BLD: 0 K/UL (ref 0–0.01)
NRBC BLD-RTO: 0 PER 100 WBC
PLATELET # BLD AUTO: 147 K/UL (ref 150–400)
PMV BLD AUTO: 11.3 FL (ref 8.9–12.9)
POTASSIUM SERPL-SCNC: 4 MMOL/L (ref 3.5–5.1)
RBC # BLD AUTO: 3.77 M/UL (ref 3.8–5.2)
SERVICE CMNT-IMP: ABNORMAL
SODIUM SERPL-SCNC: 138 MMOL/L (ref 136–145)
WBC # BLD AUTO: 6.8 K/UL (ref 3.6–11)

## 2018-02-11 PROCEDURE — 74011250636 HC RX REV CODE- 250/636: Performed by: COLON & RECTAL SURGERY

## 2018-02-11 PROCEDURE — 74011250637 HC RX REV CODE- 250/637: Performed by: COLON & RECTAL SURGERY

## 2018-02-11 PROCEDURE — 36415 COLL VENOUS BLD VENIPUNCTURE: CPT | Performed by: COLON & RECTAL SURGERY

## 2018-02-11 PROCEDURE — 82962 GLUCOSE BLOOD TEST: CPT

## 2018-02-11 PROCEDURE — 65270000029 HC RM PRIVATE

## 2018-02-11 PROCEDURE — 74011000250 HC RX REV CODE- 250: Performed by: COLON & RECTAL SURGERY

## 2018-02-11 PROCEDURE — 85027 COMPLETE CBC AUTOMATED: CPT | Performed by: COLON & RECTAL SURGERY

## 2018-02-11 PROCEDURE — 80048 BASIC METABOLIC PNL TOTAL CA: CPT | Performed by: COLON & RECTAL SURGERY

## 2018-02-11 PROCEDURE — 83605 ASSAY OF LACTIC ACID: CPT | Performed by: INTERNAL MEDICINE

## 2018-02-11 PROCEDURE — 77030019563 HC DEV ATTCH FEED HOLL -A

## 2018-02-11 RX ORDER — INSULIN LISPRO 100 [IU]/ML
INJECTION, SOLUTION INTRAVENOUS; SUBCUTANEOUS EVERY 6 HOURS
Status: DISCONTINUED | OUTPATIENT
Start: 2018-02-11 | End: 2018-02-14

## 2018-02-11 RX ORDER — HYDROMORPHONE HYDROCHLORIDE 2 MG/ML
0.5 INJECTION, SOLUTION INTRAMUSCULAR; INTRAVENOUS; SUBCUTANEOUS
Status: DISCONTINUED | OUTPATIENT
Start: 2018-02-11 | End: 2018-02-15 | Stop reason: HOSPADM

## 2018-02-11 RX ORDER — PANTOPRAZOLE SODIUM 40 MG/10ML
40 INJECTION, POWDER, LYOPHILIZED, FOR SOLUTION INTRAVENOUS DAILY
Status: DISCONTINUED | OUTPATIENT
Start: 2018-02-12 | End: 2018-02-15 | Stop reason: HOSPADM

## 2018-02-11 RX ORDER — LORAZEPAM 2 MG/ML
0.5 INJECTION INTRAMUSCULAR
Status: DISCONTINUED | OUTPATIENT
Start: 2018-02-11 | End: 2018-02-14

## 2018-02-11 RX ADMIN — SODIUM CHLORIDE AND POTASSIUM CHLORIDE: 9; 1.49 INJECTION, SOLUTION INTRAVENOUS at 21:14

## 2018-02-11 RX ADMIN — BACLOFEN 10 MG: 10 TABLET ORAL at 08:53

## 2018-02-11 RX ADMIN — DULOXETINE HYDROCHLORIDE 120 MG: 30 CAPSULE, DELAYED RELEASE ORAL at 08:53

## 2018-02-11 RX ADMIN — ENOXAPARIN SODIUM 40 MG: 40 INJECTION SUBCUTANEOUS at 21:14

## 2018-02-11 RX ADMIN — METOPROLOL TARTRATE 50 MG: 50 TABLET ORAL at 08:59

## 2018-02-11 RX ADMIN — SODIUM CHLORIDE AND POTASSIUM CHLORIDE: 9; 1.49 INJECTION, SOLUTION INTRAVENOUS at 06:47

## 2018-02-11 RX ADMIN — ONDANSETRON 4 MG: 2 INJECTION INTRAMUSCULAR; INTRAVENOUS at 19:52

## 2018-02-11 RX ADMIN — LEVOFLOXACIN 750 MG: 5 INJECTION, SOLUTION INTRAVENOUS at 17:49

## 2018-02-11 RX ADMIN — LORAZEPAM 0.5 MG: 2 INJECTION INTRAMUSCULAR; INTRAVENOUS at 21:14

## 2018-02-11 RX ADMIN — HYDROMORPHONE HYDROCHLORIDE 0.5 MG: 2 INJECTION INTRAMUSCULAR; INTRAVENOUS; SUBCUTANEOUS at 12:48

## 2018-02-11 RX ADMIN — METRONIDAZOLE 500 MG: 500 INJECTION, SOLUTION INTRAVENOUS at 15:45

## 2018-02-11 RX ADMIN — ONDANSETRON 4 MG: 2 INJECTION INTRAMUSCULAR; INTRAVENOUS at 12:48

## 2018-02-11 RX ADMIN — Medication 10 ML: at 12:53

## 2018-02-11 RX ADMIN — PANTOPRAZOLE SODIUM 40 MG: 40 TABLET, DELAYED RELEASE ORAL at 08:53

## 2018-02-11 RX ADMIN — METRONIDAZOLE 500 MG: 500 INJECTION, SOLUTION INTRAVENOUS at 08:54

## 2018-02-11 RX ADMIN — Medication 10 ML: at 06:47

## 2018-02-11 RX ADMIN — HYDROMORPHONE HYDROCHLORIDE 0.5 MG: 2 INJECTION INTRAMUSCULAR; INTRAVENOUS; SUBCUTANEOUS at 19:52

## 2018-02-11 RX ADMIN — Medication 10 ML: at 21:15

## 2018-02-11 NOTE — PROGRESS NOTES
[facility] [place] [address]  [address]  [phone]      Medical Progress Note      NAME: Senait Blount   :  1945  MRM:  [id number]    Date/Time: 2018          Subjective:     Chief Complaint:  F/u SBO    Chart/notes/labs/studies reviewed, patient examined at bedside. Feels nauseated. Passing minimal amount of flatus. Mild abdominal pain. No F/C          Objective:       Vitals:          Last 24hrs VS reviewed since prior progress note. Most recent are:    Visit Vitals    /58 (BP 1 Location: Left arm, BP Patient Position: Sitting)    Pulse 74    Temp 98.4 °F (36.9 °C)    Resp 16    Wt 92.1 kg (203 lb)    SpO2 96%    BMI 38.36 kg/m2     SpO2 Readings from Last 6 Encounters:   18 96%   17 96%   17 96%   17 (!) 77%   17 96%   10/30/15 97%          Intake/Output Summary (Last 24 hours) at 18 1517  Last data filed at 18 0655   Gross per 24 hour   Intake          1418.34 ml   Output             1250 ml   Net           168.34 ml          Exam:     Physical Exam:    Gen:  Well-developed, well-nourished, in no acute distress  HEENT:  Pink conjunctivae, hearing intact to voice, moist mucous membranes. NGT in place  Neck:  Supple  Resp:  No accessory muscle use, clear breath sounds without wheezes rales or rhonchi  Card:  No murmurs, normal S1, S2 without thrills or peripheral edema  Abd:  Soft, non-tender, distended, hypoactive bowel sounds  Musc:  No cyanosis  Skin:  No rashes, skin turgor is good  Neuro:  Cranial nerves grossly intact, no apparent focal weakness, follows commands appropriately  Psych:  Alert with good insight.   Oriented to person, place, and time        Medications Reviewed: (see below)    Lab Data Reviewed: (see below)    ______________________________________________________________________    Medications:     Current Facility-Administered Medications   Medication Dose Route Frequency  insulin lispro (HUMALOG) injection   SubCUTAneous Q6H    baclofen (LIORESAL) tablet 10 mg  10 mg Oral BID    DULoxetine (CYMBALTA) capsule 120 mg  120 mg Oral DAILY    LORazepam (ATIVAN) tablet 1 mg  1 mg Oral QHS    metoprolol tartrate (LOPRESSOR) tablet 50 mg  50 mg Oral BID    pantoprazole (PROTONIX) tablet 40 mg  40 mg Oral ACB    ondansetron (ZOFRAN ODT) tablet 4 mg  4 mg Oral Q4H PRN    traMADol (ULTRAM) tablet 50 mg  50 mg Oral Q6H PRN    sodium chloride (NS) flush 5-10 mL  5-10 mL IntraVENous Q8H    sodium chloride (NS) flush 5-10 mL  5-10 mL IntraVENous PRN    0.9% sodium chloride with KCl 20 mEq/L infusion   IntraVENous CONTINUOUS    acetaminophen (TYLENOL) tablet 650 mg  650 mg Oral Q4H PRN    HYDROmorphone (DILAUDID) injection 0.5 mg  0.5 mg IntraVENous Q4H PRN    diphenhydrAMINE (BENADRYL) injection 12.5 mg  12.5 mg IntraVENous Q4H PRN    ondansetron (ZOFRAN) injection 4 mg  4 mg IntraVENous Q4H PRN    enoxaparin (LOVENOX) injection 40 mg  40 mg SubCUTAneous Q24H    levoFLOXacin (LEVAQUIN) 750 mg in D5W IVPB  750 mg IntraVENous Q24H    metroNIDAZOLE (FLAGYL) IVPB premix 500 mg  500 mg IntraVENous Q8H    glucose chewable tablet 16 g  4 Tab Oral PRN    dextrose (D50W) injection syrg 12.5-25 g  12.5-25 g IntraVENous PRN    glucagon (GLUCAGEN) injection 1 mg  1 mg IntraMUSCular PRN            Lab Review:     Recent Labs      02/11/18 0431  02/10/18   0431   WBC  6.8  13.8*   HGB  11.1*  13.0   HCT  34.4*  39.1   PLT  147*  121*     Recent Labs      02/11/18   0431  02/10/18   0431   NA  138  132*   K  4.0  4.4   CL  101  95*   CO2  30  25   GLU  103*  176*   BUN  17  18   CREA  0.86  0.97   CA  8.8  10.2*   ALB   --   3.8   SGOT   --   36   ALT   --   30     No components found for: GLPOC  No results for input(s): PH, PCO2, PO2, HCO3, FIO2 in the last 72 hours. No results for input(s): INR in the last 72 hours.     No lab exists for component: INREXT  Lab Results   Component Value Date/Time    Specimen Description: ABDOMEN 01/14/2014 06:49 PM    Specimen Description: BLOOD 01/09/2014 07:50 PM    Specimen Description: SPUTUM 01/03/2014 06:01 AM     Lab Results   Component Value Date/Time    Culture result: (A) 02/10/2018 06:54 AM     STREPTOCOCCI, BETA HEMOLYTIC GROUP B Penicillin and ampicillin are drugs of choice for treatment of beta-hemolytic streptococcal infections. Susceptibility testing of penicillins and beta-lactams approved by the FDA for treatment of beta-hemolytic streptococcal infections need not be performed routinely, because nonsusceptible isolates are extremely rare. CLSI 2012    Culture result: GRAM NEGATIVE RODS (3,000 COLONIES/mL) (A) 02/10/2018 06:54 AM    Culture result: (A) 09/16/2017 07:41 PM     STREPTOCOCCI, BETA HEMOLYTIC GROUP B (PREDOMINATING) . Penicillin and ampicillin are drugs of choice for treatment of beta-hemolytic streptococcal infections. Susceptibility testing of penicillins and beta-lactams approved by the FDA for treatment of beta-hemolytic streptococcal infections need not be performed routinely, because nonsusceptible isolates are extremely rare. CLSI 2012    Culture result: (A) 09/16/2017 07:41 PM     ESCHERICHIA COLI ** (EXTENDED SPECTRUM BETA LACTAMASE ) ** (25,000 COLONIES/ML)            Assessment / Plan:     DM type 2 (diabetes mellitus, type 2) ([facility] (9/10/2013): on unusual insulin regimen at home. BG well controlled here w/o scheduled insulin. Continue to use SSI alone while NPO     Essential hypertension (9/10/2013): well controlled. Continue metoprolol     Hyponatremia (9/11/2013): Mild. Had brief bout of hyponatremia previously. Likely from IVVD due to vomiting. Resolved. Follow on IV NS. Holding HCTZ / triamterene. Follow BMP    Lactic acidosis:  Noted on admission. Likely from IVVD due to vomiting and SBO. Resolved on IVF     Thrombocytopenia ([facility] (4/20/2015):  Chronic and mild.  Follow PLT     SBO (small bowel obstruction) (2/10/2018):  NGT in place. Nauseated this morning.  Mgmt per ortho      Total time spent with patient: 25 minutes                  Care Plan discussed with: Patient, Nursing Staff and >50% of time spent in counseling and coordination of care    Discussed:  Care Plan    Prophylaxis:  Lovenox    Disposition:  Home w/Family           ___________________________________________________    Attending Physician: Adrianne Sepulveda MD

## 2018-02-11 NOTE — PROGRESS NOTES
abd pain improved. Having scant flatus  Visit Vitals    /60 (BP 1 Location: Left arm, BP Patient Position: At rest)    Pulse 80    Temp 99.4 °F (37.4 °C)    Resp 16    Wt 92.1 kg (203 lb)    SpO2 97%    BMI 38.36 kg/m2       Date 02/10/18 0700 - 02/11/18 0659 02/11/18 0700 - 02/12/18 0659   Shift 0700-1859 1900-0659 24 Hour Total 3066-7615 6325-7560 24 Hour Total   I  N  T  A  K  E   I.V.  (mL/kg/hr)  1418.3  (1.3) 1418.3  (0.6)         Volume (0.9% sodium chloride with KCl 20 mEq/L infusion)  1318.3 1318.3         Volume (metroNIDAZOLE (FLAGYL) IVPB premix 500 mg)  100 100       Shift Total  (mL/kg)  1418.3  (15.4) 1418.3  (15.4)      O  U  T  P  U  T   Urine  (mL/kg/hr)            Urine Occurrence(s)  2 x 2 x       Emesis/NG output 150 450 600         Output (ml) (Nasogastric Tube 02/10/18) 150 450 600       Other 1150  1150         Other Output 1150  1150       Shift Total  (mL/kg) 1300  (14.1) 450  (4.9) 1750  (19)      NET -1300 968.3 -331.7      Weight (kg) 92.1 92.1 92.1 92.1 92.1 92.1     abd softly distended    A/p continue current management  Labs tomorrow.  Dr. Agustin Fiore to return tomorrow to manage

## 2018-02-12 LAB
ALBUMIN SERPL-MCNC: 2.9 G/DL (ref 3.5–5)
ALBUMIN/GLOB SERPL: 0.7 {RATIO} (ref 1.1–2.2)
ALP SERPL-CCNC: 99 U/L (ref 45–117)
ALT SERPL-CCNC: 19 U/L (ref 12–78)
ANION GAP SERPL CALC-SCNC: 8 MMOL/L (ref 5–15)
AST SERPL-CCNC: 17 U/L (ref 15–37)
ATRIAL RATE: 67 BPM
BACTERIA SPEC CULT: ABNORMAL
BACTERIA SPEC CULT: ABNORMAL
BILIRUB SERPL-MCNC: 0.7 MG/DL (ref 0.2–1)
BUN SERPL-MCNC: 15 MG/DL (ref 6–20)
BUN/CREAT SERPL: 19 (ref 12–20)
CALCIUM SERPL-MCNC: 8.6 MG/DL (ref 8.5–10.1)
CALCULATED P AXIS, ECG09: 11 DEGREES
CALCULATED R AXIS, ECG10: -30 DEGREES
CALCULATED T AXIS, ECG11: 33 DEGREES
CC UR VC: ABNORMAL
CHLORIDE SERPL-SCNC: 105 MMOL/L (ref 97–108)
CO2 SERPL-SCNC: 26 MMOL/L (ref 21–32)
CREAT SERPL-MCNC: 0.81 MG/DL (ref 0.55–1.02)
DIAGNOSIS, 93000: NORMAL
ERYTHROCYTE [DISTWIDTH] IN BLOOD BY AUTOMATED COUNT: 12.3 % (ref 11.5–14.5)
GLOBULIN SER CALC-MCNC: 3.9 G/DL (ref 2–4)
GLUCOSE BLD STRIP.AUTO-MCNC: 108 MG/DL (ref 65–100)
GLUCOSE BLD STRIP.AUTO-MCNC: 133 MG/DL (ref 65–100)
GLUCOSE BLD STRIP.AUTO-MCNC: 171 MG/DL (ref 65–100)
GLUCOSE BLD STRIP.AUTO-MCNC: 92 MG/DL (ref 65–100)
GLUCOSE BLD STRIP.AUTO-MCNC: 99 MG/DL (ref 65–100)
GLUCOSE SERPL-MCNC: 112 MG/DL (ref 65–100)
HCT VFR BLD AUTO: 32.9 % (ref 35–47)
HGB BLD-MCNC: 10.4 G/DL (ref 11.5–16)
MAGNESIUM SERPL-MCNC: 1.6 MG/DL (ref 1.6–2.4)
MCH RBC QN AUTO: 29.1 PG (ref 26–34)
MCHC RBC AUTO-ENTMCNC: 31.6 G/DL (ref 30–36.5)
MCV RBC AUTO: 92.2 FL (ref 80–99)
NRBC # BLD: 0 K/UL (ref 0–0.01)
NRBC BLD-RTO: 0 PER 100 WBC
P-R INTERVAL, ECG05: 146 MS
PHOSPHATE SERPL-MCNC: 3.6 MG/DL (ref 2.6–4.7)
PLATELET # BLD AUTO: 139 K/UL (ref 150–400)
PMV BLD AUTO: 11.1 FL (ref 8.9–12.9)
POTASSIUM SERPL-SCNC: 4.1 MMOL/L (ref 3.5–5.1)
PROT SERPL-MCNC: 6.8 G/DL (ref 6.4–8.2)
Q-T INTERVAL, ECG07: 440 MS
QRS DURATION, ECG06: 84 MS
QTC CALCULATION (BEZET), ECG08: 464 MS
RBC # BLD AUTO: 3.57 M/UL (ref 3.8–5.2)
SERVICE CMNT-IMP: ABNORMAL
SERVICE CMNT-IMP: NORMAL
SERVICE CMNT-IMP: NORMAL
SODIUM SERPL-SCNC: 139 MMOL/L (ref 136–145)
VENTRICULAR RATE, ECG03: 67 BPM
WBC # BLD AUTO: 7.1 K/UL (ref 3.6–11)

## 2018-02-12 PROCEDURE — 84100 ASSAY OF PHOSPHORUS: CPT | Performed by: COLON & RECTAL SURGERY

## 2018-02-12 PROCEDURE — 74011250636 HC RX REV CODE- 250/636: Performed by: COLON & RECTAL SURGERY

## 2018-02-12 PROCEDURE — 74011000250 HC RX REV CODE- 250: Performed by: COLON & RECTAL SURGERY

## 2018-02-12 PROCEDURE — 74011636637 HC RX REV CODE- 636/637: Performed by: COLON & RECTAL SURGERY

## 2018-02-12 PROCEDURE — 82962 GLUCOSE BLOOD TEST: CPT

## 2018-02-12 PROCEDURE — 74011250637 HC RX REV CODE- 250/637: Performed by: COLON & RECTAL SURGERY

## 2018-02-12 PROCEDURE — 83735 ASSAY OF MAGNESIUM: CPT | Performed by: COLON & RECTAL SURGERY

## 2018-02-12 PROCEDURE — 36415 COLL VENOUS BLD VENIPUNCTURE: CPT | Performed by: COLON & RECTAL SURGERY

## 2018-02-12 PROCEDURE — 80053 COMPREHEN METABOLIC PANEL: CPT | Performed by: COLON & RECTAL SURGERY

## 2018-02-12 PROCEDURE — 85027 COMPLETE CBC AUTOMATED: CPT | Performed by: COLON & RECTAL SURGERY

## 2018-02-12 PROCEDURE — C9113 INJ PANTOPRAZOLE SODIUM, VIA: HCPCS | Performed by: COLON & RECTAL SURGERY

## 2018-02-12 PROCEDURE — 65270000029 HC RM PRIVATE

## 2018-02-12 RX ORDER — POLYETHYLENE GLYCOL 3350 17 G/17G
17 POWDER, FOR SOLUTION ORAL DAILY
Status: DISCONTINUED | OUTPATIENT
Start: 2018-02-12 | End: 2018-02-15 | Stop reason: HOSPADM

## 2018-02-12 RX ADMIN — LORAZEPAM 0.5 MG: 2 INJECTION INTRAMUSCULAR; INTRAVENOUS at 20:24

## 2018-02-12 RX ADMIN — METRONIDAZOLE 500 MG: 500 INJECTION, SOLUTION INTRAVENOUS at 08:42

## 2018-02-12 RX ADMIN — ONDANSETRON 4 MG: 2 INJECTION INTRAMUSCULAR; INTRAVENOUS at 00:41

## 2018-02-12 RX ADMIN — BACLOFEN 10 MG: 10 TABLET ORAL at 08:41

## 2018-02-12 RX ADMIN — Medication 10 ML: at 14:18

## 2018-02-12 RX ADMIN — BACLOFEN 10 MG: 10 TABLET ORAL at 18:31

## 2018-02-12 RX ADMIN — ONDANSETRON 4 MG: 2 INJECTION INTRAMUSCULAR; INTRAVENOUS at 23:00

## 2018-02-12 RX ADMIN — PANTOPRAZOLE SODIUM 40 MG: 40 INJECTION, POWDER, FOR SOLUTION INTRAVENOUS at 08:44

## 2018-02-12 RX ADMIN — HYDROMORPHONE HYDROCHLORIDE 0.5 MG: 2 INJECTION, SOLUTION INTRAMUSCULAR; INTRAVENOUS; SUBCUTANEOUS at 00:41

## 2018-02-12 RX ADMIN — METRONIDAZOLE 500 MG: 500 INJECTION, SOLUTION INTRAVENOUS at 23:58

## 2018-02-12 RX ADMIN — METOPROLOL TARTRATE 50 MG: 50 TABLET ORAL at 18:31

## 2018-02-12 RX ADMIN — Medication 10 ML: at 04:28

## 2018-02-12 RX ADMIN — METOPROLOL TARTRATE 50 MG: 50 TABLET ORAL at 08:41

## 2018-02-12 RX ADMIN — METRONIDAZOLE 500 MG: 500 INJECTION, SOLUTION INTRAVENOUS at 00:34

## 2018-02-12 RX ADMIN — ONDANSETRON 4 MG: 2 INJECTION INTRAMUSCULAR; INTRAVENOUS at 04:27

## 2018-02-12 RX ADMIN — HYDROMORPHONE HYDROCHLORIDE 0.5 MG: 2 INJECTION, SOLUTION INTRAMUSCULAR; INTRAVENOUS; SUBCUTANEOUS at 23:00

## 2018-02-12 RX ADMIN — HYDROMORPHONE HYDROCHLORIDE 0.5 MG: 2 INJECTION, SOLUTION INTRAMUSCULAR; INTRAVENOUS; SUBCUTANEOUS at 04:27

## 2018-02-12 RX ADMIN — LEVOFLOXACIN 750 MG: 5 INJECTION, SOLUTION INTRAVENOUS at 18:31

## 2018-02-12 RX ADMIN — METRONIDAZOLE 500 MG: 500 INJECTION, SOLUTION INTRAVENOUS at 15:48

## 2018-02-12 RX ADMIN — ENOXAPARIN SODIUM 40 MG: 40 INJECTION SUBCUTANEOUS at 20:25

## 2018-02-12 RX ADMIN — HYDROMORPHONE HYDROCHLORIDE 0.5 MG: 2 INJECTION, SOLUTION INTRAMUSCULAR; INTRAVENOUS; SUBCUTANEOUS at 15:48

## 2018-02-12 RX ADMIN — Medication 10 ML: at 20:25

## 2018-02-12 RX ADMIN — POLYETHYLENE GLYCOL 3350 17 G: 17 POWDER, FOR SOLUTION ORAL at 08:44

## 2018-02-12 RX ADMIN — INSULIN LISPRO 2 UNITS: 100 INJECTION, SOLUTION INTRAVENOUS; SUBCUTANEOUS at 12:00

## 2018-02-12 NOTE — PROGRESS NOTES
Bedside shift change report given to Tasneem Rashaad Barth (oncoming nurse) by Candi whittaker (offgoing nurse). Report included the following information SBAR, Kardex, Intake/Output and MAR.

## 2018-02-12 NOTE — PROGRESS NOTES
2/12/2018  10:37 AM    Case Management Initial Screening:    Met with patient to discuss discharge needs. Patient lives alone and was independent before hospitalization. Confirmed demographics is correct. A change is daughters phone number is [phone]. Patient lives in apartment with no steps. Patient has walker and was open to [facility] before hospitalization for nursing/wound care. Patient has RX coverage and uses [facility] Confirmed patient has Dr. Stevan Gentile as PCP. Patient will use a friend for discharge transportation. Education on discharge time was discussed. Rosangela Jiang, RRT. BSRT      Care Management Interventions  PCP Verified by CM: Yes  Mode of Transport at Discharge:  Other (see comment) (friend will transport)  Transition of Care Consult (CM Consult): [address]: No  Reason Outside [place]: Patient already serviced by other home care/hospice agency  MyChart Signup: No  Discharge Durable Medical Equipment: No  Physical Therapy Consult: No  Occupational Therapy Consult: No  Speech Therapy Consult: No  Current Support Network: Lives Alone  Confirm Follow Up Transport: Friends  Plan discussed with Pt/Family/Caregiver: Yes  Freedom of Choice Offered: Yes  Discharge Location  Discharge Placement:  (home)

## 2018-02-12 NOTE — PROGRESS NOTES
CRS  Pt c/o sore throat. +flatus, no stools. Hungry  Flowsheet, labs reviewed  Abd: soft, nt  Dressing: serosang    Plan:  1) sbo. Reviewed CT- gas, stool in colon. +flatus. Minimal output in ngt. Start ngt clamping trial. Add miralax. May be related, in part, to pork chops and peas she ate day before coming to hospital. Hopefully will be able to dc ngt tomorrow and start trial of clears tomorrow  2) fluid collection. Pt with extensive mobilization circumferentially of fascial margin to B ant axillary lines at time of surgery creating a large amount of potential space. Developed postop fluid collection which continues to decrease in size. Self draining, no cellulitis, no leukocytosis, small collection ( 1 inch x 2 inch).  Cont local wound care

## 2018-02-12 NOTE — PROGRESS NOTES
Problem: Falls - Risk of  Goal: *Absence of Falls  Document Ghassan Fall Risk and appropriate interventions in the flowsheet.    Fall Risk Interventions:  Mobility Interventions: Assess mobility with egress test, Bed/chair exit alarm, Patient to call before getting OOB         Medication Interventions: Teach patient to arise slowly    Elimination Interventions: Bed/chair exit alarm, Call light in reach, Patient to call for help with toileting needs, Toilet paper/wipes in reach

## 2018-02-12 NOTE — PROGRESS NOTES
Problem: Falls - Risk of  Goal: *Absence of Falls  Document Ghassan Fall Risk and appropriate interventions in the flowsheet.    Outcome: Progressing Towards Goal  Fall Risk Interventions:  Mobility Interventions: Assess mobility with egress test, Communicate number of staff needed for ambulation/transfer, Patient to call before getting OOB         Medication Interventions: Teach patient to arise slowly    Elimination Interventions: Bed/chair exit alarm, Call light in reach

## 2018-02-13 LAB
ANION GAP SERPL CALC-SCNC: 8 MMOL/L (ref 5–15)
BASOPHILS # BLD: 0 K/UL (ref 0–0.1)
BASOPHILS NFR BLD: 0 % (ref 0–1)
BUN SERPL-MCNC: 11 MG/DL (ref 6–20)
BUN/CREAT SERPL: 16 (ref 12–20)
CALCIUM SERPL-MCNC: 8.9 MG/DL (ref 8.5–10.1)
CHLORIDE SERPL-SCNC: 105 MMOL/L (ref 97–108)
CO2 SERPL-SCNC: 26 MMOL/L (ref 21–32)
CREAT SERPL-MCNC: 0.67 MG/DL (ref 0.55–1.02)
DIFFERENTIAL METHOD BLD: ABNORMAL
EOSINOPHIL # BLD: 0.1 K/UL (ref 0–0.4)
EOSINOPHIL NFR BLD: 2 % (ref 0–7)
ERYTHROCYTE [DISTWIDTH] IN BLOOD BY AUTOMATED COUNT: 12.4 % (ref 11.5–14.5)
GLUCOSE BLD STRIP.AUTO-MCNC: 107 MG/DL (ref 65–100)
GLUCOSE BLD STRIP.AUTO-MCNC: 162 MG/DL (ref 65–100)
GLUCOSE SERPL-MCNC: 118 MG/DL (ref 65–100)
HCT VFR BLD AUTO: 30.7 % (ref 35–47)
HGB BLD-MCNC: 9.8 G/DL (ref 11.5–16)
IMM GRANULOCYTES # BLD: 0 K/UL (ref 0–0.04)
IMM GRANULOCYTES NFR BLD AUTO: 0 % (ref 0–0.5)
LYMPHOCYTES # BLD: 2 K/UL (ref 0.8–3.5)
LYMPHOCYTES NFR BLD: 32 % (ref 12–49)
MAGNESIUM SERPL-MCNC: 1.5 MG/DL (ref 1.6–2.4)
MCH RBC QN AUTO: 29.2 PG (ref 26–34)
MCHC RBC AUTO-ENTMCNC: 31.9 G/DL (ref 30–36.5)
MCV RBC AUTO: 91.4 FL (ref 80–99)
MONOCYTES # BLD: 0.5 K/UL (ref 0–1)
MONOCYTES NFR BLD: 9 % (ref 5–13)
NEUTS SEG # BLD: 3.5 K/UL (ref 1.8–8)
NEUTS SEG NFR BLD: 57 % (ref 32–75)
NRBC # BLD: 0 K/UL (ref 0–0.01)
NRBC BLD-RTO: 0 PER 100 WBC
PHOSPHATE SERPL-MCNC: 3.1 MG/DL (ref 2.6–4.7)
PLATELET # BLD AUTO: 126 K/UL (ref 150–400)
POTASSIUM SERPL-SCNC: 3.9 MMOL/L (ref 3.5–5.1)
RBC # BLD AUTO: 3.36 M/UL (ref 3.8–5.2)
RBC MORPH BLD: ABNORMAL
SERVICE CMNT-IMP: ABNORMAL
SERVICE CMNT-IMP: ABNORMAL
SODIUM SERPL-SCNC: 139 MMOL/L (ref 136–145)
WBC # BLD AUTO: 6.1 K/UL (ref 3.6–11)

## 2018-02-13 PROCEDURE — 74011250636 HC RX REV CODE- 250/636: Performed by: INTERNAL MEDICINE

## 2018-02-13 PROCEDURE — 74011250637 HC RX REV CODE- 250/637: Performed by: COLON & RECTAL SURGERY

## 2018-02-13 PROCEDURE — 85025 COMPLETE CBC W/AUTO DIFF WBC: CPT | Performed by: COLON & RECTAL SURGERY

## 2018-02-13 PROCEDURE — 74011000250 HC RX REV CODE- 250: Performed by: COLON & RECTAL SURGERY

## 2018-02-13 PROCEDURE — 65270000029 HC RM PRIVATE

## 2018-02-13 PROCEDURE — 84100 ASSAY OF PHOSPHORUS: CPT | Performed by: COLON & RECTAL SURGERY

## 2018-02-13 PROCEDURE — 36415 COLL VENOUS BLD VENIPUNCTURE: CPT | Performed by: COLON & RECTAL SURGERY

## 2018-02-13 PROCEDURE — 74011636637 HC RX REV CODE- 636/637: Performed by: COLON & RECTAL SURGERY

## 2018-02-13 PROCEDURE — 74011250636 HC RX REV CODE- 250/636: Performed by: COLON & RECTAL SURGERY

## 2018-02-13 PROCEDURE — C9113 INJ PANTOPRAZOLE SODIUM, VIA: HCPCS | Performed by: COLON & RECTAL SURGERY

## 2018-02-13 PROCEDURE — 83735 ASSAY OF MAGNESIUM: CPT | Performed by: COLON & RECTAL SURGERY

## 2018-02-13 PROCEDURE — 80048 BASIC METABOLIC PNL TOTAL CA: CPT | Performed by: COLON & RECTAL SURGERY

## 2018-02-13 PROCEDURE — 82962 GLUCOSE BLOOD TEST: CPT

## 2018-02-13 RX ORDER — MAGNESIUM SULFATE HEPTAHYDRATE 40 MG/ML
2 INJECTION, SOLUTION INTRAVENOUS ONCE
Status: COMPLETED | OUTPATIENT
Start: 2018-02-13 | End: 2018-02-13

## 2018-02-13 RX ORDER — TRIAMTERENE/HYDROCHLOROTHIAZID 37.5-25 MG
1 TABLET ORAL DAILY
Status: DISCONTINUED | OUTPATIENT
Start: 2018-02-13 | End: 2018-02-15 | Stop reason: HOSPADM

## 2018-02-13 RX ORDER — TRIAMTERENE/HYDROCHLOROTHIAZID 37.5-25 MG
1 TABLET ORAL DAILY
COMMUNITY

## 2018-02-13 RX ADMIN — ONDANSETRON 4 MG: 2 INJECTION INTRAMUSCULAR; INTRAVENOUS at 22:57

## 2018-02-13 RX ADMIN — Medication 10 ML: at 06:00

## 2018-02-13 RX ADMIN — TRIAMTERENE AND HYDROCHLOROTHIAZIDE 1 TABLET: 37.5; 25 TABLET ORAL at 08:37

## 2018-02-13 RX ADMIN — METOPROLOL TARTRATE 50 MG: 50 TABLET ORAL at 18:23

## 2018-02-13 RX ADMIN — ENOXAPARIN SODIUM 40 MG: 40 INJECTION SUBCUTANEOUS at 20:24

## 2018-02-13 RX ADMIN — LEVOFLOXACIN 750 MG: 5 INJECTION, SOLUTION INTRAVENOUS at 18:21

## 2018-02-13 RX ADMIN — MAGNESIUM SULFATE HEPTAHYDRATE 2 G: 40 INJECTION, SOLUTION INTRAVENOUS at 08:35

## 2018-02-13 RX ADMIN — POLYETHYLENE GLYCOL 3350 17 G: 17 POWDER, FOR SOLUTION ORAL at 08:36

## 2018-02-13 RX ADMIN — ONDANSETRON 4 MG: 2 INJECTION INTRAMUSCULAR; INTRAVENOUS at 03:50

## 2018-02-13 RX ADMIN — INSULIN LISPRO 2 UNITS: 100 INJECTION, SOLUTION INTRAVENOUS; SUBCUTANEOUS at 12:00

## 2018-02-13 RX ADMIN — METOPROLOL TARTRATE 50 MG: 50 TABLET ORAL at 08:37

## 2018-02-13 RX ADMIN — DULOXETINE HYDROCHLORIDE 120 MG: 30 CAPSULE, DELAYED RELEASE ORAL at 08:36

## 2018-02-13 RX ADMIN — PANTOPRAZOLE SODIUM 40 MG: 40 INJECTION, POWDER, FOR SOLUTION INTRAVENOUS at 08:37

## 2018-02-13 RX ADMIN — METRONIDAZOLE 500 MG: 500 INJECTION, SOLUTION INTRAVENOUS at 08:36

## 2018-02-13 RX ADMIN — LORAZEPAM 0.5 MG: 2 INJECTION INTRAMUSCULAR; INTRAVENOUS at 20:24

## 2018-02-13 RX ADMIN — ONDANSETRON 4 MG: 2 INJECTION INTRAMUSCULAR; INTRAVENOUS at 14:38

## 2018-02-13 RX ADMIN — SODIUM CHLORIDE AND POTASSIUM CHLORIDE: 9; 1.49 INJECTION, SOLUTION INTRAVENOUS at 05:00

## 2018-02-13 RX ADMIN — HYDROMORPHONE HYDROCHLORIDE 0.5 MG: 2 INJECTION, SOLUTION INTRAMUSCULAR; INTRAVENOUS; SUBCUTANEOUS at 03:50

## 2018-02-13 RX ADMIN — BACLOFEN 10 MG: 10 TABLET ORAL at 08:37

## 2018-02-13 RX ADMIN — BACLOFEN 10 MG: 10 TABLET ORAL at 18:20

## 2018-02-13 RX ADMIN — METRONIDAZOLE 500 MG: 500 INJECTION, SOLUTION INTRAVENOUS at 16:00

## 2018-02-13 RX ADMIN — Medication 10 ML: at 13:06

## 2018-02-13 RX ADMIN — Medication 10 ML: at 22:00

## 2018-02-13 NOTE — PROGRESS NOTES
CRS  Stool x1 this am. Tolerated ngt clamping trial  Flowsheet, labs reviewed  Abd: soft, nt  Minimal drainage on dressing    Plan:  Dc ngt, clears, mobilize.  Does not need HH for wound care

## 2018-02-13 NOTE — PROGRESS NOTES
Problem: Falls - Risk of  Goal: *Absence of Falls  Document Ghassan Fall Risk and appropriate interventions in the flowsheet.    Outcome: Progressing Towards Goal  Fall Risk Interventions:  Mobility Interventions: Assess mobility with egress test, Bed/chair exit alarm, Patient to call before getting OOB         Medication Interventions: Teach patient to arise slowly    Elimination Interventions: Bed/chair exit alarm, Call light in reach, Patient to call for help with toileting needs, Toilet paper/wipes in reach

## 2018-02-13 NOTE — PROGRESS NOTES
Problem: Falls - Risk of  Goal: *Absence of Falls  Document Ghassan Fall Risk and appropriate interventions in the flowsheet.    Outcome: Progressing Towards Goal  Fall Risk Interventions:  Mobility Interventions: Assess mobility with egress test, Bed/chair exit alarm, Patient to call before getting OOB         Medication Interventions: Teach patient to arise slowly    Elimination Interventions: Bed/chair exit alarm, Call light in reach, Patient to call for help with toileting needs

## 2018-02-13 NOTE — PROGRESS NOTES
Jg Chung [facility] [address]  [address] [facility], [address] [place] [address]  [phone]      Medical Progress Note      NAME: Chema Rodriguez   :  1945  MRM:  [id number]    Date/Time: 2018          Subjective:     Chief Complaint:  F/u SBO    Chart/notes/labs/studies reviewed, patient examined at bedside. Passing gas and had BM. NGT now out, tolerating clears. Some nausea. No fevers        Objective:       Vitals:          Last 24hrs VS reviewed since prior progress note. Most recent are:    Visit Vitals    /52 (BP 1 Location: Left arm, BP Patient Position: At rest)    Pulse 85    Temp 98 °F (36.7 °C)    Resp 16    Wt 92.1 kg (203 lb)    SpO2 95%    BMI 38.36 kg/m2     SpO2 Readings from Last 6 Encounters:   18 95%   17 96%   17 96%   17 (!) 77%   17 96%   10/30/15 97%            Intake/Output Summary (Last 24 hours) at 18 1920  Last data filed at 18 0800   Gross per 24 hour   Intake                0 ml   Output              200 ml   Net             -200 ml          Exam:     Physical Exam:    Gen:  Well-developed, well-nourished, in no acute distress. Very pleasant   HEENT:  Pink conjunctivae, hearing intact to voice, moist mucous membranes. NGT now out  Neck:  Supple  Resp:  No accessory muscle use, clear breath sounds without wheezes rales or rhonchi  Card:  No murmurs, normal S1, S2 without thrills or peripheral edema  Abd:  Soft, non-tender, distended, active bowel sounds  Musc:  No cyanosis  Skin:  No rashes, skin turgor is good. Noted abdominal wound, scant serous drainage  Neuro:  Cranial nerves grossly intact, no apparent focal weakness, follows commands appropriately  Psych:  Alert with good insight.   Oriented to person, place, and time        Medications Reviewed: (see below)    Lab Data Reviewed: (see below)    ______________________________________________________________________    Medications:     Current Facility-Administered Medications   Medication Dose Route Frequency    polyethylene glycol (MIRALAX) packet 17 g  17 g Oral DAILY    insulin lispro (HUMALOG) injection   SubCUTAneous Q6H    LORazepam (ATIVAN) injection 0.5 mg  0.5 mg IntraVENous QHS    pantoprazole (PROTONIX) injection 40 mg  40 mg IntraVENous DAILY    HYDROmorphone (PF) (DILAUDID) injection 0.5 mg  0.5 mg IntraVENous Q4H PRN    baclofen (LIORESAL) tablet 10 mg  10 mg Oral BID    DULoxetine (CYMBALTA) capsule 120 mg  120 mg Oral DAILY    metoprolol tartrate (LOPRESSOR) tablet 50 mg  50 mg Oral BID    ondansetron (ZOFRAN ODT) tablet 4 mg  4 mg Oral Q4H PRN    traMADol (ULTRAM) tablet 50 mg  50 mg Oral Q6H PRN    sodium chloride (NS) flush 5-10 mL  5-10 mL IntraVENous Q8H    sodium chloride (NS) flush 5-10 mL  5-10 mL IntraVENous PRN    0.9% sodium chloride with KCl 20 mEq/L infusion   IntraVENous CONTINUOUS    acetaminophen (TYLENOL) tablet 650 mg  650 mg Oral Q4H PRN    diphenhydrAMINE (BENADRYL) injection 12.5 mg  12.5 mg IntraVENous Q4H PRN    ondansetron (ZOFRAN) injection 4 mg  4 mg IntraVENous Q4H PRN    enoxaparin (LOVENOX) injection 40 mg  40 mg SubCUTAneous Q24H    levoFLOXacin (LEVAQUIN) 750 mg in D5W IVPB  750 mg IntraVENous Q24H    metroNIDAZOLE (FLAGYL) IVPB premix 500 mg  500 mg IntraVENous Q8H    glucose chewable tablet 16 g  4 Tab Oral PRN    dextrose (D50W) injection syrg 12.5-25 g  12.5-25 g IntraVENous PRN    glucagon (GLUCAGEN) injection 1 mg  1 mg IntraMUSCular PRN            Lab Review:     Recent Labs      02/12/18   0321  02/11/18   0431  02/10/18   0431   WBC  7.1  6.8  13.8*   HGB  10.4*  11.1*  13.0   HCT  32.9*  34.4*  39.1   PLT  139*  147*  121*     Recent Labs      02/12/18   0321  02/11/18   0431  02/10/18   0431   NA  139  138  132*   K  4.1  4.0  4.4   CL  105  101  95*   CO2  26  30  25   GLU  112*  103*  176*   BUN  15  17  18   CREA  0.81  0.86  0.97   CA  8.6  8.8  10.2*   MG  1.6   --    -- PHOS  3.6   --    --    ALB  2.9*   --   3.8   SGOT  17   --   36   ALT  19   --   30     No components found for: GLPOC  No results for input(s): PH, PCO2, PO2, HCO3, FIO2 in the last 72 hours. No results for input(s): INR in the last 72 hours. No lab exists for component: Arina Soliman  Lab Results   Component Value Date/Time    Specimen Description: ABDOMEN 01/14/2014 06:49 PM    Specimen Description: BLOOD 01/09/2014 07:50 PM    Specimen Description: SPUTUM 01/03/2014 06:01 AM     Lab Results   Component Value Date/Time    Culture result: (A) 02/10/2018 06:54 AM     STREPTOCOCCI, BETA HEMOLYTIC GROUP B Penicillin and ampicillin are drugs of choice for treatment of beta-hemolytic streptococcal infections. Susceptibility testing of penicillins and beta-lactams approved by the FDA for treatment of beta-hemolytic streptococcal infections need not be performed routinely, because nonsusceptible isolates are extremely rare. CLSI 2012    Culture result: (A) 02/10/2018 06:54 AM     ESCHERICHIA COLI ** (EXTENDED SPECTRUM BETA LACTAMASE ) **  (3,000 COLONIES/ML)    Culture result: (A) 09/16/2017 07:41 PM     STREPTOCOCCI, BETA HEMOLYTIC GROUP B (PREDOMINATING) . Penicillin and ampicillin are drugs of choice for treatment of beta-hemolytic streptococcal infections. Susceptibility testing of penicillins and beta-lactams approved by the FDA for treatment of beta-hemolytic streptococcal infections need not be performed routinely, because nonsusceptible isolates are extremely rare. CLSI 2012    Culture result: (A) 09/16/2017 07:41 PM     ESCHERICHIA COLI ** (EXTENDED SPECTRUM BETA LACTAMASE ) ** (25,000 COLONIES/ML)            Assessment / Plan:     Positive urine culture: contaminant / colonization, and NOT UTI. No pyuria or bacteruria on UA. Asymptomatic. No fevers, chills, leukocytosis.  No treatment needed for this    [facility] (9/10[facility]2013): on unusual insulin regimen at home. BG well controlled here w/o scheduled insulin. As pt starts PO (currently on clears), may need to slowly start scheduled long-acting insulin. For now, use SSI alone. BG today 107, 162     Essential hypertension (9/10/2013): well controlled. Continue metoprolol     Hyponatremia (9/11/2013):  rresolved. Mild. Had brief bout of hyponatremia previously. Likely from IVVD due to vomiting. Holding HCTZ / triamterene. On gentle IV hydration. Follow BMP    Lactic acidosis:  noted on admission. From IVVD due to vomiting and SBO and NOT sepsis. Resolved quickly     Thrombocytopenia ([address] (4/20/2015):  Chronic, mild. Follow PLT     SBO (small bowel obstruction) (2/10/2018): resolving. Had BM. Mgmt per colorectal surg. Fluid collection: CT showed abdominal wall soft tissue abscess/collection containing gas measuring 2.5 x 5.5 cm. A fistulous connection with an underlying bowel loop is not definitively seen but not excluded.  On Levaquin/Flagyl, defer mgmt to surgery/attending team. Consider wound culture      Total time spent with patient: 25 minutes                  Care Plan discussed with: Patient, Nursing Staff and >50% of time spent in counseling and coordination of care    Discussed:  Care Plan    Prophylaxis:  Lovenox    Disposition:  Home w/Family           ___________________________________________________    Attending Physician: Le Abdul MD

## 2018-02-13 NOTE — PROGRESS NOTES
2/13/2018 2:40 PM Dr. Brandt Rueda note for no home health needed after discharge noted. CM will continue to follow.  TOM BaumanW

## 2018-02-14 LAB
GLUCOSE BLD STRIP.AUTO-MCNC: 102 MG/DL (ref 65–100)
GLUCOSE BLD STRIP.AUTO-MCNC: 107 MG/DL (ref 65–100)
GLUCOSE BLD STRIP.AUTO-MCNC: 115 MG/DL (ref 65–100)
GLUCOSE BLD STRIP.AUTO-MCNC: 140 MG/DL (ref 65–100)
GLUCOSE BLD STRIP.AUTO-MCNC: 153 MG/DL (ref 65–100)
GLUCOSE BLD STRIP.AUTO-MCNC: 205 MG/DL (ref 65–100)
SERVICE CMNT-IMP: ABNORMAL

## 2018-02-14 PROCEDURE — 74011250637 HC RX REV CODE- 250/637: Performed by: INTERNAL MEDICINE

## 2018-02-14 PROCEDURE — 74011000250 HC RX REV CODE- 250: Performed by: COLON & RECTAL SURGERY

## 2018-02-14 PROCEDURE — 74011250636 HC RX REV CODE- 250/636: Performed by: COLON & RECTAL SURGERY

## 2018-02-14 PROCEDURE — 82962 GLUCOSE BLOOD TEST: CPT

## 2018-02-14 PROCEDURE — 74011636637 HC RX REV CODE- 636/637: Performed by: INTERNAL MEDICINE

## 2018-02-14 PROCEDURE — 74011250637 HC RX REV CODE- 250/637: Performed by: COLON & RECTAL SURGERY

## 2018-02-14 PROCEDURE — C9113 INJ PANTOPRAZOLE SODIUM, VIA: HCPCS | Performed by: COLON & RECTAL SURGERY

## 2018-02-14 PROCEDURE — 65270000029 HC RM PRIVATE

## 2018-02-14 RX ORDER — INSULIN LISPRO 100 [IU]/ML
20 INJECTION, SOLUTION INTRAVENOUS; SUBCUTANEOUS
Status: DISCONTINUED | OUTPATIENT
Start: 2018-02-14 | End: 2018-02-14

## 2018-02-14 RX ORDER — INSULIN LISPRO 100 [IU]/ML
3 INJECTION, SOLUTION INTRAVENOUS; SUBCUTANEOUS
Status: DISCONTINUED | OUTPATIENT
Start: 2018-02-14 | End: 2018-02-15 | Stop reason: HOSPADM

## 2018-02-14 RX ORDER — INSULIN LISPRO 100 [IU]/ML
INJECTION, SOLUTION INTRAVENOUS; SUBCUTANEOUS
Status: DISCONTINUED | OUTPATIENT
Start: 2018-02-14 | End: 2018-02-15 | Stop reason: HOSPADM

## 2018-02-14 RX ORDER — LORAZEPAM 1 MG/1
1 TABLET ORAL
Status: DISCONTINUED | OUTPATIENT
Start: 2018-02-14 | End: 2018-02-15 | Stop reason: HOSPADM

## 2018-02-14 RX ADMIN — BACLOFEN 10 MG: 10 TABLET ORAL at 18:56

## 2018-02-14 RX ADMIN — METRONIDAZOLE 500 MG: 500 INJECTION, SOLUTION INTRAVENOUS at 00:46

## 2018-02-14 RX ADMIN — POLYETHYLENE GLYCOL 3350 17 G: 17 POWDER, FOR SOLUTION ORAL at 10:32

## 2018-02-14 RX ADMIN — DULOXETINE HYDROCHLORIDE 120 MG: 30 CAPSULE, DELAYED RELEASE ORAL at 10:31

## 2018-02-14 RX ADMIN — LORAZEPAM 1 MG: 1 TABLET ORAL at 21:39

## 2018-02-14 RX ADMIN — LEVOFLOXACIN 750 MG: 500 TABLET, FILM COATED ORAL at 21:39

## 2018-02-14 RX ADMIN — HUMAN INSULIN 10 UNITS: 100 INJECTION, SUSPENSION SUBCUTANEOUS at 21:39

## 2018-02-14 RX ADMIN — METOPROLOL TARTRATE 50 MG: 50 TABLET ORAL at 18:56

## 2018-02-14 RX ADMIN — Medication 10 ML: at 14:26

## 2018-02-14 RX ADMIN — ENOXAPARIN SODIUM 40 MG: 40 INJECTION SUBCUTANEOUS at 21:38

## 2018-02-14 RX ADMIN — METOPROLOL TARTRATE 50 MG: 50 TABLET ORAL at 10:32

## 2018-02-14 RX ADMIN — PANTOPRAZOLE SODIUM 40 MG: 40 INJECTION, POWDER, FOR SOLUTION INTRAVENOUS at 10:33

## 2018-02-14 RX ADMIN — TRIAMTERENE AND HYDROCHLOROTHIAZIDE 1 TABLET: 37.5; 25 TABLET ORAL at 10:31

## 2018-02-14 RX ADMIN — HYDROMORPHONE HYDROCHLORIDE 0.5 MG: 2 INJECTION, SOLUTION INTRAMUSCULAR; INTRAVENOUS; SUBCUTANEOUS at 00:51

## 2018-02-14 RX ADMIN — BACLOFEN 10 MG: 10 TABLET ORAL at 10:32

## 2018-02-14 NOTE — DIABETES MGMT
DTC Consult Note     Dario Harding MD for the followin. NPH 10 units twice a day (0.2 units/kg/day)   2. Humalog 3 units before meals (0.1 units/kg/day)  3. Intensifying correction scale - from normal sensitivity to insulin resistant (obese, steroids). Jian Teran. Taylor Alfredo, MPH, RN, BSN, [address]   [phone] (office)  [phone] (pager)            Recommendations/ Comments: Glucose within target range, even without basal insulin. A1C has improved since last hospital admission (2017) - from 7.7 to 5.8, reflecting excellent control. Will continue to follow. Fasting glucose: 107 mg/dL per am POCT Glucose. 2 units of correction required in the past 24 hours. CrCl cannot be calculated (Unknown ideal weight.). POC Glucose last 24hrs:   Lab Results   Component Value Date/Time    GLUCPOC 115 (H) 2018 08:29 AM    GLUCPOC 107 (H) 2018 06:03 AM    GLUCPOC 102 (H) 2018 12:17 AM        Inpatient medications for glucose management:  1. Correction Scale: Lispro (Humalog) Normal Sensitivity scale to cover for glucose > 139 mg/dL Every 6 hours     Consult received from Nehemias Mendes MD for  []    Assessment of home management  []    Meal planning  []    Meter / Monitoring  []    New Diagnosis  []    Insulin education  []    Insulin pump notification  []    Medication recommendations  [x]    Hospital glucose management  []    Arthur Lyons  []    Outpatient Education - Information forwarded to [facility] who will follow-up with pt 1 week after discharge    Chart reviewed and initial evaluation complete on Cb Vivar is a 82 yo female with a past medical history relevant for  DM type 2, per Dr. Jamal Mandujano MD's  Colon and Rectal Surgery H&P dated 02/10/2018. Per med rec, Ms. Yeimi Mesa takes the following on an outpatient basis:   1. Humalog 20 units before breakfast and lunch; 24 units before dinner   2.  NPH 74 units nightly Lab Results   Component Value Date/Time    Hemoglobin A1c 5.8 12/21/2017 04:10 AM    Hemoglobin A1c 7.7 (H) 04/20/2015 02:30 AM       Thank you. Hannah Rider.  Lee Nevarez, MPH, BSN, [address]

## 2018-02-14 NOTE — PROGRESS NOTES
Problem: Falls - Risk of  Goal: *Absence of Falls  Document Ghassan Fall Risk and appropriate interventions in the flowsheet.    Outcome: Progressing Towards Goal  Fall Risk Interventions:  Mobility Interventions: Bed/chair exit alarm         Medication Interventions: Bed/chair exit alarm, Evaluate medications/consider consulting pharmacy, Patient to call before getting OOB    Elimination Interventions: Call light in reach, Bed/chair exit alarm, Patient to call for help with toileting needs

## 2018-02-14 NOTE — PROGRESS NOTES
Patient in the restroom upon my arrival. Pt has been advanced to [facility] by surgery. Will resume home insulin regimen as follows and then uptitrate to usual home regimen. Continue ISS. Start 1/2 usual NPH dose and in the AM if tolerating diet and BG levels improve will start her back on her usual mealtime insulin dosing. BP's appear controlled on current regimen of metoprolol and Maxzide.  Will continue to follow

## 2018-02-14 NOTE — PROGRESS NOTES
CRS  Pt with multiple bms  Flowsheet, labs reviewed  Abd: soft, nt  Minimal drainage on dressing    Plan:  Adv diet, hliv

## 2018-02-14 NOTE — PROGRESS NOTES
Problem: Falls - Risk of  Goal: *Absence of Falls  Document Ghassan Fall Risk and appropriate interventions in the flowsheet.    Outcome: Progressing Towards Goal  Fall Risk Interventions:  Mobility Interventions: Bed/chair exit alarm         Medication Interventions: Bed/chair exit alarm, Evaluate medications/consider consulting pharmacy, Teach patient to arise slowly    Elimination Interventions: Call light in reach, Patient to call for help with toileting needs, Toileting schedule/hourly rounds, Toilet paper/wipes in reach, Elevated toilet seat

## 2018-02-15 VITALS
HEART RATE: 77 BPM | OXYGEN SATURATION: 97 % | TEMPERATURE: 98.4 F | HEIGHT: 61 IN | RESPIRATION RATE: 16 BRPM | WEIGHT: 203 LBS | DIASTOLIC BLOOD PRESSURE: 49 MMHG | SYSTOLIC BLOOD PRESSURE: 118 MMHG | BODY MASS INDEX: 38.33 KG/M2

## 2018-02-15 LAB
GLUCOSE BLD STRIP.AUTO-MCNC: 127 MG/DL (ref 65–100)
SERVICE CMNT-IMP: ABNORMAL

## 2018-02-15 PROCEDURE — 82962 GLUCOSE BLOOD TEST: CPT

## 2018-02-15 PROCEDURE — 74011250637 HC RX REV CODE- 250/637: Performed by: COLON & RECTAL SURGERY

## 2018-02-15 PROCEDURE — C9113 INJ PANTOPRAZOLE SODIUM, VIA: HCPCS | Performed by: COLON & RECTAL SURGERY

## 2018-02-15 PROCEDURE — 74011636637 HC RX REV CODE- 636/637: Performed by: INTERNAL MEDICINE

## 2018-02-15 PROCEDURE — 74011250636 HC RX REV CODE- 250/636: Performed by: COLON & RECTAL SURGERY

## 2018-02-15 RX ORDER — LEVOFLOXACIN 750 MG/1
750 TABLET ORAL EVERY 24 HOURS
Qty: 7 TAB | Refills: 0 | Status: SHIPPED | OUTPATIENT
Start: 2018-02-15 | End: 2019-01-10

## 2018-02-15 RX ADMIN — TRIAMTERENE AND HYDROCHLOROTHIAZIDE 1 TABLET: 37.5; 25 TABLET ORAL at 09:36

## 2018-02-15 RX ADMIN — DULOXETINE HYDROCHLORIDE 120 MG: 30 CAPSULE, DELAYED RELEASE ORAL at 09:36

## 2018-02-15 RX ADMIN — METOPROLOL TARTRATE 50 MG: 50 TABLET ORAL at 09:36

## 2018-02-15 RX ADMIN — HUMAN INSULIN 10 UNITS: 100 INJECTION, SUSPENSION SUBCUTANEOUS at 09:37

## 2018-02-15 RX ADMIN — BACLOFEN 10 MG: 10 TABLET ORAL at 09:36

## 2018-02-15 RX ADMIN — INSULIN LISPRO 3 UNITS: 100 INJECTION, SOLUTION INTRAVENOUS; SUBCUTANEOUS at 09:36

## 2018-02-15 NOTE — PROGRESS NOTES
Spoke to pt. Pt was discharged by surgery prior to me being able to evaluate her and change her discharge medications as she had be receiving SIGNIFICANTLY less insulin her than she had been at home. We recommended:     NPH 10 units twice a day (0.2 units/kg/day)   Humalog 3 units before meals (0.1 units/kg/day)     Even while in house, with NPH 10, her BG levels were 127-153. Pt normally takes NPH 74 at home and 20u with meals. She has a follow-up appointment with her PCP next Thursday. I will forward this note to her PCP. I cannot change her med rec or discharge paperwork as she was already discharged.

## 2018-02-15 NOTE — DISCHARGE SUMMARY
[facility]    Elaine Mercado  MR#: [id number]  : 1945  ACCOUNT #: [de-identified]   ADMIT DATE: 02/10/2018  DISCHARGE DATE: 02/15/2018    ADMITTING DIAGNOSIS:  Partial small-bowel obstruction. FINAL DIAGNOSES:    1. Partial small-bowel obstruction. 2.  Partial small-bowel obstruction, resolved. 3.  Resolving postoperative fluid collection. HISTORY OF PRESENT ILLNESS:  This is a very pleasant 42-year-old female who presented to the hospital with signs and symptoms consistent with a partial small-bowel obstruction. Apparently, she had pork chops and peas the day before. She began to notice some abdominal pain. A CT scan showed findings consistent with a partial small-bowel obstruction. There was an incidental finding of a resolving fluid collection in her subcutaneous tissue associated with her midline incision. This is a known entity. This has been slowly resolving and has been decompressing through the skin. The patient initially had an NG tube placed. This was removed. She was able to tolerate a diet and have bowel movements. She will be sent home without pain medication as she is not having any pain. I have asked her to stick with a low fiber diet for now. She will be covered for a possible urinary tract infection as she had trace leukocyte esterase. DISPOSITION:  She will follow up with me in the office 1 month or sooner should symptoms warrant. MD MANOJ Ji/JONATHAN  D: 02/15/2018 08:00     T: 02/15/2018 13:20  JOB #: [id number]

## 2018-02-15 NOTE — PROGRESS NOTES
2/15/2018 4:02 PM CM called pt's PCP office, Dr. Dana Zimmerman. Pt has an appt on 2/19 for hospital follow up. Time cannot be established until pt calls and confirms appt. Pt's PCP will provide transport for pt if needed to appt. PCP confirmed they received Dr. Lauren Rizzo note from today for insulin recommendations. CM called pt and notified of appt on 2/19, pt was agreeable to call her PCP to confirm appt. CM expressed importance for follow up to adjust insulin prescription. Pt confirmed she has Dr. Petra Whitley phone number and will contact today to confirm appt. 2/15/2018  8:36 AM Discharge order noted, EMR reviewed. Pt's MD note addressing home health, will not be needed. No discharge needs identified.  TOM NaikW

## 2018-02-15 NOTE — PROGRESS NOTES
CRS  dora full liquids. +stool.  No abd pain, no nausea  Flowsheet reviewed  Abd: soft, nt, nd    Plan:  Dc home

## 2018-02-15 NOTE — DISCHARGE INSTRUCTIONS
Bowel Blockage (Intestinal Obstruction): Care Instructions  Your Care Instructions  A bowel blockage, also called an intestinal obstruction, can prevent gas, fluids, or solids from moving through the intestines normally. It can cause constipation and, rarely, diarrhea. You may have pain, nausea, vomiting, and cramping. Most of the time, complete blockages require a stay in the hospital and possibly surgery. But if your bowel is only partly blocked, your doctor may tell you to wait until it clears on its own and you are able to pass gas and stool. If so, there are things you can do at home to help make you feel better. If you have had surgery for a bowel blockage, there are things you can do at home to make sure you heal well. You can also make some changes to keep your bowel from becoming blocked again. Follow-up care is a key part of your treatment and safety. Be sure to make and go to all appointments, and call your doctor if you are having problems. It's also a good idea to know your test results and keep a list of the medicines you take. How can you care for yourself at home? If your doctor has told you to wait at home for a blockage to clear on its own:  · Follow your doctor's instructions. These may include eating a liquid diet to avoid complete blockage. · Be safe with medicines. Take your medicines exactly as prescribed. Call your doctor if you think you are having a problem with your medicine. · Put a heating pad set on low on your belly to relieve mild cramps and pain. To prevent another blockage  · Try to eat smaller amounts of food more often. For example, have 5 or 6 small meals throughout the day instead of 2 or 3 large meals. · Chew your food very well. Try to chew each bite about 20 times or until it is liquid. · Avoid high-fiber foods and raw fruits and vegetables with skins, husks, strings, or seeds.  These can form a ball of undigested material that can cause a blockage if a part of your bowel is scarred or narrowed. · Check with your doctor before you eat whole-grain products or use a fiber supplement such as Citrucel or Metamucil. · To help you have regular bowel movements, eat at regular times, do not strain during a bowel movement, and drink at least 8 to 10 glasses of water each day. If you have kidney, heart, or liver disease and have to limit fluids, talk with your doctor or before you increase the amount of fluids you drink. · Drink high-calorie liquid formulas if your doctor says to. Severe symptoms may make it hard for your body to take in vitamins and minerals. · Get regular exercise. It helps you digest your food better. Get at least 30 minutes of physical activity on most days of the week. Walking is a good choice. When should you call for help? Call your doctor now or seek immediate medical care if:  ? · You have a fever. ? · You are vomiting. ? · You have new or worse belly pain. ? · You cannot pass stools or gas. ? Watch closely for changes in your health, and be sure to contact your doctor if you have any problems. Where can you learn more? Go to http://chung-vick.info/. Enter Q348 in the search box to learn more about \"Bowel Blockage (Intestinal Obstruction): Care Instructions. \"  Current as of: May 12, 2017  Content Version: 11.4  © 7366-5057 [employer]wise, Incorporated. Care instructions adapted under license by [employer] (which disclaims liability or warranty for this information). If you have questions about a medical condition or this instruction, always ask your healthcare professional. [place] [address] any warranty or liability for your use of this information.

## 2018-02-15 NOTE — ROUTINE PROCESS
Bedside and Verbal shift change report given to Tammy Sosa RN (oncoming nurse) by Reji Sims (offgoing nurse). Report included the following information SBAR, Kardex, Procedure Summary, Intake/Output, MAR and Recent Results.

## 2018-03-21 ENCOUNTER — HOSPITAL ENCOUNTER (OUTPATIENT)
Dept: CT IMAGING | Age: 73
Discharge: HOME | End: 2018-03-21
Attending: COLON & RECTAL SURGERY
Payer: MEDICARE

## 2018-03-21 DIAGNOSIS — N73.9 ABSCESS OF FEMALE PELVIS: ICD-10-CM

## 2018-03-21 DIAGNOSIS — K63.2 GASTROINTESTINAL FISTULA: ICD-10-CM

## 2018-03-21 PROCEDURE — 74177 CT ABD & PELVIS W/CONTRAST: CPT

## 2018-03-21 PROCEDURE — 74011636320 HC RX REV CODE- 636/320: Performed by: RADIOLOGY

## 2018-03-21 RX ADMIN — DIATRIZOATE MEGLUMINE AND DIATRIZOATE SODIUM 30 ML: 660; 100 LIQUID ORAL; RECTAL at 07:10

## 2018-03-21 RX ADMIN — IOPAMIDOL 95 ML: 755 INJECTION, SOLUTION INTRAVENOUS at 08:57

## 2019-01-09 ENCOUNTER — HOSPITAL ENCOUNTER (OUTPATIENT)
Dept: CT IMAGING | Age: 74
Discharge: HOME | End: 2019-01-09
Attending: COLON & RECTAL SURGERY
Payer: MEDICARE

## 2019-01-09 DIAGNOSIS — R10.9 ABDOMINAL PAIN: ICD-10-CM

## 2019-01-09 LAB — CREAT BLD-MCNC: 0.7 MG/DL (ref 0.6–1.3)

## 2019-01-09 PROCEDURE — 74177 CT ABD & PELVIS W/CONTRAST: CPT

## 2019-01-09 PROCEDURE — 74011636320 HC RX REV CODE- 636/320: Performed by: RADIOLOGY

## 2019-01-09 PROCEDURE — 82565 ASSAY OF CREATININE: CPT

## 2019-01-09 RX ADMIN — IOHEXOL 50 ML: 240 INJECTION, SOLUTION INTRATHECAL; INTRAVASCULAR; INTRAVENOUS; ORAL at 11:41

## 2019-01-09 RX ADMIN — IOPAMIDOL 100 ML: 755 INJECTION, SOLUTION INTRAVENOUS at 11:42

## 2019-01-10 NOTE — PERIOP NOTES
[address] [place] [address] Dr Hurtado Preprocedure Instructions 1. On the day of your surgery, please report to registration located on the 2nd floor of the  [facility]. yes 2. You must have a responsible adult to drive you to the hospital, stay at the hospital during your procedure and drive you home. If they leave your procedure will not be started (no exceptions). yes 3. Do not have anything to eat or drink (including water, gum, mints, coffee, and juice) after midnight. This does not apply to the medications you were instructed to take by your physician. yesIf you are currently taking Plavix, Coumadin, Aspirin, or other blood-thinning agents, contact your physician for special instructions. not applicable 4. If you are having a procedure that requires bowel prep: We recommend that you have only clear liquids the day before your procedure and begin your bowel prep by 5:00 pm.  You may continue to drink clear liquids until midnight. If for any reason you are not able to complete your prep please notify your physician immediately. yes 5. Have a list of all current medications, including vitamins, herbal supplements and any other over the counter medications. yes 6. If you wear glasses, contacts, dentures and/or hearing aids, they may be removed prior to procedure, please bring a case to store them in. yes 7. You should understand that if you do not follow these instructions your procedure may be cancelled. If your physical condition changes (I.e. fever, cold or flu) please contact your doctor as soon as possible. 8. It is important that you be on time. If for any reason you are unable to keep your appointment please call [phone] day of or your physicians office prior to your scheduled procedure

## 2019-01-15 ENCOUNTER — ANESTHESIA (OUTPATIENT)
Dept: ENDOSCOPY | Age: 74
End: 2019-01-15
Payer: MEDICARE

## 2019-01-15 ENCOUNTER — ANESTHESIA EVENT (OUTPATIENT)
Dept: ENDOSCOPY | Age: 74
End: 2019-01-15
Payer: MEDICARE

## 2019-01-15 ENCOUNTER — HOSPITAL ENCOUNTER (OUTPATIENT)
Age: 74
Setting detail: OUTPATIENT SURGERY
Discharge: HOME | End: 2019-01-15
Attending: INTERNAL MEDICINE | Admitting: INTERNAL MEDICINE
Payer: MEDICARE

## 2019-01-15 VITALS
WEIGHT: 224 LBS | HEIGHT: 61 IN | DIASTOLIC BLOOD PRESSURE: 57 MMHG | OXYGEN SATURATION: 97 % | SYSTOLIC BLOOD PRESSURE: 105 MMHG | TEMPERATURE: 97.8 F | RESPIRATION RATE: 24 BRPM | BODY MASS INDEX: 42.29 KG/M2 | HEART RATE: 62 BPM

## 2019-01-15 LAB
GLUCOSE BLD STRIP.AUTO-MCNC: 266 MG/DL (ref 65–100)
H PYLORI FROM TISSUE: NEGATIVE
KIT LOT NO., HCLOLOT: NORMAL
NEGATIVE CONTROL: NEGATIVE
POSITIVE CONTROL: POSITIVE
SERVICE CMNT-IMP: ABNORMAL

## 2019-01-15 PROCEDURE — 74011250636 HC RX REV CODE- 250/636

## 2019-01-15 PROCEDURE — 77030009426 HC FCPS BIOP ENDOSC BSC -B: Performed by: INTERNAL MEDICINE

## 2019-01-15 PROCEDURE — 77030013992 HC SNR POLYP ENDOSC BSC -B: Performed by: INTERNAL MEDICINE

## 2019-01-15 PROCEDURE — 76060000031 HC ANESTHESIA FIRST 0.5 HR: Performed by: INTERNAL MEDICINE

## 2019-01-15 PROCEDURE — 87077 CULTURE AEROBIC IDENTIFY: CPT | Performed by: INTERNAL MEDICINE

## 2019-01-15 PROCEDURE — 82962 GLUCOSE BLOOD TEST: CPT

## 2019-01-15 PROCEDURE — 88305 TISSUE EXAM BY PATHOLOGIST: CPT

## 2019-01-15 PROCEDURE — 76040000019: Performed by: INTERNAL MEDICINE

## 2019-01-15 RX ORDER — EPINEPHRINE 0.1 MG/ML
1 INJECTION INTRACARDIAC; INTRAVENOUS
Status: DISCONTINUED | OUTPATIENT
Start: 2019-01-15 | End: 2019-01-15 | Stop reason: HOSPADM

## 2019-01-15 RX ORDER — DEXTROMETHORPHAN/PSEUDOEPHED 2.5-7.5/.8
1.2 DROPS ORAL
Status: DISCONTINUED | OUTPATIENT
Start: 2019-01-15 | End: 2019-01-15 | Stop reason: HOSPADM

## 2019-01-15 RX ORDER — SODIUM CHLORIDE 9 MG/ML
50 INJECTION, SOLUTION INTRAVENOUS CONTINUOUS
Status: DISCONTINUED | OUTPATIENT
Start: 2019-01-15 | End: 2019-01-15 | Stop reason: HOSPADM

## 2019-01-15 RX ORDER — NALOXONE HYDROCHLORIDE 0.4 MG/ML
0.4 INJECTION, SOLUTION INTRAMUSCULAR; INTRAVENOUS; SUBCUTANEOUS
Status: DISCONTINUED | OUTPATIENT
Start: 2019-01-15 | End: 2019-01-15 | Stop reason: HOSPADM

## 2019-01-15 RX ORDER — PROPOFOL 10 MG/ML
INJECTION, EMULSION INTRAVENOUS AS NEEDED
Status: DISCONTINUED | OUTPATIENT
Start: 2019-01-15 | End: 2019-01-15 | Stop reason: HOSPADM

## 2019-01-15 RX ORDER — ATROPINE SULFATE 0.1 MG/ML
0.4 INJECTION INTRAVENOUS
Status: DISCONTINUED | OUTPATIENT
Start: 2019-01-15 | End: 2019-01-15 | Stop reason: HOSPADM

## 2019-01-15 RX ORDER — FLUMAZENIL 0.1 MG/ML
0.2 INJECTION INTRAVENOUS
Status: DISCONTINUED | OUTPATIENT
Start: 2019-01-15 | End: 2019-01-15 | Stop reason: HOSPADM

## 2019-01-15 RX ORDER — PROPOFOL 10 MG/ML
INJECTION, EMULSION INTRAVENOUS
Status: DISCONTINUED | OUTPATIENT
Start: 2019-01-15 | End: 2019-01-15 | Stop reason: HOSPADM

## 2019-01-15 RX ORDER — SODIUM CHLORIDE 9 MG/ML
INJECTION, SOLUTION INTRAVENOUS
Status: DISCONTINUED | OUTPATIENT
Start: 2019-01-15 | End: 2019-01-15 | Stop reason: HOSPADM

## 2019-01-15 RX ORDER — MIDAZOLAM HYDROCHLORIDE 1 MG/ML
.25-5 INJECTION, SOLUTION INTRAMUSCULAR; INTRAVENOUS
Status: DISCONTINUED | OUTPATIENT
Start: 2019-01-15 | End: 2019-01-15 | Stop reason: HOSPADM

## 2019-01-15 RX ORDER — LIDOCAINE HYDROCHLORIDE 20 MG/ML
INJECTION, SOLUTION EPIDURAL; INFILTRATION; INTRACAUDAL; PERINEURAL AS NEEDED
Status: DISCONTINUED | OUTPATIENT
Start: 2019-01-15 | End: 2019-01-15 | Stop reason: HOSPADM

## 2019-01-15 RX ADMIN — PROPOFOL 50 MG: 10 INJECTION, EMULSION INTRAVENOUS at 12:21

## 2019-01-15 RX ADMIN — PROPOFOL 20 MG: 10 INJECTION, EMULSION INTRAVENOUS at 12:24

## 2019-01-15 RX ADMIN — SODIUM CHLORIDE: 9 INJECTION, SOLUTION INTRAVENOUS at 12:30

## 2019-01-15 RX ADMIN — PROPOFOL 125 MCG/KG/MIN: 10 INJECTION, EMULSION INTRAVENOUS at 12:21

## 2019-01-15 RX ADMIN — LIDOCAINE HYDROCHLORIDE 20 MG: 20 INJECTION, SOLUTION EPIDURAL; INFILTRATION; INTRACAUDAL; PERINEURAL at 12:21

## 2019-01-15 RX ADMIN — PROPOFOL 40 MG: 10 INJECTION, EMULSION INTRAVENOUS at 12:27

## 2019-01-15 RX ADMIN — SODIUM CHLORIDE: 9 INJECTION, SOLUTION INTRAVENOUS at 11:04

## 2019-01-15 RX ADMIN — PROPOFOL 30 MG: 10 INJECTION, EMULSION INTRAVENOUS at 12:23

## 2019-01-15 NOTE — ANESTHESIA POSTPROCEDURE EVALUATION
Procedure(s): 
COLONOSCOPY 
ESOPHAGOGASTRODUODENOSCOPY (EGD) ESOPHAGOGASTRODUODENAL (EGD) BIOPSY ENDOSCOPIC POLYPECTOMY. Anesthesia Post Evaluation Multimodal analgesia: multimodal analgesia used between 6 hours prior to anesthesia start to PACU discharge Patient location during evaluation: bedside Patient participation: complete - patient participated Level of consciousness: awake Pain management: adequate Airway patency: patent Anesthetic complications: no 
Cardiovascular status: acceptable Respiratory status: acceptable Hydration status: acceptable Visit Vitals /57 Pulse 62 Temp 36.6 °C (97.8 °F) Resp 24 Ht 5' 1\" (1.549 m) Wt 101.6 kg (224 lb) SpO2 97% BMI 42.32 kg/m²

## 2019-01-15 NOTE — H&P
The patient is a 72year old female who presents with a complaint of Rectal bleeding. The patient presents due to new symptoms (Black stools and bright red blood in stool). The rectal bleeding is characterized as a blood mixed in stools and tarry black stools. The onset of the rectal bleeding has been sudden. The rectal bleeding has been occurring for 11 days. The course has been a decreasing. The quality of the stools are: black and tarry and blood streaking of stools. The symptoms have been associated with weight loss (lost 4 lbs this week - feels its because of upset stomach and poor appetite.), abdominal pain (achy epigastric pain.) and use of NSAIDs (Had been on daily motrin for fibromyalgia.  has now stopped its use. ). Lab results: no recent lab studies. Previous diagnostic tests have included colonoscopy (11-30-15 normal colonoscopy 10 year follow up.). Problem List/Past Medical (Maggie Hanna; 11/2/2018 11:39 AM) Arthritis   
Hypertension   Hemochromotosis 
fibromyalgia   
Diabetes   
Depression   
Anxiety Disorder   
Kidney Stone  [09/12/2008]: Abdominal pain, periumbilical (464.28  V32.56, R10.32)   
Pancreas disorder (577.9)   
Abdominal pain, RUQ (789.01  R10.11)   Abdominal swelling, generalized (789.37  R19.07)   Non-alcoholic fatty liver disease (571.8  K75.81)   
 
Past Surgical History (Maggie Hanna; 11/2/2018 11:39 AM) Tonsillectomy   
Cholecystectomy   
whipple   
Hysterectomy; Total   
Left breast biopsy   
Appendectomy   
 
Allergies (Maggie Hanna; 11/2/2018 11:39 AM) Codeine   
 
Medication History Maggie Hanna; 11/2/2018 11:39 AM) Metoprolol Tartrate  (25MG Tablet, Oral bid) Active. Triamterene/HCTZ  (50-25MG Tablet, Oral) Active. HumaLOG  (100UNIT/ML Solution, Subcutaneous 5 to 8 units three times day) Active. NovoLOG  (100UNIT/ML Solution, Subcutaneous) Active. Zofran  (4MG Tablet, Oral as needed) Active.  
Medications Reconciled  
 
 Family History Lin Valverde; 11/2/2018 11:39 AM) Kidney failure   Father. Passed Diabetes Mellitus   Mother. passed Hypertension   Mother. Social History Lin Valverde; 11/2/2018 11:39 AM) Marital status   . Employment status   Retired. Blood Transfusion   No. 
Tobacco Use   Never smoker. Alcohol Use   No use Diagnostic Studies History Lin Valverde; 11/2/2018 11:39 AM) Endoscopy   
Colonoscopy   
 
Health Maintenance History (Lin Valverde; 11/2/2018 11:39 AM) Pneumovax   1 year ago Flu Vaccine   2018 Review of Systems (Lin Valverde; 11/2/2018 11:37 AM) General Present- Chronic Fatigue and Weight Loss. Not Present- Poor Appetite and Weight Gain. Skin Not Present- Itching, Rash and Skin Color Changes. HEENT Not Present- Hearing Loss and Vertigo. Respiratory Present- Difficulty Breathing. Not Present- TB exposure. Cardiovascular Present- Chest Pain. Not Present- Use of Antibiotics before Dental Procedures and Use of Blood Thinners. Gastrointestinal Present- See HPI. Musculoskeletal Not Present- Arthritis, Hip Replacement Surgery and Knee Replacement Surgery. Neurological Not Present- Weakness. Psychiatric Not Present- Depression. Endocrine Present- Diabetes. Not Present- Thyroid Problems. Hematology Not Present- Anemia. Vitals Lin Valverde; 11/2/2018 11:35 AM) 
11/2/2018 11:33 AM 
Weight: 244 lb   Height: 61 in  
Body Surface Area: 2.06 m²   Body Mass Index: 46.1 kg/m²   
Temp.: 98.1° F    
BP: 140/62 (Sitting, Left Arm, Standard) Physical Exam (Mat [facility]; 11/2/2018 12:19 PM) General 
Mental Status - Alert. General Appearance - Cooperative, Pleasant, Not in acute distress. Orientation - Oriented X3. Integumentary General Characteristics Color - normal coloration of skin. Head and Neck Neck Global Assessment - supple. Eye 
Sclera/Conjunctiva - Bilateral - No Jaundice.  
 
Chest and Lung Exam 
 Chest and lung exam reveals  - quiet, even and easy respiratory effort with no use of accessory muscles. Auscultation Breath sounds - Normal. Adventitious sounds - No Adventitious sounds. Cardiovascular Auscultation Rhythm - Regular, No Tachycardia, No Bradycardia . Heart Sounds - Normal heart sounds , S1 WNL and S2 WNL, No S3, No Summation Gallop. Murmurs & Other Heart Sounds - Auscultation of the heart reveals - No Murmurs. Abdomen Palpation/Percussion Tenderness - Right Upper Quadrant. Rebound tenderness - No rebound. Rigidity (guarding) - No Rigidity. Dullness to percussion - No abnormal dullness to percussion. Liver - No hepatosplenomegaly. Abdominal Mass Palpable - No masses. Other Characteristics - No Ascites. Auscultation Auscultation of the abdomen reveals - Bowel sounds normal, No Abdominal bruits and No Succussion splash. Rectal - Did not examine. Peripheral Vascular Lower Extremity Palpation - Edema - Left - No edema. Right - No edema. Neurologic Motor Involuntary Movements - Asterixis - not present. Assessment & Plan (Mat [facility]; 11/2/2018 12:22 PM) Rectal bleeding (569.3  K62.5) Story: Pt presents with melena and hematochezia which started once she took motrin for fibromyalgia. This persisted for 11 days but has now stopped. She is no longer taking motrin. Impression: Stopped ranitidine and started pantoprazole. Advised her to take 1/2 dose of long acting insulin evening prior to procedure and no insulin day of procedure. She verablized understanding. Current Plans Colonoscopy (18405) (Discussed risks and benefits with the patient to include:; perforation, post polypectomy, or post biopsy bleeding, missed lesions, and sedation reactions.) Started PEG 3350/Electrolytes 240GM, 1 (one) KIt container Milliliter as directed by physician, 240 Milliliter, 1 day starting 11/02/2018, No Refill. Endoscopy (84586) (Discussed risks and benefits with the patient to include: perforation, post polypectomy, or post biopsy bleeding, missed lesions, and sedation reactions.) CBC & PLATELETS (AUTO) (69159) Started Pantoprazole Sodium 40MG, 1 (one) Tablet daily, #90, 90 days starting 11/02/2018, Ref. x3. 
Hemochromatosis (275.03  E83.119) Story: Pt presents with Hemachromatosis. She has not been seen in our office in 3 years but reports she has phlebotomy done on a regular basis. She has not had a US done in three years. On exam she has RUQ pain - will evaluate with US and LFTs. Impression: Iron studies and follow up in office in 8 weeks. Current Plans Abdominal Ultrasound (29524) IRON + TIBC (85764) Ferritin,Serum (64232) METABOLIC PANEL, COMPREHENSIVE (89627) Case discussed personally with a physician in the office regarding the presentation, pertinent physical findings and development of a diagnostic and therapeutic plan. Pt Education - How to access health information online: discussed with patient and provided information. Medical Decision Making (Mat GONZALEZ; 11/2/2018 12:23 PM) Amount/complexity of data to be reviewed: - Order and/or review of lab test(s) 
- Order and/or review of radiology test(s) - Review and summarization of old records Signed electronically by CARLOS Patel (11/2/2018 12:23 PM)

## 2019-01-15 NOTE — PROCEDURES
Sapphire Barreto M.D.  [phone]           1/15/2019                EGD Operative Report  Sindhu Amaya  :  1945  [facility] Medical Record Number:  [id number]      Indication:  Abdominal pain, epigastric     : Denzel Lefort, MD    Referring Provider:  Toi Burns MD      Anesthesia/Sedation:  MAC anesthesia    Airway assessment: No airway problems anticipated    Pre-Procedural Exam:      Airway: clear, no airway problems anticipated  Heart: RRR, without gallops or rubs  Lungs: clear bilaterally without wheezes, crackles, or rhonchi  Abdomen: soft, nontender, nondistended, bowel sounds present  Mental Status: awake, alert and oriented to person, place and time       Procedure Details     After infomed consent was obtained for the procedure, with all risks and benefits of procedure explained the patient was taken to the endoscopy suite and placed in the left lateral decubitus position. Following sequential administration of sedation as per above, the endoscope was inserted into the mouth and advanced under direct vision to second portion of the duodenum. A careful inspection was made as the gastroscope was withdrawn, including a retroflexed view of the proximal stomach; findings and interventions are described below. Findings:   Esophagus:Mucosa within normal through the esophagus, one salmon colored mucosal tongue about 1 cm in size extending from the GE-junction suggestive of Johnson's esophagus was noted, biopsies obtained. No other lesions seen. Stomach: Several small gastric nodules seen, otherwise mucosa within normal.  Duodenum/jejunum: Evidence of previous duodenectomy with jejunal anastomosis seen. Both afferent and efferent limbs were intubated and appeared within normal. Evidence of a small superficial ulceration seen at the bifurcation of both limbs. No stigmata of recent bleeding seen.     Therapies:  none    Specimens: Pyloritek, gastric nodules and GE-junction           Complications:   None; patient tolerated the procedure well. EBL:  None. Impression:    Anastomotic superficial diminutive ulceration seen                           Gastric nodules seen                           Possible Johnson's esophagus mucosal tongue. Recommendations:    -Acid suppression with a proton pump inhibitor.  -Await pathology.  -Await DON test result and treat for Helicobacter pylori if positive. , -GERD diet: avoid fried and fatty foods. peppermint, chocolate, alcohol, coffee, citrus fruits and juices, tomoato products; avoid lying down for 2 to 3 hours after eating.     Gely Taylor MD

## 2019-01-15 NOTE — DISCHARGE INSTRUCTIONS
[address] [facility]. Leida Terrazas M.D.  [phone]                 COLON and EGD DISCHARGE INSTRUCTIONS    1/15/2019    Sindhu Amaya  :  1945  [facility] Medical Record Number:  [id number]      DISCOMFORT:  Sore throat- throat lozenges or warm salt water gargle  Redness at IV site- apply warm compress to area; if redness or soreness persist- contact your physician  There may be a slight amount of blood passed from the rectum  Gaseous discomfort- walking, belching will help relieve any discomfort  You may not operate a vehicle for 12 hours  You may not engage in an occupation involving machinery or appliances for rest of today  You may not drink alcoholic beverages for at least 12 hours  Avoid making any critical decisions for at least 24 hour  DIET:   High fiber diet. GERD diet: avoid fried and fatty foods. peppermint, chocolate, alcohol, coffee, citrus fruits and juices, tomoato products; avoid lying down for 2 to 3 hours after eating.   - however -  remember your colon is empty and a heavy meal will produce gas. Avoid these foods:  vegetables, fried / greasy foods, carbonated drinks for today     ACTIVITY:  You may  resume your normal daily activities it is recommended that you spend the remainder of the day resting -  avoid any strenuous activity and driving. CALL M.D. ANY SIGN OF:   Increasing pain, nausea, vomiting  Abdominal distension (swelling)  New increased bleeding (oral or rectal)  Fever (chills)  Pain in chest area  Bloody discharge from nose or mouth  Shortness of breath      Follow-up Instructions:   Call Dr. Jaylin Elias if any questions at [phone]. Results of procedure / biopsy in 7 to 10 days, we will call you with these results. Your endoscopy showed one small superficial ulceration seen at the intestinal anastomosis to the stomach, benign nodules in the stomach and mild acid reflux changes. Biopsies were obtained.  Continue taking pantoprazole daily and take ranitidine 150 mg at bedtime . Your colonoscopy showed mild scattered diverticulosis, one diminutive polyp and small internal hemorrhoids.

## 2019-01-15 NOTE — PROCEDURES
Ann Byrne M.D.  [phone]            1/15/2019          Colonoscopy Operative Report  Jack Membreno  :  1945  [facility] Medical Record Number:  [id number]      Indications:    Abdominal pain, generalized, Hematochezia/melena     :  Severino Ramires MD    Referring Provider: Aubree Lund MD    Sedation:  MAC anesthesia    Pre-Procedural Exam:      Airway: clear,  No airway problems anticipated  Heart: RRR, without gallops or rubs  Lungs: clear bilaterally without wheezes, crackles, or rhonchi  Abdomen: soft, nontender, nondistended, bowel sounds present  Mental Status: awake, alert and oriented to person, place and time     Procedure Details:  After informed consent was obtained with all risks and benefits of procedure explained and preoperative exam completed, the patient was taken to the endoscopy suite and placed in the left lateral decubitus position. Upon sequential sedation as per above, a digital rectal exam was performed. The Olympus videocolonoscope  was inserted in the rectum and carefully advanced to the cecum, which was identified by the ileocecal valve and appendiceal orifice, terminal ileum. The quality of preparation was good. The colonoscope was slowly withdrawn with careful inspection and evaluation between folds. Retroflexion in the rectum was performed. Findings:   Terminal Ileum: normal  Cecum: normal  Ascending Colon: no mucosal lesion appreciated  mild diverticulosis;  Transverse Colon: 1  Sessile polyp(s), the largest 2 mm in size  mild diverticulosis; Descending Colon: no mucosal lesion appreciated  mild diverticulosis; Sigmoid: surgically absent  Rectum: no mucosal lesion appreciated  Grade 1 internal hemorrhoid(s);     Interventions:  1 complete polypectomy were performed using cold biopsy forceps and the polyps were  retrieved    Specimen Removed:  specimen #1, 2 mm in size, located in the transverse colon removed by cold biopsy and sent for pathology    Complications: None. EBL:  None. Impression:  One diminutive polyp removed by excisional biopsy and sent to pathology, otherwise no mucosal lesions seen. Mild diverticulosis scattered throughout the colon                         Small internal hemorrhoids    Recommendations:  -If adenoma is present, repeat colonoscopy in 5 years.   -High fiber diet.    -Resume normal medication(s). -Follow-up with Dr. Javid Tidwell. Discharge Disposition:  Home in the company of a  when able to ambulate.     Terri Green MD  1/15/2019  12:55 PM

## 2019-01-15 NOTE — PROGRESS NOTES
Rose Sheets 1945 
[id number] Situation: 
Verbal report received from: Micheal Couch Procedure: Procedure(s): 
COLONOSCOPY 
ESOPHAGOGASTRODUODENOSCOPY (EGD) ESOPHAGOGASTRODUODENAL (EGD) BIOPSY ENDOSCOPIC POLYPECTOMY Background: 
 
Preoperative diagnosis: RECTAL BLEED Postoperative diagnosis: EGD-gastric nodules possible Johnson's esophagus anastomosis gastric ulcer Colon-diverticulosis/hemorrhoids :  Dr. Venkata Mcghee Assistant(s): Endoscopy RN-1: Carol Pollock RN; Ivanna Miguel RN Specimens:  
ID Type Source Tests Collected by Time Destination 1 : biopsy  Preservative   Quin Jeronimo MD 1/15/2019 1234 Pathology 2 : biopsy Preservative   Quin Jeronimo MD 1/15/2019 1235 Pathology 3 : polyp Preservative Colon, Transverse  Quin Jeronimo MD 1/15/2019 1242 Pathology H. Pylori  yes Assessment: 
Intra-procedure medications Anesthesia gave intra-procedure sedation and medications, see anesthesia flow sheet yes Intravenous fluids: NS@ [facility] Vital signs stable   yes Abdominal assessment: round and soft   yes Recommendation: 
Discharge patient per MD order  yes. Return to floor  outpatient Family or Friend   Friend Permission to share finding with family or friend yes

## 2019-01-15 NOTE — ANESTHESIA PREPROCEDURE EVALUATION
Anesthetic History Increased risk of difficult airway Review of Systems / Medical History Patient summary reviewed, nursing notes reviewed and pertinent labs reviewed Pulmonary Sleep apnea: No treatment Neuro/Psych Psychiatric history Cardiovascular Hypertension: well controlled Exercise tolerance: >4 METS 
  
GI/Hepatic/Renal 
  
GERD: well controlled Liver disease Comments: Diverticulitis Endo/Other Diabetes: well controlled, type 2, using insulin Morbid obesity, arthritis and cancer (h/o pancreatic cancer) Other Findings Comments: Hemochromatosis controlled with phlebotomy Fibromyalgia Physical Exam 
 
Airway Mallampati: III 
TM Distance: < 4 cm Neck ROM: normal range of motion Mouth opening: Diminished (comment) Cardiovascular Rhythm: regular Rate: normal 
 
 
 
 Dental 
 
Dentition: Lower partial plate and Bridges Comments: Lower partial plate slightly loose Pulmonary Breath sounds clear to auscultation Abdominal 
 
 
 
 Other Findings Anesthetic Plan ASA: 3 Anesthesia type: MAC Anesthetic plan and risks discussed with: Patient

## 2019-05-04 NOTE — PERIOP NOTES
Date of Service: 05/03/2019    SUBJECTIVE:  Daniel returns, 2 weeks out from his massive rotator cuff repair.  He says he is doing well.  He is off pain medicine.  He says he is wearing his sling, but not full-time.    PHYSICAL EXAMINATION:  Portal sites are healed.    RECOMMENDATIONS:  I really stressed the importance of wearing a sling to protect his massive rotator cuff repair.  I told him the first 6 weeks, all I want him doing is passive external rotation at the side with a broom stick, range of motion of the elbow and wrist.  I want him in a sling really 23 hours a day if possible.  We are going to remove his stitches as his incisions look healed.  I will see him back in 1 month.      Dictated By: Frederic Maldoando MD  Signing Provider: Frederic Maldonado MD    OD/SMR ([id number])  DD: 05/03/2019 08:55:12 TD: 05/04/2019 07:25:10    Copy Sent To:     cc: Memo Zarco PT   Unable to obtain a suction/seal on either of the [facility] ODILON drains. Chano Vazquez made aware on patient arrival to PACU, 2030.

## 2019-11-06 ENCOUNTER — OFFICE VISIT (OUTPATIENT)
Dept: SURGERY | Age: 74
End: 2019-11-06

## 2019-11-06 VITALS
HEART RATE: 89 BPM | SYSTOLIC BLOOD PRESSURE: 148 MMHG | WEIGHT: 213 LBS | HEIGHT: 61 IN | OXYGEN SATURATION: 98 % | DIASTOLIC BLOOD PRESSURE: 78 MMHG | BODY MASS INDEX: 40.22 KG/M2 | TEMPERATURE: 98.8 F | RESPIRATION RATE: 18 BRPM

## 2019-11-06 DIAGNOSIS — K43.2 INCISIONAL HERNIA, WITHOUT OBSTRUCTION OR GANGRENE: Primary | ICD-10-CM

## 2019-11-06 NOTE — PROGRESS NOTES
1. Have you been to the ER, urgent care clinic since your last visit? Hospitalized since your last visit? No    2. Have you seen or consulted any other health care providers outside of the [address] [place] [address] since your last visit? Include any pap smears or colon screening.   No

## 2019-11-06 NOTE — PROGRESS NOTES
Subjective:      Mario Ring  is a 69 y.o.  female who presents for evaluation of ventral hernia. Pt notes she has developed a hernia at her previous incision site. She states she was told she also has  abdominal muscles which she was told \"need to be stitched together. \"    Pt notes her hernia is very tender. She reports she has to [facility] the area in\" when coughing, sneezing, and with BMs. Pt notes she had diverticulitis and developed a fistula in 2017. Pt had surgery with Dr. Adair Carreno. She reports she is also a month out from abdominal abscess that was treated with antibiotics. Pt previously seen for Whipple procedure on 12/31/13 for tubulovillous adenoma. Pt notes she never had any pain in her incision after surgery.      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    Unspecified sleep apnea     NO C-PAP       Past Surgical History:   Procedure Laterality Date    COLONOSCOPY N/A 1/15/2019    COLONOSCOPY performed by Abby Sullivan MD at [facility] HX APPENDECTOMY      HX BREAST LUMPECTOMY Left     lumpectomy BENIGN    HX COLECTOMY      sigmoid    HX COLONOSCOPY      HX HYSTERECTOMY      hysto    HX ORTHOPAEDIC Left     shoulder CYST REMOVAL    HX OTHER SURGICAL  12/31/13    pyloric-sparing whipple,bx of portal and hepatic nodes by Dr Nicole Wong      ileostomy    HX SALPINGO-OOPHORECTOMY Bilateral     HX TONSILLECTOMY      HX UROLOGICAL  2008    BLADDER [facility]       Social History     Tobacco Use    Smoking status: Former Smoker     Packs/day: 1.00     Years: 17.00     Pack years: 17.00     Last attempt to quit: 1983     Years since quittin.8    Smokeless tobacco: Never Used   Substance Use Topics    Alcohol use: No       Family History   Problem Relation Age of Onset    Heart Failure Mother     Diabetes Mother     Hypertension Mother     Heart Failure Father     Kidney Disease Father         WAS ON DIALYSIS    Diabetes Sister     Depression Sister     Depression Sister     Other Sister         DIFFICULTY INTUBATING    Asthma Sister        Current Outpatient Medications on File Prior to Visit   Medication Sig Dispense Refill    triamterene-hydroCHLOROthiazide (MAXZIDE) 37.5-25 mg per tablet Take 1 Tab by mouth daily.  insulin lispro (HUMALOG) 100 unit/mL injection 20 Units by SubCUTAneous route Daily (before breakfast).  insulin lispro (HUMALOG) 100 unit/mL injection 20 Units by SubCUTAneous route Daily (before lunch).  pantoprazole (PROTONIX) 40 mg tablet Take 40 mg by mouth daily.  insulin NPH (NOVOLIN N) 100 unit/mL injection 74 Units by SubCUTAneous route nightly.  metoprolol (LOPRESSOR) 50 mg tablet Take 50 mg by mouth two (2) times a day.  baclofen (LIORESAL) 10 mg tablet Take 10 mg by mouth daily as needed.  insulin lispro (HUMALOG) 100 unit/mL injection 24 Units by SubCUTAneous route Daily (before dinner). Before dinner       ondansetron hcl (ZOFRAN) 4 mg tablet Take 4 mg by mouth every four (4) hours as needed for Nausea.  triamterene-hydrochlorothiazide (MAXZIDE-25MG) 37.5-25 mg per tablet Take 1 Tab by mouth daily. Indications: HYPERTENSION      DULoxetine (CYMBALTA) 60 mg capsule Take 120 mg by mouth daily. No current facility-administered medications on file prior to visit.         Allergies   Allergen Reactions    Tylenol [Acetaminophen] Other (comments)     Liver disorder. HAS HEMATOMACROSIS    Claritin [Loratadine] Other (comments)     Panic attack    Codeine Other (comments)     hallucinations    Percocet [Oxycodone-Acetaminophen] Other (comments)     hallucinations         Review of Systems:    Pertinent items are noted in the History of Present Illness. Objective:     Visit Vitals  /78 (BP 1 Location: Left arm, BP Patient Position: Sitting)   Pulse 89   Temp 98.8 °F (37.1 °C) (Oral)   Resp 18   Ht 5' 1\" (1.549 m)   Wt 213 lb (96.6 kg)   SpO2 98%   BMI 40.25 kg/m²        Physical Exam:  ABDOMEN: Pt has a hernia that extends from the umbilicus to the pubis originating from her diverticulosis incision. Labs: No results found for this or any previous visit (from the past 24 hour(s)). Assessment and Plan:       ICD-10-CM ICD-9-CM    1. Incisional hernia, without obstruction or gangrene K43.2 553.21        Recommend hernia repair, sooner rather than later. Will discuss case with Dr. Gabrielle Arias to determine best way to repair this hernia. I described the details of this surgery and discussed what the patient should expect for recovery. Pt should avoid any heavy lifting for 10-14 days post-operation. All questions were answered. They agree with this plan and will schedule this at their convenience. This document was scribed by Ethan Garcia as dictated by Dr. Antonio Rueda.      Signed By: Checo Mccarthy MD     11/06/19

## 2019-11-06 NOTE — LETTER
11/6/19 Patient: Delmi Caceres YOB: 1945 Date of Visit: 11/6/2019 Man Daley MD 
[address] [address] VIA Facsimile: [phone] Dear Man Daley MD, Thank you for referring Ms. eDlmi Caceres to [facility] [address] for evaluation. My notes for this consultation are attached. If you have questions, please do not hesitate to call me. I look forward to following your patient along with you. Sincerely, Edin Sofia MD

## 2019-11-20 ENCOUNTER — HOSPITAL ENCOUNTER (OUTPATIENT)
Dept: PREADMISSION TESTING | Age: 74
Discharge: HOME | End: 2019-11-20
Payer: MEDICARE

## 2019-11-20 ENCOUNTER — HOSPITAL ENCOUNTER (OUTPATIENT)
Dept: GENERAL RADIOLOGY | Age: 74
Discharge: HOME | End: 2019-11-20
Attending: SURGERY
Payer: MEDICARE

## 2019-11-20 ENCOUNTER — DOCUMENTATION ONLY (OUTPATIENT)
Dept: SURGERY | Age: 74
End: 2019-11-20

## 2019-11-20 VITALS
TEMPERATURE: 98.7 F | BODY MASS INDEX: 40.64 KG/M2 | DIASTOLIC BLOOD PRESSURE: 84 MMHG | HEIGHT: 61 IN | HEART RATE: 70 BPM | WEIGHT: 215.25 LBS | RESPIRATION RATE: 18 BRPM | SYSTOLIC BLOOD PRESSURE: 137 MMHG

## 2019-11-20 LAB
ALBUMIN SERPL-MCNC: 3.5 G/DL (ref 3.5–5)
ALBUMIN/GLOB SERPL: 0.9 {RATIO} (ref 1.1–2.2)
ALP SERPL-CCNC: 177 U/L (ref 45–117)
ALT SERPL-CCNC: 46 U/L (ref 12–78)
ANION GAP SERPL CALC-SCNC: 6 MMOL/L (ref 5–15)
AST SERPL-CCNC: 51 U/L (ref 15–37)
ATRIAL RATE: 70 BPM
BASOPHILS # BLD: 0 K/UL (ref 0–0.1)
BASOPHILS NFR BLD: 0 % (ref 0–1)
BILIRUB SERPL-MCNC: 0.5 MG/DL (ref 0.2–1)
BUN SERPL-MCNC: 15 MG/DL (ref 6–20)
BUN/CREAT SERPL: 18 (ref 12–20)
CALCIUM SERPL-MCNC: 9.1 MG/DL (ref 8.5–10.1)
CALCULATED P AXIS, ECG09: 42 DEGREES
CALCULATED R AXIS, ECG10: -30 DEGREES
CALCULATED T AXIS, ECG11: 19 DEGREES
CHLORIDE SERPL-SCNC: 99 MMOL/L (ref 97–108)
CO2 SERPL-SCNC: 28 MMOL/L (ref 21–32)
CREAT SERPL-MCNC: 0.82 MG/DL (ref 0.55–1.02)
DIAGNOSIS, 93000: NORMAL
DIFFERENTIAL METHOD BLD: ABNORMAL
EOSINOPHIL # BLD: 0.2 K/UL (ref 0–0.4)
EOSINOPHIL NFR BLD: 2 % (ref 0–7)
ERYTHROCYTE [DISTWIDTH] IN BLOOD BY AUTOMATED COUNT: 15.3 % (ref 11.5–14.5)
EST. AVERAGE GLUCOSE BLD GHB EST-MCNC: 246 MG/DL
GLOBULIN SER CALC-MCNC: 4.1 G/DL (ref 2–4)
GLUCOSE SERPL-MCNC: 285 MG/DL (ref 65–100)
HBA1C MFR BLD: 10.2 % (ref 4–5.6)
HCT VFR BLD AUTO: 36.6 % (ref 35–47)
HGB BLD-MCNC: 11.4 G/DL (ref 11.5–16)
IMM GRANULOCYTES # BLD AUTO: 0 K/UL (ref 0–0.04)
IMM GRANULOCYTES NFR BLD AUTO: 0 % (ref 0–0.5)
LYMPHOCYTES # BLD: 3.3 K/UL (ref 0.8–3.5)
LYMPHOCYTES NFR BLD: 39 % (ref 12–49)
MCH RBC QN AUTO: 26.6 PG (ref 26–34)
MCHC RBC AUTO-ENTMCNC: 31.1 G/DL (ref 30–36.5)
MCV RBC AUTO: 85.5 FL (ref 80–99)
MONOCYTES # BLD: 0.5 K/UL (ref 0–1)
MONOCYTES NFR BLD: 6 % (ref 5–13)
NEUTS SEG # BLD: 4.5 K/UL (ref 1.8–8)
NEUTS SEG NFR BLD: 53 % (ref 32–75)
NRBC # BLD: 0 K/UL (ref 0–0.01)
NRBC BLD-RTO: 0 PER 100 WBC
P-R INTERVAL, ECG05: 180 MS
PLATELET # BLD AUTO: ABNORMAL K/UL (ref 150–400)
POTASSIUM SERPL-SCNC: 4.4 MMOL/L (ref 3.5–5.1)
PROT SERPL-MCNC: 7.6 G/DL (ref 6.4–8.2)
Q-T INTERVAL, ECG07: 426 MS
QRS DURATION, ECG06: 94 MS
QTC CALCULATION (BEZET), ECG08: 460 MS
RBC # BLD AUTO: 4.28 M/UL (ref 3.8–5.2)
RBC MORPH BLD: ABNORMAL
SODIUM SERPL-SCNC: 133 MMOL/L (ref 136–145)
VENTRICULAR RATE, ECG03: 70 BPM
WBC # BLD AUTO: 8.5 K/UL (ref 3.6–11)

## 2019-11-20 PROCEDURE — 71046 X-RAY EXAM CHEST 2 VIEWS: CPT

## 2019-11-20 PROCEDURE — 80053 COMPREHEN METABOLIC PANEL: CPT

## 2019-11-20 PROCEDURE — 36415 COLL VENOUS BLD VENIPUNCTURE: CPT

## 2019-11-20 PROCEDURE — 85025 COMPLETE CBC W/AUTO DIFF WBC: CPT

## 2019-11-20 PROCEDURE — 93005 ELECTROCARDIOGRAM TRACING: CPT

## 2019-11-20 PROCEDURE — 83036 HEMOGLOBIN GLYCOSYLATED A1C: CPT

## 2019-11-20 RX ORDER — CALCIUM CARBONATE 500(1250)
3 TABLET ORAL
COMMUNITY

## 2019-11-20 RX ORDER — BISMUTH SUBSALICYLATE 262 MG
1 TABLET,CHEWABLE ORAL DAILY
COMMUNITY

## 2019-11-20 NOTE — PERIOP NOTES
MESSAGE SENT VIA CC TO Alba Jaramillo - NURSE FOR DR. LAURA, THAT PT IS A NO BLOOD PRODUCTS PT, SHE IS JEHOVAH WITNESS.

## 2019-11-20 NOTE — PERIOP NOTES
PAT INTERVIEW COMPLETED WITH PT.  INFECTION PREVENTION INFORMATION GIVEN TO PT.  PT WAS GIVEN THE OPPORTUNITY TO ASK ADDITIONAL QUESTIONS.    2 BOTTLES CHG SOAP AND DIRECTIONS GIVEN TO PT    DIRECTIONS TO HAVE CXR COMPLETED GIVEN TO PT

## 2019-11-20 NOTE — PROGRESS NOTES
BLOOD PRODUCTS   Received: Today   Message Contents   REMY Feng, SCOTTN             Camilla LAURA TO KNOW THAT PT IS A NOT BLOOD PRODUCTS PT; SHE IS Carissa Ruiz. Winford Ganser RN   [phone]        Dr. Rubi Oneill notified.

## 2019-11-21 ENCOUNTER — DOCUMENTATION ONLY (OUTPATIENT)
Dept: SURGERY | Age: 74
End: 2019-11-21

## 2019-11-21 NOTE — ADVANCED PRACTICE NURSE
Informed Dr Cathy Dhillon of A1C 10.2. Dr Cathy Dhillon states surgery will be postponed until patient is further optimized. Patient called and informed of surgical risk regarding uncontrolled DM, provided education and answered questions. She has appointment with endo (\"pretty sure Dr Imelda Yung. Cook\") in Feb. Message sent to Dr Diandra Sotomayor to inform of current situation, labs sent to PCP, Henry Beckford NP, also for further eval. Patient to call and schedule f/u with PCP. She was thankful for the call.

## 2020-01-07 ENCOUNTER — TELEPHONE (OUTPATIENT)
Dept: SURGERY | Age: 75
End: 2020-01-07

## 2020-01-07 NOTE — TELEPHONE ENCOUNTER
Per Dr. Messer Leader, he would like her HGB to be around 7ish for the surgery. She will keep us updated.

## 2020-01-17 ENCOUNTER — TELEPHONE (OUTPATIENT)
Dept: SURGERY | Age: 75
End: 2020-01-17

## 2020-01-17 DIAGNOSIS — K43.2 INCISIONAL HERNIA, WITHOUT OBSTRUCTION OR GANGRENE: Primary | ICD-10-CM

## 2020-01-17 NOTE — TELEPHONE ENCOUNTER
Her A1c is down to 7.8, and she is continuing to work on it. Lisset we are ready to proceed and she is in complete agreement.

## 2020-01-17 NOTE — TELEPHONE ENCOUNTER
I returned patients call and she states her HgbA1c drawn Wednesday 1/15/2020 was 7.8. She is ready to get on the surgery schedule. I asked her to have her PCP fax us a letter of clearance as labs are not in the system . She will call them now.

## 2020-01-17 NOTE — TELEPHONE ENCOUNTER
Patients wants a call back , alc level has dropped and she wants to know what Dr Sinha Spine thinks about her having surgery

## 2020-01-28 NOTE — PERIOP NOTES
CC MESSAGE SENT TO Paola Tran LPN (DR LAURA'S NURSE) AFTER VIEWING THE LAB RESULTS FROM DR JIN [facility] OFFICE AND THE LABS DRAWN IN PAT BACK IN 11/2019. PLATELETS WERE UNDETERMINED DUE TO AGGREGATION. PER DR LAURA, THERE IS NO NEED TO RE-ORDER A PLATELET CT ON THE DOS.

## 2020-02-03 ENCOUNTER — TELEPHONE (OUTPATIENT)
Dept: SURGERY | Age: 75
End: 2020-02-03

## 2020-02-03 ENCOUNTER — ANESTHESIA EVENT (OUTPATIENT)
Dept: SURGERY | Age: 75
DRG: 355 | End: 2020-02-03
Payer: MEDICARE

## 2020-02-03 NOTE — TELEPHONE ENCOUNTER
Patient called stating she has surgery tomorrow and can't remember if she's suppose to take her insulin tonight/morning.

## 2020-02-04 ENCOUNTER — HOSPITAL ENCOUNTER (INPATIENT)
Age: 75
LOS: 5 days | Discharge: HOME HEALTH | DRG: 355 | End: 2020-02-09
Attending: SURGERY | Admitting: SURGERY
Payer: MEDICARE

## 2020-02-04 ENCOUNTER — ANESTHESIA (OUTPATIENT)
Dept: SURGERY | Age: 75
DRG: 355 | End: 2020-02-04
Payer: MEDICARE

## 2020-02-04 DIAGNOSIS — K43.2 INCISIONAL HERNIA, WITHOUT OBSTRUCTION OR GANGRENE: Primary | ICD-10-CM

## 2020-02-04 LAB
GLUCOSE BLD STRIP.AUTO-MCNC: 114 MG/DL (ref 65–100)
GLUCOSE BLD STRIP.AUTO-MCNC: 130 MG/DL (ref 65–100)
GLUCOSE BLD STRIP.AUTO-MCNC: 190 MG/DL (ref 65–100)
SERVICE CMNT-IMP: ABNORMAL

## 2020-02-04 PROCEDURE — 74011000250 HC RX REV CODE- 250: Performed by: NURSE ANESTHETIST, CERTIFIED REGISTERED

## 2020-02-04 PROCEDURE — 74011250636 HC RX REV CODE- 250/636: Performed by: NURSE ANESTHETIST, CERTIFIED REGISTERED

## 2020-02-04 PROCEDURE — 77030018846 HC SOL IRR STRL H20 ICUM -A: Performed by: SURGERY

## 2020-02-04 PROCEDURE — 76060000037 HC ANESTHESIA 3 TO 3.5 HR: Performed by: SURGERY

## 2020-02-04 PROCEDURE — 74011250636 HC RX REV CODE- 250/636: Performed by: ANESTHESIOLOGY

## 2020-02-04 PROCEDURE — 74011250637 HC RX REV CODE- 250/637: Performed by: SURGERY

## 2020-02-04 PROCEDURE — 76210000017 HC OR PH I REC 1.5 TO 2 HR: Performed by: SURGERY

## 2020-02-04 PROCEDURE — 77030040922 HC BLNKT HYPOTHRM STRY -A

## 2020-02-04 PROCEDURE — 76010000133 HC OR TIME 3 TO 3.5 HR: Performed by: SURGERY

## 2020-02-04 PROCEDURE — 0WUF0JZ SUPPLEMENT ABDOMINAL WALL WITH SYNTHETIC SUBSTITUTE, OPEN APPROACH: ICD-10-PCS | Performed by: SURGERY

## 2020-02-04 PROCEDURE — 77030002966 HC SUT PDS J&J -A: Performed by: SURGERY

## 2020-02-04 PROCEDURE — 77030040361 HC SLV COMPR DVT MDII -B: Performed by: SURGERY

## 2020-02-04 PROCEDURE — 77030038552 HC DRN WND MDII -A: Performed by: SURGERY

## 2020-02-04 PROCEDURE — 82962 GLUCOSE BLOOD TEST: CPT

## 2020-02-04 PROCEDURE — 74011250636 HC RX REV CODE- 250/636: Performed by: SURGERY

## 2020-02-04 PROCEDURE — 77030041397 HC DRSG PRIMASEAL AG MDII -B: Performed by: SURGERY

## 2020-02-04 PROCEDURE — 77030002933 HC SUT MCRYL J&J -A: Performed by: SURGERY

## 2020-02-04 PROCEDURE — 77030002916 HC SUT ETHLN J&J -A: Performed by: SURGERY

## 2020-02-04 PROCEDURE — 77030008684 HC TU ET CUF COVD -B: Performed by: ANESTHESIOLOGY

## 2020-02-04 PROCEDURE — 74011636637 HC RX REV CODE- 636/637: Performed by: SURGERY

## 2020-02-04 PROCEDURE — 65270000029 HC RM PRIVATE

## 2020-02-04 PROCEDURE — 77030018548 HC SUT ETHBND2 J&J -B: Performed by: SURGERY

## 2020-02-04 PROCEDURE — 77030008462 HC STPLR SKN PROX J&J -A: Performed by: SURGERY

## 2020-02-04 PROCEDURE — 77030031139 HC SUT VCRL2 J&J -A: Performed by: SURGERY

## 2020-02-04 PROCEDURE — 77030018836 HC SOL IRR NACL ICUM -A: Performed by: SURGERY

## 2020-02-04 PROCEDURE — 77030036554: Performed by: SURGERY

## 2020-02-04 PROCEDURE — 77030011640 HC PAD GRND REM COVD -A: Performed by: SURGERY

## 2020-02-04 PROCEDURE — C1781 MESH (IMPLANTABLE): HCPCS | Performed by: SURGERY

## 2020-02-04 PROCEDURE — 0WQF0ZZ REPAIR ABDOMINAL WALL, OPEN APPROACH: ICD-10-PCS | Performed by: SURGERY

## 2020-02-04 PROCEDURE — P9045 ALBUMIN (HUMAN), 5%, 250 ML: HCPCS | Performed by: NURSE ANESTHETIST, CERTIFIED REGISTERED

## 2020-02-04 PROCEDURE — 77030026438 HC STYL ET INTUB CARD -A: Performed by: ANESTHESIOLOGY

## 2020-02-04 PROCEDURE — 88302 TISSUE EXAM BY PATHOLOGIST: CPT

## 2020-02-04 DEVICE — PHASIX ST MESH, 20 CM X 25 CM (8" X 10"), RECTANGLE
Type: IMPLANTABLE DEVICE | Site: ABDOMEN | Status: FUNCTIONAL
Brand: PHASIX

## 2020-02-04 RX ORDER — SODIUM CHLORIDE, SODIUM LACTATE, POTASSIUM CHLORIDE, CALCIUM CHLORIDE 600; 310; 30; 20 MG/100ML; MG/100ML; MG/100ML; MG/100ML
50 INJECTION, SOLUTION INTRAVENOUS CONTINUOUS
Status: DISCONTINUED | OUTPATIENT
Start: 2020-02-04 | End: 2020-02-04 | Stop reason: HOSPADM

## 2020-02-04 RX ORDER — HYDROCODONE BITARTRATE AND ACETAMINOPHEN 5; 325 MG/1; MG/1
1 TABLET ORAL
Status: DISCONTINUED | OUTPATIENT
Start: 2020-02-04 | End: 2020-02-04

## 2020-02-04 RX ORDER — ONDANSETRON 2 MG/ML
4 INJECTION INTRAMUSCULAR; INTRAVENOUS AS NEEDED
Status: DISCONTINUED | OUTPATIENT
Start: 2020-02-04 | End: 2020-02-04 | Stop reason: HOSPADM

## 2020-02-04 RX ORDER — HYDROCODONE BITARTRATE AND ACETAMINOPHEN 5; 325 MG/1; MG/1
1 TABLET ORAL
Status: DISCONTINUED | OUTPATIENT
Start: 2020-02-04 | End: 2020-02-05 | Stop reason: ALTCHOICE

## 2020-02-04 RX ORDER — LIDOCAINE HYDROCHLORIDE 20 MG/ML
INJECTION, SOLUTION EPIDURAL; INFILTRATION; INTRACAUDAL; PERINEURAL AS NEEDED
Status: DISCONTINUED | OUTPATIENT
Start: 2020-02-04 | End: 2020-02-04 | Stop reason: HOSPADM

## 2020-02-04 RX ORDER — DIPHENHYDRAMINE HYDROCHLORIDE 50 MG/ML
12.5 INJECTION, SOLUTION INTRAMUSCULAR; INTRAVENOUS
Status: ACTIVE | OUTPATIENT
Start: 2020-02-04 | End: 2020-02-05

## 2020-02-04 RX ORDER — ALBUMIN HUMAN 50 G/1000ML
SOLUTION INTRAVENOUS AS NEEDED
Status: DISCONTINUED | OUTPATIENT
Start: 2020-02-04 | End: 2020-02-04 | Stop reason: HOSPADM

## 2020-02-04 RX ORDER — MIDAZOLAM HYDROCHLORIDE 1 MG/ML
INJECTION, SOLUTION INTRAMUSCULAR; INTRAVENOUS AS NEEDED
Status: DISCONTINUED | OUTPATIENT
Start: 2020-02-04 | End: 2020-02-04 | Stop reason: HOSPADM

## 2020-02-04 RX ORDER — SODIUM CHLORIDE, SODIUM LACTATE, POTASSIUM CHLORIDE, CALCIUM CHLORIDE 600; 310; 30; 20 MG/100ML; MG/100ML; MG/100ML; MG/100ML
15 INJECTION, SOLUTION INTRAVENOUS CONTINUOUS
Status: DISCONTINUED | OUTPATIENT
Start: 2020-02-04 | End: 2020-02-09 | Stop reason: HOSPADM

## 2020-02-04 RX ORDER — SUCCINYLCHOLINE CHLORIDE 20 MG/ML
INJECTION INTRAMUSCULAR; INTRAVENOUS AS NEEDED
Status: DISCONTINUED | OUTPATIENT
Start: 2020-02-04 | End: 2020-02-04 | Stop reason: HOSPADM

## 2020-02-04 RX ORDER — CEFAZOLIN SODIUM/WATER 2 G/20 ML
2 SYRINGE (ML) INTRAVENOUS ONCE
Status: DISCONTINUED | OUTPATIENT
Start: 2020-02-04 | End: 2020-02-04 | Stop reason: HOSPADM

## 2020-02-04 RX ORDER — EPHEDRINE SULFATE/0.9% NACL/PF 50 MG/5 ML
SYRINGE (ML) INTRAVENOUS AS NEEDED
Status: DISCONTINUED | OUTPATIENT
Start: 2020-02-04 | End: 2020-02-04 | Stop reason: HOSPADM

## 2020-02-04 RX ORDER — SODIUM CHLORIDE 0.9 % (FLUSH) 0.9 %
5-40 SYRINGE (ML) INJECTION EVERY 8 HOURS
Status: DISCONTINUED | OUTPATIENT
Start: 2020-02-04 | End: 2020-02-04 | Stop reason: HOSPADM

## 2020-02-04 RX ORDER — DEXAMETHASONE SODIUM PHOSPHATE 4 MG/ML
INJECTION, SOLUTION INTRA-ARTICULAR; INTRALESIONAL; INTRAMUSCULAR; INTRAVENOUS; SOFT TISSUE AS NEEDED
Status: DISCONTINUED | OUTPATIENT
Start: 2020-02-04 | End: 2020-02-04 | Stop reason: HOSPADM

## 2020-02-04 RX ORDER — ACETAMINOPHEN 325 MG/1
650 TABLET ORAL ONCE
Status: DISCONTINUED | OUTPATIENT
Start: 2020-02-04 | End: 2020-02-04 | Stop reason: HOSPADM

## 2020-02-04 RX ORDER — DEXTROSE MONOHYDRATE 100 MG/ML
0-250 INJECTION, SOLUTION INTRAVENOUS AS NEEDED
Status: DISCONTINUED | OUTPATIENT
Start: 2020-02-04 | End: 2020-02-09 | Stop reason: HOSPADM

## 2020-02-04 RX ORDER — ONDANSETRON 2 MG/ML
4 INJECTION INTRAMUSCULAR; INTRAVENOUS
Status: DISCONTINUED | OUTPATIENT
Start: 2020-02-04 | End: 2020-02-09 | Stop reason: HOSPADM

## 2020-02-04 RX ORDER — DULOXETIN HYDROCHLORIDE 60 MG/1
120 CAPSULE, DELAYED RELEASE ORAL DAILY
Status: DISCONTINUED | OUTPATIENT
Start: 2020-02-05 | End: 2020-02-09 | Stop reason: HOSPADM

## 2020-02-04 RX ORDER — ACETAMINOPHEN 325 MG/1
650 TABLET ORAL
Status: DISCONTINUED | OUTPATIENT
Start: 2020-02-04 | End: 2020-02-05 | Stop reason: ALTCHOICE

## 2020-02-04 RX ORDER — FENTANYL CITRATE 50 UG/ML
50 INJECTION, SOLUTION INTRAMUSCULAR; INTRAVENOUS AS NEEDED
Status: DISCONTINUED | OUTPATIENT
Start: 2020-02-04 | End: 2020-02-04 | Stop reason: HOSPADM

## 2020-02-04 RX ORDER — SODIUM CHLORIDE, SODIUM LACTATE, POTASSIUM CHLORIDE, CALCIUM CHLORIDE 600; 310; 30; 20 MG/100ML; MG/100ML; MG/100ML; MG/100ML
INJECTION, SOLUTION INTRAVENOUS
Status: DISCONTINUED | OUTPATIENT
Start: 2020-02-04 | End: 2020-02-04 | Stop reason: HOSPADM

## 2020-02-04 RX ORDER — MIDAZOLAM HYDROCHLORIDE 1 MG/ML
1 INJECTION, SOLUTION INTRAMUSCULAR; INTRAVENOUS AS NEEDED
Status: DISCONTINUED | OUTPATIENT
Start: 2020-02-04 | End: 2020-02-04 | Stop reason: HOSPADM

## 2020-02-04 RX ORDER — SODIUM CHLORIDE 0.9 % (FLUSH) 0.9 %
5-40 SYRINGE (ML) INJECTION AS NEEDED
Status: DISCONTINUED | OUTPATIENT
Start: 2020-02-04 | End: 2020-02-04 | Stop reason: HOSPADM

## 2020-02-04 RX ORDER — FENTANYL CITRATE 50 UG/ML
INJECTION, SOLUTION INTRAMUSCULAR; INTRAVENOUS AS NEEDED
Status: DISCONTINUED | OUTPATIENT
Start: 2020-02-04 | End: 2020-02-04 | Stop reason: HOSPADM

## 2020-02-04 RX ORDER — SODIUM CHLORIDE 0.9 % (FLUSH) 0.9 %
5-40 SYRINGE (ML) INJECTION AS NEEDED
Status: DISCONTINUED | OUTPATIENT
Start: 2020-02-04 | End: 2020-02-09 | Stop reason: HOSPADM

## 2020-02-04 RX ORDER — METOPROLOL TARTRATE 50 MG/1
50 TABLET ORAL 2 TIMES DAILY
Status: DISCONTINUED | OUTPATIENT
Start: 2020-02-04 | End: 2020-02-09 | Stop reason: HOSPADM

## 2020-02-04 RX ORDER — HYDROMORPHONE HYDROCHLORIDE 2 MG/ML
INJECTION, SOLUTION INTRAMUSCULAR; INTRAVENOUS; SUBCUTANEOUS AS NEEDED
Status: DISCONTINUED | OUTPATIENT
Start: 2020-02-04 | End: 2020-02-04 | Stop reason: HOSPADM

## 2020-02-04 RX ORDER — INSULIN LISPRO 100 [IU]/ML
INJECTION, SOLUTION INTRAVENOUS; SUBCUTANEOUS
Status: DISCONTINUED | OUTPATIENT
Start: 2020-02-05 | End: 2020-02-09 | Stop reason: HOSPADM

## 2020-02-04 RX ORDER — FENTANYL CITRATE 50 UG/ML
25 INJECTION, SOLUTION INTRAMUSCULAR; INTRAVENOUS
Status: DISCONTINUED | OUTPATIENT
Start: 2020-02-04 | End: 2020-02-04 | Stop reason: HOSPADM

## 2020-02-04 RX ORDER — MAGNESIUM SULFATE 100 %
4 CRYSTALS MISCELLANEOUS AS NEEDED
Status: DISCONTINUED | OUTPATIENT
Start: 2020-02-04 | End: 2020-02-09 | Stop reason: HOSPADM

## 2020-02-04 RX ORDER — CEFAZOLIN SODIUM 1 G/3ML
INJECTION, POWDER, FOR SOLUTION INTRAMUSCULAR; INTRAVENOUS AS NEEDED
Status: DISCONTINUED | OUTPATIENT
Start: 2020-02-04 | End: 2020-02-04 | Stop reason: HOSPADM

## 2020-02-04 RX ORDER — PANTOPRAZOLE SODIUM 40 MG/1
40 TABLET, DELAYED RELEASE ORAL DAILY
Status: DISCONTINUED | OUTPATIENT
Start: 2020-02-05 | End: 2020-02-09 | Stop reason: HOSPADM

## 2020-02-04 RX ORDER — GLYCOPYRROLATE 0.2 MG/ML
INJECTION INTRAMUSCULAR; INTRAVENOUS AS NEEDED
Status: DISCONTINUED | OUTPATIENT
Start: 2020-02-04 | End: 2020-02-04 | Stop reason: HOSPADM

## 2020-02-04 RX ORDER — PROPOFOL 10 MG/ML
INJECTION, EMULSION INTRAVENOUS AS NEEDED
Status: DISCONTINUED | OUTPATIENT
Start: 2020-02-04 | End: 2020-02-04 | Stop reason: HOSPADM

## 2020-02-04 RX ORDER — HYDROMORPHONE HYDROCHLORIDE 1 MG/ML
1 INJECTION, SOLUTION INTRAMUSCULAR; INTRAVENOUS; SUBCUTANEOUS
Status: DISCONTINUED | OUTPATIENT
Start: 2020-02-04 | End: 2020-02-07

## 2020-02-04 RX ORDER — ROCURONIUM BROMIDE 10 MG/ML
INJECTION, SOLUTION INTRAVENOUS AS NEEDED
Status: DISCONTINUED | OUTPATIENT
Start: 2020-02-04 | End: 2020-02-04 | Stop reason: HOSPADM

## 2020-02-04 RX ORDER — HYDROMORPHONE HYDROCHLORIDE 1 MG/ML
0.2 INJECTION, SOLUTION INTRAMUSCULAR; INTRAVENOUS; SUBCUTANEOUS
Status: DISCONTINUED | OUTPATIENT
Start: 2020-02-04 | End: 2020-02-04 | Stop reason: HOSPADM

## 2020-02-04 RX ORDER — HYDROCODONE BITARTRATE AND ACETAMINOPHEN 10; 325 MG/1; MG/1
1 TABLET ORAL
Status: DISCONTINUED | OUTPATIENT
Start: 2020-02-04 | End: 2020-02-05 | Stop reason: ALTCHOICE

## 2020-02-04 RX ORDER — ONDANSETRON 2 MG/ML
INJECTION INTRAMUSCULAR; INTRAVENOUS AS NEEDED
Status: DISCONTINUED | OUTPATIENT
Start: 2020-02-04 | End: 2020-02-04 | Stop reason: HOSPADM

## 2020-02-04 RX ORDER — SODIUM CHLORIDE 0.9 % (FLUSH) 0.9 %
5-40 SYRINGE (ML) INJECTION EVERY 8 HOURS
Status: DISCONTINUED | OUTPATIENT
Start: 2020-02-04 | End: 2020-02-09 | Stop reason: HOSPADM

## 2020-02-04 RX ORDER — ONDANSETRON 2 MG/ML
4 INJECTION INTRAMUSCULAR; INTRAVENOUS ONCE
Status: COMPLETED | OUTPATIENT
Start: 2020-02-04 | End: 2020-02-04

## 2020-02-04 RX ORDER — LIDOCAINE HYDROCHLORIDE 10 MG/ML
0.1 INJECTION, SOLUTION EPIDURAL; INFILTRATION; INTRACAUDAL; PERINEURAL AS NEEDED
Status: DISCONTINUED | OUTPATIENT
Start: 2020-02-04 | End: 2020-02-04 | Stop reason: HOSPADM

## 2020-02-04 RX ORDER — PHENYLEPHRINE HCL IN 0.9% NACL 0.4MG/10ML
SYRINGE (ML) INTRAVENOUS AS NEEDED
Status: DISCONTINUED | OUTPATIENT
Start: 2020-02-04 | End: 2020-02-04 | Stop reason: HOSPADM

## 2020-02-04 RX ADMIN — SODIUM CHLORIDE, POTASSIUM CHLORIDE, SODIUM LACTATE AND CALCIUM CHLORIDE: 600; 310; 30; 20 INJECTION, SOLUTION INTRAVENOUS at 14:37

## 2020-02-04 RX ADMIN — HYDROMORPHONE HYDROCHLORIDE 0.4 MG: 2 INJECTION, SOLUTION INTRAMUSCULAR; INTRAVENOUS; SUBCUTANEOUS at 15:12

## 2020-02-04 RX ADMIN — GLYCOPYRROLATE 0.2 MG: 0.2 INJECTION, SOLUTION INTRAMUSCULAR; INTRAVENOUS at 15:05

## 2020-02-04 RX ADMIN — ROCURONIUM BROMIDE 10 MG: 10 SOLUTION INTRAVENOUS at 14:23

## 2020-02-04 RX ADMIN — Medication 10 ML: at 19:36

## 2020-02-04 RX ADMIN — HUMAN INSULIN 74 UNITS: 100 INJECTION, SUSPENSION SUBCUTANEOUS at 21:49

## 2020-02-04 RX ADMIN — ALBUMIN (HUMAN) 250 ML: 12.5 INJECTION, SOLUTION INTRAVENOUS at 13:18

## 2020-02-04 RX ADMIN — PHENYLEPHRINE HYDROCHLORIDE 120 MCG: 10 INJECTION INTRAVENOUS at 13:04

## 2020-02-04 RX ADMIN — SODIUM CHLORIDE, SODIUM LACTATE, POTASSIUM CHLORIDE, AND CALCIUM CHLORIDE 50 ML/HR: 600; 310; 30; 20 INJECTION, SOLUTION INTRAVENOUS at 12:14

## 2020-02-04 RX ADMIN — CEFAZOLIN 2 G: 330 INJECTION, POWDER, FOR SOLUTION INTRAMUSCULAR; INTRAVENOUS at 12:59

## 2020-02-04 RX ADMIN — Medication 10 ML: at 21:49

## 2020-02-04 RX ADMIN — FENTANYL CITRATE 50 MCG: 50 INJECTION, SOLUTION INTRAMUSCULAR; INTRAVENOUS at 13:45

## 2020-02-04 RX ADMIN — Medication 10 MG: at 13:58

## 2020-02-04 RX ADMIN — ROCURONIUM BROMIDE 30 MG: 10 SOLUTION INTRAVENOUS at 13:00

## 2020-02-04 RX ADMIN — ONDANSETRON 4 MG: 2 INJECTION INTRAMUSCULAR; INTRAVENOUS at 12:24

## 2020-02-04 RX ADMIN — Medication 120 MCG: at 13:07

## 2020-02-04 RX ADMIN — METOPROLOL TARTRATE 50 MG: 50 TABLET, FILM COATED ORAL at 21:46

## 2020-02-04 RX ADMIN — SODIUM CHLORIDE, POTASSIUM CHLORIDE, SODIUM LACTATE AND CALCIUM CHLORIDE: 600; 310; 30; 20 INJECTION, SOLUTION INTRAVENOUS at 12:41

## 2020-02-04 RX ADMIN — SODIUM CHLORIDE, SODIUM LACTATE, POTASSIUM CHLORIDE, AND CALCIUM CHLORIDE 75 ML/HR: 600; 310; 30; 20 INJECTION, SOLUTION INTRAVENOUS at 19:36

## 2020-02-04 RX ADMIN — ROCURONIUM BROMIDE 10 MG: 10 SOLUTION INTRAVENOUS at 13:40

## 2020-02-04 RX ADMIN — Medication 80 MCG: at 13:05

## 2020-02-04 RX ADMIN — MIDAZOLAM 2 MG: 1 INJECTION INTRAMUSCULAR; INTRAVENOUS at 12:42

## 2020-02-04 RX ADMIN — Medication 10 MG: at 13:18

## 2020-02-04 RX ADMIN — HYDROMORPHONE HYDROCHLORIDE 0.2 MG: 2 INJECTION, SOLUTION INTRAMUSCULAR; INTRAVENOUS; SUBCUTANEOUS at 14:54

## 2020-02-04 RX ADMIN — FENTANYL CITRATE 50 MCG: 50 INJECTION, SOLUTION INTRAMUSCULAR; INTRAVENOUS at 12:48

## 2020-02-04 RX ADMIN — HYDROMORPHONE HYDROCHLORIDE 1 MG: 1 INJECTION, SOLUTION INTRAMUSCULAR; INTRAVENOUS; SUBCUTANEOUS at 21:49

## 2020-02-04 RX ADMIN — Medication 5 MG: at 13:11

## 2020-02-04 RX ADMIN — GLYCOPYRROLATE 0.2 MG: 0.2 INJECTION, SOLUTION INTRAMUSCULAR; INTRAVENOUS at 13:26

## 2020-02-04 RX ADMIN — PROPOFOL 150 MG: 10 INJECTION, EMULSION INTRAVENOUS at 12:49

## 2020-02-04 RX ADMIN — SUCCINYLCHOLINE CHLORIDE 180 MG: 20 INJECTION, SOLUTION INTRAMUSCULAR; INTRAVENOUS at 12:49

## 2020-02-04 RX ADMIN — DEXAMETHASONE SODIUM PHOSPHATE 8 MG: 4 INJECTION, SOLUTION INTRAMUSCULAR; INTRAVENOUS at 13:37

## 2020-02-04 RX ADMIN — ONDANSETRON HYDROCHLORIDE 4 MG: 2 INJECTION, SOLUTION INTRAMUSCULAR; INTRAVENOUS at 13:37

## 2020-02-04 RX ADMIN — Medication 5 MG: at 13:16

## 2020-02-04 RX ADMIN — HYDROMORPHONE HYDROCHLORIDE 0.2 MG: 2 INJECTION, SOLUTION INTRAMUSCULAR; INTRAVENOUS; SUBCUTANEOUS at 15:06

## 2020-02-04 RX ADMIN — HYDROMORPHONE HYDROCHLORIDE 0.2 MG: 2 INJECTION, SOLUTION INTRAMUSCULAR; INTRAVENOUS; SUBCUTANEOUS at 14:47

## 2020-02-04 RX ADMIN — SUGAMMADEX 200 MG: 100 INJECTION, SOLUTION INTRAVENOUS at 15:03

## 2020-02-04 RX ADMIN — Medication 80 MCG: at 12:58

## 2020-02-04 RX ADMIN — LIDOCAINE HYDROCHLORIDE 100 MG: 20 INJECTION, SOLUTION EPIDURAL; INFILTRATION; INTRACAUDAL; PERINEURAL at 12:49

## 2020-02-04 NOTE — ANESTHESIA PREPROCEDURE EVALUATION
Anesthetic History     Increased risk of difficult airway          Review of Systems / Medical History  Patient summary reviewed, nursing notes reviewed and pertinent labs reviewed    Pulmonary        Sleep apnea: No treatment           Neuro/Psych         Psychiatric history     Cardiovascular    Hypertension: well controlled              Exercise tolerance: >4 METS     GI/Hepatic/Renal     GERD: well controlled      Liver disease    Comments: Diverticulitis Endo/Other    Diabetes: well controlled, type 2, using insulin    Morbid obesity, arthritis and cancer (h/o pancreatic cancer)     Other Findings   Comments: Hemochromatosis controlled with phlebotomy  Fibromyalgia           Physical Exam    Airway  Mallampati: III  TM Distance: 4 - 6 cm  Neck ROM: normal range of motion   Mouth opening: Diminished (comment)     Cardiovascular    Rhythm: regular  Rate: normal         Dental    Dentition: Lower partial plate and Bridges  Comments: Lower partial plate slightly loose   Pulmonary  Breath sounds clear to auscultation               Abdominal         Other Findings            Anesthetic Plan    ASA: 3  Anesthesia type: general          Induction: Intravenous  Anesthetic plan and risks discussed with: Patient

## 2020-02-04 NOTE — ANESTHESIA POSTPROCEDURE EVALUATION
Post-Anesthesia Evaluation and Assessment    Patient: Juwan Fontenot MRN: [id number]  SSN: [id number]    YOB: 1945  Age: 69 y.o. Sex: female      I have evaluated the patient and they are stable and ready for discharge from the PACU. Cardiovascular Function/Vital Signs  Visit Vitals  BP (!) 127/94   Pulse 72   Temp 37.2 °C (98.9 °F)   Resp 13   Ht 5' 1\" (1.549 m)   Wt 98.9 kg (218 lb 1.6 oz)   SpO2 94%   BMI 41.21 kg/m²       Patient is status post General anesthesia for Procedure(s):  REPAIR OF VENTRAL INCISIONAL HERNIA WITH MESH. Nausea/Vomiting: None    Postoperative hydration reviewed and adequate. Pain:  Pain Scale 1: FLACC (02/04/20 1556)  Pain Intensity 1: 0 (02/04/20 1556)   Managed    Neurological Status:   Neuro (WDL): Within Defined Limits (02/04/20 1556)   At baseline    Mental Status, Level of Consciousness: Alert and  oriented to person, place, and time    Pulmonary Status:   O2 Device: Nasal cannula (02/04/20 1556)   Adequate oxygenation and airway patent    Complications related to anesthesia: None    Post-anesthesia assessment completed. No concerns    Signed By: Jose Harding MD     February 4, 2020              Procedure(s):  REPAIR OF VENTRAL INCISIONAL HERNIA WITH MESH. general    <BSHSIANPOST>    Vitals Value Taken Time   /66 2/4/2020  4:45 PM   Temp 37.2 °C (98.9 °F) 2/4/2020  3:56 PM   Pulse 70 2/4/2020  4:51 PM   Resp 13 2/4/2020  4:51 PM   SpO2 90 % 2/4/2020  4:51 PM   Vitals shown include unvalidated device data.

## 2020-02-04 NOTE — H&P
Subjective:      Ibeth Herring  is a 69 y.o.  female who presents for evaluation of ventral hernia. Pt notes she has developed a hernia at her previous incision site. She states she was told she also has  abdominal muscles which she was told \"need to be stitched together. \"     Pt notes her hernia is very tender. She reports she has to [facility] the area in\" when coughing, sneezing, and with BMs. Pt notes she had diverticulitis and developed a fistula in 2017. Pt had surgery with Dr. Claude Decant. She reports she is also a month out from abdominal abscess that was treated with antibiotics. Pt previously seen for Whipple procedure on 12/31/13 for tubulovillous adenoma. Pt notes she never had any pain in her incision after surgery.    Her A1c was very high and she has spent the last several months getting her glucose under better control          Past Medical History:   Diagnosis Date    Arthritis       BACK    Depression      Diabetes ([address]       TYPE II    Difficult intubation       easy mask ventilation but required videolaryngoscope to intubate    Diverticulitis      Fatty liver       hemachromatosis    Fibromyalgia      Hemochromatosis      Hypertension      Incisional hernia, without obstruction or gangrene 11/6/2019    Irregular heart beat      Irregular heart beat      Pancreatic cancer ([address] 2013    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    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    Unspecified sleep apnea       NO C-PAP               Past Surgical History:   Procedure Laterality Date    COLONOSCOPY N/A 1/15/2019     COL[facility]ardo [facility]well [facility] at [facility] END[facility]    HX APPENDECTOMY        HX BREAST LUMPECTOMY Left       lumpectomy BENIGN    HX COLECTOMY         sigmoid    HX COLONOSCOPY        HX HYSTERECTOMY         hysto    HX ORTHOPAEDIC Left       shoulder CYST REMOVAL    HX OTHER SURGICAL   13     pyloric-sparing whipple,bx of portal and hepatic nodes by Dr Nina Padron HX SALPINGO-OOPHORECTOMY Bilateral      HX TONSILLECTOMY        HX UROLOGICAL   2008     BLADDER TUCK         Social History            Tobacco Use    Smoking status: Former Smoker       Packs/day: 1.00       Years: 17.00       Pack years: 17.00       Last attempt to quit: 1983       Years since quittin.8    Smokeless tobacco: Never Used   Substance Use Topics    Alcohol use: No               Family History   Problem Relation Age of Onset    Heart Failure Mother      Diabetes Mother      Hypertension Mother      Heart Failure Father      Kidney Disease Father           WAS ON DIALYSIS    Diabetes Sister      Depression Sister      Depression Sister      Other Sister           DIFFICULTY INTUBATING    Asthma Sister           Current Outpatient Medications on File Prior to Visit   Medication Sig Dispense Refill    triamterene-hydroCHLOROthiazide (MAXZIDE) 37.5-25 mg per tablet Take 1 Tab by mouth daily.  insulin lispro (HUMALOG) 100 unit/mL injection 20 Units by SubCUTAneous route Daily (before breakfast).  insulin lispro (HUMALOG) 100 unit/mL injection 20 Units by SubCUTAneous route Daily (before lunch).  pantoprazole (PROTONIX) 40 mg tablet Take 40 mg by mouth daily.  insulin NPH (NOVOLIN N) 100 unit/mL injection 74 Units by SubCUTAneous route nightly.  metoprolol (LOPRESSOR) 50 mg tablet Take 50 mg by mouth two (2) times a day.  baclofen (LIORESAL) 10 mg tablet Take 10 mg by mouth daily as needed.  insulin lispro (HUMALOG) 100 unit/mL injection 24 Units by SubCUTAneous route Daily (before dinner). Before dinner         ondansetron hcl (ZOFRAN) 4 mg tablet Take 4 mg by mouth every four (4) hours as needed for Nausea.         triamterene-hydrochlorothiazide (MAXZIDE-25MG) 37.5-25 mg per tablet Take 1 Tab by mouth daily. Indications: HYPERTENSION        DULoxetine (CYMBALTA) 60 mg capsule Take 120 mg by mouth daily. No current facility-administered medications on file prior to visit. Allergies   Allergen Reactions    Tylenol [Acetaminophen] Other (comments)       Liver disorder. HAS HEMATOMACROSIS    Claritin [Loratadine] Other (comments)       Panic attack    Codeine Other (comments)       hallucinations    Percocet [Oxycodone-Acetaminophen] Other (comments)       hallucinations            Review of Systems:    Pertinent items are noted in the History of Present Illness. Objective:      Visit Vitals  /78 (BP 1 Location: Left arm, BP Patient Position: Sitting)   Pulse 89   Temp 98.8 °F (37.1 °C) (Oral)   Resp 18   Ht 5' 1\" (1.549 m)   Wt 213 lb (96.6 kg)   SpO2 98%   BMI 40.25 kg/m²         Physical Exam:  ABDOMEN: Pt has a hernia that extends from the umbilicus to the pubis originating from her diverticulosis incision. Labs:    Recent Results   No results found for this or any previous visit (from the past 24 hour(s)). Assessment and Plan:          ICD-10-CM ICD-9-CM     1. Incisional hernia, without obstruction or gangrene K43.2 553.21           Recommend hernia repair, sooner rather than later. Will discuss case with Dr. Rose Phelan to determine best way to repair this hernia. I described the details of this surgery and discussed what the patient should expect for recovery. Pt should avoid any heavy lifting for 10-14 days post-operation. All questions were answered. They agree with this plan and will schedule this at their convenience.                                                      ·      ·

## 2020-02-04 NOTE — PERIOP NOTES
Patient: Chyna Clemons MRN: [id number]  SSN: [id number]   YOB: 1945  Age: 69 y.o. Sex: female     Patient is status post Procedure(s):  REPAIR OF VENTRAL INCISIONAL HERNIA WITH MESH. Surgeon(s) and Role:     Robbie Joe MD - Primary    Local/Dose/Irrigation: No local given. Peripheral IV 02/04/20 Right Hand (Active)          Scherry Night #1 02/04/20 Right;Upper Abdomen (Active)   Dressing Status Clean, dry, & intact 2/4/2020  3:12 PM      Airway - Endotracheal Tube 02/04/20 Oral (Active)               Dressing/Packing:  Wound Abdomen-Dressing Type: ABD binder; Other (Comment)(Aquacel) (02/04/20 1500)

## 2020-02-04 NOTE — PERIOP NOTES
TRANSFER - OUT REPORT:    Verbal report given to [facility]) on [place]  being transferred to 203(unit) for routine post - op       Report consisted of patients Situation, Background, Assessment and   Recommendations(SBAR). Time Pre op antibiotic given:1259  Anesthesia Stop time: 7803  Law Present on Transfer to floor:yes  Order for Law on Chart:yes  Discharge Prescriptions with Chart:no    Information from the following report(s) SBAR, Kardex, OR Summary, Procedure Summary, Intake/Output, MAR, Recent Results and Med Rec Status was reviewed with the receiving nurse. Opportunity for questions and clarification was provided. Is the patient on 02? YES       L/Min 3       Other     Is the patient on a monitor? NO    Is the nurse transporting with the patient? NO    Surgical Waiting Area notified of patient's transfer from PACU? YES      The following personal items collected during your admission accompanied patient upon transfer:   Dental Appliance: Dental Appliances: Other (comment)(lower permanent bridge)  Vision:    Hearing Aid:    Jewelry:    Clothing: Clothing: Other (comment)(clothing)  Other Valuables:    Valuables sent to safe:        Clothes sent to floor.

## 2020-02-04 NOTE — BRIEF OP NOTE
BRIEF OPERATIVE NOTE    Date of Procedure: 2/4/2020   Preoperative Diagnosis: VENTRAL INCISIONAL HERNIA  Postoperative Diagnosis: VENTRAL INCISIONAL HERNIA    Procedure(s):  REPAIR OF VENTRAL INCISIONAL HERNIA WITH MESH  Surgeon(s) and Role:     Cecilio Barton MD - Primary         Surgical Assistant: Adrianna Thurston    Surgical Staff:  Circ-1: Kaelyn Jean-Baptiste RN  Circ-Relief: Naomi Craven RN; Crystal Brooks RN  Scrub Tech-1: Jerri Mohs  Scrub Tech-Relief: Sue Valencia  Scrub RN-Relief: Isabel Safer T  Surg Asst-1: Alphonsa [place]  Surg Asst-Relief: Garnetta Forward H  Event Time In Time Out   Incision Start 1307    Incision Close 1524      Anesthesia: General   Estimated Blood Loss: minimal  Specimens:   ID Type Source Tests Collected by Time Destination   1 : [address] Fresh Hernia Sac  Godfrey Valenzuela MD 2/4/2020 1407 Pathology      Findings: large defect   Complications: none  Implants:   Implant Name Type Inv.  Item Serial No.  Lot No. LRB No. Used Action   MESH TRUE RECT [id number] RESRB -- [address] - SN/A Mesh MESH TRUE RECT [id number] RESRB -- [address] N/A BARD DAVOL [id number] N/A 1 Implanted

## 2020-02-05 LAB
GLUCOSE BLD STRIP.AUTO-MCNC: 195 MG/DL (ref 65–100)
GLUCOSE BLD STRIP.AUTO-MCNC: 196 MG/DL (ref 65–100)
GLUCOSE BLD STRIP.AUTO-MCNC: 206 MG/DL (ref 65–100)
GLUCOSE BLD STRIP.AUTO-MCNC: 210 MG/DL (ref 65–100)
SERVICE CMNT-IMP: ABNORMAL

## 2020-02-05 PROCEDURE — 74011250636 HC RX REV CODE- 250/636: Performed by: SURGERY

## 2020-02-05 PROCEDURE — 74011250637 HC RX REV CODE- 250/637: Performed by: SURGERY

## 2020-02-05 PROCEDURE — 65270000029 HC RM PRIVATE

## 2020-02-05 PROCEDURE — 82962 GLUCOSE BLOOD TEST: CPT

## 2020-02-05 PROCEDURE — 74011636637 HC RX REV CODE- 636/637: Performed by: SURGERY

## 2020-02-05 PROCEDURE — 74011250637 HC RX REV CODE- 250/637: Performed by: NURSE PRACTITIONER

## 2020-02-05 PROCEDURE — 97116 GAIT TRAINING THERAPY: CPT

## 2020-02-05 PROCEDURE — 77010033678 HC OXYGEN DAILY

## 2020-02-05 PROCEDURE — 97161 PT EVAL LOW COMPLEX 20 MIN: CPT

## 2020-02-05 RX ORDER — OXYCODONE HYDROCHLORIDE 5 MG/1
10 TABLET ORAL
Status: DISCONTINUED | OUTPATIENT
Start: 2020-02-05 | End: 2020-02-09 | Stop reason: HOSPADM

## 2020-02-05 RX ORDER — OXYCODONE HYDROCHLORIDE 5 MG/1
2.5-5 TABLET ORAL
Status: DISCONTINUED | OUTPATIENT
Start: 2020-02-05 | End: 2020-02-09 | Stop reason: HOSPADM

## 2020-02-05 RX ADMIN — METOPROLOL TARTRATE 50 MG: 50 TABLET, FILM COATED ORAL at 08:54

## 2020-02-05 RX ADMIN — HYDROMORPHONE HYDROCHLORIDE 1 MG: 1 INJECTION, SOLUTION INTRAMUSCULAR; INTRAVENOUS; SUBCUTANEOUS at 12:19

## 2020-02-05 RX ADMIN — Medication 10 ML: at 06:00

## 2020-02-05 RX ADMIN — HYDROMORPHONE HYDROCHLORIDE 1 MG: 1 INJECTION, SOLUTION INTRAMUSCULAR; INTRAVENOUS; SUBCUTANEOUS at 02:26

## 2020-02-05 RX ADMIN — SODIUM CHLORIDE, SODIUM LACTATE, POTASSIUM CHLORIDE, AND CALCIUM CHLORIDE 75 ML/HR: 600; 310; 30; 20 INJECTION, SOLUTION INTRAVENOUS at 22:46

## 2020-02-05 RX ADMIN — Medication 10 ML: at 21:49

## 2020-02-05 RX ADMIN — INSULIN LISPRO 3 UNITS: 100 INJECTION, SOLUTION INTRAVENOUS; SUBCUTANEOUS at 16:57

## 2020-02-05 RX ADMIN — OXYCODONE 2.5 MG: 5 TABLET ORAL at 18:40

## 2020-02-05 RX ADMIN — HUMAN INSULIN 74 UNITS: 100 INJECTION, SUSPENSION SUBCUTANEOUS at 22:41

## 2020-02-05 RX ADMIN — OXYCODONE 10 MG: 5 TABLET ORAL at 22:41

## 2020-02-05 RX ADMIN — INSULIN LISPRO 3 UNITS: 100 INJECTION, SOLUTION INTRAVENOUS; SUBCUTANEOUS at 07:30

## 2020-02-05 RX ADMIN — Medication 10 ML: at 12:20

## 2020-02-05 RX ADMIN — PANTOPRAZOLE SODIUM 40 MG: 40 TABLET, DELAYED RELEASE ORAL at 08:54

## 2020-02-05 RX ADMIN — HYDROMORPHONE HYDROCHLORIDE 1 MG: 1 INJECTION, SOLUTION INTRAMUSCULAR; INTRAVENOUS; SUBCUTANEOUS at 08:54

## 2020-02-05 RX ADMIN — METOPROLOL TARTRATE 50 MG: 50 TABLET, FILM COATED ORAL at 21:47

## 2020-02-05 RX ADMIN — INSULIN LISPRO 4 UNITS: 100 INJECTION, SOLUTION INTRAVENOUS; SUBCUTANEOUS at 12:20

## 2020-02-05 RX ADMIN — DULOXETINE HYDROCHLORIDE 120 MG: 60 CAPSULE, DELAYED RELEASE ORAL at 08:54

## 2020-02-05 NOTE — PROGRESS NOTES
General Surgery Daily Progress Note    Admit Date: 2020  Post-Operative Day: 1 Day Post-Op from Procedure(s):  REPAIR OF VENTRAL INCISIONAL HERNIA WITH MESH     Subjective:     Last 24 hrs: Pt is having some abd pain but manageable; tolerating clear liquids. Eager to get OOB       Objective:     Blood pressure 146/82, pulse 67, temperature 98.5 °F (36.9 °C), resp. rate 16, height 5' 1\" (1.549 m), weight 233 lb 11 oz (106 kg), SpO2 96 %. Temp (24hrs), Av.1 °F (36.7 °C), Min:97.5 °F (36.4 °C), Max:98.9 °F (37.2 °C)      _____________________  Physical Exam:     Alert and Oriented, x3, in no acute distress. Cardiovascular: RRR, no peripheral edema  Lungs: diminished in bases, on O2  Abdomen: soft, diminished BS, drsg intact; binder present    Assessment:   Principal Problem:    Incisional hernia, without obstruction or gangrene (2019)    Active Problems:    Ventral incisional hernia (2020)            Plan:     PT/OOB  IS  Cont diet - diabetic  ppi  dvt proph - SCDs  Cbc in am    Data Review:    No results for input(s): WBC, HGB, HCT, PLT, HGBEXT, HCTEXT, PLTEXT in the last 72 hours. No results for input(s): NA, K, CL, CO2, GLU, BUN, CREA, CA, MG, PHOS, ALB, TBIL, SGOT, ALT, INR, INREXT in the last 72 hours. No results for input(s): AML, LPSE in the last 72 hours.         ______________________  Medications:    Current Facility-Administered Medications   Medication Dose Route Frequency    DULoxetine (CYMBALTA) capsule 120 mg  120 mg Oral DAILY    insulin NPH (NOVOLIN N, HUMULIN N) injection 74 Units  74 Units SubCUTAneous QHS    metoprolol tartrate (LOPRESSOR) tablet 50 mg  50 mg Oral BID    pantoprazole (PROTONIX) tablet 40 mg  40 mg Oral DAILY    sodium chloride (NS) flush 5-40 mL  5-40 mL IntraVENous Q8H    sodium chloride (NS) flush 5-40 mL  5-40 mL IntraVENous PRN    lactated Ringers infusion  75 mL/hr IntraVENous CONTINUOUS    acetaminophen (TYLENOL) tablet 650 mg  650 mg Oral Q6H PRN  HYDROcodone-acetaminophen (NORCO)  mg tablet 1 Tab  1 Tab Oral Q4H PRN    HYDROmorphone (PF) (DILAUDID) injection 1 mg  1 mg IntraVENous Q3H PRN    ondansetron (ZOFRAN) injection 4 mg  4 mg IntraVENous Q4H PRN    diphenhydrAMINE (BENADRYL) injection 12.5 mg  12.5 mg IntraVENous ONCE PRN    glucose chewable tablet 16 g  4 Tab Oral PRN    glucagon (GLUCAGEN) injection 1 mg  1 mg IntraMUSCular PRN    dextrose 10% infusion 0-250 mL  0-250 mL IntraVENous PRN    insulin lispro (HUMALOG) injection   SubCUTAneous TIDAC    HYDROcodone-acetaminophen (NORCO) 5-325 mg per tablet 1 Tab  1 Tab Oral Q4H PRN       Jax Walters NP  2/5/2020

## 2020-02-05 NOTE — PROGRESS NOTES
Problem: Mobility Impaired (Adult and Pediatric)  Goal: *Acute Goals and Plan of Care (Insert Text)  Description  FUNCTIONAL STATUS PRIOR TO ADMISSION: Patient was independent and active without use of DME.    HOME SUPPORT PRIOR TO ADMISSION: The patient lived alone with friends to provide assistance. Pt's son will be arriving to stay with patient from late Sun 2/9 to Fri 2/14. Physical Therapy Goals  Initiated 2/5/2020  1. Patient will move from supine to sit and sit to supine  and roll side to side in bed with modified independence within 7 day(s). 2.  Patient will transfer from bed to chair and chair to bed with modified independence using the least restrictive device within 7 day(s). 3.  Patient will perform sit to stand with modified independence within 7 day(s). 4.  Patient will ambulate with modified independence for 300 feet with the least restrictive device within 7 day(s). Outcome: Progressing Towards Goal   PHYSICAL THERAPY EVALUATION  Patient: Sergey Smalls (46 y.o. female)  Date: 2/5/2020  Primary Diagnosis: Ventral incisional hernia [K43.2]  Procedure(s) (LRB):  REPAIR OF VENTRAL INCISIONAL HERNIA WITH MESH (N/A) 1 Day Post-Op   Precautions:   Contact      ASSESSMENT  Based on the objective data described below, the patient presents with decreased activity tolerance and gait deviations 2/2 post op abdominal pain. Pt motivated for mobility and reported discomfort after transferring to chair, but agreeable to attempt ambulation. Pt required moderate cues for hand position for transfers with fair carryover. Pt utilized standard walker and ambulated within room requiring increased time. Pt fairly steady with no significant LOB or buckling of knees noted. Discussed using RW next session. Anticipate pt to progress well with mobility and be appropriate for d/c to home once medically appropriate. Pt would benefit from additional PT sessions for endurance training.      Current Level of Function Impacting Discharge (mobility/balance): CGA for ambulation 5 ft x walker    Functional Outcome Measure: The patient scored Total: 55/100 on the Barthel Index which is indicative of 45% impaired ability to care for basic self needs/dependency on others. Other factors to consider for discharge: lives alone with help planned, no stairs, abdominal pain, fall risk     Patient will benefit from skilled therapy intervention to address the above noted impairments. PLAN :  Recommendations and Planned Interventions: bed mobility training, transfer training, gait training, therapeutic exercises, neuromuscular re-education, patient and family training/education, and therapeutic activities      Frequency/Duration: Patient will be followed by physical therapy:  5 times a week to address goals. Recommendation for discharge: (in order for the patient to meet his/her long term goals)  Physical therapy at least 2 days/week in the home AND ensure assist and/or supervision for safety with mobility     This discharge recommendation:  Has not yet been discussed the attending provider and/or case management    IF patient discharges home will need the following DME: to be determined (TBD)- may need RW? SUBJECTIVE:   Patient stated I am happy to move.     OBJECTIVE DATA SUMMARY:   HISTORY:    Past Medical History:   Diagnosis Date    Adverse effect of anesthesia     \"I'M CRAZY WHEN I WAKE UP, I'M CRAZY.   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    PUD (peptic ulcer disease) 2019    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    Unspecified sleep apnea     NO C-PAP     Past Surgical History:   Procedure Laterality Date    COLONOSCOPY N/A 1/15/2019    COLONOSCOPY performed by Dorian Hinojosa MD at [facility] ENDOSCOPY    HX APPENDECTOMY      HX BREAST LUMPECTOMY Left     lumpectomy BENIGN    HX COLECTOMY      sigmoid    HX COLONOSCOPY      HX HYSTERECTOMY      hysto    HX ORTHOPAEDIC Left     shoulder CYST REMOVAL    HX OTHER SURGICAL  12/31/13    pyloric-sparing whipple,bx of portal and hepatic nodes by Dr Quirino Rodas      ileostomy-AND REVERSAL    HX SALPINGO-OOPHORECTOMY Bilateral     HX TONSILLECTOMY      HX UROLOGICAL  2008    BLADDER TUCK       Personal factors and/or comorbidities impacting plan of care: PMH    Home Situation  Home Environment: Private residence  # Steps to Enter: 0  One/Two Story Residence: One story  Living Alone: Yes  Support Systems: Friends \ neighbors, Child(diana)(son coming Sun 2/9 to stay for 5 days)  Patient Expects to be Discharged to[de-identified] Private residence  Current DME Used/Available at Home: Grab bars, [place] [employer] or Shower Type: Tub/Shower combination    EXAMINATION/PRESENTATION/DECISION MAKING:   Critical Behavior:  Neurologic State: Alert  Orientation Level: Oriented X4  Cognition: Follows commands     Hearing:     Skin:  intact  Edema: none noted  Range Of Motion:  AROM: Generally decreased, functional           PROM: Generally decreased, functional           Strength:    Strength: Generally decreased, functional                    Tone & Sensation:                                  Coordination:     Vision:      Functional Mobility:  Bed Mobility:              Transfers:  Sit to Stand: Contact guard assistance; Adaptive equipment; Additional time  Stand to Sit: Contact guard assistance; Adaptive equipment; Additional time                       Balance:   Sitting: Intact  Standing: Impaired; With support  Standing - Static: Fair  Standing - Dynamic : Fair;Constant support  Ambulation/Gait Training:  Distance (ft): 5 Feet (ft)  Assistive Device: Gait belt;Walker, rolling  Ambulation - Level of Assistance: Contact guard assistance; Adaptive equipment; Additional time        Gait Abnormalities: Decreased step clearance; Step to gait        Base of Support: Narrowed  Stance: Weight shift  Speed/Bertha: Shuffled; Slow  Step Length: Left shortened;Right shortened  Swing Pattern: Left asymmetrical;Right asymmetrical               Functional Measure:  Barthel Index:    Bathin  Bladder: 10  Bowels: 10  Groomin  Dressin  Feeding: 10  Mobility: 0  Stairs: 0  Toilet Use: 5  Transfer (Bed to Chair and Back): 10  Total: 55/100       The Barthel ADL Index: Guidelines  1. The index should be used as a record of what a patient does, not as a record of what a patient could do. 2. The main aim is to establish degree of independence from any help, physical or verbal, however minor and for whatever reason. 3. The need for supervision renders the patient not independent. 4. A patient's performance should be established using the best available evidence. Asking the patient, friends/relatives and nurses are the usual sources, but direct observation and common sense are also important. However direct testing is not needed. 5. Usually the patient's performance over the preceding 24-48 hours is important, but occasionally longer periods will be relevant. 6. Middle categories imply that the patient supplies over 50 per cent of the effort. 7. Use of aids to be independent is allowed. Mason Mendosa., Barthel, DAllanWAllan (1996). Functional evaluation: the Barthel Index. [facility] (14)2. TEJA Vogt, Rigboerto Perez., Zhen Guzman., Carie, [address] ().  Measuring the change indisability after inpatient rehabilitation; comparison of the responsiveness of the Barthel Index and Functional [place] Measure. Journal of Neurology, Neurosurgery, and Psychiatry, 66(4), 913-131. STEPHANIE Campbell.A, TYSHAWN Del Toro, & Richard Baez M.A. (2004.) Assessment of post-stroke quality of life in cost-effectiveness studies: The usefulness of the Barthel Index and the EuroQoL-5D. Quality of Life Research, 15, 617-22        Physical Therapy Evaluation Charge Determination   History Examination Presentation Decision-Making   MEDIUM  Complexity : 1-2 comorbidities / personal factors will impact the outcome/ POC  LOW Complexity : 1-2 Standardized tests and measures addressing body structure, function, activity limitation and / or participation in recreation  LOW Complexity : Stable, uncomplicated  LOW Complexity : FOTO score of       Based on the above components, the patient evaluation is determined to be of the following complexity level: LOW     Pain Rating:  Pt did not quantify abdominal pain    Activity Tolerance:   Fair  Please refer to the flowsheet for vital signs taken during this treatment. After treatment patient left in no apparent distress:   Sitting in chair and Call bell within reach    COMMUNICATION/EDUCATION:   The patients plan of care was discussed with: Registered Nurse. Fall prevention education was provided and the patient/caregiver indicated understanding., Patient/family have participated as able in goal setting and plan of care. , and Patient/family agree to work toward stated goals and plan of care.     Thank you for this referral.  Alana Torres, PT, DPT   Time Calculation: 17 mins

## 2020-02-05 NOTE — PHYSICIAN ADVISORY
[facility] [place] Physician Advisor Recommendation The information in this document is a recommendation to be used for utilization review and utilization management purposes only. This recommendation is not an order. The recommendation is made based on the information reviewed at the time of the referral, is pursuant to the [facility] Conditions of Participation (42 CFR Part 482), and is neither a judgment nor an assessment with regard to the appropriateness or quality of clinical care. Nothing in this document may be used to limit, alter, or affect clinical services provided to the patient named below. The provider of services is ultimately responsible for the submission of a claim that has met all requirements for correct coding, billing, and reimbursement. Letter of Status Determination:   
Recommend Changing patient status from INPATIENT to OBSERVATION Pt Name:  Terrell Holland MR#  [id number] [facility]#   [id number] [facility]  203/01  @ [facility]  
Hospitalization date  2/4/2020 10:08 AM  
Current Attending Physician  Ambika Chan MD  
Principal diagnosis  Incisional hernia, without obstruction or gangrene Clinicals  69 y.o. female hospitalized with  S/p REPAIR OF VENTRAL INCISIONAL [facility] Does  NOT apply STATUS DETERMINATION  On the basis of review of available clinical data, documentation in the chart  It is our recommendation that the patient's status is changed from INPATIENT to OBSERVATION The final decision of the patient's hospitalization status depends on the attending physician's judgment Additional comments Insurance  Payor: Jude Anne / Plan: [address] HMO / Product Type: Managed Care Medicare /   
 
  
 
Rafaela Porras MD, RAISA Physician [address] [facility] [place] [id number] [place] [address] Insurance Information   
          
 HU[employer]NA MEDICARE/[facility] HUMANA MEDICARE O Phone:   
 Subscriber: Margo Matias Subscriber#: [id number]  
 Group#: [id number] Precert#:

## 2020-02-05 NOTE — PROGRESS NOTES
Bedside and Verbal shift change report given to bubba (oncoming nurse) by Swapna Owens (offgoing nurse). Report included the following information SBAR, Kardex and MAR.

## 2020-02-05 NOTE — PROGRESS NOTES
Transition of [address] with family assistance once medically stable  Sadie Rivera Patient: CM to sent Home Health referral to [facility] at closer to discharge date    Patient is requesting a new rollator with wheel    Patient's new PCP is Dr. Myles Zheng Transport: friend Barry Meng [phone]   [address] Follow up with PCP/Specialist     Reason for Admission:   Admitted for evaluation of ventral hernia                    RUR Score:   9 %                   Plan for utilizing home health:    Open to [address]                      Current Advanced Directive/Advance Care Plan: NO CODE STATUS DETERMINED, AMD on file                          Transition of Care Plan:   Patient is expected to return back to previous living status with some home health services. Patient's new PCP is Dr. Waylon Gan    CM met patient at bedside, introduced role, verified demographics, and offered assistance. Patient is independent with ADLs and reports use of no DMEs. Patient states she has a rollator with no wheel, which she rarely uses. Patient lives alone in an apartment with no steps to enter. Patient states that she has many friends who helps her as needed. Patient utilizes [address] located at [facility] in [address] [place] [address]. Patient may benefit from a rollator with wheel. The Plan for Transition of Care is related to the following treatment goals: Home Health     The Patient  was provided with a choice of provider and agrees   with the discharge plan. [x] Yes [] No    Freedom of choice list was provided with basic dialogue that supports the patient's individualized plan of care/goals, treatment preferences and shares the quality data associated with the providers. [x] Yes [] No                A Freedom of Choice letter was provided, signed by the patient, placed on the bedside chart.  A referral will be sent to [facility] later once the patient gets closer to discharge.      Care Management Interventions  PCP Verified by CM: Yes(Dr. Ashley Everett is the new PCP )  Palliative Care Criteria Met (RRAT>21 & CHF Dx)?: No  Mode of Transport at Discharge: ALS(friend [employer] [phone])  Transition of Care Consult (CM Consult): Discharge Planning  MyChart Signup: No  Discharge Durable Medical Equipment: No  Health Maintenance Reviewed: Yes  Physical Therapy Consult: Yes  Occupational Therapy Consult: Yes  Speech Therapy Consult: No  Current Support Network: Lives Alone, Own Home  Confirm Follow Up Transport: Friends(Friend Rajesh Turner [phone])  The Patient and/or Patient Representative was Provided with a Choice of Provider and Agrees with the Discharge Plan?: Yes  Freedom of Choice List was Provided with Basic Dialogue that Supports the Patient's Individualized Plan of Care/Goals, Treatment Preferences and Shares the Quality Data Associated with the Providers?: Yes  Discharge Location  Discharge Placement: Home with family assistance    Cecilio Goddard  Clinical     [phone]

## 2020-02-05 NOTE — PROGRESS NOTES
Bedside shift change report given to silvano (oncoming nurse) by Kathryn Mercer (offgoing nurse). Report included the following information SBAR, Kardex and Intake/Output.

## 2020-02-06 LAB
BASOPHILS # BLD: 0 K/UL (ref 0–0.1)
BASOPHILS NFR BLD: 0 % (ref 0–1)
DIFFERENTIAL METHOD BLD: ABNORMAL
EOSINOPHIL # BLD: 0.2 K/UL (ref 0–0.4)
EOSINOPHIL NFR BLD: 1 % (ref 0–7)
ERYTHROCYTE [DISTWIDTH] IN BLOOD BY AUTOMATED COUNT: 15.1 % (ref 11.5–14.5)
GLUCOSE BLD STRIP.AUTO-MCNC: 148 MG/DL (ref 65–100)
GLUCOSE BLD STRIP.AUTO-MCNC: 190 MG/DL (ref 65–100)
GLUCOSE BLD STRIP.AUTO-MCNC: 208 MG/DL (ref 65–100)
GLUCOSE BLD STRIP.AUTO-MCNC: 63 MG/DL (ref 65–100)
GLUCOSE BLD STRIP.AUTO-MCNC: 71 MG/DL (ref 65–100)
GLUCOSE BLD STRIP.AUTO-MCNC: 77 MG/DL (ref 65–100)
HCT VFR BLD AUTO: 29.7 % (ref 35–47)
HGB BLD-MCNC: 9.3 G/DL (ref 11.5–16)
IMM GRANULOCYTES # BLD AUTO: 0.1 K/UL (ref 0–0.04)
IMM GRANULOCYTES NFR BLD AUTO: 0 % (ref 0–0.5)
LYMPHOCYTES # BLD: 2.7 K/UL (ref 0.8–3.5)
LYMPHOCYTES NFR BLD: 15 % (ref 12–49)
MCH RBC QN AUTO: 27.7 PG (ref 26–34)
MCHC RBC AUTO-ENTMCNC: 31.3 G/DL (ref 30–36.5)
MCV RBC AUTO: 88.4 FL (ref 80–99)
MONOCYTES # BLD: 1.4 K/UL (ref 0–1)
MONOCYTES NFR BLD: 8 % (ref 5–13)
NEUTS SEG # BLD: 13.4 K/UL (ref 1.8–8)
NEUTS SEG NFR BLD: 75 % (ref 32–75)
NRBC # BLD: 0 K/UL (ref 0–0.01)
NRBC BLD-RTO: 0 PER 100 WBC
PLATELET # BLD AUTO: 170 K/UL (ref 150–400)
PMV BLD AUTO: 11.8 FL (ref 8.9–12.9)
RBC # BLD AUTO: 3.36 M/UL (ref 3.8–5.2)
SERVICE CMNT-IMP: ABNORMAL
SERVICE CMNT-IMP: NORMAL
SERVICE CMNT-IMP: NORMAL
WBC # BLD AUTO: 17.8 K/UL (ref 3.6–11)

## 2020-02-06 PROCEDURE — 74011250636 HC RX REV CODE- 250/636: Performed by: SURGERY

## 2020-02-06 PROCEDURE — 85025 COMPLETE CBC W/AUTO DIFF WBC: CPT

## 2020-02-06 PROCEDURE — 74011250636 HC RX REV CODE- 250/636: Performed by: NURSE PRACTITIONER

## 2020-02-06 PROCEDURE — 97116 GAIT TRAINING THERAPY: CPT

## 2020-02-06 PROCEDURE — 74011250637 HC RX REV CODE- 250/637: Performed by: NURSE PRACTITIONER

## 2020-02-06 PROCEDURE — 74011636637 HC RX REV CODE- 636/637: Performed by: NURSE PRACTITIONER

## 2020-02-06 PROCEDURE — 36415 COLL VENOUS BLD VENIPUNCTURE: CPT

## 2020-02-06 PROCEDURE — 74011636637 HC RX REV CODE- 636/637: Performed by: SURGERY

## 2020-02-06 PROCEDURE — 82962 GLUCOSE BLOOD TEST: CPT

## 2020-02-06 PROCEDURE — 74011250637 HC RX REV CODE- 250/637: Performed by: SURGERY

## 2020-02-06 PROCEDURE — 65270000029 HC RM PRIVATE

## 2020-02-06 RX ORDER — TRIAMTERENE/HYDROCHLOROTHIAZID 37.5-25 MG
1 TABLET ORAL DAILY
Status: DISCONTINUED | OUTPATIENT
Start: 2020-02-06 | End: 2020-02-09 | Stop reason: HOSPADM

## 2020-02-06 RX ORDER — INSULIN LISPRO 100 [IU]/ML
20 INJECTION, SOLUTION INTRAVENOUS; SUBCUTANEOUS
Status: DISCONTINUED | OUTPATIENT
Start: 2020-02-06 | End: 2020-02-09 | Stop reason: HOSPADM

## 2020-02-06 RX ADMIN — HUMAN INSULIN 74 UNITS: 100 INJECTION, SUSPENSION SUBCUTANEOUS at 21:00

## 2020-02-06 RX ADMIN — TRIAMTERENE AND HYDROCHLOROTHIAZIDE 1 TABLET: 37.5; 25 TABLET ORAL at 12:16

## 2020-02-06 RX ADMIN — HYDROMORPHONE HYDROCHLORIDE 1 MG: 1 INJECTION, SOLUTION INTRAMUSCULAR; INTRAVENOUS; SUBCUTANEOUS at 20:56

## 2020-02-06 RX ADMIN — DULOXETINE HYDROCHLORIDE 120 MG: 60 CAPSULE, DELAYED RELEASE ORAL at 08:57

## 2020-02-06 RX ADMIN — INSULIN LISPRO 20 UNITS: 100 INJECTION, SOLUTION INTRAVENOUS; SUBCUTANEOUS at 17:01

## 2020-02-06 RX ADMIN — PANTOPRAZOLE SODIUM 40 MG: 40 TABLET, DELAYED RELEASE ORAL at 08:57

## 2020-02-06 RX ADMIN — SODIUM CHLORIDE, SODIUM LACTATE, POTASSIUM CHLORIDE, AND CALCIUM CHLORIDE 15 ML/HR: 600; 310; 30; 20 INJECTION, SOLUTION INTRAVENOUS at 20:56

## 2020-02-06 RX ADMIN — INSULIN LISPRO 20 UNITS: 100 INJECTION, SOLUTION INTRAVENOUS; SUBCUTANEOUS at 12:16

## 2020-02-06 RX ADMIN — INSULIN LISPRO 3 UNITS: 100 INJECTION, SOLUTION INTRAVENOUS; SUBCUTANEOUS at 17:01

## 2020-02-06 RX ADMIN — HYDROMORPHONE HYDROCHLORIDE 1 MG: 1 INJECTION, SOLUTION INTRAMUSCULAR; INTRAVENOUS; SUBCUTANEOUS at 09:04

## 2020-02-06 RX ADMIN — Medication 10 ML: at 06:58

## 2020-02-06 RX ADMIN — METOPROLOL TARTRATE 50 MG: 50 TABLET, FILM COATED ORAL at 08:57

## 2020-02-06 RX ADMIN — HYDROMORPHONE HYDROCHLORIDE 1 MG: 1 INJECTION, SOLUTION INTRAMUSCULAR; INTRAVENOUS; SUBCUTANEOUS at 01:29

## 2020-02-06 RX ADMIN — Medication 10 ML: at 21:01

## 2020-02-06 RX ADMIN — Medication 10 ML: at 17:01

## 2020-02-06 RX ADMIN — INSULIN LISPRO 4 UNITS: 100 INJECTION, SOLUTION INTRAVENOUS; SUBCUTANEOUS at 06:57

## 2020-02-06 RX ADMIN — Medication 16 G: at 22:54

## 2020-02-06 RX ADMIN — METOPROLOL TARTRATE 50 MG: 50 TABLET, FILM COATED ORAL at 21:01

## 2020-02-06 RX ADMIN — HYDROMORPHONE HYDROCHLORIDE 1 MG: 1 INJECTION, SOLUTION INTRAMUSCULAR; INTRAVENOUS; SUBCUTANEOUS at 17:00

## 2020-02-06 RX ADMIN — INSULIN LISPRO 3 UNITS: 100 INJECTION, SOLUTION INTRAVENOUS; SUBCUTANEOUS at 12:17

## 2020-02-06 NOTE — PROGRESS NOTES
JUN:  Home at discharge with skilled services [facility] [address] [facility] [facility] [facility], pt followed by [facility] physician group in community. [facility] sent confirmed acceptance. Orders received for skilled homecare, nursing, PT/OT and possible discharge tomorrow. Orders sent via allscripts with order attached to [address]. The Plan for Transition of Care is related to the following treatment goals: home with home health    The Patient and/or patient representative was provided with a choice of provider and agrees   with the discharge plan. [x] Yes [] No    Freedom of choice list was provided with basic dialogue that supports the patient's individualized plan of care/goals, treatment preferences and shares the quality data associated with the providers. [x] Yes [] No      Chart reviewed and noted that plan remains home with skilled services with [facility] at discharge. Will set up services once orders placed. Left communication with request for surgical team as attending for orders. Expected discharge is tomorrow.     DELIA Dewtit

## 2020-02-06 NOTE — PROGRESS NOTES
Problem: Mobility Impaired (Adult and Pediatric)  Goal: *Acute Goals and Plan of Care (Insert Text)  Description  FUNCTIONAL STATUS PRIOR TO ADMISSION: Patient was independent and active without use of DME.    HOME SUPPORT PRIOR TO ADMISSION: The patient lived alone with friends to provide assistance. Pt's son will be arriving to stay with patient from late Sun 2/9 to Fri 2/14. Physical Therapy Goals  Initiated 2/5/2020  1. Patient will move from supine to sit and sit to supine  and roll side to side in bed with modified independence within 7 day(s). 2.  Patient will transfer from bed to chair and chair to bed with modified independence using the least restrictive device within 7 day(s). 3.  Patient will perform sit to stand with modified independence within 7 day(s). 4.  Patient will ambulate with modified independence for 300 feet with the least restrictive device within 7 day(s). Outcome: Progressing Towards Goal  PHYSICAL THERAPY TREATMENT  Patient: Lizzie Mendieta (33 y.o. female)  Date: 2/6/2020  Diagnosis: Ventral incisional hernia [K43.2]   Incisional hernia, without obstruction or gangrene  Procedure(s) (LRB):  REPAIR OF VENTRAL INCISIONAL HERNIA WITH MESH (N/A) 2 Days Post-Op  Precautions: Contact  Chart, physical therapy assessment, plan of care and goals were reviewed. ASSESSMENT  Patient continues with skilled PT services and is progressing towards goals. Pt progressing with OOB tolerance - up to bathroom, bedside chair and ambulating in hallway today without oxygen. Sats remained > 90 % on room air with cues x 3 to decrease RR and increase deep breathing. Current Level of Function Impacting Discharge (mobility/balance): CGA for transfers/ambulation    Other factors to consider for discharge: lives alone         PLAN :  Patient continues to benefit from skilled intervention to address the above impairments. Continue treatment per established plan of care.   to address goals. Recommendation for discharge: (in order for the patient to meet his/her long term goals)  Therapy 3 hours per day 5-7 days per week vs. Home health    This discharge recommendation:  Has not yet been discussed the attending provider and/or case management    IF patient discharges home will need the following DME: to be determined (TBD)       SUBJECTIVE:     OBJECTIVE DATA SUMMARY:   Critical Behavior:  Neurologic State: Alert, Appropriate for age  Orientation Level: Oriented X4  Cognition: Follows commands     Functional Mobility Training:  Transfers:  Sit to Stand: Contact guard assistance  Stand to Sit: Contact guard assistance                             Balance:  Sitting: Intact  Standing: Impaired; With support  Standing - Static: Good  Standing - Dynamic : Fair  Ambulation/Gait Training:  Distance (ft): 55 Feet (ft)  Assistive Device: Walker, rolling;Gait belt  Ambulation - Level of Assistance: Contact guard assistance        Gait Abnormalities: Decreased step clearance        Base of Support: Narrowed     Speed/Bertha: Slow;Shuffled(able to increase stride with cues)  Step Length: Right shortened;Left shortened    Pain Rating:  Pre-treatment 10/10  Post-treatment 8/10    Activity Tolerance:   Improving OOB tolerance -  SpO2 stable on RA during ambulation  Please refer to the flowsheet for vital signs taken during this treatment.     After treatment patient left in no apparent distress:   Sitting in chair, Call bell within reach, and Caregiver / family present    COMMUNICATION/COLLABORATION:   The patients plan of care was discussed with: Registered Nurse    Any Ibanez, PT   Time Calculation: 20 mins

## 2020-02-06 NOTE — PROGRESS NOTES
Problem: Falls - Risk of  Goal: *Absence of Falls  Description  Document Karina Sarabia Fall Risk and appropriate interventions in the flowsheet.   Outcome: Progressing Towards Goal  Note: Fall Risk Interventions:  Mobility Interventions: Communicate number of staff needed for ambulation/transfer, OT consult for ADLs, Patient to call before getting OOB, PT Consult for assist device competence, PT Consult for mobility concerns         Medication Interventions: Patient to call before getting OOB, Teach patient to arise slowly

## 2020-02-06 NOTE — PROGRESS NOTES
Patient complained of pain twice this shift for which she got prn pain meds. She usually complains of pain after getting out of bed and going to the restroom. She was able to sleep better sitting on the chair. She has no other subjective complaints now.

## 2020-02-06 NOTE — PROGRESS NOTES
[facility]  Daily Progress Note  ATTENDING ADDENDUM  I supervised the APC and reviewed the note. We discussed the plan of care  May or may not be reasdy for discharge in the AM.  Admit Date: 2020  Post-Operative Day: 2 from Procedure(s):  [address]     Subjective:     Last 24 hrs: Having a lot of pain, adequately controlled with medication. Getting OOB, working with PT. Eating small amounts, gets full quickly. + flatus and BM. C/o weakness and needing a lot of help to get around. States she is retaining fluid. WBC 17.8, afebrile. Blood glucose persistently elevated. Hgb 9.3    Objective:     Blood pressure 121/75, pulse 77, temperature 98.4 °F (36.9 °C), resp. rate 16, height 5' 1\" (1.549 m), weight 106 kg (233 lb 11 oz), SpO2 97 %. Temp (24hrs), Av °F (36.7 °C), Min:97.7 °F (36.5 °C), Max:98.4 °F (36.9 °C)      _____________________  Physical Exam:     Alert and Oriented, sitting up in chair, no acute distress. Cardiovascular: RRR  Lungs:CTAB   Abdomen: soft, appropriately tender. Surgical dressing in place, bloody strike through drainage noted, and small amount of drainage leaking from bottom of dressing. AILEEN with thin bloody fluid, 160 mL out yesterday. Assessment:   Principal Problem:    Incisional hernia, without obstruction or gangrene (2019)    Active Problems:    Ventral incisional hernia (2020)      S/p ventral incisional hernia repair with mesh      Plan:      Add home lispro with meals  Re-start maxzide  KVO IVF  OOB/PT as she is doing  CBC in am  Home tomorrow if doing better        Jose Velazquez, ACNP - [address] [facility] [place] [address],  [facility], [address] [place], [place]  [phone]    Data Review:    Recent Labs     20  0506   WBC 17.8*   HGB 9.3*   HCT 29.7*        No results for input(s): NA, K, CL, CO2, GLU, BUN, CREA, CA, MG, PHOS, ALB, TBIL, TBILI, SGOT, ALT, INR, INREXT in the last 72 hours. No results for input(s): AML, LPSE in the last 72 hours.         ______________________  Medications:    Current Facility-Administered Medications   Medication Dose Route Frequency    insulin lispro (HUMALOG) injection 20 Units  20 Units SubCUTAneous TIDAC    triamterene-hydroCHLOROthiazide (MAXZIDE) 37.5-25 mg per tablet 1 Tab  1 Tab Oral DAILY    oxyCODONE IR (ROXICODONE) tablet 2.5-5 mg  2.5-5 mg Oral Q4H PRN    oxyCODONE IR (ROXICODONE) tablet 10 mg  10 mg Oral Q4H PRN    DULoxetine (CYMBALTA) capsule 120 mg  120 mg Oral DAILY    insulin NPH (NOVOLIN N, HUMULIN N) injection 74 Units  74 Units SubCUTAneous QHS    metoprolol tartrate (LOPRESSOR) tablet 50 mg  50 mg Oral BID    pantoprazole (PROTONIX) tablet 40 mg  40 mg Oral DAILY    sodium chloride (NS) flush 5-40 mL  5-40 mL IntraVENous Q8H    sodium chloride (NS) flush 5-40 mL  5-40 mL IntraVENous PRN    lactated Ringers infusion  75 mL/hr IntraVENous CONTINUOUS    HYDROmorphone (PF) (DILAUDID) injection 1 mg  1 mg IntraVENous Q3H PRN    ondansetron (ZOFRAN) injection 4 mg  4 mg IntraVENous Q4H PRN    glucose chewable tablet 16 g  4 Tab Oral PRN    glucagon (GLUCAGEN) injection 1 mg  1 mg IntraMUSCular PRN    dextrose 10% infusion 0-250 mL  0-250 mL IntraVENous PRN    insulin lispro (HUMALOG) injection   SubCUTAneous TIDAC

## 2020-02-06 NOTE — PROGRESS NOTES
Bedside and Verbal shift change report given to Analilia Combs RN (oncoming nurse) by Kamryn Vera RN (offgoing nurse). Report included the following information SBAR, Kardex, Intake/Output, MAR and Recent Results.

## 2020-02-07 LAB
ERYTHROCYTE [DISTWIDTH] IN BLOOD BY AUTOMATED COUNT: 14.9 % (ref 11.5–14.5)
GLUCOSE BLD STRIP.AUTO-MCNC: 160 MG/DL (ref 65–100)
GLUCOSE BLD STRIP.AUTO-MCNC: 174 MG/DL (ref 65–100)
GLUCOSE BLD STRIP.AUTO-MCNC: 80 MG/DL (ref 65–100)
GLUCOSE BLD STRIP.AUTO-MCNC: 86 MG/DL (ref 65–100)
GLUCOSE BLD STRIP.AUTO-MCNC: 99 MG/DL (ref 65–100)
HCT VFR BLD AUTO: 25.4 % (ref 35–47)
HGB BLD-MCNC: 8.1 G/DL (ref 11.5–16)
MCH RBC QN AUTO: 27.6 PG (ref 26–34)
MCHC RBC AUTO-ENTMCNC: 31.9 G/DL (ref 30–36.5)
MCV RBC AUTO: 86.7 FL (ref 80–99)
NRBC # BLD: 0 K/UL (ref 0–0.01)
NRBC BLD-RTO: 0 PER 100 WBC
PLATELET # BLD AUTO: 138 K/UL (ref 150–400)
RBC # BLD AUTO: 2.93 M/UL (ref 3.8–5.2)
SERVICE CMNT-IMP: ABNORMAL
SERVICE CMNT-IMP: ABNORMAL
SERVICE CMNT-IMP: NORMAL
WBC # BLD AUTO: 13.5 K/UL (ref 3.6–11)

## 2020-02-07 PROCEDURE — 74011250637 HC RX REV CODE- 250/637: Performed by: NURSE PRACTITIONER

## 2020-02-07 PROCEDURE — 94760 N-INVAS EAR/PLS OXIMETRY 1: CPT

## 2020-02-07 PROCEDURE — 74011250637 HC RX REV CODE- 250/637: Performed by: SURGERY

## 2020-02-07 PROCEDURE — 85027 COMPLETE CBC AUTOMATED: CPT

## 2020-02-07 PROCEDURE — 74011250636 HC RX REV CODE- 250/636: Performed by: SURGERY

## 2020-02-07 PROCEDURE — 65270000029 HC RM PRIVATE

## 2020-02-07 PROCEDURE — 74011636637 HC RX REV CODE- 636/637: Performed by: SURGERY

## 2020-02-07 PROCEDURE — 74011636637 HC RX REV CODE- 636/637: Performed by: NURSE PRACTITIONER

## 2020-02-07 PROCEDURE — 82962 GLUCOSE BLOOD TEST: CPT

## 2020-02-07 RX ORDER — INSULIN LISPRO 100 [IU]/ML
10 INJECTION, SOLUTION INTRAVENOUS; SUBCUTANEOUS ONCE
Status: COMPLETED | OUTPATIENT
Start: 2020-02-07 | End: 2020-02-07

## 2020-02-07 RX ORDER — OXYCODONE HYDROCHLORIDE 10 MG/1
10 TABLET ORAL
Qty: 24 TAB | Refills: 0 | Status: SHIPPED | OUTPATIENT
Start: 2020-02-07 | End: 2020-02-12

## 2020-02-07 RX ADMIN — HYDROMORPHONE HYDROCHLORIDE 1 MG: 1 INJECTION, SOLUTION INTRAMUSCULAR; INTRAVENOUS; SUBCUTANEOUS at 00:25

## 2020-02-07 RX ADMIN — HYDROMORPHONE HYDROCHLORIDE 1 MG: 1 INJECTION, SOLUTION INTRAMUSCULAR; INTRAVENOUS; SUBCUTANEOUS at 08:55

## 2020-02-07 RX ADMIN — OXYCODONE 10 MG: 5 TABLET ORAL at 15:54

## 2020-02-07 RX ADMIN — INSULIN LISPRO 3 UNITS: 100 INJECTION, SOLUTION INTRAVENOUS; SUBCUTANEOUS at 07:02

## 2020-02-07 RX ADMIN — DULOXETINE HYDROCHLORIDE 120 MG: 60 CAPSULE, DELAYED RELEASE ORAL at 08:55

## 2020-02-07 RX ADMIN — INSULIN LISPRO 10 UNITS: 100 INJECTION, SOLUTION INTRAVENOUS; SUBCUTANEOUS at 12:24

## 2020-02-07 RX ADMIN — PANTOPRAZOLE SODIUM 40 MG: 40 TABLET, DELAYED RELEASE ORAL at 08:55

## 2020-02-07 RX ADMIN — HYDROMORPHONE HYDROCHLORIDE 1 MG: 1 INJECTION, SOLUTION INTRAMUSCULAR; INTRAVENOUS; SUBCUTANEOUS at 04:36

## 2020-02-07 RX ADMIN — HUMAN INSULIN 74 UNITS: 100 INJECTION, SUSPENSION SUBCUTANEOUS at 22:21

## 2020-02-07 RX ADMIN — Medication 10 ML: at 22:23

## 2020-02-07 RX ADMIN — OXYCODONE 10 MG: 5 TABLET ORAL at 19:59

## 2020-02-07 RX ADMIN — METOPROLOL TARTRATE 50 MG: 50 TABLET, FILM COATED ORAL at 08:55

## 2020-02-07 RX ADMIN — Medication 10 ML: at 07:02

## 2020-02-07 RX ADMIN — INSULIN LISPRO 20 UNITS: 100 INJECTION, SOLUTION INTRAVENOUS; SUBCUTANEOUS at 07:03

## 2020-02-07 RX ADMIN — TRIAMTERENE AND HYDROCHLOROTHIAZIDE 1 TABLET: 37.5; 25 TABLET ORAL at 08:55

## 2020-02-07 NOTE — PROGRESS NOTES
Bedside shift change report given to [address] (oncoming nurse) by Gustavo Mendoza (offgoing nurse). Report included the following information SBAR, Kardex, MAR and Recent Results.

## 2020-02-07 NOTE — PROGRESS NOTES
General Surgery Daily Progress Note    Admit Date: 2020  Post-Operative Day: 3 Days Post-Op from Procedure(s):  REPAIR OF VENTRAL INCISIONAL HERNIA WITH MESH     Subjective:     Last 24 hrs: Pt is sitting up in the chair; no c/o pain at present just got IV dilaudid; tolerating diet and passing flatus, had a BM but doesn't remember when; ? Pre surgery       Objective:     Blood pressure 121/78, pulse 83, temperature 98.8 °F (37.1 °C), resp. rate 18, height 5' 1\" (1.549 m), weight 224 lb 13.9 oz (102 kg), SpO2 90 %. Temp (24hrs), Av.7 °F (37.1 °C), Min:98.6 °F (37 °C), Max:98.8 °F (37.1 °C)      _____________________  Physical Exam:     Alert and Oriented, x3, in no acute distress. Cardiovascular: RRR, no peripheral edema  Lungs: diminished in bases  Abdomen: soft, obese, +bS, incision w/ staples intact; sm amt ss drainage on drsg; AILEEN w/ ss drainage - 30cc out last 24hrs    Assessment:   Principal Problem:    Incisional hernia, without obstruction or gangrene (2019)    Active Problems:    Ventral incisional hernia (2020)            Plan:     D/c IV dilaudid and use milena for pain  Cont diet  Cont OOB/ambulate  Home tomorrow  Drain out prior to d/c  Home health for PT/OT  D/c instructions/script on chart    Data Review:    Recent Labs     20  0407 20  0506   WBC 13.5* 17.8*   HGB 8.1* 9.3*   HCT 25.4* 29.7*   * 170     No results for input(s): NA, K, CL, CO2, GLU, BUN, CREA, CA, MG, PHOS, ALB, TBIL, SGOT, ALT, INR, INREXT in the last 72 hours. No results for input(s): AML, LPSE in the last 72 hours.         ______________________  Medications:    Current Facility-Administered Medications   Medication Dose Route Frequency    insulin lispro (HUMALOG) injection 20 Units  20 Units SubCUTAneous TIDAC    triamterene-hydroCHLOROthiazide (MAXZIDE) 37.5-25 mg per tablet 1 Tab  1 Tab Oral DAILY    oxyCODONE IR (ROXICODONE) tablet 2.5-5 mg  2.5-5 mg Oral Q4H PRN    oxyCODONE IR (ROXICODONE) tablet 10 mg  10 mg Oral Q4H PRN    DULoxetine (CYMBALTA) capsule 120 mg  120 mg Oral DAILY    insulin NPH (NOVOLIN N, HUMULIN N) injection 74 Units  74 Units SubCUTAneous QHS    metoprolol tartrate (LOPRESSOR) tablet 50 mg  50 mg Oral BID    pantoprazole (PROTONIX) tablet 40 mg  40 mg Oral DAILY    sodium chloride (NS) flush 5-40 mL  5-40 mL IntraVENous Q8H    sodium chloride (NS) flush 5-40 mL  5-40 mL IntraVENous PRN    lactated Ringers infusion  15 mL/hr IntraVENous CONTINUOUS    HYDROmorphone (PF) (DILAUDID) injection 1 mg  1 mg IntraVENous Q3H PRN    ondansetron (ZOFRAN) injection 4 mg  4 mg IntraVENous Q4H PRN    glucose chewable tablet 16 g  4 Tab Oral PRN    glucagon (GLUCAGEN) injection 1 mg  1 mg IntraMUSCular PRN    dextrose 10% infusion 0-250 mL  0-250 mL IntraVENous PRN    insulin lispro (HUMALOG) injection   SubCUTAneous TIDAC       Peter Lopes NP  2/7/2020                    ATTENDING ADDENDUM  I supervised the APC and reviewed the note. We discussed the plan of care  Still having a lot of trouble with ambulation. May benefit from another day or two of assistance.

## 2020-02-07 NOTE — PROGRESS NOTES
Care Management-  Transition of Care Plan  · Home with [facility]. They have accepted the referral and are aware of probable discharge on 2-8-2020. · Bc Portillo and his wife will transport patient home. Per chart review, plan is for discharge home with home health tomorrow. CM called [facility] ([phone]) and spoke with Anup Ludwig to notify her of plan for discharge home tomorrow. . She will call patient to coordinate time of home health. Gave her number to patient's room here at [address] [place] [address]. Met with patient and her friends Bc Portillo ([phone]) and his wife. They will transport patient home tomorrow. Discusses home health. Patient in agreement with home health. She said her son will be staying with her when she first gets home.     DELIA Garcia

## 2020-02-07 NOTE — PROGRESS NOTES
Bedside shift change report given to [address] (oncoming nurse) by Miguel Saul RN (offgoing nurse). Report included the following information SBAR, Kardex, MAR and Recent Results.

## 2020-02-07 NOTE — PROGRESS NOTES
Deferred visit: Patient declines therapy stating she has just returned to bed after being in the chair all night. States she is using the RW to go to the bathroom and that she hopes to go home tomorrow or Sunday. HH has been recommended previously.

## 2020-02-08 LAB
GLUCOSE BLD STRIP.AUTO-MCNC: 119 MG/DL (ref 65–100)
GLUCOSE BLD STRIP.AUTO-MCNC: 138 MG/DL (ref 65–100)
GLUCOSE BLD STRIP.AUTO-MCNC: 170 MG/DL (ref 65–100)
GLUCOSE BLD STRIP.AUTO-MCNC: 84 MG/DL (ref 65–100)
SERVICE CMNT-IMP: ABNORMAL
SERVICE CMNT-IMP: NORMAL

## 2020-02-08 PROCEDURE — 74011250637 HC RX REV CODE- 250/637: Performed by: SURGERY

## 2020-02-08 PROCEDURE — 74011636637 HC RX REV CODE- 636/637: Performed by: NURSE PRACTITIONER

## 2020-02-08 PROCEDURE — 74011250637 HC RX REV CODE- 250/637: Performed by: NURSE PRACTITIONER

## 2020-02-08 PROCEDURE — 65270000029 HC RM PRIVATE

## 2020-02-08 PROCEDURE — 82962 GLUCOSE BLOOD TEST: CPT

## 2020-02-08 PROCEDURE — 74011250636 HC RX REV CODE- 250/636: Performed by: SURGERY

## 2020-02-08 PROCEDURE — 74011636637 HC RX REV CODE- 636/637: Performed by: SURGERY

## 2020-02-08 RX ORDER — DOCUSATE SODIUM 100 MG/1
100 CAPSULE, LIQUID FILLED ORAL 2 TIMES DAILY
Status: DISCONTINUED | OUTPATIENT
Start: 2020-02-08 | End: 2020-02-09 | Stop reason: HOSPADM

## 2020-02-08 RX ORDER — KETOROLAC TROMETHAMINE 30 MG/ML
15 INJECTION, SOLUTION INTRAMUSCULAR; INTRAVENOUS EVERY 6 HOURS
Status: DISCONTINUED | OUTPATIENT
Start: 2020-02-08 | End: 2020-02-09 | Stop reason: HOSPADM

## 2020-02-08 RX ADMIN — DOCUSATE SODIUM 100 MG: 100 CAPSULE, LIQUID FILLED ORAL at 18:01

## 2020-02-08 RX ADMIN — HUMAN INSULIN 74 UNITS: 100 INJECTION, SUSPENSION SUBCUTANEOUS at 22:11

## 2020-02-08 RX ADMIN — INSULIN LISPRO 20 UNITS: 100 INJECTION, SOLUTION INTRAVENOUS; SUBCUTANEOUS at 08:22

## 2020-02-08 RX ADMIN — TRIAMTERENE AND HYDROCHLOROTHIAZIDE 1 TABLET: 37.5; 25 TABLET ORAL at 09:28

## 2020-02-08 RX ADMIN — Medication 10 ML: at 06:08

## 2020-02-08 RX ADMIN — INSULIN LISPRO 3 UNITS: 100 INJECTION, SOLUTION INTRAVENOUS; SUBCUTANEOUS at 13:14

## 2020-02-08 RX ADMIN — METOPROLOL TARTRATE 50 MG: 50 TABLET, FILM COATED ORAL at 22:15

## 2020-02-08 RX ADMIN — KETOROLAC TROMETHAMINE 15 MG: 30 INJECTION, SOLUTION INTRAMUSCULAR at 14:05

## 2020-02-08 RX ADMIN — INSULIN LISPRO 20 UNITS: 100 INJECTION, SOLUTION INTRAVENOUS; SUBCUTANEOUS at 13:14

## 2020-02-08 RX ADMIN — OXYCODONE 10 MG: 5 TABLET ORAL at 18:01

## 2020-02-08 RX ADMIN — PANTOPRAZOLE SODIUM 40 MG: 40 TABLET, DELAYED RELEASE ORAL at 09:28

## 2020-02-08 RX ADMIN — DULOXETINE HYDROCHLORIDE 120 MG: 60 CAPSULE, DELAYED RELEASE ORAL at 09:29

## 2020-02-08 RX ADMIN — OXYCODONE 10 MG: 5 TABLET ORAL at 22:11

## 2020-02-08 RX ADMIN — OXYCODONE 10 MG: 5 TABLET ORAL at 09:29

## 2020-02-08 RX ADMIN — Medication 10 ML: at 22:11

## 2020-02-08 RX ADMIN — METOPROLOL TARTRATE 50 MG: 50 TABLET, FILM COATED ORAL at 09:29

## 2020-02-08 RX ADMIN — OXYCODONE 10 MG: 5 TABLET ORAL at 06:03

## 2020-02-08 RX ADMIN — KETOROLAC TROMETHAMINE 15 MG: 30 INJECTION, SOLUTION INTRAMUSCULAR at 19:31

## 2020-02-08 NOTE — PROGRESS NOTES
Progress Note    Patient: Stacey Nesbitt MRN: [id number]  SSN: [id number]    YOB: 1945  Age: 69 y.o. Sex: female      Admit Date: 2020    4 Days Post-Op    Procedure:  Procedure(s):  REPAIR OF VENTRAL INCISIONAL HERNIA WITH MESH    Subjective:     No acute surgical issues. Pt tolerating diet. She does report significant pain to LLQ. AILEEN is serosanguinous. No nausea or vomiting.     Objective:     Visit Vitals  /70 (BP 1 Location: Left arm, BP Patient Position: At rest)   Pulse 85   Temp 99 °F (37.2 °C)   Resp 18   Ht 5' 1\" (1.549 m)   Wt 220 lb 7.4 oz (100 kg)   SpO2 99%   BMI 41.66 kg/m²       Temp (24hrs), Av.7 °F (37.1 °C), Min:98.4 °F (36.9 °C), Max:99 °F (37.2 °C)        Physical Exam:    Gen:  NAD  Pulm:  Unlabored  Abd:  S/ND/appropriate TTP  Wound:  C/D/I  AILEEN with serosanguinous drainage    Recent Results (from the past 24 hour(s))   GLUCOSE, POC    Collection Time: 20  4:14 PM   Result Value Ref Range    Glucose (POC) 80 65 - 100 mg/dL    Performed by Shaila Montano 26, POC    Collection Time: 20  9:23 PM   Result Value Ref Range    Glucose (POC) 174 (H) 65 - 100 mg/dL    Performed by Rayo Mae, POC    Collection Time: 20  6:14 AM   Result Value Ref Range    Glucose (POC) 138 (H) 65 - 100 mg/dL    Performed by Liane Gregory    GLUCOSE, POC    Collection Time: 20 11:19 AM   Result Value Ref Range    Glucose (POC) 170 (H) 65 - 100 mg/dL    Performed by Kenny Franco          Assessment:     Hospital Problems  Date Reviewed: 2020          Codes Class Noted POA    Ventral incisional hernia ICD-10-CM: K43.2  ICD-9-CM: 553.21  2020 Unknown        * (Principal) Incisional hernia, without obstruction or gangrene ICD-10-CM: K43.2  ICD-9-CM: 553.21  2019 Yes              Plan/Recommendations/Medical Decision Making:     - Diet as tolerated  - Pain control  - Encourage ambulation  - Plan DC home tomorrow  - Bowel regiment

## 2020-02-08 NOTE — PROGRESS NOTES
Patient has agreed to ambulate in the hallway today, moves well from chair to bed, able to ambulate to bathroom. Patient complains of abdominal pain especially when moving. Appetite is moderate. Problem: Patient Education: Go to Patient Education Activity  Goal: Patient/Family Education  Outcome: Progressing Towards Goal     Problem: Falls - Risk of  Goal: *Absence of Falls  Description  Document Dae Mahmood Fall Risk and appropriate interventions in the flowsheet.   Outcome: Progressing Towards Goal  Note: Fall Risk Interventions:  Mobility Interventions: Communicate number of staff needed for ambulation/transfer         Medication Interventions: Evaluate medications/consider consulting pharmacy, Patient to call before getting OOB, Teach patient to arise slowly                   Problem: Patient Education: Go to Patient Education Activity  Goal: Patient/Family Education  Outcome: Progressing Towards Goal     Problem: Patient Education:  Go to Education Activity  Goal: Patient/Family Education  Outcome: Progressing Towards Goal     Problem: Patient Education: Go to Patient Education Activity  Goal: Patient/Family Education  Outcome: Progressing Towards Goal     Problem: Discharge Planning  Goal: *Discharge to safe environment  Outcome: Progressing Towards Goal  Goal: *Knowledge of medication management  Outcome: Progressing Towards Goal  Goal: *Knowledge of discharge instructions  Outcome: Progressing Towards Goal     Problem: Patient Education: Go to Patient Education Activity  Goal: Patient/Family Education  Outcome: Progressing Towards Goal

## 2020-02-08 NOTE — PROGRESS NOTES
Bedside shift change report given to [address] (oncoming nurse) by Dot Suárez RN (offgoing nurse). Report included the following information SBAR, Kardex, MAR and Recent Results.

## 2020-02-08 NOTE — PROGRESS NOTES
Bedside shift change report given to [address] (oncoming nurse) by Alissa Leyva RN (offgoing nurse). Report included the following information SBAR, Kardex, MAR and Recent Results.

## 2020-02-09 VITALS
HEIGHT: 61 IN | HEART RATE: 61 BPM | WEIGHT: 216.05 LBS | OXYGEN SATURATION: 93 % | RESPIRATION RATE: 16 BRPM | DIASTOLIC BLOOD PRESSURE: 68 MMHG | SYSTOLIC BLOOD PRESSURE: 106 MMHG | BODY MASS INDEX: 40.79 KG/M2 | TEMPERATURE: 98.2 F

## 2020-02-09 LAB
GLUCOSE BLD STRIP.AUTO-MCNC: 126 MG/DL (ref 65–100)
GLUCOSE BLD STRIP.AUTO-MCNC: 81 MG/DL (ref 65–100)
SERVICE CMNT-IMP: ABNORMAL
SERVICE CMNT-IMP: NORMAL

## 2020-02-09 PROCEDURE — 74011250637 HC RX REV CODE- 250/637: Performed by: NURSE PRACTITIONER

## 2020-02-09 PROCEDURE — 74011250637 HC RX REV CODE- 250/637: Performed by: SURGERY

## 2020-02-09 PROCEDURE — 82962 GLUCOSE BLOOD TEST: CPT

## 2020-02-09 PROCEDURE — 74011636637 HC RX REV CODE- 636/637: Performed by: NURSE PRACTITIONER

## 2020-02-09 RX ORDER — DOCUSATE SODIUM 100 MG/1
100 CAPSULE, LIQUID FILLED ORAL 2 TIMES DAILY
Qty: 30 CAP | Refills: 0 | Status: SHIPPED | OUTPATIENT
Start: 2020-02-09

## 2020-02-09 RX ADMIN — OXYCODONE 5 MG: 5 TABLET ORAL at 03:46

## 2020-02-09 RX ADMIN — DULOXETINE HYDROCHLORIDE 120 MG: 60 CAPSULE, DELAYED RELEASE ORAL at 09:49

## 2020-02-09 RX ADMIN — INSULIN LISPRO 20 UNITS: 100 INJECTION, SOLUTION INTRAVENOUS; SUBCUTANEOUS at 08:09

## 2020-02-09 RX ADMIN — OXYCODONE 10 MG: 5 TABLET ORAL at 09:48

## 2020-02-09 RX ADMIN — TRIAMTERENE AND HYDROCHLOROTHIAZIDE 1 TABLET: 37.5; 25 TABLET ORAL at 09:48

## 2020-02-09 RX ADMIN — DOCUSATE SODIUM 100 MG: 100 CAPSULE, LIQUID FILLED ORAL at 09:48

## 2020-02-09 RX ADMIN — METOPROLOL TARTRATE 50 MG: 50 TABLET, FILM COATED ORAL at 09:48

## 2020-02-09 RX ADMIN — PANTOPRAZOLE SODIUM 40 MG: 40 TABLET, DELAYED RELEASE ORAL at 09:49

## 2020-02-09 NOTE — PROGRESS NOTES
Progress Note    Patient: Avinash Oglesby MRN: [id number]  SSN: [id number]    YOB: 1945  Age: 69 y.o. Sex: female      Admit Date: 2020    5 Days Post-Op    Procedure:  Procedure(s):  REPAIR OF VENTRAL INCISIONAL HERNIA WITH MESH    Subjective:     No acute surgical issues. Pt tolerating diet. No nausea or vomiting. Pain is under control. AILEEN is serosanguinous.     Objective:     Visit Vitals  /68 (BP 1 Location: Left arm, BP Patient Position: At rest)   Pulse 61   Temp 98.2 °F (36.8 °C)   Resp 16   Ht 5' 1\" (1.549 m)   Wt 216 lb 0.8 oz (98 kg)   SpO2 93%   BMI 40.82 kg/m²       Temp (24hrs), Av.2 °F (36.8 °C), Min:97.6 °F (36.4 °C), Max:99 °F (37.2 °C)        Physical Exam:    Gen:  NAD  Pulm:  Unlabored  Abd:  S/ND/appropriate TTP  Wound:  C/D/I  AILEEN with serosanguinous drainage    Recent Results (from the past 24 hour(s))   GLUCOSE, POC    Collection Time: 20  4:33 PM   Result Value Ref Range    Glucose (POC) 84 65 - 100 mg/dL    Performed by Tabatha Wick 201, POC    Collection Time: 20 10:00 PM   Result Value Ref Range    Glucose (POC) 119 (H) 65 - 100 mg/dL    Performed by Salvatore Ascencio    GLUCOSE, POC    Collection Time: 20  6:47 AM   Result Value Ref Range    Glucose (POC) 126 (H) 65 - 100 mg/dL    Performed by Dakota Lopez    GLUCOSE, POC    Collection Time: 20 12:16 PM   Result Value Ref Range    Glucose (POC) 81 65 - 100 mg/dL    Performed by Yoshi Spearing          Assessment:     Hospital Problems  Date Reviewed: 2020          Codes Class Noted POA    Ventral incisional hernia ICD-10-CM: K43.2  ICD-9-CM: 553.21  2020 Unknown        * (Principal) Incisional hernia, without obstruction or gangrene ICD-10-CM: K43.2  ICD-9-CM: 553.21  2019 Yes              Plan/Recommendations/Medical Decision Making:     - Diet as tolerated  - Pain control  - Encourage ambulation  - Plan DC home today with AILEEN drain  - Bowel regiment  - Follow-up with Dr. Asmita Lam for AILEEN drain removal in office

## 2020-02-09 NOTE — PROGRESS NOTES
Patient ready for discharge, patient to be discharged with drain, reviewed care for drain and emptying. Patient able to explain how to do it and demonstrated. All belongings packed and  sent with patient, reviewed discharge orders, removed IV, and gave prescriptions. No further questions at this time, patient transported home with son. Problem: Patient Education: Go to Patient Education Activity  Goal: Patient/Family Education  Outcome: Resolved/Met     Problem: Falls - Risk of  Goal: *Absence of Falls  Description  Document Bandar Garcia Fall Risk and appropriate interventions in the flowsheet.   Outcome: Resolved/Met  Note: Fall Risk Interventions:  Mobility Interventions: Communicate number of staff needed for ambulation/transfer         Medication Interventions: Evaluate medications/consider consulting pharmacy, Patient to call before getting OOB, Teach patient to arise slowly    Elimination Interventions: Patient to call for help with toileting needs              Problem: Patient Education: Go to Patient Education Activity  Goal: Patient/Family Education  Outcome: Resolved/Met     Problem: Patient Education:  Go to Education Activity  Goal: Patient/Family Education  Outcome: Resolved/Met     Problem: Patient Education: Go to Patient Education Activity  Goal: Patient/Family Education  Outcome: Resolved/Met     Problem: Discharge Planning  Goal: *Discharge to safe environment  Outcome: Resolved/Met  Goal: *Knowledge of medication management  Outcome: Resolved/Met  Goal: *Knowledge of discharge instructions  Outcome: Resolved/Met     Problem: Patient Education: Go to Patient Education Activity  Goal: Patient/Family Education  Outcome: Resolved/Met

## 2020-02-09 NOTE — PROGRESS NOTES
Bedside shift change report given to [address] (oncoming nurse) by Roxana Davidson RN (offgoing nurse). Report included the following information SBAR, Kardex, MAR and Recent Results.

## 2020-02-09 NOTE — PROGRESS NOTES
Bedside shift change report given to [address] (oncoming nurse) by Dory Leggett RN (offgoing nurse). Report included the following information SBAR, Kardex, MAR and Recent Results.

## 2020-02-09 NOTE — DISCHARGE INSTRUCTIONS
Patient Education     1. Do not drive while on pain medication. You should take a stool softener while on pain medication to prevent constipation. 2.  Do not lift anything greater than 10 pounds for 6 weeks. 3.  You may resume your home medications. 4.  You may shower but do not swim or soak in tub for 2 weeks. 5.  You will need to empty the AILEEN drain and record the output. Bring the record to see Dr. Mega Spence for drain removal.  6.  Please call [phone] to schedule a follow-up with Dr. Iris Amin within 2 weeks. Abdominal Hernia Repair: What to Expect at Home  Your Recovery  After surgery to repair your hernia, you are likely to have pain for a few days. You may also feel like you have the flu, and you may have a low fever and feel tired and nauseated. This is common. You should feel better after a few days and will probably feel much better in 7 days. For several weeks you may feel twinges or pulling in the hernia repair when you move. You may have some bruising around the area of your hernia repair. This is normal.  This care sheet gives you a general idea about how long it will take for you to recover. But each person recovers at a different pace. Follow the steps below to get better as quickly as possible. How can you care for yourself at home? Activity    · Rest when you feel tired. Getting enough sleep will help you recover.     · Try to walk each day. Start by walking a little more than you did the day before. Bit by bit, increase the amount you walk. Walking boosts blood flow and helps prevent pneumonia and constipation.     · If your doctor gives you an abdominal binder to wear, use it as directed. This is an elastic bandage that wraps around your belly and upper hips.  It helps support your belly muscles after surgery.     · Avoid strenuous activities, such as biking, jogging, weight lifting, or aerobic exercise, until your doctor says it is okay.     · Avoid lifting anything that would make you strain. This may include heavy grocery bags and milk containers, a heavy briefcase or backpack, cat litter or dog food bags, a vacuum , or a child.     · Ask your doctor when you can drive again.     · Most people are able to return to work within 1 to 2 weeks after surgery. But if your job requires that you to do heavy lifting or strenuous activity, you may need to take 4 to 6 weeks off from work.     · You may shower 24 to 48 hours after surgery, if your doctor okays it. Pat the cut (incision) dry. Do not take a bath for the first 2 weeks, or until your doctor tells you it is okay.     · Ask your doctor when it is okay for you to have sex. Diet    · You can eat your normal diet. If your stomach is upset, try bland, low-fat foods like plain rice, broiled chicken, toast, and yogurt.     · Drink plenty of fluids (unless your doctor tells you not to).     · You may notice that your bowel movements are not regular right after your surgery. This is common. Avoid constipation and straining with bowel movements. You may want to take a fiber supplement every day. If you have not had a bowel movement after a couple of days, ask your doctor about taking a mild laxative. Medicines    · Your doctor will tell you if and when you can restart your medicines. He or she will also give you instructions about taking any new medicines.     · If you take blood thinners, such as warfarin (Coumadin), clopidogrel (Plavix), or aspirin, be sure to talk to your doctor. He or she will tell you if and when to start taking those medicines again. Make sure that you understand exactly what your doctor wants you to do.     · Be safe with medicines. Take pain medicines exactly as directed. ? If the doctor gave you a prescription medicine for pain, take it as prescribed.   ? If you are not taking a prescription pain medicine, ask your doctor if you can take an over-the-counter medicine.     · If your doctor prescribed antibiotics, take them as directed. Do not stop taking them just because you feel better. You need to take the full course of antibiotics.     · If you think your pain medicine is making you sick to your stomach:  ? Take your medicine after meals (unless your doctor has told you not to). ? Ask your doctor for a different pain medicine. Incision care    · If you have strips of tape on the cut (incision) the doctor made, leave the tape on for a week or until it falls off. Or follow your doctor's instructions for removing the tape.     · If you have staples closing the cut, you will need to visit your doctor in 1 to 2 weeks to have them removed.     · Wash the area daily with warm, soapy water, and pat it dry. Don't use hydrogen peroxide or alcohol, which can slow healing. You may cover the area with a gauze bandage if it weeps or rubs against clothing. Change the bandage every day. Other instructions    · Hold a pillow over your incision when you cough or take deep breaths. This will support your belly and decrease your pain.     · Do breathing exercises at home as instructed by your doctor. This will help prevent pneumonia.     · If you had laparoscopic surgery, you may also have pain in your left shoulder. The pain usually lasts about a day or two. Follow-up care is a key part of your treatment and safety. Be sure to make and go to all appointments, and call your doctor if you are having problems. It's also a good idea to know your test results and keep a list of the medicines you take. When should you call for help? Call 911 anytime you think you may need emergency care.  For example, call if:    · You passed out (lost consciousness).     · You are short of breath.    Call your doctor now or seek immediate medical care if:    · You are sick to your stomach and cannot drink fluids.     · You have signs of a blood clot in your leg (called a deep vein thrombosis), such as:  ? Pain in your calf, back of the knee, thigh, or groin.  ? Redness and swelling in your leg or groin.     · You have signs of infection, such as:  ? Increased pain, swelling, warmth, or redness. ? Red streaks leading from the incision. ? Pus draining from the incision. ? A fever.     · You cannot pass stools or gas.     · You have pain that does not get better after you take pain medicine.     · You have loose stitches, or your incision comes open.     · Bright red blood has soaked through the bandage over your incision.    Watch closely for changes in your health, and be sure to contact your doctor if you have any problems. Where can you learn more? Go to http://www.gray.com/. Enter B577 in the search box to learn more about \"Abdominal Hernia Repair: What to Expect at Home. \"  Current as of: November 7, 2018  Content Version: 12.2  © 6143-5379 [employer]. Care instructions adapted under license by [employer] (which disclaims liability or warranty for this information). If you have questions about a medical condition or this instruction, always ask your healthcare professional. [address] any warranty or liability for your use of this information.

## 2020-02-13 NOTE — OP NOTES
[address]   OPERATIVE REPORT    Name:  Iraj Biswas  MR#:  [id number]  :  1945  ACCOUNT #:  [de-identified]  DATE OF SERVICE:  2020      PREOPERATIVE DIAGNOSIS:  Ventral incisional hernia. POSTOPERATIVE DIAGNOSIS:  Ventral incisional hernia. PROCEDURE PERFORMED:  Repair of ventral incisional hernia with underlay mesh. SURGEON:  Vazquez Mcdonnell MD    ASSISTANT:  Dez Zhong    ANESTHESIA:  General.    COMPLICATIONS:  None. SPECIMENS REMOVED:  Hernia sac. IMPLANTS:  20 x 25 cm piece of Phasix mesh. ESTIMATED BLOOD LOSS:  Minimal.    FINDINGS:  Findings were that of very large defect in the lower abdomen from her previous colon surgery. CONDITION:  Good to the PACU. PROCEDURE:  With the patient supine and suitably anesthetized, the abdomen was prepared with ChloraPrep and draped as a field. Previous midline incision was reopened and the hernia sac was identified and dissected circumferentially. It was resected. A lot of bowel stuck to the hernia wall, which was taken down sharply. The abdominal wall was then cleared circumferentially. A partial component separation was done on each side to allow for mobilization of the fascia medially. A piece of Phasix mesh was then placed underneath and secured to the peritoneum in multiple places. The fascia was then closed over this with interrupted 0 Ethibond sutures. The subcutaneous tissues were approximated with Vicryl and the skin was closed in the usual fashion. Dressings were applied. At the termination of the procedure, all counts were correct. The patient tolerated this well and was brought to the PACU in satisfactory condition.         Diane Ellis MD      [id number]  D:  2020 7:35  T:  2020 15:24  JOB #:  [id number]  CC:  Napoleon Lott MD

## 2020-02-13 NOTE — DISCHARGE SUMMARY
Physician Discharge Summary     Patient ID:  Abhniav Caraballo  [id number]  89 y.o.  1945    Allergies: Tylenol [acetaminophen]; Claritin [loratadine]; Codeine; and Percocet [oxycodone-acetaminophen]    Admit Date: 2/4/2020    Discharge Date: 2/9/2020  * Admission Diagnoses: Ventral incisional hernia [K43.2]    * Discharge Diagnoses:    Hospital Problems as of 2/9/2020 Date Reviewed: 2/4/2020          Codes Class Noted - Resolved POA    Ventral incisional hernia ICD-10-CM: K43.2  ICD-9-CM: 553.21  2/4/2020 - Present Unknown        * (Principal) Incisional hernia, without obstruction or gangrene ICD-10-CM: K43.2  ICD-9-CM: 553.21  11/6/2019 - Present Yes               Admission Condition: Good    * Discharge Condition: good    * Procedures: Procedure(s):  [address] Course:   Hospital course was complicated by incisional pain, which added 3 days to the patient's length of stay. Consults: None        * Disposition: Home    Discharge Medications:   Discharge Medication List as of 2/9/2020  1:04 PM      START taking these medications    Details   docusate sodium (COLACE) 100 mg capsule Take 1 Cap by mouth two (2) times a day., Print, Disp-30 Cap, R-0      oxyCODONE IR (ROXICODONE) 10 mg tab immediate release tablet Take 1 Tab by mouth every four (4) hours as needed for Pain for up to 5 days. Max Daily Amount: 60 mg., Print, Disp-24 Tab, R-0         CONTINUE these medications which have NOT CHANGED    Details   multivitamin (ONE A DAY) tablet Take 1 Tab by mouth daily. , Historical Med      triamterene-hydroCHLOROthiazide (MAXZIDE) 37.5-25 mg per tablet Take 1 Tab by mouth daily. , Historical Med      pantoprazole (PROTONIX) 40 mg tablet Take 40 mg by mouth daily. , Historical Med      insulin NPH (NOVOLIN N) 100 unit/mL injection 74 Units by SubCUTAneous route nightly., Historical Med      metoprolol (LOPRESSOR) 50 mg tablet Take 50 mg by mouth two (2) times a day. , Historical Med      baclofen (LIORESAL) 10 mg tablet Take 10 mg by mouth daily as needed., Historical Med      ondansetron hcl (ZOFRAN) 4 mg tablet Take 4 mg by mouth every four (4) hours as needed for Nausea., Historical Med      DULoxetine (CYMBALTA) 60 mg capsule Take 120 mg by mouth daily. , Historical Med      calcium carbonate (OS-DANI) 500 mg calcium (1,250 mg) tablet Take 3 Tabs by mouth nightly., Historical Med      !! insulin lispro (HUMALOG) 100 unit/mL injection 20 Units by SubCUTAneous route Daily (before breakfast). , Historical Med      !! insulin lispro (HUMALOG) 100 unit/mL injection 20 Units by SubCUTAneous route Daily (before lunch). , Historical Med      !! insulin lispro (HUMALOG) 100 unit/mL injection 24 Units by SubCUTAneous route Daily (before dinner). Before dinner , Historical Med       !! - Potential duplicate medications found. Please discuss with provider. * Follow-up Care/Patient Instructions:   Activity: Activity as tolerated  Diet: Regular Diet  Wound Care: Keep wound clean and dry    Follow-up Information     Follow up With Specialties Details Why [address] [place] [address] On 2/8/2020 Skilled homecare services (nursing and therapy) [place] [address] [address] [facility] He[place] Cabrales, NP Nurse Practitioner   [address] [place] [address] At [address] [place] [address]  [phone]              Signed:  Jaqueline Turner MD  2/13/2020  7:29 AM

## 2020-02-18 ENCOUNTER — HOSPITAL ENCOUNTER (EMERGENCY)
Age: 75
Discharge: ANOTHER HOSPITAL | End: 2020-02-18
Attending: EMERGENCY MEDICINE
Payer: MEDICARE

## 2020-02-18 ENCOUNTER — HOSPITAL ENCOUNTER (OUTPATIENT)
Age: 75
Setting detail: OBSERVATION
Discharge: HOME | End: 2020-02-27
Attending: EMERGENCY MEDICINE | Admitting: SURGERY
Payer: MEDICARE

## 2020-02-18 ENCOUNTER — APPOINTMENT (OUTPATIENT)
Dept: CT IMAGING | Age: 75
End: 2020-02-18
Attending: EMERGENCY MEDICINE
Payer: MEDICARE

## 2020-02-18 VITALS
HEART RATE: 81 BPM | BODY MASS INDEX: 40.02 KG/M2 | OXYGEN SATURATION: 100 % | RESPIRATION RATE: 20 BRPM | TEMPERATURE: 99.6 F | WEIGHT: 212 LBS | DIASTOLIC BLOOD PRESSURE: 59 MMHG | SYSTOLIC BLOOD PRESSURE: 124 MMHG | HEIGHT: 61 IN

## 2020-02-18 DIAGNOSIS — R58 BLEEDING: ICD-10-CM

## 2020-02-18 DIAGNOSIS — L76.22 POSTOPERATIVE HEMORRHAGE OF SUBCUTANEOUS TISSUE FOLLOWING NON-DERMATOLOGIC PROCEDURE: Primary | ICD-10-CM

## 2020-02-18 DIAGNOSIS — I97.631: ICD-10-CM

## 2020-02-18 DIAGNOSIS — R58 BLEEDING: Primary | ICD-10-CM

## 2020-02-18 LAB
ALBUMIN SERPL-MCNC: 2.9 G/DL (ref 3.5–5)
ALBUMIN/GLOB SERPL: 0.7 {RATIO} (ref 1.1–2.2)
ALP SERPL-CCNC: 129 U/L (ref 45–117)
ALT SERPL-CCNC: 15 U/L (ref 12–78)
ANION GAP SERPL CALC-SCNC: 7 MMOL/L (ref 5–15)
AST SERPL-CCNC: 16 U/L (ref 15–37)
BASOPHILS # BLD: 0 K/UL (ref 0–0.1)
BASOPHILS NFR BLD: 0 % (ref 0–1)
BILIRUB SERPL-MCNC: 1 MG/DL (ref 0.2–1)
BUN SERPL-MCNC: 14 MG/DL (ref 6–20)
BUN/CREAT SERPL: 14 (ref 12–20)
CALCIUM SERPL-MCNC: 7.8 MG/DL (ref 8.5–10.1)
CHLORIDE SERPL-SCNC: 101 MMOL/L (ref 97–108)
CO2 SERPL-SCNC: 24 MMOL/L (ref 21–32)
COMMENT, HOLDF: NORMAL
CREAT SERPL-MCNC: 1.01 MG/DL (ref 0.55–1.02)
DIFFERENTIAL METHOD BLD: ABNORMAL
EOSINOPHIL # BLD: 0.1 K/UL (ref 0–0.4)
EOSINOPHIL NFR BLD: 1 % (ref 0–7)
ERYTHROCYTE [DISTWIDTH] IN BLOOD BY AUTOMATED COUNT: 15.2 % (ref 11.5–14.5)
GLOBULIN SER CALC-MCNC: 4.2 G/DL (ref 2–4)
GLUCOSE SERPL-MCNC: 319 MG/DL (ref 65–100)
HCT VFR BLD AUTO: 26.3 % (ref 35–47)
HGB BLD-MCNC: 8.5 G/DL (ref 11.5–16)
IMM GRANULOCYTES # BLD AUTO: 0.1 K/UL (ref 0–0.04)
IMM GRANULOCYTES NFR BLD AUTO: 1 % (ref 0–0.5)
LYMPHOCYTES # BLD: 2.5 K/UL (ref 0.8–3.5)
LYMPHOCYTES NFR BLD: 15 % (ref 12–49)
MCH RBC QN AUTO: 27.7 PG (ref 26–34)
MCHC RBC AUTO-ENTMCNC: 32.3 G/DL (ref 30–36.5)
MCV RBC AUTO: 85.7 FL (ref 80–99)
MONOCYTES # BLD: 1.4 K/UL (ref 0–1)
MONOCYTES NFR BLD: 9 % (ref 5–13)
NEUTS SEG # BLD: 12.2 K/UL (ref 1.8–8)
NEUTS SEG NFR BLD: 74 % (ref 32–75)
NRBC # BLD: 0 K/UL (ref 0–0.01)
NRBC BLD-RTO: 0 PER 100 WBC
PLATELET # BLD AUTO: 67 K/UL (ref 150–400)
PMV BLD AUTO: 12.3 FL (ref 8.9–12.9)
POTASSIUM SERPL-SCNC: 3.8 MMOL/L (ref 3.5–5.1)
PROT SERPL-MCNC: 7.1 G/DL (ref 6.4–8.2)
RBC # BLD AUTO: 3.07 M/UL (ref 3.8–5.2)
SAMPLES BEING HELD,HOLD: NORMAL
SODIUM SERPL-SCNC: 132 MMOL/L (ref 136–145)
WBC # BLD AUTO: 16.3 K/UL (ref 3.6–11)

## 2020-02-18 PROCEDURE — 36415 COLL VENOUS BLD VENIPUNCTURE: CPT

## 2020-02-18 PROCEDURE — 80053 COMPREHEN METABOLIC PANEL: CPT

## 2020-02-18 PROCEDURE — 74177 CT ABD & PELVIS W/CONTRAST: CPT

## 2020-02-18 PROCEDURE — 74011636320 HC RX REV CODE- 636/320: Performed by: EMERGENCY MEDICINE

## 2020-02-18 PROCEDURE — 85025 COMPLETE CBC W/AUTO DIFF WBC: CPT

## 2020-02-18 PROCEDURE — 99285 EMERGENCY DEPT VISIT HI MDM: CPT

## 2020-02-18 PROCEDURE — 99284 EMERGENCY DEPT VISIT MOD MDM: CPT

## 2020-02-18 RX ADMIN — IOPAMIDOL 100 ML: 755 INJECTION, SOLUTION INTRAVENOUS at 20:45

## 2020-02-18 NOTE — ED TRIAGE NOTES
Pt arrives via EMS from home. Pt had hernia surgery on the  4th. Stapes to mid abdomen noted, wound dehiscence noted to upper half. Bleeding controlled, 3 ABD pads used by EMS. AILEEN drain noted to RUQ. Pt denies pain at this time.

## 2020-02-19 ENCOUNTER — TELEPHONE (OUTPATIENT)
Dept: ENDOCRINOLOGY | Age: 75
End: 2020-02-19

## 2020-02-19 PROBLEM — I97.631 POSTOPERATIVE HEMATOMA INVOLVING CIRCULATORY SYSTEM FOLLOWING CARDIAC BYPASS: Status: ACTIVE | Noted: 2020-02-19

## 2020-02-19 LAB
ABO + RH BLD: NORMAL
ANION GAP SERPL CALC-SCNC: 7 MMOL/L (ref 5–15)
BASOPHILS # BLD: 0 K/UL (ref 0–0.1)
BASOPHILS NFR BLD: 0 % (ref 0–1)
BLOOD GROUP ANTIBODIES SERPL: NORMAL
BUN SERPL-MCNC: 12 MG/DL (ref 6–20)
BUN/CREAT SERPL: 15 (ref 12–20)
CALCIUM SERPL-MCNC: 8.6 MG/DL (ref 8.5–10.1)
CHLORIDE SERPL-SCNC: 102 MMOL/L (ref 97–108)
CO2 SERPL-SCNC: 23 MMOL/L (ref 21–32)
COMMENT, HOLDF: NORMAL
CREAT SERPL-MCNC: 0.81 MG/DL (ref 0.55–1.02)
DIFFERENTIAL METHOD BLD: ABNORMAL
EOSINOPHIL # BLD: 0.3 K/UL (ref 0–0.4)
EOSINOPHIL NFR BLD: 2 % (ref 0–7)
ERYTHROCYTE [DISTWIDTH] IN BLOOD BY AUTOMATED COUNT: 15.4 % (ref 11.5–14.5)
EST. AVERAGE GLUCOSE BLD GHB EST-MCNC: 143 MG/DL
GLUCOSE BLD STRIP.AUTO-MCNC: 171 MG/DL (ref 65–100)
GLUCOSE BLD STRIP.AUTO-MCNC: 212 MG/DL (ref 65–100)
GLUCOSE BLD STRIP.AUTO-MCNC: 216 MG/DL (ref 65–100)
GLUCOSE BLD STRIP.AUTO-MCNC: 286 MG/DL (ref 65–100)
GLUCOSE SERPL-MCNC: 251 MG/DL (ref 65–100)
HBA1C MFR BLD: 6.6 % (ref 4–5.6)
HCT VFR BLD AUTO: 27.4 % (ref 35–47)
HGB BLD-MCNC: 8.7 G/DL (ref 11.5–16)
IMM GRANULOCYTES # BLD AUTO: 0 K/UL (ref 0–0.04)
IMM GRANULOCYTES NFR BLD AUTO: 0 % (ref 0–0.5)
INR PPP: 1.2 (ref 0.9–1.1)
LYMPHOCYTES # BLD: 2.3 K/UL (ref 0.8–3.5)
LYMPHOCYTES NFR BLD: 17 % (ref 12–49)
MAGNESIUM SERPL-MCNC: 1.9 MG/DL (ref 1.6–2.4)
MCH RBC QN AUTO: 27.3 PG (ref 26–34)
MCHC RBC AUTO-ENTMCNC: 31.8 G/DL (ref 30–36.5)
MCV RBC AUTO: 85.9 FL (ref 80–99)
MONOCYTES # BLD: 1.2 K/UL (ref 0–1)
MONOCYTES NFR BLD: 9 % (ref 5–13)
NEUTS SEG # BLD: 9.8 K/UL (ref 1.8–8)
NEUTS SEG NFR BLD: 72 % (ref 32–75)
NRBC # BLD: 0 K/UL (ref 0–0.01)
NRBC BLD-RTO: 0 PER 100 WBC
PLATELET # BLD AUTO: 95 K/UL (ref 150–400)
PMV BLD AUTO: 12.3 FL (ref 8.9–12.9)
POTASSIUM SERPL-SCNC: 3.6 MMOL/L (ref 3.5–5.1)
PROTHROMBIN TIME: 12.1 SEC (ref 9–11.1)
RBC # BLD AUTO: 3.19 M/UL (ref 3.8–5.2)
SAMPLES BEING HELD,HOLD: NORMAL
SERVICE CMNT-IMP: ABNORMAL
SODIUM SERPL-SCNC: 132 MMOL/L (ref 136–145)
SPECIMEN EXP DATE BLD: NORMAL
WBC # BLD AUTO: 13.6 K/UL (ref 3.6–11)

## 2020-02-19 PROCEDURE — 85610 PROTHROMBIN TIME: CPT

## 2020-02-19 PROCEDURE — 96376 TX/PRO/DX INJ SAME DRUG ADON: CPT

## 2020-02-19 PROCEDURE — 99218 HC RM OBSERVATION: CPT

## 2020-02-19 PROCEDURE — 80048 BASIC METABOLIC PNL TOTAL CA: CPT

## 2020-02-19 PROCEDURE — 86900 BLOOD TYPING SEROLOGIC ABO: CPT

## 2020-02-19 PROCEDURE — 82962 GLUCOSE BLOOD TEST: CPT

## 2020-02-19 PROCEDURE — 96361 HYDRATE IV INFUSION ADD-ON: CPT

## 2020-02-19 PROCEDURE — 74011000258 HC RX REV CODE- 258: Performed by: SURGERY

## 2020-02-19 PROCEDURE — 96366 THER/PROPH/DIAG IV INF ADDON: CPT

## 2020-02-19 PROCEDURE — 74011250637 HC RX REV CODE- 250/637: Performed by: SURGERY

## 2020-02-19 PROCEDURE — 85025 COMPLETE CBC W/AUTO DIFF WBC: CPT

## 2020-02-19 PROCEDURE — 74011250636 HC RX REV CODE- 250/636: Performed by: SURGERY

## 2020-02-19 PROCEDURE — 96375 TX/PRO/DX INJ NEW DRUG ADDON: CPT

## 2020-02-19 PROCEDURE — 83735 ASSAY OF MAGNESIUM: CPT

## 2020-02-19 PROCEDURE — 83036 HEMOGLOBIN GLYCOSYLATED A1C: CPT

## 2020-02-19 PROCEDURE — 96365 THER/PROPH/DIAG IV INF INIT: CPT

## 2020-02-19 PROCEDURE — 36415 COLL VENOUS BLD VENIPUNCTURE: CPT

## 2020-02-19 PROCEDURE — 74011636637 HC RX REV CODE- 636/637: Performed by: SURGERY

## 2020-02-19 RX ORDER — NALOXONE HYDROCHLORIDE 0.4 MG/ML
0.4 INJECTION, SOLUTION INTRAMUSCULAR; INTRAVENOUS; SUBCUTANEOUS AS NEEDED
Status: DISCONTINUED | OUTPATIENT
Start: 2020-02-19 | End: 2020-02-27 | Stop reason: HOSPADM

## 2020-02-19 RX ORDER — SODIUM CHLORIDE 0.9 % (FLUSH) 0.9 %
5-40 SYRINGE (ML) INJECTION EVERY 8 HOURS
Status: DISCONTINUED | OUTPATIENT
Start: 2020-02-19 | End: 2020-02-27 | Stop reason: HOSPADM

## 2020-02-19 RX ORDER — ONDANSETRON 2 MG/ML
4 INJECTION INTRAMUSCULAR; INTRAVENOUS
Status: DISCONTINUED | OUTPATIENT
Start: 2020-02-19 | End: 2020-02-27 | Stop reason: HOSPADM

## 2020-02-19 RX ORDER — HYDROMORPHONE HYDROCHLORIDE 1 MG/ML
.5-1 INJECTION, SOLUTION INTRAMUSCULAR; INTRAVENOUS; SUBCUTANEOUS
Status: DISCONTINUED | OUTPATIENT
Start: 2020-02-19 | End: 2020-02-27 | Stop reason: HOSPADM

## 2020-02-19 RX ORDER — SODIUM CHLORIDE 0.9 % (FLUSH) 0.9 %
5-40 SYRINGE (ML) INJECTION AS NEEDED
Status: DISCONTINUED | OUTPATIENT
Start: 2020-02-19 | End: 2020-02-27 | Stop reason: HOSPADM

## 2020-02-19 RX ORDER — METOPROLOL TARTRATE 50 MG/1
50 TABLET ORAL 2 TIMES DAILY
Status: DISCONTINUED | OUTPATIENT
Start: 2020-02-19 | End: 2020-02-27 | Stop reason: HOSPADM

## 2020-02-19 RX ORDER — ACETAMINOPHEN 325 MG/1
650 TABLET ORAL
Status: DISCONTINUED | OUTPATIENT
Start: 2020-02-19 | End: 2020-02-27 | Stop reason: HOSPADM

## 2020-02-19 RX ORDER — INSULIN LISPRO 100 [IU]/ML
INJECTION, SOLUTION INTRAVENOUS; SUBCUTANEOUS EVERY 6 HOURS
Status: DISCONTINUED | OUTPATIENT
Start: 2020-02-19 | End: 2020-02-25

## 2020-02-19 RX ORDER — PANTOPRAZOLE SODIUM 40 MG/1
40 TABLET, DELAYED RELEASE ORAL
Status: DISCONTINUED | OUTPATIENT
Start: 2020-02-19 | End: 2020-02-27 | Stop reason: HOSPADM

## 2020-02-19 RX ORDER — DEXTROSE MONOHYDRATE 100 MG/ML
0-250 INJECTION, SOLUTION INTRAVENOUS AS NEEDED
Status: DISCONTINUED | OUTPATIENT
Start: 2020-02-19 | End: 2020-02-27 | Stop reason: HOSPADM

## 2020-02-19 RX ORDER — DULOXETIN HYDROCHLORIDE 60 MG/1
120 CAPSULE, DELAYED RELEASE ORAL DAILY
Status: DISCONTINUED | OUTPATIENT
Start: 2020-02-19 | End: 2020-02-27 | Stop reason: HOSPADM

## 2020-02-19 RX ORDER — TRIAMTERENE/HYDROCHLOROTHIAZID 37.5-25 MG
1 TABLET ORAL DAILY
Status: DISCONTINUED | OUTPATIENT
Start: 2020-02-19 | End: 2020-02-27 | Stop reason: HOSPADM

## 2020-02-19 RX ORDER — SODIUM CHLORIDE, SODIUM LACTATE, POTASSIUM CHLORIDE, CALCIUM CHLORIDE 600; 310; 30; 20 MG/100ML; MG/100ML; MG/100ML; MG/100ML
25 INJECTION, SOLUTION INTRAVENOUS CONTINUOUS
Status: DISCONTINUED | OUTPATIENT
Start: 2020-02-19 | End: 2020-02-27 | Stop reason: HOSPADM

## 2020-02-19 RX ORDER — MAGNESIUM SULFATE 100 %
4 CRYSTALS MISCELLANEOUS AS NEEDED
Status: DISCONTINUED | OUTPATIENT
Start: 2020-02-19 | End: 2020-02-27 | Stop reason: HOSPADM

## 2020-02-19 RX ADMIN — INSULIN LISPRO 3 UNITS: 100 INJECTION, SOLUTION INTRAVENOUS; SUBCUTANEOUS at 23:34

## 2020-02-19 RX ADMIN — ONDANSETRON 4 MG: 2 INJECTION, SOLUTION INTRAMUSCULAR; INTRAVENOUS at 03:41

## 2020-02-19 RX ADMIN — TRIAMTERENE AND HYDROCHLOROTHIAZIDE 1 TABLET: 37.5; 25 TABLET ORAL at 10:57

## 2020-02-19 RX ADMIN — PIPERACILLIN AND TAZOBACTAM 3.38 G: 3; .375 INJECTION, POWDER, LYOPHILIZED, FOR SOLUTION INTRAVENOUS at 04:28

## 2020-02-19 RX ADMIN — DULOXETINE HYDROCHLORIDE 120 MG: 60 CAPSULE, DELAYED RELEASE ORAL at 10:57

## 2020-02-19 RX ADMIN — INSULIN LISPRO 2 UNITS: 100 INJECTION, SOLUTION INTRAVENOUS; SUBCUTANEOUS at 18:00

## 2020-02-19 RX ADMIN — PIPERACILLIN AND TAZOBACTAM 3.38 G: 3; .375 INJECTION, POWDER, LYOPHILIZED, FOR SOLUTION INTRAVENOUS at 20:15

## 2020-02-19 RX ADMIN — Medication 10 ML: at 03:37

## 2020-02-19 RX ADMIN — METOPROLOL TARTRATE 50 MG: 50 TABLET, FILM COATED ORAL at 10:57

## 2020-02-19 RX ADMIN — PIPERACILLIN AND TAZOBACTAM 3.38 G: 3; .375 INJECTION, POWDER, LYOPHILIZED, FOR SOLUTION INTRAVENOUS at 12:03

## 2020-02-19 RX ADMIN — SODIUM CHLORIDE, SODIUM LACTATE, POTASSIUM CHLORIDE, AND CALCIUM CHLORIDE 100 ML/HR: 600; 310; 30; 20 INJECTION, SOLUTION INTRAVENOUS at 04:27

## 2020-02-19 RX ADMIN — INSULIN LISPRO 5 UNITS: 100 INJECTION, SOLUTION INTRAVENOUS; SUBCUTANEOUS at 07:23

## 2020-02-19 RX ADMIN — HYDROMORPHONE HYDROCHLORIDE 1 MG: 1 INJECTION, SOLUTION INTRAMUSCULAR; INTRAVENOUS; SUBCUTANEOUS at 12:00

## 2020-02-19 RX ADMIN — PANTOPRAZOLE SODIUM 40 MG: 40 TABLET, DELAYED RELEASE ORAL at 10:58

## 2020-02-19 RX ADMIN — INSULIN LISPRO 3 UNITS: 100 INJECTION, SOLUTION INTRAVENOUS; SUBCUTANEOUS at 12:00

## 2020-02-19 RX ADMIN — HYDROMORPHONE HYDROCHLORIDE 1 MG: 1 INJECTION, SOLUTION INTRAMUSCULAR; INTRAVENOUS; SUBCUTANEOUS at 03:38

## 2020-02-19 RX ADMIN — HYDROMORPHONE HYDROCHLORIDE 1 MG: 1 INJECTION, SOLUTION INTRAMUSCULAR; INTRAVENOUS; SUBCUTANEOUS at 17:24

## 2020-02-19 NOTE — H&P
[address]   HISTORY AND PHYSICAL    Name:  Zabrina Banda  MR#:  [id number]  :  1945  ACCOUNT #:  [de-identified]  ADMIT DATE:  2020    HISTORY OF PRESENT ILLNESS:  The patient is a 72-year-old female with a history of diverticulitis, hypertension, arthritis who underwent an open incisional hernia repair with mesh by Dr. Laurie Mora on 2020. She had been doing well until today when she started noticing significant amount of bleeding coming from her abdominal wall. Previously, she had been to her primary care doctor's office the day before and it was found that everything was normal.  She denies any dizziness or lightheadedness. She has never had anything similar to this in the past.    PAST MEDICAL HISTORY:  Significant for;  1. Palpitations. 2.  Arthritis. 3.  Fibromyalgia. 4.  Chronic pain. 5.  Depression. 6.  Diabetes. 7.  Diverticulitis. 8.  Pancreatic cancer. 9.  Hemachromatosis. 10.  Hypertension. 11.  Peptic ulcer disease. PAST SURGICAL HISTORY:  Includes appendectomy, benign lumpectomy, colectomy, colonoscopy, hysterectomy, Whipple, salpingo-oophorectomy, tonsillectomy, bladder tract, and ventral hernia repair 1 week ago. FAMILY HISTORY:  Significant for heart failure in her mother, diabetes in her mother, hypertension, heart disease, depression. SOCIAL HISTORY:  She is a former smoker. She does not drink any alcohol. REVIEW OF SYSTEMS:  Otherwise negative. ALLERGIES:  SHE DENIES ALLERGIES, THOUGH THEY ARE LISTED IN HER CHART. PHYSICAL EXAMINATION:  VITAL SIGNS:  Her temperature is 99 with a pulse of 82, blood pressure is 125/57. GENERAL:  She is alert and oriented x4, no distress. HEENT:  Normocephalic, atraumatic. Her eyes are anicteric. NECK:  Supple. LUNGS:  Clear to auscultation. HEART:  Regular rate and rhythm. ABDOMEN:  Soft. There is some dark blood from her AILEEN.   There is also dark type blood coming from a pinpoint hole in her umbilicus. This was suctioned for approximately 300 mL. Her abdomen was much softer after this. This was packed with gauze and covered with tape. EXTREMITIES:  No clubbing, cyanosis, or edema. Incision is intact without erythema. LABORATORY DATA:  Laboratory studies show a white count of 16.3 with an H and H of 8.5 and 26.2 with a platelet count of 67. Sodium 132, potassium 3.8, BUN 14, creatinine 1.01. LFTs are essentially normal.    CT scan was performed. This showed an interval demonstration of large anterior abdominal subcutaneous collection with drain in place and higher areas of attenuation suggesting acute hemorrhage. ASSESSMENT:  This is a 57-year-old female with some postoperative hemorrhage, status post ventral hernia repair. She will remain n.p.o. for now. It is unclear whether or not this is acute bleeding or whether or not this is old bleeding that we are seeing. We will place an abdominal binder on her. She will remain n.p.o. She may require operative exploration if there is. We will recheck the labs later. Unfortunately,  the patient is a [employer] and will not accept any blood products. This was discussed with Dr. Gui Pérez at the time of her examination.       Altamese Apley, MD      [id number]  D:  02/19/2020 1:29  T:  02/19/2020 4:08  JOB #:  [id number]

## 2020-02-19 NOTE — PROGRESS NOTES
Pt allowed nurse to draw a type and cross since it was ordered by the doctor but she stated \"no blood and Dr. Jo Cannon knows that\". I asked why she didn't want blood in the event of an emergency she stated she is a [employer].

## 2020-02-19 NOTE — WOUND CARE
WOCN Note:  
 
New consult placed by RN for post op abdominal wound from 2-4-20. Patient going to OR tonight. Please reconsult post op if needed. Transition of Care: Going to OR. [place] [facility] RN Wound Care Department Office: [phone] Pager: [phone]

## 2020-02-19 NOTE — PROGRESS NOTES
Looks very good. Incision without any evidence of infection. AILEEN draining old blood. Discussed with her and her son that we have no evidence of ongoing bleeding, and that we were dealing with a postop wound hemotoma that evacuated spontaneously. Will rescan in 2 days.

## 2020-02-19 NOTE — DIABETES MGMT
[facility]  CLINICAL NURSE SPECIALIST CONSULT  PROGRAM FOR DIABETES HEALTH    Presentation   Lisa Stafford is a 69 y.o. female admitted with a wound dehiscence and acute abdominal hemorrhage. Current clinical course has been complicated by hyperglycemia. Patient has known diabetes. Consulted by Provider for advanced diabetes nursing assessment and care, specifically related to     [x] Inpatient management strategy  [x] Home management assessment      Diabetes-related medical history  Acute complications: Hyperglycemia  Neurological complications  Peripheral neuropathy  Microvascular disease: None  Macrovascular disease: None  Other associated conditions     Depression    Diabetes medication history  Drug class Currently in use Discontinued Never used   Biguanide      DDP-4 inhibitor       Sulfonylurea      Thiazolidinedione      GLP-1 RA      SGLT-2 inhibitors      Basal insulin 74 units Lantus every evening     Bolus insulin 20 units Humalog with B/L  24 units Humalog with dinner       Subjective   Ms Xiomara Romo is a 69year old female who was admitted overnight from an abdominal wound dehiscence and abdominal hemorrhage. She is s/p a ventral incisional hernia repair on 2/4. She does have a past medical history significant for diverticulitis, HTN, arthritis, pancreatic cancer, depression, PUD, GERD, fatty liver, and fibromyalgia who presents from private vehicle with chief complaint of wound check. Pt underwent a ventral incisional hernia repair with mesh, performed by Ilya Shannon MD (General Surgery) on 02/04/20. She has been managed with Diabetes for over 10 years and was taking metformin until she was treated for pancreatic cancer in 2013. She has been on insulin since that time and managed by her PCP. She stated that her A1C was over 10% this past year and was told by her surgeon that she needed to drop her A1C for safe surgery. Her A1C was last drawn in November and it was 7.6%.   Her A1C today was 6.6%. She lives independently. Her son has been with her over the past two weeks to help at home. She has been taking her blood glucose 4 times daily and does not miss any insulin doses. She has added natural spices such as cinnamon and tumeric to her diet, stopped \"sugery drinks\" and reports a 40 lb weight loss this year. She does c/o some episodes of hypoglycemia at home where she feels \"jittery, weak and sweaty\". She reports a decrease in appetite since abdominal surgery in the past few weeks. Objective   Physical exam  General Alert, oriented and in acute distress. Conversant and cooperative  Vital Signs   Visit Vitals  /65 (BP 1 Location: Right arm, BP Patient Position: At rest;Supine)   Pulse 78   Temp 98.2 °F (36.8 °C)   Resp 16   Ht 5' 1\" (1.549 m)   Wt 96.2 kg (212 lb)   SpO2 96%   BMI 40.06 kg/m²   . Skin  Warm and dry. No Acanthosis noted along neckline. No lipohypertrophy or lipoatrophy noted at injection sites   Heart   Regular rate and rhythm. No murmurs, rubs or gallops  Lungs  Clear without rales or rhonchi  Extremities Diabetic foot exam:    Left Foot     Visual Exam: normal    Pulse DP: 2+ (normal)   Filament test: 4/6   Vibratory sensation: normal  Right Foot   Visual Exam: normal    Pulse DP: 2+ (normal)   Filament test: 4/6   Vibratory sensation: diminished DP & PT pulses +2.      Laboratory  Lab Results   Component Value Date/Time    Hemoglobin A1c 6.6 (H) 02/19/2020 03:55 AM     No results found for: LDL, LDLC, DLDLP  Lab Results   Component Value Date/Time    Creatinine (POC) 0.7 01/09/2019 11:20 AM    Creatinine 0.81 02/19/2020 03:55 AM     Lab Results   Component Value Date/Time    Sodium 132 (L) 02/19/2020 03:55 AM    Potassium 3.6 02/19/2020 03:55 AM    Chloride 102 02/19/2020 03:55 AM    CO2 23 02/19/2020 03:55 AM    Anion gap 7 02/19/2020 03:55 AM    Glucose 251 (H) 02/19/2020 03:55 AM    BUN 12 02/19/2020 03:55 AM    Creatinine 0.81 02/19/2020 03:55 AM BUN/Creatinine ratio 15 02/19/2020 03:55 AM    GFR est AA >60 02/19/2020 03:55 AM    GFR est non-AA >60 02/19/2020 03:55 AM    Calcium 8.6 02/19/2020 03:55 AM    Bilirubin, total 1.0 02/18/2020 07:14 PM    AST (SGOT) 16 02/18/2020 07:14 PM    Alk. phosphatase 129 (H) 02/18/2020 07:14 PM    Protein, total 7.1 02/18/2020 07:14 PM    Albumin 2.9 (L) 02/18/2020 07:14 PM    Globulin 4.2 (H) 02/18/2020 07:14 PM    A-G Ratio 0.7 (L) 02/18/2020 07:14 PM    ALT (SGPT) 15 02/18/2020 07:14 PM     Lab Results   Component Value Date/Time    ALT (SGPT) 15 02/18/2020 07:14 PM       Blood glucose pattern        Assessment and Plan   Nursing Diagnosis Risk for unstable blood glucose pattern   Nursing Intervention Domain 5258 Decision-making Support   Nursing Interventions Examined current inpatient diabetes control   Explored factors facilitating and impeding inpatient management  Identified self-management practices impeding diabetes control  Explored corrective strategies with patient and responsible inpatient provider   Informed patient of rational for insulin strategy while hospitalized  Instructed patient in hypoglycemia, inpatient strategy, peripheral neuropathy     Evaluation   This woman with insulin dependent diabetes hasn't achieved inpatient blood glucose target of 100-180mg/dl. She has insulin dependent diabetes and long acting insulin needs to be initiated. Inpatient blood glucose management has been impacted by  [x] Stress induced hyperglycemia  [x] Erratic meal consumption     I also suspect some episodes of hypoglycemia at home due to her weight loss and change in appetite. An evaluation of glucose patterns over admission will help to determine her needs. Recommendations   Recommend:  1. Start Basal insulin   [x] 0.4 units/kg/D: 40 units Lantus Daily    Daily evaluation fasting blood glucose will be required to determine the appropriate amount of Lantus.  If fasting blood glucose is over 200, please add the total amount of correctional Humalog received the day before to total Lantus dose. Ok to split Lantus BID for larger doses. 2. When tolerating meals, start Bolus insulin  [x] Insulin-resistant sensitivity (BMI > 27) 12 units Humalog with meals  If she consumes less than 50% of carbohydrates on tray, do not give mealtime insulin    3. Corrective insulin   Insulin-resistant sensitivity (BMI >27)   Insulin Resistant Correctional Scale     200-249: 4 units Humalog   250-299: 8 units Humalog   300-349: 12 units Humalog   350-399: 16 units Humalog   over 400: 20 units Humalog     DO NOT hold correctional dosing if NPO   Give in addition of mealtime insulin     4. Follow up appointment with endocrinology. She was  scheduled to see Dr Micah Anderson with [facility].   He no  longer works for this group      Billing Code(s)     [id number]; [address] [facility]  Access via [employer] [employer] [phone]

## 2020-02-19 NOTE — ED PROVIDER NOTES
69 y.o. female with past medical history significant for diverticulitis, HTN, arthritis, pancreatic cancer, depression, PUD, GERD, fatty liver, and fibromyalgia who presents from private vehicle with chief complaint of wound check. Pt underwent a ventral incisional hernia repair with mesh, performed by Debbie Rodriguez MD (General Surgery) on 02/04/20. Pt reports a significant bleeding from the incision site approximately 1.5 hours ago. Pt notes completely soaking \"3 facial towels\" and \"2 abdominal pads\" with blood. Pt notes she was examined at her PCP's office yesterday and was \"told everything looked fine\". Pt reports increased abdominal pain yesterday, which has improved today. Pt denies dizziness, lightheadedness, chest pain, or SOB. There are no other acute medical concerns at this time. PCP: Maurice Cook NP    Note written by Bruno Brittle, Scribe, as dictated by Mary Munson MD 7:10 PM        The history is provided by the patient and a relative. No  was used. Past Medical History:   Diagnosis Date    Adverse effect of anesthesia     \"I'M CRAZY WHEN I WAKE UP, I'M CRAZY.   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    PUD (peptic ulcer disease) 2019    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    Unspecified sleep apnea     NO C-PAP       Past Surgical History:   Procedure Laterality Date    COLONOSCOPY N/A 1/15/2019    COLON[facility]ancho [facility] at [facility] ENDOSCOPY    HX APPENDECTOMY      HX BREAST LUMPECTOMY Left     lumpectomy BENIGN    HX COLECTOMY      sigmoid    HX COLONOSCOPY      HX HYSTERECTOMY      hysto    HX ORTHOPAEDIC Left     shoulder CYST REMOVAL    HX OTHER SURGICAL  13    pyloric-sparing whipple,bx of portal and hepatic nodes by Dr Eddie Talbert      ileostomy-AND REVERSAL    HX SALPINGO-OOPHORECTOMY Bilateral     HX TONSILLECTOMY      HX UROLOGICAL  2008    BLADDER TUCK         Family History:   Problem Relation Age of Onset    Heart Failure Mother     Diabetes Mother     Hypertension Mother    [address] [facility] [address] Anesth Problems Mother         HARD TO INTUBATE    Heart Failure Father     Kidney Disease Father         WAS ON DIALYSIS    Diabetes Sister     Depression Sister     Anesth Problems Sister         HARD TO INTUBATE    Depression Sister         BIPOLAR    Other Sister         DIFFICULTY INTUBATING    Cancer Sister         ADENOMA    Asthma Sister     Other Son         HEMATOMACHROSIS    No Known Problems Daughter        Social History     Socioeconomic History    Marital status:      Spouse name: Not on file    Number of children: Not on file    Years of education: Not on file    Highest education level: Not on file   Occupational History    Not on file   Social Needs    Financial resource strain: Not on file    Food insecurity:     Worry: Not on file     Inability: Not on file    Transportation needs:     Medical: Not on file     Non-medical: Not on file   Tobacco Use    Smoking status: Former Smoker     Packs/day: 1.00     Years: 17.00     Pack years: 17.00     Last attempt to quit: 1983     Years since quittin.1    Smokeless tobacco: Never Used   Substance and Sexual Activity    Alcohol use: No    Drug use: No    Sexual activity: Not on file   Lifestyle    Physical activity:     Days per week: Not on file     Minutes per session: Not on file    Stress: Not on file   Relationships    Social connections:     Talks on phone: Not on file     Gets together: Not on file     Attends Sikh service: Not on file     Active member of club or organization: Not on file     Attends meetings of clubs or organizations: Not on file     Relationship status: Not on file    Intimate partner violence:     Fear of current or ex partner: Not on file     Emotionally abused: Not on file     Physically abused: Not on file     Forced sexual activity: Not on file   Other Topics Concern    Not on file   Social History Narrative    Not on file         ALLERGIES: Tylenol [acetaminophen]; Claritin [loratadine]; Codeine; and Percocet [oxycodone-acetaminophen]    Review of Systems   Constitutional: Negative for chills and fever. HENT: Negative for ear pain and sore throat. Eyes: Negative for pain. Respiratory: Negative for chest tightness and shortness of breath. Cardiovascular: Negative for chest pain. Gastrointestinal: Positive for abdominal pain. Genitourinary: Negative for flank pain. Musculoskeletal: Negative for back pain. Skin: Positive for wound. Negative for rash. Neurological: Negative for headaches. All other systems reviewed and are negative. Vitals:    02/18/20 1902   BP: 125/42   Pulse: 81   Resp: 20   Temp: 99.6 °F (37.6 °C)   SpO2: 98%   Weight: 96.2 kg (212 lb)   Height: 5' 1\" (1.549 m)            Physical Exam  Vitals signs and nursing note reviewed. Constitutional:       General: She is not in acute distress. HENT:      Head: Normocephalic and atraumatic. Mouth/Throat:      Mouth: Mucous membranes are moist.   Eyes:      General: No scleral icterus. Conjunctiva/sclera: Conjunctivae normal.      Pupils: Pupils are equal, round, and reactive to light. Neck:      Musculoskeletal: Neck supple. Trachea: No tracheal deviation.    Cardiovascular:      Rate and Rhythm: Normal rate and regular rhythm. Pulmonary:      Effort: Pulmonary effort is normal. No respiratory distress. Breath sounds: No wheezing or rales. Abdominal:      General: A surgical scar is present. There is no distension. Palpations: Abdomen is soft. Tenderness: There is no abdominal tenderness. Comments: Small amount of bleeding to incision. AILEEN drain to the RUQ. Genitourinary:     Comments: deferred  Musculoskeletal:         General: No deformity. Skin:     General: Skin is warm and dry. Neurological:      General: No focal deficit present. Mental Status: She is alert. Psychiatric:         Mood and Affect: Mood normal.     Note written by Rizwana Aguilar, as dictated by Siri Johnson MD 7:10 PM       [facility]  ED Course as of  Spoke to Dr. Cheryl Barton: pt can follow up in the office. [TT]      ED Course User Index  [TT] Siri Johnson MD       Procedures      MEDICAL DECISION MAKIN y.o. female with past medical history significant for recent ventral hernia repair presents with bleeding from incision site and abdominal pain  Differential diagnosis includes postoperative hemorrhage, postoperative infection, hematoma, seroma       LABORATORY TESTS:  Labs Reviewed   CBC WITH AUTOMATED DIFF - Abnormal; Notable for the following components:       Result Value    WBC 16.3 (*)     RBC 3.07 (*)     HGB 8.5 (*)     HCT 26.3 (*)     RDW 15.2 (*)     PLATELET 67 (*)     IMMATURE GRANULOCYTES 1 (*)     ABS. NEUTROPHILS 12.2 (*)     ABS. MONOCYTES 1.4 (*)     ABS. IMM. GRANS. 0.1 (*)     All other components within normal limits   METABOLIC PANEL, COMPREHENSIVE - Abnormal; Notable for the following components:    Sodium 132 (*)     Glucose 319 (*)     GFR est non-AA 54 (*)     Calcium 7.8 (*)     Alk.  phosphatase 129 (*)     Albumin 2.9 (*)     Globulin 4.2 (*)     A-G Ratio 0.7 (*)     All other components within normal limits SAMPLES BEING HELD       IMAGING RESULTS:  Ct Abd Pelv W Cont    Result Date: 2/18/2020  EXAM: CT ABD PELV W CONT INDICATION: extensive bleeding from surgical wound COMPARISON: CT 1/9/2019 CONTRAST: 100 mL of Isovue-370. TECHNIQUE: Following the uneventful intravenous administration of contrast, thin axial images were obtained through the abdomen and pelvis. Coronal and sagittal reconstructions were generated. Oral contrast was not administered. CT dose reduction was achieved through use of a standardized protocol tailored for this examination and automatic exposure control for dose modulation. The lack of oral contrast material diminishes the capacity of CT to evaluate the bowel and solid FINDINGS: LUNG BASES: Clear. INCIDENTALLY IMAGED HEART AND MEDIASTINUM: Unremarkable. LIVER: Diffuse hepatic steatosis. No mass or biliary ductal dilation. GALLBLADDER: Status post cholecystectomy. SPLEEN: No mass. PANCREAS: Absence of the neck and head again shown. Body and tail remain normal. ADRENALS: Unremarkable. KIDNEYS: No mass, calculus, or hydronephrosis. STOMACH: Unremarkable. SMALL BOWEL: No dilatation or wall thickening. COLON: Suture ring again shown in right pelvis. Suture line again shown at superior rectum. APPENDIX: Not shown. PERITONEUM: No ascites or pneumoperitoneum. RETROPERITONEUM: No lymphadenopathy or aortic aneurysm. REPRODUCTIVE ORGANS: Status post hysterectomy. URINARY BLADDER: No mass or calculus. BONES: No destructive bone lesion. ADDITIONAL COMMENTS: Interval demonstration of large irregular collection of the anterior abdominal wall and overlying subcutaneous fat with surgical drain in place. Overall size of the collection, which is loculated, measures 21 cm transverse, up to 6 cm AP, and 14 cm craniocaudal. There are foci of hyperattenuation within the collection suggesting acute hemorrhagic products. Abundant edema-like attenuation is shown in the surrounding subcutaneous fat.      IMPRESSION: Interval demonstration of large anterior abdominal wall-subcutaneous collection with drain in place, and areas of higher attenuation suggesting acute hemorrhage. MEDICATIONS GIVEN:  Medications   iopamidoL (ISOVUE-370) 76 % injection 100 mL (100 mL IntraVENous Given 2/18/20 2045)       ED COURSE:  Remained stable throughout her emergency department stay    IMPRESSION:  1. Postoperative hemorrhage of subcutaneous tissue following non-dermatologic procedure    2.  Bleeding        PLAN:  - Admit to General surgery at [address]:  stable    Total critical care time spent exclusive of procedures:  0 minutes    Tila Hernandez MD

## 2020-02-19 NOTE — PROGRESS NOTES
Bedside shift change report given to Adriana Garcia RN (oncoming nurse) by Valentino Grew, RN (offgoing nurse). Report included the following information SBAR, Kardex, Intake/Output and MAR.

## 2020-02-19 NOTE — ED NOTES
CT reviewed  IMPRESSION  IMPRESSION:  Interval demonstration of large anterior abdominal wall-subcutaneous collection  with drain in place, and areas of higher attenuation suggesting acute  Hemorrhage.     Pt here for admission to surgery  D/w Dr Kane Chin

## 2020-02-19 NOTE — TELEPHONE ENCOUNTER
Sabas Fatima at the [facility], this pt will need a hospital f/u within the next couple of weeks and Dr Leonardo Arreguin currently does not have any available appts until July. Can you please schedule and appt at either location then call the pt of the appt. Mrs Dixie Orellana is a former pt of Dr Ramesh Vee.

## 2020-02-19 NOTE — ED TRIAGE NOTES
Pt Transfer from [facility] for Dr. Sierra Fenton. Pt had ventral hernia repair 2/4, since has been having bleeding and pain in site. Pt Aox4, VSS, NAD c/o abd pain 8/10.

## 2020-02-20 LAB
ANION GAP SERPL CALC-SCNC: 5 MMOL/L (ref 5–15)
BASOPHILS # BLD: 0 K/UL (ref 0–0.1)
BASOPHILS NFR BLD: 0 % (ref 0–1)
BUN SERPL-MCNC: 10 MG/DL (ref 6–20)
BUN/CREAT SERPL: 15 (ref 12–20)
CALCIUM SERPL-MCNC: 8.5 MG/DL (ref 8.5–10.1)
CHLORIDE SERPL-SCNC: 102 MMOL/L (ref 97–108)
CO2 SERPL-SCNC: 27 MMOL/L (ref 21–32)
CREAT SERPL-MCNC: 0.67 MG/DL (ref 0.55–1.02)
DIFFERENTIAL METHOD BLD: ABNORMAL
EOSINOPHIL # BLD: 0.3 K/UL (ref 0–0.4)
EOSINOPHIL NFR BLD: 3 % (ref 0–7)
ERYTHROCYTE [DISTWIDTH] IN BLOOD BY AUTOMATED COUNT: 15.3 % (ref 11.5–14.5)
GLUCOSE BLD STRIP.AUTO-MCNC: 158 MG/DL (ref 65–100)
GLUCOSE BLD STRIP.AUTO-MCNC: 180 MG/DL (ref 65–100)
GLUCOSE BLD STRIP.AUTO-MCNC: 199 MG/DL (ref 65–100)
GLUCOSE BLD STRIP.AUTO-MCNC: 211 MG/DL (ref 65–100)
GLUCOSE SERPL-MCNC: 189 MG/DL (ref 65–100)
HCT VFR BLD AUTO: 25.9 % (ref 35–47)
HGB BLD-MCNC: 8 G/DL (ref 11.5–16)
IMM GRANULOCYTES # BLD AUTO: 0 K/UL (ref 0–0.04)
IMM GRANULOCYTES NFR BLD AUTO: 0 % (ref 0–0.5)
LYMPHOCYTES # BLD: 1.9 K/UL (ref 0.8–3.5)
LYMPHOCYTES NFR BLD: 18 % (ref 12–49)
MAGNESIUM SERPL-MCNC: 1.8 MG/DL (ref 1.6–2.4)
MCH RBC QN AUTO: 27.1 PG (ref 26–34)
MCHC RBC AUTO-ENTMCNC: 30.9 G/DL (ref 30–36.5)
MCV RBC AUTO: 87.8 FL (ref 80–99)
MONOCYTES # BLD: 0.8 K/UL (ref 0–1)
MONOCYTES NFR BLD: 7 % (ref 5–13)
NEUTS SEG # BLD: 7.8 K/UL (ref 1.8–8)
NEUTS SEG NFR BLD: 72 % (ref 32–75)
NRBC # BLD: 0 K/UL (ref 0–0.01)
NRBC BLD-RTO: 0 PER 100 WBC
PLATELET # BLD AUTO: ABNORMAL K/UL (ref 150–400)
PLATELET COMMENTS,PCOM: ABNORMAL
POTASSIUM SERPL-SCNC: 4.3 MMOL/L (ref 3.5–5.1)
RBC # BLD AUTO: 2.95 M/UL (ref 3.8–5.2)
RBC MORPH BLD: ABNORMAL
SERVICE CMNT-IMP: ABNORMAL
SODIUM SERPL-SCNC: 134 MMOL/L (ref 136–145)
WBC # BLD AUTO: 10.8 K/UL (ref 3.6–11)

## 2020-02-20 PROCEDURE — 74011250636 HC RX REV CODE- 250/636: Performed by: SURGERY

## 2020-02-20 PROCEDURE — 96366 THER/PROPH/DIAG IV INF ADDON: CPT

## 2020-02-20 PROCEDURE — 36415 COLL VENOUS BLD VENIPUNCTURE: CPT

## 2020-02-20 PROCEDURE — 80048 BASIC METABOLIC PNL TOTAL CA: CPT

## 2020-02-20 PROCEDURE — 99218 HC RM OBSERVATION: CPT

## 2020-02-20 PROCEDURE — 74011636637 HC RX REV CODE- 636/637: Performed by: NURSE PRACTITIONER

## 2020-02-20 PROCEDURE — 74011250636 HC RX REV CODE- 250/636: Performed by: NURSE PRACTITIONER

## 2020-02-20 PROCEDURE — 96361 HYDRATE IV INFUSION ADD-ON: CPT

## 2020-02-20 PROCEDURE — 74011250637 HC RX REV CODE- 250/637: Performed by: SURGERY

## 2020-02-20 PROCEDURE — 83735 ASSAY OF MAGNESIUM: CPT

## 2020-02-20 PROCEDURE — 96376 TX/PRO/DX INJ SAME DRUG ADON: CPT

## 2020-02-20 PROCEDURE — 74011000258 HC RX REV CODE- 258: Performed by: SURGERY

## 2020-02-20 PROCEDURE — 74011250637 HC RX REV CODE- 250/637: Performed by: NURSE PRACTITIONER

## 2020-02-20 PROCEDURE — 74011636637 HC RX REV CODE- 636/637: Performed by: SURGERY

## 2020-02-20 PROCEDURE — 82962 GLUCOSE BLOOD TEST: CPT

## 2020-02-20 PROCEDURE — 85025 COMPLETE CBC W/AUTO DIFF WBC: CPT

## 2020-02-20 RX ORDER — INSULIN LISPRO 100 [IU]/ML
8 INJECTION, SOLUTION INTRAVENOUS; SUBCUTANEOUS
Status: DISCONTINUED | OUTPATIENT
Start: 2020-02-20 | End: 2020-02-21

## 2020-02-20 RX ORDER — POLYETHYLENE GLYCOL 3350 17 G/17G
17 POWDER, FOR SOLUTION ORAL DAILY
Status: DISCONTINUED | OUTPATIENT
Start: 2020-02-20 | End: 2020-02-24

## 2020-02-20 RX ORDER — BACLOFEN 10 MG/1
10 TABLET ORAL
Status: DISCONTINUED | OUTPATIENT
Start: 2020-02-20 | End: 2020-02-27 | Stop reason: HOSPADM

## 2020-02-20 RX ORDER — INSULIN LISPRO 100 [IU]/ML
15 INJECTION, SOLUTION INTRAVENOUS; SUBCUTANEOUS
Status: DISCONTINUED | OUTPATIENT
Start: 2020-02-20 | End: 2020-02-20

## 2020-02-20 RX ADMIN — SODIUM CHLORIDE, SODIUM LACTATE, POTASSIUM CHLORIDE, AND CALCIUM CHLORIDE 50 ML/HR: 600; 310; 30; 20 INJECTION, SOLUTION INTRAVENOUS at 11:13

## 2020-02-20 RX ADMIN — PANTOPRAZOLE SODIUM 40 MG: 40 TABLET, DELAYED RELEASE ORAL at 06:42

## 2020-02-20 RX ADMIN — ONDANSETRON 4 MG: 2 INJECTION, SOLUTION INTRAMUSCULAR; INTRAVENOUS at 22:52

## 2020-02-20 RX ADMIN — INSULIN LISPRO 2 UNITS: 100 INJECTION, SOLUTION INTRAVENOUS; SUBCUTANEOUS at 06:42

## 2020-02-20 RX ADMIN — POLYETHYLENE GLYCOL 3350 17 G: 17 POWDER, FOR SOLUTION ORAL at 11:15

## 2020-02-20 RX ADMIN — SODIUM CHLORIDE, SODIUM LACTATE, POTASSIUM CHLORIDE, AND CALCIUM CHLORIDE 100 ML/HR: 600; 310; 30; 20 INJECTION, SOLUTION INTRAVENOUS at 00:49

## 2020-02-20 RX ADMIN — Medication 10 ML: at 23:03

## 2020-02-20 RX ADMIN — METOPROLOL TARTRATE 50 MG: 50 TABLET, FILM COATED ORAL at 18:14

## 2020-02-20 RX ADMIN — METOPROLOL TARTRATE 50 MG: 50 TABLET, FILM COATED ORAL at 11:14

## 2020-02-20 RX ADMIN — HYDROMORPHONE HYDROCHLORIDE 1 MG: 1 INJECTION, SOLUTION INTRAMUSCULAR; INTRAVENOUS; SUBCUTANEOUS at 15:09

## 2020-02-20 RX ADMIN — INSULIN LISPRO 8 UNITS: 100 INJECTION, SOLUTION INTRAVENOUS; SUBCUTANEOUS at 11:32

## 2020-02-20 RX ADMIN — Medication 10 ML: at 06:43

## 2020-02-20 RX ADMIN — INSULIN LISPRO 4 UNITS: 100 INJECTION, SOLUTION INTRAVENOUS; SUBCUTANEOUS at 18:12

## 2020-02-20 RX ADMIN — PIPERACILLIN AND TAZOBACTAM 3.38 G: 3; .375 INJECTION, POWDER, LYOPHILIZED, FOR SOLUTION INTRAVENOUS at 19:19

## 2020-02-20 RX ADMIN — INSULIN LISPRO 3 UNITS: 100 INJECTION, SOLUTION INTRAVENOUS; SUBCUTANEOUS at 11:32

## 2020-02-20 RX ADMIN — HYDROMORPHONE HYDROCHLORIDE 1 MG: 1 INJECTION, SOLUTION INTRAMUSCULAR; INTRAVENOUS; SUBCUTANEOUS at 23:03

## 2020-02-20 RX ADMIN — INSULIN LISPRO 8 UNITS: 100 INJECTION, SOLUTION INTRAVENOUS; SUBCUTANEOUS at 18:11

## 2020-02-20 RX ADMIN — PIPERACILLIN AND TAZOBACTAM 3.38 G: 3; .375 INJECTION, POWDER, LYOPHILIZED, FOR SOLUTION INTRAVENOUS at 04:43

## 2020-02-20 RX ADMIN — HYDROMORPHONE HYDROCHLORIDE 1 MG: 1 INJECTION, SOLUTION INTRAMUSCULAR; INTRAVENOUS; SUBCUTANEOUS at 00:49

## 2020-02-20 RX ADMIN — DULOXETINE HYDROCHLORIDE 120 MG: 60 CAPSULE, DELAYED RELEASE ORAL at 11:14

## 2020-02-20 RX ADMIN — PIPERACILLIN AND TAZOBACTAM 3.38 G: 3; .375 INJECTION, POWDER, LYOPHILIZED, FOR SOLUTION INTRAVENOUS at 11:17

## 2020-02-20 NOTE — PROGRESS NOTES
[facility]  Daily Progress Note    Admit Date: 2020    Subjective:     Last 24 hrs: Pain well controlled. Tolerating PO, + flatus. Biggest complaint is feeling tired. Hgb 8, WBC 10.8, T max 99. On zosyn. Objective:     Blood pressure 107/58, pulse 81, temperature 98.4 °F (36.9 °C), resp. rate 18, height 5' 1\" (1.549 m), weight 96.2 kg (212 lb), SpO2 97 %. Temp (24hrs), Av.4 °F (36.9 °C), Min:97.9 °F (36.6 °C), Max:99 °F (37.2 °C)      _____________________  Physical Exam:     Alert and Oriented, lying in bed, no acute distress. Cardiovascular: RRR  Lungs:CTAB   Abdomen: + BS, soft. Bruising present across lower abdomen. Drain with thin bloody fluid, 150 mL out yesterday. Dressing over upper portion of wound, no strike-through drainage. Assessment:   Active Problems:    Postoperative hematoma involving circulatory system following cardiac bypass (2020)      S/p ventral hernia repair with mesh on 20 with Dr. Tad Campos, now with post-operative hematoma which drained spontaneously      Plan:     Continue zosyn  Reduce IVF  OOB as tolerated  Add home insulin at lower dose, titrate up as needed  CBC in am  Repeat CT imaging per Dr. Yoseph Rodriguez, [address] [facility] [address],  [facility] [address], [facility]  [phone]    Data Review:    Recent Labs     20  0440 20  0355 20   WBC 10.8 13.6* 16.3*   HGB 8.0* 8.7* 8.5*   HCT 25.9* 27.4* 26.3*   PLT UNABLE TO REPORT ACCURATE COUNT DUE TO PLATELET AGGREGATION, HOWEVER, PLATELETS APPEAR DECREASED IN NUMBER ON SMEAR. PLEASE RESUBMIT SODIUM CITRATE (BLUE) AND EDTA (LAVENDAR) TUBES FOR HEMATOLOGICAL TESTING.  95* 67*     Recent Labs     20  0440 20  0355 20   * 132* 132*   K 4.3 3.6 3.8    102 101   CO2 27 23 24   * 251* 319*   BUN 10 12 14   CREA 0.67 0.81 1.01   CA 8.5 8.6 7.8*   MG 1.8 1.9  --    ALB  --   --  2.9*   TBILI  --   --  1.0   SGOT  --   --  16   ALT  --   --  15   INR  --  1.2*  --      No results for input(s): AML, LPSE in the last 72 hours. ______________________  Medications:    Current Facility-Administered Medications   Medication Dose Route Frequency    sodium chloride (NS) flush 5-40 mL  5-40 mL IntraVENous Q8H    sodium chloride (NS) flush 5-40 mL  5-40 mL IntraVENous PRN    lactated Ringers infusion  100 mL/hr IntraVENous CONTINUOUS    acetaminophen (TYLENOL) tablet 650 mg  650 mg Oral Q6H PRN    HYDROmorphone (PF) (DILAUDID) injection 0.5-1 mg  0.5-1 mg IntraVENous Q4H PRN    naloxone (NARCAN) injection 0.4 mg  0.4 mg IntraVENous PRN    ondansetron (ZOFRAN) injection 4 mg  4 mg IntraVENous Q4H PRN    piperacillin-tazobactam (ZOSYN) 3.375 g in 0.9% sodium chloride (MBP/ADV) 100 mL  3.375 g IntraVENous Q8H    DULoxetine (CYMBALTA) capsule 120 mg  120 mg Oral DAILY    metoprolol tartrate (LOPRESSOR) tablet 50 mg  50 mg Oral BID    pantoprazole (PROTONIX) tablet 40 mg  40 mg Oral ACB    triamterene-hydroCHLOROthiazide (MAXZIDE) 37.5-25 mg per tablet 1 Tab  1 Tab Oral DAILY    glucose chewable tablet 16 g  4 Tab Oral PRN    glucagon (GLUCAGEN) injection 1 mg  1 mg IntraMUSCular PRN    dextrose 10% infusion 0-250 mL  0-250 mL IntraVENous PRN    insulin lispro (HUMALOG) injection   SubCUTAneous Q6H     ATTENDING ADDENDUM  I supervised the APC and reviewed the note. We discussed the plan of care  Not a very good appetite. Some new pain on the left side of the incision. Will get CT tomorrow to assess progress.

## 2020-02-20 NOTE — DIABETES MGMT
JOAO MATOS  CLINICAL NURSE SPECIALIST   Followup Progress Note    Presentation   Beatrice Everett is a 69 y.o. female admitted with wound dehiscence and acute abdominal hemorrhage and consulted by Provider for advanced specialty nursing care related to inpatient diabetes management. Hyperglycemia management order set is in place. Subjective   Ms Morales Brought went to the OR yesterday where the surgery team found that there was no evidence of infection at wound and no concerns for ongoing bleeding. Post-operative wound hematoma that evacuated spontaneously. Will scan again tomorrow to follow-up. Diet advanced today. Blood glucose 171-216 in the past 24 hours. Objective   Physical exam    General Alert, oriented and in no acute distress. Conversant and cooperative  Vital Signs   Visit Vitals  /58   Pulse 81   Temp 98.4 °F (36.9 °C)   Resp 18   Ht 5' 1\" (1.549 m)   Wt 96.2 kg (212 lb)   SpO2 97%   BMI 40.06 kg/m²     Skin  Warm and dry  Heart   Regular rate and rhythm. No murmurs, rubs or gallops  Lungs  Clear to auscultation without rales or rhonchi  Extremities No foot wounds    Laboratory      CBC WITH AUTOMATED DIFF    Collection Time: 02/20/20  4:40 AM   Result Value Ref Range    WBC 10.8 3.6 - 11.0 K/uL    RBC 2.95 (L) 3.80 - 5.20 M/uL    HGB 8.0 (L) 11.5 - 16.0 g/dL    HCT 25.9 (L) 35.0 - 47.0 %    MCV 87.8 80.0 - 99.0 FL    MCH 27.1 26.0 - 34.0 PG    MCHC 30.9 30.0 - 36.5 g/dL    RDW 15.3 (H) 11.5 - 14.5 %    PLATELET  409 - 276 K/uL     UNABLE TO REPORT ACCURATE COUNT DUE TO PLATELET AGGREGATION, HOWEVER, PLATELETS APPEAR DECREASED IN NUMBER ON SMEAR. PLEASE RESUBMIT SODIUM CITRATE (BLUE) AND EDTA (LAVENDAR) TUBES FOR HEMATOLOGICAL TESTING. NRBC 0.0 0  WBC    ABSOLUTE NRBC 0.00 0.00 - 0.01 K/uL    NEUTROPHILS 72 32 - 75 %    LYMPHOCYTES 18 12 - 49 %    MONOCYTES 7 5 - 13 %    EOSINOPHILS 3 0 - 7 %    BASOPHILS 0 0 - 1 %    IMMATURE GRANULOCYTES 0 0.0 - 0.5 %    ABS.  NEUTROPHILS 7.8 1.8 - 8.0 K/UL    ABS. LYMPHOCYTES 1.9 0.8 - 3.5 K/UL    ABS. MONOCYTES 0.8 0.0 - 1.0 K/UL    ABS. EOSINOPHILS 0.3 0.0 - 0.4 K/UL    ABS. BASOPHILS 0.0 0.0 - 0.1 K/UL    ABS. IMM. GRANS. 0.0 0.00 - 0.04 K/UL    DF SMEAR SCANNED      PLATELET COMMENTS CLUMPED PLATELETS      RBC COMMENTS ANISOCYTOSIS  1+         BMP:   Lab Results   Component Value Date/Time     (L) 02/20/2020 04:40 AM    K 4.3 02/20/2020 04:40 AM     02/20/2020 04:40 AM    CO2 27 02/20/2020 04:40 AM    AGAP 5 02/20/2020 04:40 AM     (H) 02/20/2020 04:40 AM    BUN 10 02/20/2020 04:40 AM    CREA 0.67 02/20/2020 04:40 AM    GFRAA >60 02/20/2020 04:40 AM    GFRNA >60 02/20/2020 04:40 AM          Blood glucose pattern      Assessment and Plan   Nursing Diagnosis Risk for unstable blood glucose pattern   Nursing Intervention Domain 525 Decision-making Support   Nursing Interventions Examined current inpatient diabetes control   Explored factors facilitating and impeding inpatient management  Identified self-management practices impeding diabetes control  Explored corrective strategies with patient and responsible inpatient provider   Informed patient of rational for basal bolus insulin strategy while hospitalized     Evaluation   Basal insulin has not been started. Bolus insulin dosing is indicated as patient is eating meals. Ordered GI Lite tray- based on 40 grams CHO/meal.  Patient has decreased appetite. Correction dosing indicates the need for adjustment in insulin dosing    Blood glucose management has been impacted by  O Erratic meal consumption    Recommendations     Recommend:  1.  Start Basal insulin   [x]?        0.4 units/kg/D: 40 units Lantus Daily     Daily evaluation fasting blood glucose will be required to determine the appropriate amount of Lantus. If fasting blood glucose is over 200, please add the total amount of correctional Humalog received the day before to total Lantus dose.  Ok to split Lantus BID for larger doses.        2. When tolerating meals, start Bolus insulin  [x]? Insulin-resistant sensitivity (BMI > 27) 6 units Humalog with meals. Would decrease off of 40 grams CHO/meal and decreased appetite. If she consumes less than 50% of carbohydrates on tray, do not give mealtime insulin     3.  Corrective insulin              Insulin-resistant sensitivity (BMI >27)              Insulin Resistant Correctional Scale                 200-249: 4 units Humalog              250-299: 8 units Humalog              300-349: 12 units Humalog              350-399: 16 units Humalog              over 400: 20 units Humalog                 DO NOT hold correctional dosing if NPO              Give in addition of mealtime insulin  Billing Code(s)     [address] [facility], [facility]  [phone]

## 2020-02-20 NOTE — PROGRESS NOTES
Bedside shift change report given to Markell Vang RN (oncoming nurse) by Valeriano Mcnamara RN (offgoing nurse). Report included the following information SBAR, Kardex, Intake/Output, MAR and Recent Results.

## 2020-02-20 NOTE — ROUTINE PROCESS
Bedside and Verbal shift change report given to Jennifer Xiong (oncoming nurse) by REMY Patel (offgoing nurse). Report included the following information SBAR, Kardex, Intake/Output, MAR and Recent Results.

## 2020-02-21 ENCOUNTER — APPOINTMENT (OUTPATIENT)
Dept: CT IMAGING | Age: 75
End: 2020-02-21
Attending: SURGERY
Payer: MEDICARE

## 2020-02-21 LAB
ANION GAP SERPL CALC-SCNC: 2 MMOL/L (ref 5–15)
BASOPHILS # BLD: 0 K/UL (ref 0–0.1)
BASOPHILS NFR BLD: 0 % (ref 0–1)
BUN SERPL-MCNC: 9 MG/DL (ref 6–20)
BUN/CREAT SERPL: 13 (ref 12–20)
CALCIUM SERPL-MCNC: 8.6 MG/DL (ref 8.5–10.1)
CHLORIDE SERPL-SCNC: 104 MMOL/L (ref 97–108)
CO2 SERPL-SCNC: 29 MMOL/L (ref 21–32)
CREAT SERPL-MCNC: 0.69 MG/DL (ref 0.55–1.02)
DIFFERENTIAL METHOD BLD: ABNORMAL
EOSINOPHIL # BLD: 0.3 K/UL (ref 0–0.4)
EOSINOPHIL NFR BLD: 4 % (ref 0–7)
ERYTHROCYTE [DISTWIDTH] IN BLOOD BY AUTOMATED COUNT: 15.3 % (ref 11.5–14.5)
GLUCOSE BLD STRIP.AUTO-MCNC: 127 MG/DL (ref 65–100)
GLUCOSE BLD STRIP.AUTO-MCNC: 190 MG/DL (ref 65–100)
GLUCOSE BLD STRIP.AUTO-MCNC: 191 MG/DL (ref 65–100)
GLUCOSE BLD STRIP.AUTO-MCNC: 196 MG/DL (ref 65–100)
GLUCOSE BLD STRIP.AUTO-MCNC: 198 MG/DL (ref 65–100)
GLUCOSE BLD STRIP.AUTO-MCNC: 222 MG/DL (ref 65–100)
GLUCOSE SERPL-MCNC: 184 MG/DL (ref 65–100)
HCT VFR BLD AUTO: 25.9 % (ref 35–47)
HGB BLD-MCNC: 8.1 G/DL (ref 11.5–16)
IMM GRANULOCYTES # BLD AUTO: 0 K/UL (ref 0–0.04)
IMM GRANULOCYTES NFR BLD AUTO: 0 % (ref 0–0.5)
LYMPHOCYTES # BLD: 2.9 K/UL (ref 0.8–3.5)
LYMPHOCYTES NFR BLD: 34 % (ref 12–49)
MAGNESIUM SERPL-MCNC: 1.9 MG/DL (ref 1.6–2.4)
MCH RBC QN AUTO: 27.6 PG (ref 26–34)
MCHC RBC AUTO-ENTMCNC: 31.3 G/DL (ref 30–36.5)
MCV RBC AUTO: 88.1 FL (ref 80–99)
MONOCYTES # BLD: 0.7 K/UL (ref 0–1)
MONOCYTES NFR BLD: 8 % (ref 5–13)
NEUTS SEG # BLD: 4.5 K/UL (ref 1.8–8)
NEUTS SEG NFR BLD: 54 % (ref 32–75)
NRBC # BLD: 0 K/UL (ref 0–0.01)
NRBC BLD-RTO: 0 PER 100 WBC
PLATELET # BLD AUTO: ABNORMAL K/UL (ref 150–400)
PLATELET COMMENTS,PCOM: ABNORMAL
POTASSIUM SERPL-SCNC: 3.9 MMOL/L (ref 3.5–5.1)
RBC # BLD AUTO: 2.94 M/UL (ref 3.8–5.2)
RBC MORPH BLD: ABNORMAL
SERVICE CMNT-IMP: ABNORMAL
SODIUM SERPL-SCNC: 135 MMOL/L (ref 136–145)
WBC # BLD AUTO: 8.4 K/UL (ref 3.6–11)

## 2020-02-21 PROCEDURE — 74177 CT ABD & PELVIS W/CONTRAST: CPT

## 2020-02-21 PROCEDURE — 80048 BASIC METABOLIC PNL TOTAL CA: CPT

## 2020-02-21 PROCEDURE — 83735 ASSAY OF MAGNESIUM: CPT

## 2020-02-21 PROCEDURE — 74011000258 HC RX REV CODE- 258: Performed by: RADIOLOGY

## 2020-02-21 PROCEDURE — 96376 TX/PRO/DX INJ SAME DRUG ADON: CPT

## 2020-02-21 PROCEDURE — 74011636320 HC RX REV CODE- 636/320: Performed by: RADIOLOGY

## 2020-02-21 PROCEDURE — 74011636637 HC RX REV CODE- 636/637: Performed by: NURSE PRACTITIONER

## 2020-02-21 PROCEDURE — 74011250637 HC RX REV CODE- 250/637: Performed by: SURGERY

## 2020-02-21 PROCEDURE — 82962 GLUCOSE BLOOD TEST: CPT

## 2020-02-21 PROCEDURE — 99218 HC RM OBSERVATION: CPT

## 2020-02-21 PROCEDURE — 85025 COMPLETE CBC W/AUTO DIFF WBC: CPT

## 2020-02-21 PROCEDURE — 74011000258 HC RX REV CODE- 258: Performed by: SURGERY

## 2020-02-21 PROCEDURE — 74011250636 HC RX REV CODE- 250/636: Performed by: SURGERY

## 2020-02-21 PROCEDURE — 96366 THER/PROPH/DIAG IV INF ADDON: CPT

## 2020-02-21 PROCEDURE — 74011250637 HC RX REV CODE- 250/637: Performed by: NURSE PRACTITIONER

## 2020-02-21 PROCEDURE — 36415 COLL VENOUS BLD VENIPUNCTURE: CPT

## 2020-02-21 RX ORDER — INSULIN GLARGINE 100 [IU]/ML
40 INJECTION, SOLUTION SUBCUTANEOUS
Status: DISCONTINUED | OUTPATIENT
Start: 2020-02-21 | End: 2020-02-25

## 2020-02-21 RX ORDER — INSULIN LISPRO 100 [IU]/ML
6 INJECTION, SOLUTION INTRAVENOUS; SUBCUTANEOUS
Status: DISCONTINUED | OUTPATIENT
Start: 2020-02-21 | End: 2020-02-24

## 2020-02-21 RX ORDER — SODIUM CHLORIDE 0.9 % (FLUSH) 0.9 %
10 SYRINGE (ML) INJECTION
Status: ACTIVE | OUTPATIENT
Start: 2020-02-21 | End: 2020-02-22

## 2020-02-21 RX ORDER — INSULIN LISPRO 100 [IU]/ML
12 INJECTION, SOLUTION INTRAVENOUS; SUBCUTANEOUS
Status: DISCONTINUED | OUTPATIENT
Start: 2020-02-21 | End: 2020-02-21

## 2020-02-21 RX ADMIN — HYDROMORPHONE HYDROCHLORIDE 1 MG: 1 INJECTION, SOLUTION INTRAMUSCULAR; INTRAVENOUS; SUBCUTANEOUS at 17:10

## 2020-02-21 RX ADMIN — IOPAMIDOL 100 ML: 755 INJECTION, SOLUTION INTRAVENOUS at 20:34

## 2020-02-21 RX ADMIN — INSULIN LISPRO 6 UNITS: 100 INJECTION, SOLUTION INTRAVENOUS; SUBCUTANEOUS at 13:05

## 2020-02-21 RX ADMIN — PIPERACILLIN AND TAZOBACTAM 3.38 G: 3; .375 INJECTION, POWDER, LYOPHILIZED, FOR SOLUTION INTRAVENOUS at 13:06

## 2020-02-21 RX ADMIN — INSULIN LISPRO 4 UNITS: 100 INJECTION, SOLUTION INTRAVENOUS; SUBCUTANEOUS at 01:53

## 2020-02-21 RX ADMIN — METOPROLOL TARTRATE 50 MG: 50 TABLET, FILM COATED ORAL at 21:04

## 2020-02-21 RX ADMIN — PIPERACILLIN AND TAZOBACTAM 3.38 G: 3; .375 INJECTION, POWDER, LYOPHILIZED, FOR SOLUTION INTRAVENOUS at 19:41

## 2020-02-21 RX ADMIN — ONDANSETRON 4 MG: 2 INJECTION, SOLUTION INTRAMUSCULAR; INTRAVENOUS at 15:10

## 2020-02-21 RX ADMIN — INSULIN LISPRO 4 UNITS: 100 INJECTION, SOLUTION INTRAVENOUS; SUBCUTANEOUS at 07:09

## 2020-02-21 RX ADMIN — HYDROMORPHONE HYDROCHLORIDE 1 MG: 1 INJECTION, SOLUTION INTRAMUSCULAR; INTRAVENOUS; SUBCUTANEOUS at 07:09

## 2020-02-21 RX ADMIN — PIPERACILLIN AND TAZOBACTAM 3.38 G: 3; .375 INJECTION, POWDER, LYOPHILIZED, FOR SOLUTION INTRAVENOUS at 04:51

## 2020-02-21 RX ADMIN — Medication 10 ML: at 21:09

## 2020-02-21 RX ADMIN — HYDROMORPHONE HYDROCHLORIDE 1 MG: 1 INJECTION, SOLUTION INTRAMUSCULAR; INTRAVENOUS; SUBCUTANEOUS at 22:34

## 2020-02-21 RX ADMIN — IOHEXOL 50 ML: 240 INJECTION, SOLUTION INTRATHECAL; INTRAVASCULAR; INTRAVENOUS; ORAL at 16:00

## 2020-02-21 RX ADMIN — Medication 10 ML: at 07:10

## 2020-02-21 RX ADMIN — INSULIN LISPRO 4 UNITS: 100 INJECTION, SOLUTION INTRAVENOUS; SUBCUTANEOUS at 13:05

## 2020-02-21 RX ADMIN — SODIUM CHLORIDE 55 ML: 900 INJECTION, SOLUTION INTRAVENOUS at 20:33

## 2020-02-21 NOTE — PROGRESS NOTES
Bedside shift change report given to Lisy Mcdonald RN (oncoming nurse) by REMY Hoyos (offgoing nurse). Report included the following information SBAR, Kardex, Intake/Output, MAR and Recent Results.

## 2020-02-21 NOTE — PROGRESS NOTES
JUN:  -IV ABX  -home when stable    Reason for Admission:  Bleeding from abdominal wound                    RUR Score:      15               Plan for utilizing home health:      TBD, patient does not want to have home health again. She had [facility] upon last admission and does not wish to have home health again. Current Advanced Directive/Advance Care Plan: on file                                           CM met with patient at the bedside to explain role and offer support. Patient is alert and oriented x4, confirmed she lives alone but has a very large support system. Patient is followed by [facility] and receives her diabetic supplies from them. Care Management Interventions  PCP Verified by CM: Yes(Dr. Melvin Aceves with [facility])  Palliative Care Criteria Met (RRAT>21 & CHF Dx)?: No  MyChart Signup: No  Discharge Durable Medical Equipment: No  Health Maintenance Reviewed: Yes  Physical Therapy Consult: No  Occupational Therapy Consult: No  Speech Therapy Consult: No  Freedom of Choice List was Provided with Basic Dialogue that Supports the Patient's Individualized Plan of Care/Goals, Treatment Preferences and Shares the Quality Data Associated with the Providers?: No   Resource Information Provided?: No  Discharge Location  Discharge Placement: Home with family assistance    S.  [employer]

## 2020-02-21 NOTE — PROGRESS NOTES
[facility]  Daily Progress Note    Admit Date: 2020  S/p repair of ventral incisional hernia with mesh on 20 with Dr. Bam Montoya. Re-admitted on 20 with bleeding from her abdominal wound. Subjective:     Last 24 hrs: Feeling much better today. Having bowel function. Currently NPO for CT. WBC 8.4, Hgb 8.1 (stable). No further drainage from wound. T max 99.3, continues on zosyn. Objective:     Blood pressure 96/51, pulse 63, temperature 98.3 °F (36.8 °C), resp. rate 18, height 5' 1\" (1.549 m), weight 96.2 kg (212 lb), SpO2 92 %. Temp (24hrs), Av.5 °F (36.9 °C), Min:98 °F (36.7 °C), Max:99.3 °F (37.4 °C)      _____________________  Physical Exam:     Alert and Oriented, sitting up in chair, no acute distress. Cardiovascular: RRR, no peripheral edema  Lungs:CTAB   Abdomen: soft, NT.  Midline incision healing well. No drainage. Small amount of moisture at lower portion of wound in abdominal fold. AILEEN with thin bloody drainage, 210 mL out yesterday. Assessment:   Active Problems:    Postoperative hematoma (2020)            Plan:     F/u CT results  Continue zosyn  CBC in [facility], [address] [facility] [place] [address],  [facility], [address] [place] [address]  [phone]    Data Review:    Recent Labs     20  0455 20  0440 20  0355   WBC 8.4 10.8 13.6*   HGB 8.1* 8.0* 8.7*   HCT 25.9* 25.9* 27.4*   PLT UNABLE TO REPORT ACCURATE COUNT DUE TO PLATELET AGGREGATION, HOWEVER, PLATELETS APPEAR NORMAL IN NUMBER ON SMEAR. PLEASE RESUBMIT SODIUM CITRATE (BLUE) AND EDTA (LAVENDER) TUBES FOR HEMATOLOGICAL TESTING. UNABLE TO REPORT ACCURATE COUNT DUE TO PLATELET AGGREGATION, HOWEVER, PLATELETS APPEAR DECREASED IN NUMBER ON SMEAR. PLEASE RESUBMIT SODIUM CITRATE (BLUE) AND EDTA (LAVENDAR) TUBES FOR HEMATOLOGICAL TESTING.  [address] [facility]     20  0455 20  0440 20  0355 02/18/20  1914   * 134* 132* 132*   K 3.9 4.3 3.6 3.8    102 102 101   CO2 29 27 23 24   * 189* 251* 319*   BUN 9 10 12 14   CREA 0.69 0.67 0.81 1.01   CA 8.6 8.5 8.6 7.8*   MG 1.9 1.8 1.9  --    ALB  --   --   --  2.9*   TBILI  --   --   --  1.0   SGOT  --   --   --  16   ALT  --   --   --  15   INR  --   --  1.2*  --      No results for input(s): AML, LPSE in the last 72 hours.         ______________________  Medications:    Current Facility-Administered Medications   Medication Dose Route Frequency    insulin lispro (HUMALOG) injection 12 Units  12 Units SubCUTAneous TIDAC    insulin NPH (NOVOLIN N, HUMULIN N) injection 50 Units  50 Units SubCUTAneous QHS    baclofen (LIORESAL) tablet 10 mg  10 mg Oral DAILY PRN    polyethylene glycol (MIRALAX) packet 17 g  17 g Oral DAILY    sodium chloride (NS) flush 5-40 mL  5-40 mL IntraVENous Q8H    sodium chloride (NS) flush 5-40 mL  5-40 mL IntraVENous PRN    lactated Ringers infusion  50 mL/hr IntraVENous CONTINUOUS    acetaminophen (TYLENOL) tablet 650 mg  650 mg Oral Q6H PRN    HYDROmorphone (PF) (DILAUDID) injection 0.5-1 mg  0.5-1 mg IntraVENous Q4H PRN    naloxone (NARCAN) injection 0.4 mg  0.4 mg IntraVENous PRN    ondansetron (ZOFRAN) injection 4 mg  4 mg IntraVENous Q4H PRN    piperacillin-tazobactam (ZOSYN) 3.375 g in 0.9% sodium chloride (MBP/ADV) 100 mL  3.375 g IntraVENous Q8H    DULoxetine (CYMBALTA) capsule 120 mg  120 mg Oral DAILY    metoprolol tartrate (LOPRESSOR) tablet 50 mg  50 mg Oral BID    pantoprazole (PROTONIX) tablet 40 mg  40 mg Oral ACB    triamterene-hydroCHLOROthiazide (MAXZIDE) 37.5-25 mg per tablet 1 Tab  1 Tab Oral DAILY    glucose chewable tablet 16 g  4 Tab Oral PRN    glucagon (GLUCAGEN) injection 1 mg  1 mg IntraMUSCular PRN    dextrose 10% infusion 0-250 mL  0-250 mL IntraVENous PRN    insulin lispro (HUMALOG) injection   SubCUTAneous Q6H     ATTENDING ADDENDUM  I supervised the APC and reviewed the note. We discussed the plan of care  Awaiting the CT. Probably should keep at least another 48-72 hours to be sure all is healing well.

## 2020-02-21 NOTE — PROGRESS NOTES
Bedside shift change report given to Yuri Orourke (oncoming nurse) by Gordon Lara RN (offgoing nurse). Report included the following information SBAR and Kardex.

## 2020-02-21 NOTE — DIABETES MGMT
JOAO MATOS  CLINICAL NURSE SPECIALIST   Followup Progress Note    Presentation   Caron Larios is a 69 y.o. female admitted for a repair of ventral incisional hernia with mesh and consulted by Provider for advanced specialty nursing care related to inpatient diabetes management. Hyperglycemia management order set is in place. Subjective   I feel so much better since they started the antibiotics. I feel like my whole insides are healing. I am so happy.     Objective   Physical exam    General Alert, oriented and in no acute distress. Conversant and cooperative  Vital Signs   Visit Vitals  BP 96/51   Pulse 63   Temp 98.3 °F (36.8 °C)   Resp 18   Ht 5' 1\" (1.549 m)   Wt 96.2 kg (212 lb)   SpO2 92%   BMI 40.06 kg/m²     Skin  Warm and dry  Heart   Regular rate and rhythm. No murmurs, rubs or gallops  Lungs  Clear to auscultation without rales or rhonchi  Extremities No foot wounds    Laboratory      CBC WITH AUTOMATED DIFF    Collection Time: 02/21/20  4:55 AM   Result Value Ref Range    WBC 8.4 3.6 - 11.0 K/uL    RBC 2.94 (L) 3.80 - 5.20 M/uL    HGB 8.1 (L) 11.5 - 16.0 g/dL    HCT 25.9 (L) 35.0 - 47.0 %    MCV 88.1 80.0 - 99.0 FL    MCH 27.6 26.0 - 34.0 PG    MCHC 31.3 30.0 - 36.5 g/dL    RDW 15.3 (H) 11.5 - 14.5 %    PLATELET  174 - 734 K/uL     UNABLE TO REPORT ACCURATE COUNT DUE TO PLATELET AGGREGATION, HOWEVER, PLATELETS APPEAR NORMAL IN NUMBER ON SMEAR. PLEASE RESUBMIT SODIUM CITRATE (BLUE) AND EDTA (LAVENDER) TUBES FOR HEMATOLOGICAL TESTING. NRBC 0.0 0  WBC    ABSOLUTE NRBC 0.00 0.00 - 0.01 K/uL    NEUTROPHILS 54 32 - 75 %    LYMPHOCYTES 34 12 - 49 %    MONOCYTES 8 5 - 13 %    EOSINOPHILS 4 0 - 7 %    BASOPHILS 0 0 - 1 %    IMMATURE GRANULOCYTES 0 0.0 - 0.5 %    ABS. NEUTROPHILS 4.5 1.8 - 8.0 K/UL    ABS. LYMPHOCYTES 2.9 0.8 - 3.5 K/UL    ABS. MONOCYTES 0.7 0.0 - 1.0 K/UL    ABS. EOSINOPHILS 0.3 0.0 - 0.4 K/UL    ABS. BASOPHILS 0.0 0.0 - 0.1 K/UL    ABS. IMM.  GRANS. 0.0 0.00 - 0.04 K/UL    DF SMEAR SCANNED      PLATELET COMMENTS CLUMPED PLATELETS      RBC COMMENTS ANISOCYTOSIS  1+         BMP:   Lab Results   Component Value Date/Time     (L) 02/21/2020 04:55 AM    K 3.9 02/21/2020 04:55 AM     02/21/2020 04:55 AM    CO2 29 02/21/2020 04:55 AM    AGAP 2 (L) 02/21/2020 04:55 AM     (H) 02/21/2020 04:55 AM    BUN 9 02/21/2020 04:55 AM    CREA 0.69 02/21/2020 04:55 AM    GFRAA >60 02/21/2020 04:55 AM    GFRNA >60 02/21/2020 04:55 AM        Blood glucose pattern      Assessment and Plan   Nursing Diagnosis Risk for unstable blood glucose pattern   Nursing Intervention Domain 5250 Decision-making Support   Nursing Interventions Examined current inpatient diabetes control   Explored factors facilitating and impeding inpatient management  Identified self-management practices impeding diabetes control  Explored corrective strategies with patient and responsible inpatient provider   Informed patient of rational for basal bolus insulin strategy while hospitalized  Instructed patient in starting long acting insulin, timing of lantus at home, difference between NPH and Lantus. Evaluation   Ms Jessica Hendricks is a 69year old woman with well controlled insulin dependent diabetes on  Lantus and Humalog who was admitted for an abdominal wound dehiscence and abdominal hemorrhage. No evidence of infection at wound and no concerns for ongoing bleeding when she went to the OR on 2/19. Post-operative wound hematoma that evacuated spontaneously. Plans are to return today for a CT with contrast to evaluate abdominal wound. Ms Jessica Hendricks continued to have hyperglycemia not controlled by sliding scale insulin (blood glucose over 200). There were plans to start NPH overnight due to hyperglycemia but patient refused the evening dose last night. She stated today that she refused it because she was confused and didn't know what kind of insulin it was.   In the past 24 hours, she continues to have blood glucose 158-196 with about 50% of meal tray intake. Recommendations   1. To prevent inpatient hypoglycemia, it is recommended to start at a lower long acting dose and titrate accordingly. Insulin resistant dose (BMI over 27). Start 0.4 units/kg Lantus: 40 units    Daily evaluation fasting blood glucose will be required to determine the appropriate amount of Lantus. If fasting blood glucose is over 200, please add the total amount of correctional Humalog received the day before to total Lantus dose. Ok to split Lantus BID for larger doses. 2. She is receiving a low carbohydrate tray (about 45 grams of CHO/meal) and consuming about 50% of trays. With a low PO intake- it would be appropriate to cut insulin resistant mealtime dose in half to prevent hypoglycemia with long acting insulin on board. 0.05 units/k units Humalog with meal.  As diet is advanced and she is experiencing pre-prandial hyperglycemia (Blood glucose over 180), this dose can be resumed at 0.1 units/kg or 10 units Humalog with meals. 3. As she has well controlled diabetes as evidenced with an A1C of 6.6%, It would be appropriate to resume home regimen upon discharge.     Billing Code(s)     [facility], [address] [facility]

## 2020-02-22 LAB
ANION GAP SERPL CALC-SCNC: 4 MMOL/L (ref 5–15)
BASOPHILS # BLD: 0 K/UL (ref 0–0.1)
BASOPHILS NFR BLD: 1 % (ref 0–1)
BUN SERPL-MCNC: 9 MG/DL (ref 6–20)
BUN/CREAT SERPL: 13 (ref 12–20)
CALCIUM SERPL-MCNC: 8.7 MG/DL (ref 8.5–10.1)
CHLORIDE SERPL-SCNC: 104 MMOL/L (ref 97–108)
CO2 SERPL-SCNC: 28 MMOL/L (ref 21–32)
CREAT SERPL-MCNC: 0.72 MG/DL (ref 0.55–1.02)
DIFFERENTIAL METHOD BLD: ABNORMAL
EOSINOPHIL # BLD: 0.3 K/UL (ref 0–0.4)
EOSINOPHIL NFR BLD: 5 % (ref 0–7)
ERYTHROCYTE [DISTWIDTH] IN BLOOD BY AUTOMATED COUNT: 15.6 % (ref 11.5–14.5)
GLUCOSE BLD STRIP.AUTO-MCNC: 175 MG/DL (ref 65–100)
GLUCOSE BLD STRIP.AUTO-MCNC: 192 MG/DL (ref 65–100)
GLUCOSE BLD STRIP.AUTO-MCNC: 213 MG/DL (ref 65–100)
GLUCOSE SERPL-MCNC: 192 MG/DL (ref 65–100)
HCT VFR BLD AUTO: 25.5 % (ref 35–47)
HGB BLD-MCNC: 8 G/DL (ref 11.5–16)
IMM GRANULOCYTES # BLD AUTO: 0 K/UL (ref 0–0.04)
IMM GRANULOCYTES NFR BLD AUTO: 0 % (ref 0–0.5)
LYMPHOCYTES # BLD: 1.9 K/UL (ref 0.8–3.5)
LYMPHOCYTES NFR BLD: 29 % (ref 12–49)
MAGNESIUM SERPL-MCNC: 1.9 MG/DL (ref 1.6–2.4)
MCH RBC QN AUTO: 27.1 PG (ref 26–34)
MCHC RBC AUTO-ENTMCNC: 31.4 G/DL (ref 30–36.5)
MCV RBC AUTO: 86.4 FL (ref 80–99)
MONOCYTES # BLD: 0.6 K/UL (ref 0–1)
MONOCYTES NFR BLD: 10 % (ref 5–13)
NEUTS SEG # BLD: 3.6 K/UL (ref 1.8–8)
NEUTS SEG NFR BLD: 55 % (ref 32–75)
NRBC # BLD: 0 K/UL (ref 0–0.01)
NRBC BLD-RTO: 0 PER 100 WBC
PLATELET # BLD AUTO: 211 K/UL (ref 150–400)
PMV BLD AUTO: 11 FL (ref 8.9–12.9)
POTASSIUM SERPL-SCNC: 3.7 MMOL/L (ref 3.5–5.1)
RBC # BLD AUTO: 2.95 M/UL (ref 3.8–5.2)
SERVICE CMNT-IMP: ABNORMAL
SODIUM SERPL-SCNC: 136 MMOL/L (ref 136–145)
WBC # BLD AUTO: 6.4 K/UL (ref 3.6–11)

## 2020-02-22 PROCEDURE — 74011250637 HC RX REV CODE- 250/637: Performed by: SURGERY

## 2020-02-22 PROCEDURE — 80048 BASIC METABOLIC PNL TOTAL CA: CPT

## 2020-02-22 PROCEDURE — 74011636637 HC RX REV CODE- 636/637: Performed by: NURSE PRACTITIONER

## 2020-02-22 PROCEDURE — 99218 HC RM OBSERVATION: CPT

## 2020-02-22 PROCEDURE — 82962 GLUCOSE BLOOD TEST: CPT

## 2020-02-22 PROCEDURE — 74011250636 HC RX REV CODE- 250/636: Performed by: NURSE PRACTITIONER

## 2020-02-22 PROCEDURE — 74011250636 HC RX REV CODE- 250/636: Performed by: SURGERY

## 2020-02-22 PROCEDURE — 85025 COMPLETE CBC W/AUTO DIFF WBC: CPT

## 2020-02-22 PROCEDURE — 83735 ASSAY OF MAGNESIUM: CPT

## 2020-02-22 PROCEDURE — 36415 COLL VENOUS BLD VENIPUNCTURE: CPT

## 2020-02-22 PROCEDURE — 77030036554

## 2020-02-22 PROCEDURE — 74011000258 HC RX REV CODE- 258: Performed by: SURGERY

## 2020-02-22 PROCEDURE — 96366 THER/PROPH/DIAG IV INF ADDON: CPT

## 2020-02-22 RX ORDER — HYDROMORPHONE HYDROCHLORIDE 2 MG/1
2 TABLET ORAL
Status: DISCONTINUED | OUTPATIENT
Start: 2020-02-22 | End: 2020-02-27 | Stop reason: HOSPADM

## 2020-02-22 RX ADMIN — PANTOPRAZOLE SODIUM 40 MG: 40 TABLET, DELAYED RELEASE ORAL at 07:32

## 2020-02-22 RX ADMIN — HYDROMORPHONE HYDROCHLORIDE 1 MG: 1 INJECTION, SOLUTION INTRAMUSCULAR; INTRAVENOUS; SUBCUTANEOUS at 06:05

## 2020-02-22 RX ADMIN — METOPROLOL TARTRATE 50 MG: 50 TABLET, FILM COATED ORAL at 16:43

## 2020-02-22 RX ADMIN — HYDROMORPHONE HYDROCHLORIDE 1 MG: 1 INJECTION, SOLUTION INTRAMUSCULAR; INTRAVENOUS; SUBCUTANEOUS at 20:06

## 2020-02-22 RX ADMIN — TRIAMTERENE AND HYDROCHLOROTHIAZIDE 1 TABLET: 37.5; 25 TABLET ORAL at 08:33

## 2020-02-22 RX ADMIN — SODIUM CHLORIDE, SODIUM LACTATE, POTASSIUM CHLORIDE, AND CALCIUM CHLORIDE 25 ML/HR: 600; 310; 30; 20 INJECTION, SOLUTION INTRAVENOUS at 11:40

## 2020-02-22 RX ADMIN — Medication 10 ML: at 05:11

## 2020-02-22 RX ADMIN — INSULIN GLARGINE 40 UNITS: 100 INJECTION, SOLUTION SUBCUTANEOUS at 00:06

## 2020-02-22 RX ADMIN — INSULIN LISPRO 2 UNITS: 100 INJECTION, SOLUTION INTRAVENOUS; SUBCUTANEOUS at 07:33

## 2020-02-22 RX ADMIN — INSULIN LISPRO 6 UNITS: 100 INJECTION, SOLUTION INTRAVENOUS; SUBCUTANEOUS at 07:33

## 2020-02-22 RX ADMIN — INSULIN LISPRO 6 UNITS: 100 INJECTION, SOLUTION INTRAVENOUS; SUBCUTANEOUS at 11:36

## 2020-02-22 RX ADMIN — INSULIN LISPRO 6 UNITS: 100 INJECTION, SOLUTION INTRAVENOUS; SUBCUTANEOUS at 16:43

## 2020-02-22 RX ADMIN — INSULIN LISPRO 4 UNITS: 100 INJECTION, SOLUTION INTRAVENOUS; SUBCUTANEOUS at 16:44

## 2020-02-22 RX ADMIN — Medication 10 ML: at 22:00

## 2020-02-22 RX ADMIN — PIPERACILLIN AND TAZOBACTAM 3.38 G: 3; .375 INJECTION, POWDER, LYOPHILIZED, FOR SOLUTION INTRAVENOUS at 05:11

## 2020-02-22 RX ADMIN — INSULIN LISPRO 2 UNITS: 100 INJECTION, SOLUTION INTRAVENOUS; SUBCUTANEOUS at 11:37

## 2020-02-22 RX ADMIN — HYDROMORPHONE HYDROCHLORIDE 1 MG: 1 INJECTION, SOLUTION INTRAMUSCULAR; INTRAVENOUS; SUBCUTANEOUS at 14:57

## 2020-02-22 RX ADMIN — PIPERACILLIN AND TAZOBACTAM 3.38 G: 3; .375 INJECTION, POWDER, LYOPHILIZED, FOR SOLUTION INTRAVENOUS at 19:01

## 2020-02-22 RX ADMIN — INSULIN LISPRO 4 UNITS: 100 INJECTION, SOLUTION INTRAVENOUS; SUBCUTANEOUS at 00:05

## 2020-02-22 RX ADMIN — PIPERACILLIN AND TAZOBACTAM 3.38 G: 3; .375 INJECTION, POWDER, LYOPHILIZED, FOR SOLUTION INTRAVENOUS at 11:36

## 2020-02-22 RX ADMIN — DULOXETINE HYDROCHLORIDE 120 MG: 60 CAPSULE, DELAYED RELEASE ORAL at 08:33

## 2020-02-22 RX ADMIN — METOPROLOL TARTRATE 50 MG: 50 TABLET, FILM COATED ORAL at 08:33

## 2020-02-22 NOTE — PROGRESS NOTES
Progress Note    Patient: Favian Rosa MRN: [id number]  SSN: [id number]    YOB: 1945  Age: 69 y.o. Sex: female      Admit Date: 2020    Abdominal wall hematoma    Subjective:     No acute surgical issues. Pt reported abdominal pain with cramping. She is also having nausea but is able to take PO. Hemoglobin is stable.  CT scan showed hematoma to be smaller    Objective:     Visit Vitals  /59   Pulse 62   Temp 98.2 °F (36.8 °C)   Resp 16   Ht 5' 1\" (1.549 m)   Wt 212 lb (96.2 kg)   SpO2 94%   BMI 40.06 kg/m²       Temp (24hrs), Av.4 °F (36.9 °C), Min:98.1 °F (36.7 °C), Max:99 °F (37.2 °C)        Physical Exam:    Gen:  NAD  Pulm:  Unlabored  Abd:  S/ND/appropriate TTP  Wound:  C/D/I  AILEEN drain with serosanguinous drainage    Recent Results (from the past 24 hour(s))   GLUCOSE, POC    Collection Time: 20  4:40 PM   Result Value Ref Range    Glucose (POC) 127 (H) 65 - 100 mg/dL    Performed by Oriana Garcia    GLUCOSE, POC    Collection Time: 20 11:11 PM   Result Value Ref Range    Glucose (POC) 222 (H) 65 - 100 mg/dL    Performed by [facility]    METABOLIC PANEL, BASIC    Collection Time: 20  4:46 AM   Result Value Ref Range    Sodium 136 136 - 145 mmol/L    Potassium 3.7 3.5 - 5.1 mmol/L    Chloride 104 97 - 108 mmol/L    CO2 28 21 - 32 mmol/L    Anion gap 4 (L) 5 - 15 mmol/L    Glucose 192 (H) 65 - 100 mg/dL    BUN 9 6 - 20 MG/DL    Creatinine 0.72 0.55 - 1.02 MG/DL    BUN/Creatinine ratio 13  20      GFR est AA >60 >60 ml/min/1.73m2    GFR est non-AA >60 >60 ml/min/1.73m2    Calcium 8.7 8.5 - 10.1 MG/DL   MAGNESIUM    Collection Time: 20  4:46 AM   Result Value Ref Range    Magnesium 1.9 1.6 - 2.4 mg/dL   CBC WITH AUTOMATED DIFF    Collection Time: 20  4:46 AM   Result Value Ref Range    WBC 6.4 3.6 - 11.0 K/uL    RBC 2.95 (L) 3.80 - 5.20 M/uL    HGB 8.0 (L) 11.5 - 16.0 g/dL    HCT 25.5 (L) 35.0 - 47.0 %    MCV 86.4 80.0 - 99.0 FL    MCH 27.1 26.0 - 34.0 PG    MCHC 31.4 30.0 - 36.5 g/dL    RDW 15.6 (H) 11.5 - 14.5 %    PLATELET 603 925 - 624 K/uL    MPV 11.0 8.9 - 12.9 FL    NRBC 0.0 0  WBC    ABSOLUTE NRBC 0.00 0.00 - 0.01 K/uL    NEUTROPHILS 55 32 - 75 %    LYMPHOCYTES 29 12 - 49 %    MONOCYTES 10 5 - 13 %    EOSINOPHILS 5 0 - 7 %    BASOPHILS 1 0 - 1 %    IMMATURE GRANULOCYTES 0 0.0 - 0.5 %    ABS. NEUTROPHILS 3.6 1.8 - 8.0 K/UL    ABS. LYMPHOCYTES 1.9 0.8 - 3.5 K/UL    ABS. MONOCYTES 0.6 0.0 - 1.0 K/UL    ABS. EOSINOPHILS 0.3 0.0 - 0.4 K/UL    ABS. BASOPHILS 0.0 0.0 - 0.1 K/UL    ABS. IMM.  GRANS. 0.0 0.00 - 0.04 K/UL    DF AUTOMATED     GLUCOSE, POC    Collection Time: 02/22/20  6:35 AM   Result Value Ref Range    Glucose (POC) 192 (H) 65 - 100 mg/dL    Performed by Aaron Chavarria    GLUCOSE, POC    Collection Time: 02/22/20 11:09 AM   Result Value Ref Range    Glucose (POC) 175 (H) 65 - 100 mg/dL    Performed by Nichelle Lorenzana          Assessment:     Hospital Problems  Date Reviewed: 2/4/2020          Codes Class Noted POA    Postoperative hematoma involving circulatory system following cardiac bypass ICD-10-CM: I97.631  ICD-9-CM: 998.12  2/19/2020 Unknown              Plan/Recommendations/Medical Decision Making:     - Abdominal wall hematoma:  Monitor AILEEN drain output  - Acute anemia from abdominal wall hematoma:  Continue to monitor  - Labs in am  - Diet as tolerated  - Abdominal binder

## 2020-02-22 NOTE — PROGRESS NOTES
Problem: Falls - Risk of  Goal: *Absence of Falls  Description  Document Tia Manus Fall Risk and appropriate interventions in the flowsheet.   Outcome: Progressing Towards Goal  Note: Fall Risk Interventions:            Medication Interventions: Patient to call before getting OOB, Teach patient to arise slowly    Elimination Interventions: Call light in reach, Patient to call for help with toileting needs

## 2020-02-22 NOTE — PROGRESS NOTES
Observation notice provided in writing to patient and/or caregiver as well as verbal explanation of the policy. Patients who are in outpatient status also receive the Observation notice.       Jayjay GARCIA, ACM

## 2020-02-22 NOTE — PROGRESS NOTES
Bedside shift change report given to [facility] (oncoming nurse) by Dasia Morales RN (offgoing nurse). Report included the following information SBAR and Kardex.

## 2020-02-23 LAB
ANION GAP SERPL CALC-SCNC: 3 MMOL/L (ref 5–15)
BASOPHILS # BLD: 0 K/UL (ref 0–0.1)
BASOPHILS NFR BLD: 0 % (ref 0–1)
BUN SERPL-MCNC: 9 MG/DL (ref 6–20)
BUN/CREAT SERPL: 14 (ref 12–20)
CALCIUM SERPL-MCNC: 8.6 MG/DL (ref 8.5–10.1)
CHLORIDE SERPL-SCNC: 106 MMOL/L (ref 97–108)
CO2 SERPL-SCNC: 29 MMOL/L (ref 21–32)
CREAT SERPL-MCNC: 0.65 MG/DL (ref 0.55–1.02)
DIFFERENTIAL METHOD BLD: ABNORMAL
EOSINOPHIL # BLD: 0.4 K/UL (ref 0–0.4)
EOSINOPHIL NFR BLD: 6 % (ref 0–7)
ERYTHROCYTE [DISTWIDTH] IN BLOOD BY AUTOMATED COUNT: 15.6 % (ref 11.5–14.5)
GLUCOSE BLD STRIP.AUTO-MCNC: 144 MG/DL (ref 65–100)
GLUCOSE BLD STRIP.AUTO-MCNC: 145 MG/DL (ref 65–100)
GLUCOSE BLD STRIP.AUTO-MCNC: 163 MG/DL (ref 65–100)
GLUCOSE BLD STRIP.AUTO-MCNC: 225 MG/DL (ref 65–100)
GLUCOSE BLD STRIP.AUTO-MCNC: 90 MG/DL (ref 65–100)
GLUCOSE SERPL-MCNC: 143 MG/DL (ref 65–100)
HCT VFR BLD AUTO: 25.5 % (ref 35–47)
HGB BLD-MCNC: 7.8 G/DL (ref 11.5–16)
IMM GRANULOCYTES # BLD AUTO: 0 K/UL (ref 0–0.04)
IMM GRANULOCYTES NFR BLD AUTO: 0 % (ref 0–0.5)
LYMPHOCYTES # BLD: 2.1 K/UL (ref 0.8–3.5)
LYMPHOCYTES NFR BLD: 30 % (ref 12–49)
MAGNESIUM SERPL-MCNC: 1.9 MG/DL (ref 1.6–2.4)
MCH RBC QN AUTO: 26.6 PG (ref 26–34)
MCHC RBC AUTO-ENTMCNC: 30.6 G/DL (ref 30–36.5)
MCV RBC AUTO: 87 FL (ref 80–99)
MONOCYTES # BLD: 0.7 K/UL (ref 0–1)
MONOCYTES NFR BLD: 9 % (ref 5–13)
NEUTS SEG # BLD: 3.7 K/UL (ref 1.8–8)
NEUTS SEG NFR BLD: 54 % (ref 32–75)
NRBC # BLD: 0 K/UL (ref 0–0.01)
NRBC BLD-RTO: 0 PER 100 WBC
PLATELET # BLD AUTO: 141 K/UL (ref 150–400)
PMV BLD AUTO: 12.2 FL (ref 8.9–12.9)
POTASSIUM SERPL-SCNC: 4.1 MMOL/L (ref 3.5–5.1)
RBC # BLD AUTO: 2.93 M/UL (ref 3.8–5.2)
SERVICE CMNT-IMP: ABNORMAL
SERVICE CMNT-IMP: NORMAL
SODIUM SERPL-SCNC: 138 MMOL/L (ref 136–145)
WBC # BLD AUTO: 6.9 K/UL (ref 3.6–11)

## 2020-02-23 PROCEDURE — 82962 GLUCOSE BLOOD TEST: CPT

## 2020-02-23 PROCEDURE — 99218 HC RM OBSERVATION: CPT

## 2020-02-23 PROCEDURE — 74011000258 HC RX REV CODE- 258: Performed by: SURGERY

## 2020-02-23 PROCEDURE — 85025 COMPLETE CBC W/AUTO DIFF WBC: CPT

## 2020-02-23 PROCEDURE — 74011250637 HC RX REV CODE- 250/637: Performed by: SURGERY

## 2020-02-23 PROCEDURE — 74011250636 HC RX REV CODE- 250/636: Performed by: SURGERY

## 2020-02-23 PROCEDURE — 36415 COLL VENOUS BLD VENIPUNCTURE: CPT

## 2020-02-23 PROCEDURE — 96366 THER/PROPH/DIAG IV INF ADDON: CPT

## 2020-02-23 PROCEDURE — 74011636637 HC RX REV CODE- 636/637: Performed by: NURSE PRACTITIONER

## 2020-02-23 PROCEDURE — 83735 ASSAY OF MAGNESIUM: CPT

## 2020-02-23 PROCEDURE — 80048 BASIC METABOLIC PNL TOTAL CA: CPT

## 2020-02-23 PROCEDURE — 74011250636 HC RX REV CODE- 250/636: Performed by: NURSE PRACTITIONER

## 2020-02-23 RX ADMIN — TRIAMTERENE AND HYDROCHLOROTHIAZIDE 1 TABLET: 37.5; 25 TABLET ORAL at 08:35

## 2020-02-23 RX ADMIN — HYDROMORPHONE HYDROCHLORIDE 1 MG: 1 INJECTION, SOLUTION INTRAMUSCULAR; INTRAVENOUS; SUBCUTANEOUS at 23:10

## 2020-02-23 RX ADMIN — INSULIN LISPRO 2 UNITS: 100 INJECTION, SOLUTION INTRAVENOUS; SUBCUTANEOUS at 16:31

## 2020-02-23 RX ADMIN — SODIUM CHLORIDE, SODIUM LACTATE, POTASSIUM CHLORIDE, AND CALCIUM CHLORIDE 25 ML/HR: 600; 310; 30; 20 INJECTION, SOLUTION INTRAVENOUS at 16:34

## 2020-02-23 RX ADMIN — PIPERACILLIN AND TAZOBACTAM 3.38 G: 3; .375 INJECTION, POWDER, LYOPHILIZED, FOR SOLUTION INTRAVENOUS at 04:19

## 2020-02-23 RX ADMIN — INSULIN LISPRO 6 UNITS: 100 INJECTION, SOLUTION INTRAVENOUS; SUBCUTANEOUS at 07:09

## 2020-02-23 RX ADMIN — PIPERACILLIN AND TAZOBACTAM 3.38 G: 3; .375 INJECTION, POWDER, LYOPHILIZED, FOR SOLUTION INTRAVENOUS at 19:19

## 2020-02-23 RX ADMIN — INSULIN LISPRO 4 UNITS: 100 INJECTION, SOLUTION INTRAVENOUS; SUBCUTANEOUS at 00:16

## 2020-02-23 RX ADMIN — METOPROLOL TARTRATE 50 MG: 50 TABLET, FILM COATED ORAL at 08:35

## 2020-02-23 RX ADMIN — PIPERACILLIN AND TAZOBACTAM 3.38 G: 3; .375 INJECTION, POWDER, LYOPHILIZED, FOR SOLUTION INTRAVENOUS at 12:56

## 2020-02-23 RX ADMIN — METOPROLOL TARTRATE 50 MG: 50 TABLET, FILM COATED ORAL at 16:31

## 2020-02-23 RX ADMIN — INSULIN LISPRO 6 UNITS: 100 INJECTION, SOLUTION INTRAVENOUS; SUBCUTANEOUS at 12:58

## 2020-02-23 RX ADMIN — Medication 10 ML: at 22:00

## 2020-02-23 RX ADMIN — INSULIN LISPRO 6 UNITS: 100 INJECTION, SOLUTION INTRAVENOUS; SUBCUTANEOUS at 16:31

## 2020-02-23 RX ADMIN — Medication 10 ML: at 07:10

## 2020-02-23 RX ADMIN — INSULIN LISPRO 2 UNITS: 100 INJECTION, SOLUTION INTRAVENOUS; SUBCUTANEOUS at 12:58

## 2020-02-23 RX ADMIN — INSULIN GLARGINE 40 UNITS: 100 INJECTION, SOLUTION SUBCUTANEOUS at 23:21

## 2020-02-23 RX ADMIN — HYDROMORPHONE HYDROCHLORIDE 1 MG: 1 INJECTION, SOLUTION INTRAMUSCULAR; INTRAVENOUS; SUBCUTANEOUS at 15:26

## 2020-02-23 RX ADMIN — INSULIN LISPRO 2 UNITS: 100 INJECTION, SOLUTION INTRAVENOUS; SUBCUTANEOUS at 07:09

## 2020-02-23 RX ADMIN — PANTOPRAZOLE SODIUM 40 MG: 40 TABLET, DELAYED RELEASE ORAL at 07:09

## 2020-02-23 RX ADMIN — INSULIN GLARGINE 40 UNITS: 100 INJECTION, SOLUTION SUBCUTANEOUS at 00:16

## 2020-02-23 RX ADMIN — DULOXETINE HYDROCHLORIDE 120 MG: 60 CAPSULE, DELAYED RELEASE ORAL at 08:35

## 2020-02-23 RX ADMIN — HYDROMORPHONE HYDROCHLORIDE 1 MG: 1 INJECTION, SOLUTION INTRAMUSCULAR; INTRAVENOUS; SUBCUTANEOUS at 10:02

## 2020-02-23 RX ADMIN — HYDROMORPHONE HYDROCHLORIDE 1 MG: 1 INJECTION, SOLUTION INTRAMUSCULAR; INTRAVENOUS; SUBCUTANEOUS at 00:38

## 2020-02-23 NOTE — ROUTINE PROCESS
Bedside shift change report given to [facility] (oncoming nurse) by Jose Alejandro Yung RN (offgoing nurse). Report included the following information SBAR and Kardex.

## 2020-02-23 NOTE — PROGRESS NOTES
Progress Note    Patient: Jennifer Barnett MRN: [id number]  SSN: [id number]    YOB: 1945  Age: 69 y.o. Sex: female      Admit Date: 2020    Abdominal wall hematoma    Subjective:     No acute surgical issues. Pt reported minimal pain. She is some anorexia but is able to take PO. Hemoglobin is stable.      Objective:     Visit Vitals  /61   Pulse 70   Temp 98.3 °F (36.8 °C)   Resp 16   Ht 5' 1\" (1.549 m)   Wt 229 lb 4.5 oz (104 kg)   SpO2 96%   BMI 43.32 kg/m²       Temp (24hrs), Av °F (36.7 °C), Min:97.5 °F (36.4 °C), Max:98.3 °F (36.8 °C)        Physical Exam:    Gen:  NAD  Pulm:  Unlabored  Abd:  S/ND/appropriate TTP  Wound:  C/D/I  AILEEN drain with serosanguinous drainage    Recent Results (from the past 24 hour(s))   GLUCOSE, POC    Collection Time: 20  4:32 PM   Result Value Ref Range    Glucose (POC) 213 (H) 65 - 100 mg/dL    Performed by Mg Eldridge    GLUCOSE, POC    Collection Time: 20 12:03 AM   Result Value Ref Range    Glucose (POC) 225 (H) 65 - 100 mg/dL    Performed by Feli Chauhan    METABOLIC PANEL, BASIC    Collection Time: 20  4:15 AM   Result Value Ref Range    Sodium 138 136 - 145 mmol/L    Potassium 4.1 3.5 - 5.1 mmol/L    Chloride 106 97 - 108 mmol/L    CO2 29 21 - 32 mmol/L    Anion gap 3 (L) 5 - 15 mmol/L    Glucose 143 (H) 65 - 100 mg/dL    BUN 9 6 - 20 MG/DL    Creatinine 0.65 0.55 - 1.02 MG/DL    BUN/Creatinine ratio 14 12 - 20      GFR est AA >60 >60 ml/min/1.73m2    GFR est non-AA >60 >60 ml/min/1.73m2    Calcium 8.6 8.5 - 10.1 MG/DL   MAGNESIUM    Collection Time: 20  4:15 AM   Result Value Ref Range    Magnesium 1.9 1.6 - 2.4 mg/dL   CBC WITH AUTOMATED DIFF    Collection Time: 20  4:15 AM   Result Value Ref Range    WBC 6.9 3.6 - 11.0 K/uL    RBC 2.93 (L) 3.80 - 5.20 M/uL    HGB 7.8 (L) 11.5 - 16.0 g/dL    HCT 25.5 (L) 35.0 - 47.0 %    MCV 87.0 80.0 - 99.0 FL    MCH 26.6 26.0 - 34.0 PG    MCHC 30.6 30.0 - 36.5 g/dL    RDW 15.6 (H) 11.5 - 14.5 %    PLATELET 901 (L) 028 - 400 K/uL    MPV 12.2 8.9 - 12.9 FL    NRBC 0.0 0  WBC    ABSOLUTE NRBC 0.00 0.00 - 0.01 K/uL    NEUTROPHILS 54 32 - 75 %    LYMPHOCYTES 30 12 - 49 %    MONOCYTES 9 5 - 13 %    EOSINOPHILS 6 0 - 7 %    BASOPHILS 0 0 - 1 %    IMMATURE GRANULOCYTES 0 0.0 - 0.5 %    ABS. NEUTROPHILS 3.7 1.8 - 8.0 K/UL    ABS. LYMPHOCYTES 2.1 0.8 - 3.5 K/UL    ABS. MONOCYTES 0.7 0.0 - 1.0 K/UL    ABS. EOSINOPHILS 0.4 0.0 - 0.4 K/UL    ABS. BASOPHILS 0.0 0.0 - 0.1 K/UL    ABS. IMM.  GRANS. 0.0 0.00 - 0.04 K/UL    DF AUTOMATED     GLUCOSE, POC    Collection Time: 02/23/20  6:34 AM   Result Value Ref Range    Glucose (POC) 144 (H) 65 - 100 mg/dL    Performed by Idolina Severin    GLUCOSE, POC    Collection Time: 02/23/20 11:16 AM   Result Value Ref Range    Glucose (POC) 145 (H) 65 - 100 mg/dL    Performed by Tenisha Jhaveri          Assessment:     Hospital Problems  Date Reviewed: 2/4/2020          Codes Class Noted POA    Postoperative hematoma involving circulatory system following cardiac bypass ICD-10-CM: I97.631  ICD-9-CM: 998.12  2/19/2020 Unknown              Plan/Recommendations/Medical Decision Making:     - Abdominal wall hematoma:  Monitor AILEEN drain output  - Acute anemia from abdominal wall hematoma:  Continue to monitor  - Hemoglobin check in am  - Diet as tolerated  - Abdominal binder

## 2020-02-23 NOTE — PROGRESS NOTES
Problem: Falls - Risk of  Goal: *Absence of Falls  Description  Document Sadie Mane Fall Risk and appropriate interventions in the flowsheet.   Outcome: Progressing Towards Goal  Note: Fall Risk Interventions:            Medication Interventions: Patient to call before getting OOB, Teach patient to arise slowly    Elimination Interventions: Call light in reach

## 2020-02-23 NOTE — PROGRESS NOTES
This nurse placed an abdominal binder on the patient at the beginning of shift, patient tolerated the tightness well but woke up this morning feeling hot and itchy from it. Patient requested to remove it but stated she will try during the day to have it on.

## 2020-02-24 LAB
COMMENT, HOLDF: NORMAL
GLUCOSE BLD STRIP.AUTO-MCNC: 101 MG/DL (ref 65–100)
GLUCOSE BLD STRIP.AUTO-MCNC: 120 MG/DL (ref 65–100)
GLUCOSE BLD STRIP.AUTO-MCNC: 195 MG/DL (ref 65–100)
GLUCOSE BLD STRIP.AUTO-MCNC: 201 MG/DL (ref 65–100)
GLUCOSE BLD STRIP.AUTO-MCNC: 97 MG/DL (ref 65–100)
HGB BLD-MCNC: 9.4 G/DL (ref 11.5–16)
SAMPLES BEING HELD,HOLD: NORMAL
SERVICE CMNT-IMP: ABNORMAL
SERVICE CMNT-IMP: NORMAL

## 2020-02-24 PROCEDURE — 99218 HC RM OBSERVATION: CPT

## 2020-02-24 PROCEDURE — 74011250636 HC RX REV CODE- 250/636: Performed by: SURGERY

## 2020-02-24 PROCEDURE — 74011000258 HC RX REV CODE- 258: Performed by: SURGERY

## 2020-02-24 PROCEDURE — 96376 TX/PRO/DX INJ SAME DRUG ADON: CPT

## 2020-02-24 PROCEDURE — 74011250637 HC RX REV CODE- 250/637: Performed by: SURGERY

## 2020-02-24 PROCEDURE — 85018 HEMOGLOBIN: CPT

## 2020-02-24 PROCEDURE — 74011250637 HC RX REV CODE- 250/637: Performed by: NURSE PRACTITIONER

## 2020-02-24 PROCEDURE — 74011636637 HC RX REV CODE- 636/637: Performed by: NURSE PRACTITIONER

## 2020-02-24 PROCEDURE — 82962 GLUCOSE BLOOD TEST: CPT

## 2020-02-24 PROCEDURE — 36415 COLL VENOUS BLD VENIPUNCTURE: CPT

## 2020-02-24 RX ORDER — INSULIN LISPRO 100 [IU]/ML
8 INJECTION, SOLUTION INTRAVENOUS; SUBCUTANEOUS
Status: DISCONTINUED | OUTPATIENT
Start: 2020-02-24 | End: 2020-02-25

## 2020-02-24 RX ADMIN — HYDROMORPHONE HYDROCHLORIDE 2 MG: 2 TABLET ORAL at 15:45

## 2020-02-24 RX ADMIN — HYDROMORPHONE HYDROCHLORIDE 2 MG: 2 TABLET ORAL at 03:34

## 2020-02-24 RX ADMIN — HYDROMORPHONE HYDROCHLORIDE 2 MG: 2 TABLET ORAL at 10:57

## 2020-02-24 RX ADMIN — DULOXETINE HYDROCHLORIDE 120 MG: 60 CAPSULE, DELAYED RELEASE ORAL at 09:41

## 2020-02-24 RX ADMIN — Medication 1 CAPSULE: at 10:57

## 2020-02-24 RX ADMIN — INSULIN LISPRO 6 UNITS: 100 INJECTION, SOLUTION INTRAVENOUS; SUBCUTANEOUS at 12:07

## 2020-02-24 RX ADMIN — PIPERACILLIN AND TAZOBACTAM 3.38 G: 3; .375 INJECTION, POWDER, LYOPHILIZED, FOR SOLUTION INTRAVENOUS at 03:45

## 2020-02-24 RX ADMIN — INSULIN LISPRO 4 UNITS: 100 INJECTION, SOLUTION INTRAVENOUS; SUBCUTANEOUS at 18:04

## 2020-02-24 RX ADMIN — TRIAMTERENE AND HYDROCHLOROTHIAZIDE 1 TABLET: 37.5; 25 TABLET ORAL at 09:41

## 2020-02-24 RX ADMIN — INSULIN GLARGINE 40 UNITS: 100 INJECTION, SOLUTION SUBCUTANEOUS at 23:02

## 2020-02-24 RX ADMIN — PIPERACILLIN AND TAZOBACTAM 3.38 G: 3; .375 INJECTION, POWDER, LYOPHILIZED, FOR SOLUTION INTRAVENOUS at 11:00

## 2020-02-24 RX ADMIN — METOPROLOL TARTRATE 50 MG: 50 TABLET, FILM COATED ORAL at 09:41

## 2020-02-24 RX ADMIN — HYDROMORPHONE HYDROCHLORIDE 2 MG: 2 TABLET ORAL at 20:32

## 2020-02-24 RX ADMIN — INSULIN LISPRO 8 UNITS: 100 INJECTION, SOLUTION INTRAVENOUS; SUBCUTANEOUS at 16:51

## 2020-02-24 RX ADMIN — Medication 10 ML: at 14:00

## 2020-02-24 RX ADMIN — PANTOPRAZOLE SODIUM 40 MG: 40 TABLET, DELAYED RELEASE ORAL at 08:10

## 2020-02-24 RX ADMIN — Medication 10 ML: at 06:00

## 2020-02-24 RX ADMIN — ONDANSETRON 4 MG: 2 INJECTION, SOLUTION INTRAMUSCULAR; INTRAVENOUS at 08:13

## 2020-02-24 NOTE — PROGRESS NOTES
Call received from [address] [phone]) stating patient is currently open to them for PT, OT, and SN. CM informed them that patient had shared she was no longer open to them and did not wish to continue home health unless absolutely necessary. CM will continue to follow and assist with disposition.     Anabel De La Rosa BSW, ACM

## 2020-02-24 NOTE — PROGRESS NOTES
Mercedes PIKE requesting updates on patient's plan of care. Provider notified and given call back number for this PCP. Bedside shift change report given to Jaxson Chung (oncoming nurse) by Delma Mix RN (offgoing nurse). Report included the following information SBAR, Kardex, MAR and Recent Results.

## 2020-02-24 NOTE — PROGRESS NOTES
Bedside shift change report given to Hetal Dave and Tiara Mcdonnell RN (oncoming nurse) by Naomi Ramirez (offgoing nurse). Report included the following information SBAR, Kardex and MAR.

## 2020-02-24 NOTE — PROGRESS NOTES
General Surgery Daily Progress Note    Admit Date: 2020  Post-Operative Day: * No surgery found * from * No surgery found *     Subjective:     Last 24 hrs: Pt is having some loose stools; otherwise doing ok. Tolerating diet; h/h are stable; AILEEN drainage diminishing       Objective:     Blood pressure 108/61, pulse 61, temperature 98.2 °F (36.8 °C), resp. rate 16, height 5' 1\" (1.549 m), weight 229 lb 4.5 oz (104 kg), SpO2 94 %. Temp (24hrs), Av.3 °F (36.8 °C), Min:98.2 °F (36.8 °C), Max:98.3 °F (36.8 °C)      _____________________  Physical Exam:     Alert and Oriented, x3, in no acute distress. Cardiovascular: RRR, no peripheral edema  Abdomen: soft, staples intact in incision; AILEEN w/ ss drainage - 165cc out last 24hrs as compared to 300cc the day prior      Assessment:   Active Problems:    Postoperative hematoma involving circulatory system following cardiac bypass (2020)            Plan: Will start probiotic  Cont diet  Cont OOB  Monitor H/H, AILEEN output  PPI  D/c miralax  Daily labs    Data Review:    Recent Labs     20  0351 20  0415 20  0446   WBC  --  6.9 6.4   HGB 9.4* 7.8* 8.0*   HCT  --  25.5* 25.5*   PLT  --  141* 211     Recent Labs     20  0415 20  0446    136   K 4.1 3.7    104   CO2 29 28   * 192*   BUN 9 9   CREA 0.65 0.72   CA 8.6 8.7   MG 1.9 1.9     No results for input(s): AML, LPSE in the last 72 hours.         ______________________  Medications:    Current Facility-Administered Medications   Medication Dose Route Frequency    HYDROmorphone (DILAUDID) tablet 2 mg  2 mg Oral Q4H PRN    insulin glargine (LANTUS) injection 40 Units  40 Units SubCUTAneous QHS    insulin lispro (HUMALOG) injection 6 Units  6 Units SubCUTAneous TIDAC    baclofen (LIORESAL) tablet 10 mg  10 mg Oral DAILY PRN    polyethylene glycol (MIRALAX) packet 17 g  17 g Oral DAILY    sodium chloride (NS) flush 5-40 mL  5-40 mL IntraVENous Q8H    sodium chloride (NS) flush 5-40 mL  5-40 mL IntraVENous PRN    lactated Ringers infusion  25 mL/hr IntraVENous CONTINUOUS    acetaminophen (TYLENOL) tablet 650 mg  650 mg Oral Q6H PRN    HYDROmorphone (PF) (DILAUDID) injection 0.5-1 mg  0.5-1 mg IntraVENous Q4H PRN    naloxone (NARCAN) injection 0.4 mg  0.4 mg IntraVENous PRN    ondansetron (ZOFRAN) injection 4 mg  4 mg IntraVENous Q4H PRN    piperacillin-tazobactam (ZOSYN) 3.375 g in 0.9% sodium chloride (MBP/ADV) 100 mL  3.375 g IntraVENous Q8H    DULoxetine (CYMBALTA) capsule 120 mg  120 mg Oral DAILY    metoprolol tartrate (LOPRESSOR) tablet 50 mg  50 mg Oral BID    pantoprazole (PROTONIX) tablet 40 mg  40 mg Oral ACB    triamterene-hydroCHLOROthiazide (MAXZIDE) 37.5-25 mg per tablet 1 Tab  1 Tab Oral DAILY    glucose chewable tablet 16 g  4 Tab Oral PRN    glucagon (GLUCAGEN) injection 1 mg  1 mg IntraMUSCular PRN    dextrose 10% infusion 0-250 mL  0-250 mL IntraVENous PRN    insulin lispro (HUMALOG) injection   SubCUTAneous Q6H       Cleopatra Welsh NP  2/24/2020  ATTENDING ADDENDUM  I supervised the APC and reviewed the note. We discussed the plan of care  Really looks and feels a lot better. Will probably be able to go home with the drain on Wednesday.

## 2020-02-24 NOTE — DIABETES MGMT
JOAO MATOS  CLINICAL NURSE SPECIALIST   Followup Progress Note    Presentation   Easton Allen is a 70 y.o. female admitted for a repair of ventral incisional hernia  and consulted by Provider for advanced specialty nursing care related to inpatient diabetes management. Hyperglycemia management order set is in place. Subjective   Ms Ayse Montes is a 69year old woman with well controlled insulin dependent diabetes as evidenced by an A1C of 6.6%, on  Lantus and Humalog who was admitted for an abdominal wound dehiscence and abdominal hemorrhage. No evidence of infection at wound and no concerns for ongoing bleeding when she went to the OR on 2/19.  Post-operative wound hematoma that evacuated spontaneously. Repeat CT on 2/22 showed the hematoma is smaller. Lantus and mealtime Humalog were initiated on Friday. Objective   Physical exam    General Alert, oriented and in no acute distress. Conversant and cooperative  Vital Signs   Visit Vitals  /74 (BP 1 Location: Right arm, BP Patient Position: At rest;Sitting)   Pulse 71   Temp 98.5 °F (36.9 °C)   Resp 16   Ht 5' 1\" (1.549 m)   Wt 104 kg (229 lb 4.5 oz)   SpO2 99%   BMI 43.32 kg/m²     Skin  Warm and dry  Heart   Regular rate and rhythm.  No murmurs, rubs or gallops  Lungs  Clear to auscultation without rales or rhonchi  Extremities No foot wounds      Blood glucose pattern      Assessment and Plan   Nursing Diagnosis Risk for unstable blood glucose pattern   Nursing Intervention Domain 1037 Decision-making Support   Nursing Interventions Examined current inpatient diabetes control   Explored factors facilitating and impeding inpatient management  Identified self-management practices impeding diabetes control  Explored corrective strategies with patient and responsible inpatient provider   Informed patient of rational for basal bolus insulin strategy while hospitalized     Evaluation   Ms Ayse Montes is a 69year old woman with well controlled insulin dependent diabetes on  Lantus and Humalog who was admitted for an abdominal wound dehiscence and abdominal hemorrhage. No evidence of infection at wound and no concerns for ongoing bleeding when she went to the OR on 2/19.  Post-operative wound hematoma that evacuated spontaneously.       Lantus 40 units Daily and Humalog 6 units were started over the weekend. Fasting blood glucose over the past two days were 101 and 144 restrictively, indicating that this is an appropriate long acting dose. Her pre-prandial blood glucose values were 140-213, indicating that we can adjust bolus Humalog. Recommendations   1. Continue Lantus: 40 units as fasting blood glcuose values are in goal.  Fasting blood glucose goal 100-180. If fasting blood glucose values are below 100, Lantus can be decreased by 10%.     2. She is receiving a low carbohydrate tray (about 45 grams of CHO/meal) and consuming about 50% of trays. She is experiencing some pre-prandial hyperglycemia (BG 200s), it is appropriate to increase to 8 units Humalog with meals plus correctional insulin. 3. As she has well controlled diabetes as evidenced with an A1C of 6.6%, It would be appropriate to resume home regimen upon discharge.     Billing Code(s)   [address] [facility], [address] [facility]

## 2020-02-25 LAB
BASOPHILS # BLD: 0 K/UL (ref 0–0.1)
BASOPHILS NFR BLD: 1 % (ref 0–1)
DIFFERENTIAL METHOD BLD: ABNORMAL
EOSINOPHIL # BLD: 0.4 K/UL (ref 0–0.4)
EOSINOPHIL NFR BLD: 6 % (ref 0–7)
ERYTHROCYTE [DISTWIDTH] IN BLOOD BY AUTOMATED COUNT: 15.4 % (ref 11.5–14.5)
GLUCOSE BLD STRIP.AUTO-MCNC: 111 MG/DL (ref 65–100)
GLUCOSE BLD STRIP.AUTO-MCNC: 111 MG/DL (ref 65–100)
GLUCOSE BLD STRIP.AUTO-MCNC: 123 MG/DL (ref 65–100)
GLUCOSE BLD STRIP.AUTO-MCNC: 135 MG/DL (ref 65–100)
GLUCOSE BLD STRIP.AUTO-MCNC: 190 MG/DL (ref 65–100)
HCT VFR BLD AUTO: 29.4 % (ref 35–47)
HGB BLD-MCNC: 8.9 G/DL (ref 11.5–16)
IMM GRANULOCYTES # BLD AUTO: 0 K/UL (ref 0–0.04)
IMM GRANULOCYTES NFR BLD AUTO: 0 % (ref 0–0.5)
LYMPHOCYTES # BLD: 2.7 K/UL (ref 0.8–3.5)
LYMPHOCYTES NFR BLD: 38 % (ref 12–49)
MCH RBC QN AUTO: 26.3 PG (ref 26–34)
MCHC RBC AUTO-ENTMCNC: 30.3 G/DL (ref 30–36.5)
MCV RBC AUTO: 86.7 FL (ref 80–99)
MONOCYTES # BLD: 0.6 K/UL (ref 0–1)
MONOCYTES NFR BLD: 8 % (ref 5–13)
NEUTS SEG # BLD: 3.3 K/UL (ref 1.8–8)
NEUTS SEG NFR BLD: 47 % (ref 32–75)
NRBC # BLD: 0 K/UL (ref 0–0.01)
NRBC BLD-RTO: 0 PER 100 WBC
PLATELET # BLD AUTO: 196 K/UL (ref 150–400)
PMV BLD AUTO: 11.7 FL (ref 8.9–12.9)
RBC # BLD AUTO: 3.39 M/UL (ref 3.8–5.2)
SERVICE CMNT-IMP: ABNORMAL
WBC # BLD AUTO: 7 K/UL (ref 3.6–11)

## 2020-02-25 PROCEDURE — 74011250636 HC RX REV CODE- 250/636: Performed by: NURSE PRACTITIONER

## 2020-02-25 PROCEDURE — 74011250637 HC RX REV CODE- 250/637: Performed by: NURSE PRACTITIONER

## 2020-02-25 PROCEDURE — 74011636637 HC RX REV CODE- 636/637: Performed by: NURSE PRACTITIONER

## 2020-02-25 PROCEDURE — 74011636637 HC RX REV CODE- 636/637: Performed by: SURGERY

## 2020-02-25 PROCEDURE — 74011250637 HC RX REV CODE- 250/637: Performed by: SURGERY

## 2020-02-25 PROCEDURE — 82962 GLUCOSE BLOOD TEST: CPT

## 2020-02-25 PROCEDURE — 99218 HC RM OBSERVATION: CPT

## 2020-02-25 PROCEDURE — 85025 COMPLETE CBC W/AUTO DIFF WBC: CPT

## 2020-02-25 PROCEDURE — 77030008027

## 2020-02-25 PROCEDURE — 36415 COLL VENOUS BLD VENIPUNCTURE: CPT

## 2020-02-25 RX ORDER — INSULIN LISPRO 100 [IU]/ML
INJECTION, SOLUTION INTRAVENOUS; SUBCUTANEOUS
Status: DISCONTINUED | OUTPATIENT
Start: 2020-02-25 | End: 2020-02-27 | Stop reason: HOSPADM

## 2020-02-25 RX ORDER — INSULIN LISPRO 100 [IU]/ML
10 INJECTION, SOLUTION INTRAVENOUS; SUBCUTANEOUS
Status: DISCONTINUED | OUTPATIENT
Start: 2020-02-25 | End: 2020-02-27 | Stop reason: HOSPADM

## 2020-02-25 RX ORDER — INSULIN GLARGINE 100 [IU]/ML
40 INJECTION, SOLUTION SUBCUTANEOUS
Status: DISCONTINUED | OUTPATIENT
Start: 2020-02-26 | End: 2020-02-27 | Stop reason: HOSPADM

## 2020-02-25 RX ADMIN — HYDROMORPHONE HYDROCHLORIDE 2 MG: 2 TABLET ORAL at 17:33

## 2020-02-25 RX ADMIN — HUMAN INSULIN 20 UNITS: 100 INJECTION, SUSPENSION SUBCUTANEOUS at 21:24

## 2020-02-25 RX ADMIN — HYDROMORPHONE HYDROCHLORIDE 2 MG: 2 TABLET ORAL at 11:28

## 2020-02-25 RX ADMIN — Medication 10 ML: at 14:09

## 2020-02-25 RX ADMIN — METOPROLOL TARTRATE 50 MG: 50 TABLET, FILM COATED ORAL at 17:24

## 2020-02-25 RX ADMIN — INSULIN LISPRO 10 UNITS: 100 INJECTION, SOLUTION INTRAVENOUS; SUBCUTANEOUS at 12:29

## 2020-02-25 RX ADMIN — SODIUM CHLORIDE, SODIUM LACTATE, POTASSIUM CHLORIDE, AND CALCIUM CHLORIDE 25 ML/HR: 600; 310; 30; 20 INJECTION, SOLUTION INTRAVENOUS at 06:19

## 2020-02-25 RX ADMIN — Medication 10 ML: at 06:20

## 2020-02-25 RX ADMIN — INSULIN LISPRO 10 UNITS: 100 INJECTION, SOLUTION INTRAVENOUS; SUBCUTANEOUS at 17:24

## 2020-02-25 RX ADMIN — PANTOPRAZOLE SODIUM 40 MG: 40 TABLET, DELAYED RELEASE ORAL at 07:16

## 2020-02-25 RX ADMIN — METOPROLOL TARTRATE 50 MG: 50 TABLET, FILM COATED ORAL at 09:09

## 2020-02-25 RX ADMIN — HYDROMORPHONE HYDROCHLORIDE 2 MG: 2 TABLET ORAL at 21:24

## 2020-02-25 RX ADMIN — INSULIN LISPRO 4 UNITS: 100 INJECTION, SOLUTION INTRAVENOUS; SUBCUTANEOUS at 12:30

## 2020-02-25 RX ADMIN — Medication 1 CAPSULE: at 09:12

## 2020-02-25 RX ADMIN — HYDROMORPHONE HYDROCHLORIDE 2 MG: 2 TABLET ORAL at 06:19

## 2020-02-25 RX ADMIN — HYDROMORPHONE HYDROCHLORIDE 2 MG: 2 TABLET ORAL at 00:50

## 2020-02-25 RX ADMIN — DULOXETINE HYDROCHLORIDE 120 MG: 60 CAPSULE, DELAYED RELEASE ORAL at 09:09

## 2020-02-25 RX ADMIN — TRIAMTERENE AND HYDROCHLOROTHIAZIDE 1 TABLET: 37.5; 25 TABLET ORAL at 09:09

## 2020-02-25 NOTE — PROGRESS NOTES
Bedside shift change report given to Isai Lopez RN (oncoming nurse) by Lars Kawasaki (offgoing nurse). Report included the following information SBAR, Kardex and MAR.

## 2020-02-25 NOTE — PROGRESS NOTES
Bedside shift change report given to Shree Garrett RN (oncoming nurse) by Lyric Clement (offgoing nurse). Report included the following information SBAR, Kardex, Procedure Summary, MAR and Recent Results.

## 2020-02-25 NOTE — PROGRESS NOTES
General Surgery Daily Progress Note    Admit Date: 2020  Post-Operative Day: * No surgery found * from * No surgery found *     Subjective:     Last 24 hrs: Pt is doing well; tolerating diet and moving bowels. OOB independently. Objective:     Blood pressure 126/64, pulse 71, temperature 97.7 °F (36.5 °C), resp. rate 18, height 5' 1\" (1.549 m), weight 229 lb 4.5 oz (104 kg), SpO2 95 %. Temp (24hrs), Av.1 °F (36.7 °C), Min:97.7 °F (36.5 °C), Max:98.4 °F (36.9 °C)      _____________________  Physical Exam:     Alert and Oriented, x3, in no acute distress. 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 Data Review:    Recent Labs     20  0543 20  0351 20  0415   WBC 7.0  --  6.9   HGB 8.9* 9.4* 7.8*   HCT 29.4*  --  25.5*     --  141*     Recent Labs     20  0415      K 4.1      CO2 29   *   BUN 9   CREA 0.65   CA 8.6   MG 1.9     No results for input(s): AML, LPSE in the last 72 hours.         ______________________  Medications:    Current Facility-Administered Medications   Medication Dose Route Frequency    insulin lispro (HUMALOG) injection   SubCUTAneous AC&HS    insulin lispro (HUMALOG) injection 10 Units  10 Units SubCUTAneous TIDAC    [START ON 2020] insulin glargine (LANTUS) injection 40 Units  40 Units SubCUTAneous 7am    insulin NPH (NOVOLIN N, HUMULIN N) injection 20 Units  20 Units SubCUTAneous QHS    lactobac ac& pc-s.therm-b.anim (PAM Q/RISAQUAD)  1 Cap Oral DAILY    HYDROmorphone (DILAUDID) tablet 2 mg  2 mg Oral Q4H PRN    baclofen (LIORESAL) tablet 10 mg  10 mg Oral DAILY PRN    sodium chloride (NS) flush 5-40 mL  5-40 mL IntraVENous Q8H    sodium chloride (NS) flush 5-40 mL  5-40 mL IntraVENous PRN    lactated Ringers infusion  25 mL/hr IntraVENous CONTINUOUS    acetaminophen (TYLENOL) tablet 650 mg  650 mg Oral Q6H PRN    HYDROmorphone (PF) (DILAUDID) injection 0.5-1 mg  0.5-1 mg IntraVENous Q4H PRN    naloxone (NARCAN) injection 0.4 mg  0.4 mg IntraVENous PRN    ondansetron (ZOFRAN) injection 4 mg  4 mg IntraVENous Q4H PRN    DULoxetine (CYMBALTA) capsule 120 mg  120 mg Oral DAILY    metoprolol tartrate (LOPRESSOR) tablet 50 mg  50 mg Oral BID    pantoprazole (PROTONIX) tablet 40 mg  40 mg Oral ACB    triamterene-hydroCHLOROthiazide (MAXZIDE) 37.5-25 mg per tablet 1 Tab  1 Tab Oral DAILY    glucose chewable tablet 16 g  4 Tab Oral PRN    glucagon (GLUCAGEN) injection 1 mg  1 mg IntraMUSCular PRN    dextrose 10% infusion 0-250 mL  0-250 mL IntraVENous PRN       Hudson Guallpa NP  2/25/2020  ATTENDING ADDENDUM  I supervised the APC and reviewed the note. We discussed the plan of care  Has made good progress.  Thurs or Wed is ok

## 2020-02-25 NOTE — PROGRESS NOTES
Spoke with the Nurse Coordinator of [facility] who asked for an update on the patient at this time. The nurse was given Dr Jana Alston name and office information at this time.

## 2020-02-25 NOTE — PROGRESS NOTES
[employer] out to [employer]: Good Morning: The patient currently has [employer] scheduled insulin coverage and sliding scale Q6. Is there a reason that the sliding coverage is not [employer]? Is there anyway this can be aligned so we are not poking the patient 8x/daily? Thank you.     9070: all medication to be scheduled for [employer] including blood sugar checks.

## 2020-02-25 NOTE — DIABETES MGMT
JOAO MATOS  CLINICAL NURSE SPECIALIST   Followup Progress Note    Presentation   Hugo Irving is a 70 y.o. female admitted with repair of ventral incisional hernia and consulted by Provider for advanced specialty nursing care related to inpatient diabetes management. Hyperglycemia management order set is in place. Subjective   No evidence of infection at wound and no concerns for ongoing bleeding when she went to the OR on 2/19.  Post-operative wound hematoma that evacuated spontaneously.  Repeat CT on 2/22 showed the hematoma is smaller. Lantus continued and mealtime Humalog were adjusted in the past 24 hours. Fasting blood glucose this morning in goal, 111. Pre-prandial blood glucose 123-195. Blood glucose goal 100-180. Objective   Physical exam    General Alert, oriented and in no acute distress. Conversant and cooperative  Vital Signs   Visit Vitals  /64   Pulse 71   Temp 97.7 °F (36.5 °C)   Resp 18   Ht 5' 1\" (1.549 m)   Wt 104 kg (229 lb 4.5 oz)   SpO2 95%   BMI 43.32 kg/m²     Skin  Warm and dry  Heart   Regular rate and rhythm. No murmurs, rubs or gallops  Lungs  Clear to auscultation without rales or rhonchi  Extremities No foot wounds    Laboratory      CBC WITH AUTOMATED DIFF    Collection Time: 02/25/20  5:43 AM   Result Value Ref Range    WBC 7.0 3.6 - 11.0 K/uL    RBC 3.39 (L) 3.80 - 5.20 M/uL    HGB 8.9 (L) 11.5 - 16.0 g/dL    HCT 29.4 (L) 35.0 - 47.0 %    MCV 86.7 80.0 - 99.0 FL    MCH 26.3 26.0 - 34.0 PG    MCHC 30.3 30.0 - 36.5 g/dL    RDW 15.4 (H) 11.5 - 14.5 %    PLATELET 579 828 - 646 K/uL    MPV 11.7 8.9 - 12.9 FL    NRBC 0.0 0  WBC    ABSOLUTE NRBC 0.00 0.00 - 0.01 K/uL    NEUTROPHILS 47 32 - 75 %    LYMPHOCYTES 38 12 - 49 %    MONOCYTES 8 5 - 13 %    EOSINOPHILS 6 0 - 7 %    BASOPHILS 1 0 - 1 %    IMMATURE GRANULOCYTES 0 0.0 - 0.5 %    ABS. NEUTROPHILS 3.3 1.8 - 8.0 K/UL    ABS. LYMPHOCYTES 2.7 0.8 - 3.5 K/UL    ABS. MONOCYTES 0.6 0.0 - 1.0 K/UL    ABS.  EOSINOPHILS 0.4 0.0 - 0.4 K/UL    ABS. BASOPHILS 0.0 0.0 - 0.1 K/UL    ABS. IMM. GRANS. 0.0 0.00 - 0.04 K/UL    DF AUTOMATED       BMP: No results found for: NA, K, CL, CO2, AGAP, GLU, BUN, CREA, GFRAA, GFRNA         Blood glucose pattern      Assessment and Plan   Nursing Diagnosis Risk for unstable blood glucose pattern   Nursing Intervention Domain 7136 Decision-making Support   Nursing Interventions Examined current inpatient diabetes control   Explored factors facilitating and impeding inpatient management  Identified self-management practices impeding diabetes control  Explored corrective strategies with patient and responsible inpatient provider   Informed patient of rational for basal bolus insulin strategy while hospitalized     Evaluation   Ms Shante Barnes is a 69year old woman with well controlled insulin dependent diabetes on  Lantus and Humalog who was admitted for an abdominal wound dehiscence and abdominal hemorrhage.  No evidence of infection at wound and no concerns for ongoing bleeding when she went to the OR on 2/19.  Post-operative wound hematoma that evacuated spontaneously.       Fasting blood glucose 111 indicating that Lantus is at an appropriate dose. Humalog was adjusted to 8 units with meals starting at dinner. There is not enough value to determine if this is an appropriate bolus dose. Recommendations   1.  Continue Lantus: 40 units as fasting blood glucose values are in goal.  Fasting blood glucose goal 100-180.     If fasting blood glucose values are below 100, Lantus can be decreased by 10%.     2. She is receiving a low carbohydrate tray (about 45 grams of CHO/meal) and consuming about 50% of trays.  She is experiencing some pre-prandial hyperglycemia (BG 200s), continue 8 units Humalog with meals plus correctional insulin. Ok to increase to 10 units Humalog with meals (0.1 units/kg at insulin resistant dose) if pre-prandial hyperglycemia persists.     3.  She is well controlled diabetes as evidenced with an A1C of 6.6%, however, I would make some adjustments as her diet and appetite has changes significantly. Her home insulin doses would put her at risk for hypoglycemia.   Continue on discharge:  -Lantus 40 units Daily  -Humalog 10 units with meals    Billing Code(s)   [address] [facility]  [phone]

## 2020-02-25 NOTE — PROGRESS NOTES
Bedside shift change report given to Gee Cornejo (oncoming nurse) by Jessica Mcduffie (offgoing nurse). Report included the following information SBAR.

## 2020-02-25 NOTE — PROGRESS NOTES
Problem: Risk for Spread of Infection  Goal: Prevent transmission of infectious organism to others  Description  Prevent the transmission of infectious organisms to other patients, staff members, and visitors. Outcome: Progressing Towards Goal     Problem: Falls - Risk of  Goal: *Absence of Falls  Description  Document Gurdeep Esther Fall Risk and appropriate interventions in the flowsheet.   Outcome: Progressing Towards Goal  Note: Fall Risk Interventions:            Medication Interventions: Teach patient to arise slowly    Elimination Interventions: Call light in reach, Toilet paper/wipes in reach

## 2020-02-26 LAB
BASOPHILS # BLD: 0 K/UL (ref 0–0.1)
BASOPHILS NFR BLD: 1 % (ref 0–1)
DIFFERENTIAL METHOD BLD: ABNORMAL
EOSINOPHIL # BLD: 0.4 K/UL (ref 0–0.4)
EOSINOPHIL NFR BLD: 4 % (ref 0–7)
ERYTHROCYTE [DISTWIDTH] IN BLOOD BY AUTOMATED COUNT: 15.2 % (ref 11.5–14.5)
GLUCOSE BLD STRIP.AUTO-MCNC: 108 MG/DL (ref 65–100)
GLUCOSE BLD STRIP.AUTO-MCNC: 126 MG/DL (ref 65–100)
GLUCOSE BLD STRIP.AUTO-MCNC: 140 MG/DL (ref 65–100)
GLUCOSE BLD STRIP.AUTO-MCNC: 147 MG/DL (ref 65–100)
HCT VFR BLD AUTO: 27.4 % (ref 35–47)
HGB BLD-MCNC: 8.3 G/DL (ref 11.5–16)
IMM GRANULOCYTES # BLD AUTO: 0 K/UL (ref 0–0.04)
IMM GRANULOCYTES NFR BLD AUTO: 1 % (ref 0–0.5)
LYMPHOCYTES # BLD: 3 K/UL (ref 0.8–3.5)
LYMPHOCYTES NFR BLD: 35 % (ref 12–49)
MCH RBC QN AUTO: 26.1 PG (ref 26–34)
MCHC RBC AUTO-ENTMCNC: 30.3 G/DL (ref 30–36.5)
MCV RBC AUTO: 86.2 FL (ref 80–99)
MONOCYTES # BLD: 0.6 K/UL (ref 0–1)
MONOCYTES NFR BLD: 7 % (ref 5–13)
NEUTS SEG # BLD: 4.4 K/UL (ref 1.8–8)
NEUTS SEG NFR BLD: 52 % (ref 32–75)
NRBC # BLD: 0 K/UL (ref 0–0.01)
NRBC BLD-RTO: 0 PER 100 WBC
PLATELET # BLD AUTO: 205 K/UL (ref 150–400)
PMV BLD AUTO: 11.4 FL (ref 8.9–12.9)
RBC # BLD AUTO: 3.18 M/UL (ref 3.8–5.2)
SERVICE CMNT-IMP: ABNORMAL
WBC # BLD AUTO: 8.5 K/UL (ref 3.6–11)

## 2020-02-26 PROCEDURE — 74011636637 HC RX REV CODE- 636/637: Performed by: NURSE PRACTITIONER

## 2020-02-26 PROCEDURE — 74011250637 HC RX REV CODE- 250/637: Performed by: SURGERY

## 2020-02-26 PROCEDURE — 99218 HC RM OBSERVATION: CPT

## 2020-02-26 PROCEDURE — 85025 COMPLETE CBC W/AUTO DIFF WBC: CPT

## 2020-02-26 PROCEDURE — 36415 COLL VENOUS BLD VENIPUNCTURE: CPT

## 2020-02-26 PROCEDURE — 82962 GLUCOSE BLOOD TEST: CPT

## 2020-02-26 PROCEDURE — 74011250636 HC RX REV CODE- 250/636: Performed by: NURSE PRACTITIONER

## 2020-02-26 PROCEDURE — 74011250637 HC RX REV CODE- 250/637: Performed by: NURSE PRACTITIONER

## 2020-02-26 PROCEDURE — 74011636637 HC RX REV CODE- 636/637: Performed by: SURGERY

## 2020-02-26 RX ORDER — INSULIN GLARGINE 100 [IU]/ML
40 INJECTION, SOLUTION SUBCUTANEOUS
Qty: 1 VIAL | Refills: 0 | Status: ON HOLD | OUTPATIENT
Start: 2020-02-26 | End: 2020-03-19 | Stop reason: SDUPTHER

## 2020-02-26 RX ORDER — INSULIN LISPRO 100 [IU]/ML
10 INJECTION, SOLUTION INTRAVENOUS; SUBCUTANEOUS
Qty: 1 VIAL | Refills: 0 | Status: ON HOLD | OUTPATIENT
Start: 2020-02-26 | End: 2020-03-19 | Stop reason: SDUPTHER

## 2020-02-26 RX ORDER — LORAZEPAM 0.5 MG/1
.5-1 TABLET ORAL
Qty: 18 TAB | Refills: 0 | Status: SHIPPED | OUTPATIENT
Start: 2020-02-26 | End: 2020-02-27

## 2020-02-26 RX ORDER — HYDROMORPHONE HYDROCHLORIDE 2 MG/1
2 TABLET ORAL
Qty: 15 TAB | Refills: 0 | Status: SHIPPED | OUTPATIENT
Start: 2020-02-26 | End: 2020-02-27

## 2020-02-26 RX ADMIN — SODIUM CHLORIDE, SODIUM LACTATE, POTASSIUM CHLORIDE, AND CALCIUM CHLORIDE 25 ML/HR: 600; 310; 30; 20 INJECTION, SOLUTION INTRAVENOUS at 19:11

## 2020-02-26 RX ADMIN — INSULIN LISPRO 2 UNITS: 100 INJECTION, SOLUTION INTRAVENOUS; SUBCUTANEOUS at 12:12

## 2020-02-26 RX ADMIN — PANTOPRAZOLE SODIUM 40 MG: 40 TABLET, DELAYED RELEASE ORAL at 07:18

## 2020-02-26 RX ADMIN — TRIAMTERENE AND HYDROCHLOROTHIAZIDE 1 TABLET: 37.5; 25 TABLET ORAL at 09:29

## 2020-02-26 RX ADMIN — HYDROMORPHONE HYDROCHLORIDE 2 MG: 2 TABLET ORAL at 20:30

## 2020-02-26 RX ADMIN — HYDROMORPHONE HYDROCHLORIDE 2 MG: 2 TABLET ORAL at 02:02

## 2020-02-26 RX ADMIN — HYDROMORPHONE HYDROCHLORIDE 2 MG: 2 TABLET ORAL at 11:10

## 2020-02-26 RX ADMIN — HYDROMORPHONE HYDROCHLORIDE 2 MG: 2 TABLET ORAL at 16:20

## 2020-02-26 RX ADMIN — INSULIN LISPRO 10 UNITS: 100 INJECTION, SOLUTION INTRAVENOUS; SUBCUTANEOUS at 16:53

## 2020-02-26 RX ADMIN — METOPROLOL TARTRATE 50 MG: 50 TABLET, FILM COATED ORAL at 19:09

## 2020-02-26 RX ADMIN — INSULIN GLARGINE 40 UNITS: 100 INJECTION, SOLUTION SUBCUTANEOUS at 07:18

## 2020-02-26 RX ADMIN — DULOXETINE HYDROCHLORIDE 120 MG: 60 CAPSULE, DELAYED RELEASE ORAL at 09:29

## 2020-02-26 RX ADMIN — INSULIN LISPRO 2 UNITS: 100 INJECTION, SOLUTION INTRAVENOUS; SUBCUTANEOUS at 16:54

## 2020-02-26 RX ADMIN — Medication 1 CAPSULE: at 09:29

## 2020-02-26 RX ADMIN — INSULIN LISPRO 10 UNITS: 100 INJECTION, SOLUTION INTRAVENOUS; SUBCUTANEOUS at 12:12

## 2020-02-26 RX ADMIN — METOPROLOL TARTRATE 50 MG: 50 TABLET, FILM COATED ORAL at 09:29

## 2020-02-26 NOTE — PROGRESS NOTES
Problem: Risk for Spread of Infection  Goal: Prevent transmission of infectious organism to others  Description  Prevent the transmission of infectious organisms to other patients, staff members, and visitors. Outcome: Progressing Towards Goal     Problem: Falls - Risk of  Goal: *Absence of Falls  Description  Document Shawna Bell Fall Risk and appropriate interventions in the flowsheet. Outcome: Progressing Towards Goal  Note: Fall Risk Interventions:            Medication Interventions: Teach patient to arise slowly, Patient to call before getting OOB    Elimination Interventions: Call light in reach              Problem: Diabetes Self-Management  Goal: *Disease process and treatment process  Description  Define diabetes and identify own type of diabetes; list 3 options for treating diabetes. Outcome: Progressing Towards Goal  Goal: *Incorporating nutritional management into lifestyle  Description  Describe effect of type, amount and timing of food on blood glucose; list 3 methods for planning meals. Outcome: Progressing Towards Goal  Goal: *Incorporating physical activity into lifestyle  Description  State effect of exercise on blood glucose levels.   Outcome: Progressing Towards Goal

## 2020-02-26 NOTE — PROGRESS NOTES
Bedside shift change report given to Tato Pickens (oncoming nurse) by Brice Fernández RN (offgoing nurse). Report included the following information SBAR, Kardex, Procedure Summary, Intake/Output and MAR.

## 2020-02-26 NOTE — PROGRESS NOTES
Patient had minimal output from drain overnight, pain well controlled on po medication. Patient eager to go home without AILEEN drain if possible. Bedside shift change report given to Carol ALBERTO (oncoming nurse) by Roslyn Mancuso RN (offgoing nurse). Report included the following information SBAR, Kardex, MAR and Recent Results.

## 2020-02-26 NOTE — PROGRESS NOTES
Patient ate less than 50% of her cabs this AM for breakfast. This nurse held the patients 10u of scheduled humalog.

## 2020-02-26 NOTE — PROGRESS NOTES
General Surgery Daily Progress Note    Admit Date: 2020  Post-Operative Day: * No surgery found * from * No surgery found *     Subjective:     Last 24 hrs: Pt is doing well; no complaints. OOB in chair. Objective:     Blood pressure 120/65, pulse 65, temperature 98.6 °F (37 °C), resp. rate 18, height 5' 1\" (1.549 m), weight 229 lb 4.5 oz (104 kg), SpO2 99 %. Temp (24hrs), Av.5 °F (36.9 °C), Min:98.3 °F (36.8 °C), Max:98.6 °F (37 °C)      _____________________  Physical Exam:     Alert and Oriented, x3, in no acute distress. 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 No results for input(s): AML, LPSE in the last 72 hours.         ______________________  Medications:    Current Facility-Administered Medications   Medication Dose Route Frequency    insulin lispro (HUMALOG) injection   SubCUTAneous AC&HS    insulin lispro (HUMALOG) injection 10 Units  10 Units SubCUTAneous TIDAC    insulin glargine (LANTUS) injection 40 Units  40 Units SubCUTAneous 7am    lactobac ac& pc-s.therm-b.anim (PAM Q/RISAQUAD)  1 Cap Oral DAILY    HYDROmorphone (DILAUDID) tablet 2 mg  2 mg Oral Q4H PRN    baclofen (LIORESAL) tablet 10 mg  10 mg Oral DAILY PRN    sodium chloride (NS) flush 5-40 mL  5-40 mL IntraVENous Q8H    sodium chloride (NS) flush 5-40 mL  5-40 mL IntraVENous PRN    lactated Ringers infusion  25 mL/hr IntraVENous CONTINUOUS    acetaminophen (TYLENOL) tablet 650 mg  650 mg Oral Q6H PRN    HYDROmorphone (PF) (DILAUDID) injection 0.5-1 mg  0.5-1 mg IntraVENous Q4H PRN    naloxone (NARCAN) injection 0.4 mg  0.4 mg IntraVENous PRN    ondansetron (ZOFRAN) injection 4 mg  4 mg IntraVENous Q4H PRN    DULoxetine (CYMBALTA) capsule 120 mg  120 mg Oral DAILY    metoprolol tartrate (LOPRESSOR) tablet 50 mg  50 mg Oral BID    pantoprazole (PROTONIX) tablet 40 mg  40 mg Oral ACB    triamterene-hydroCHLOROthiazide (MAXZIDE) 37.5-25 mg per tablet 1 Tab  1 Tab Oral DAILY    glucose chewable tablet 16 g  4 Tab Oral PRN    glucagon (GLUCAGEN) injection 1 mg  1 mg IntraMUSCular PRN    dextrose 10% infusion 0-250 mL  0-250 mL IntraVENous PRN       Codi Spears NP  2/26/2020    ATTENDING ADDENDUM  I supervised the APC and reviewed the note. We discussed the plan of care  Agree with all the plans. She is recuperating well.

## 2020-02-26 NOTE — PROGRESS NOTES
This nurse spoke with the patient about her DM management. Educated the patient on her new DM management and what we have been doing to cover her sugars while in the hospital. The patient was eager and stated that she believes we have doing a phenomenal job at managing her sugars while she has been here with us stating \"the haven't been this low in a while\". Patient asked that we continue to educate her as it is new and confusing and this nurse reassured the patient that we would continue to educate her while she is here.

## 2020-02-26 NOTE — DIABETES MGMT
URVASHI Roy 53 SPECIALIST   Discharge Recommendations    Presentation   Caron Larios is a 70 y.o. female admitted with repair of ventral incisional herniaand consulted by Provider for advanced specialty nursing care related to inpatient diabetes management. Hyperglycemia management order set has been in place during hospital stay. Subjective   Ms Saroj Mora transitioned successfully from a PM to Am dosing of lantus, bridged with a 1x NPH dose. Fasting blood glucose this morning was 126. Patient blood glucose in goal with a blood glcuose 126-147 in the past 24 hours on Lantus, Bolus and correctional insulin. Plans to go home with a AILEEN drain per surgery recommendations tomorrow. Objective   Physical exam    General Alert, oriented and in no acute distress Conversant and cooperative  Vital Signs   Visit Vitals  /65   Pulse 65   Temp 98.6 °F (37 °C)   Resp 18   Ht 5' 1\" (1.549 m)   Wt 104 kg (229 lb 4.5 oz)   SpO2 99%   BMI 43.32 kg/m²     Skin  Warm and dry  Heart   Regular rate and rhythm. No murmurs, rubs or gallops  Lungs  Clear to auscultation without rales or rhonchi  Extremities No foot wounds      Blood glucose pattern    Assessment and Plan   Nursing Diagnosis Risk for unstable blood glucose pattern   Nursing Intervention Domain 5255 Decision-making Support   Nursing Interventions Examined current inpatient diabetes control   Explored factors facilitating and impeding inpatient management  Identified self-management practices impeding diabetes control  Explored corrective strategies with patient and responsible inpatient provider   Informed patient of rational for basal bolus insulin strategy while hospitalized     Evaluation   Basal insulin dosing has stabilized blood glucose levels during this hospitalization. Bolus insulin dosing has controlled post-prandial blood glucose excursions. Patient's blood glucose within goal of 100-180.     Recommendations   Until seen by primary care provider,  O  Continue basal insulin at 40 units Lantus Qam  O Continue mealtime/bolus insulin at 10 units of Humalog at each meal  O  Do not correct hyperglycemia; call primary care provider     Billing Code(s)     [facility], [address] [facility]

## 2020-02-27 ENCOUNTER — TELEPHONE (OUTPATIENT)
Dept: SURGERY | Age: 75
End: 2020-02-27

## 2020-02-27 VITALS
RESPIRATION RATE: 18 BRPM | SYSTOLIC BLOOD PRESSURE: 115 MMHG | DIASTOLIC BLOOD PRESSURE: 75 MMHG | HEART RATE: 63 BPM | OXYGEN SATURATION: 96 % | HEIGHT: 61 IN | WEIGHT: 229.28 LBS | TEMPERATURE: 98.6 F | BODY MASS INDEX: 43.29 KG/M2

## 2020-02-27 DIAGNOSIS — I97.631: ICD-10-CM

## 2020-02-27 LAB
BASOPHILS # BLD: 0 K/UL (ref 0–0.1)
BASOPHILS NFR BLD: 0 % (ref 0–1)
DIFFERENTIAL METHOD BLD: ABNORMAL
EOSINOPHIL # BLD: 0.3 K/UL (ref 0–0.4)
EOSINOPHIL NFR BLD: 4 % (ref 0–7)
ERYTHROCYTE [DISTWIDTH] IN BLOOD BY AUTOMATED COUNT: 15.1 % (ref 11.5–14.5)
GLUCOSE BLD STRIP.AUTO-MCNC: 110 MG/DL (ref 65–100)
HCT VFR BLD AUTO: 27.4 % (ref 35–47)
HGB BLD-MCNC: 8.3 G/DL (ref 11.5–16)
IMM GRANULOCYTES # BLD AUTO: 0 K/UL (ref 0–0.04)
IMM GRANULOCYTES NFR BLD AUTO: 0 % (ref 0–0.5)
LYMPHOCYTES # BLD: 3.1 K/UL (ref 0.8–3.5)
LYMPHOCYTES NFR BLD: 38 % (ref 12–49)
MCH RBC QN AUTO: 25.9 PG (ref 26–34)
MCHC RBC AUTO-ENTMCNC: 30.3 G/DL (ref 30–36.5)
MCV RBC AUTO: 85.4 FL (ref 80–99)
MONOCYTES # BLD: 0.7 K/UL (ref 0–1)
MONOCYTES NFR BLD: 8 % (ref 5–13)
NEUTS SEG # BLD: 4.1 K/UL (ref 1.8–8)
NEUTS SEG NFR BLD: 50 % (ref 32–75)
NRBC # BLD: 0 K/UL (ref 0–0.01)
NRBC BLD-RTO: 0 PER 100 WBC
PLATELET # BLD AUTO: 120 K/UL (ref 150–400)
RBC # BLD AUTO: 3.21 M/UL (ref 3.8–5.2)
RBC MORPH BLD: ABNORMAL
SERVICE CMNT-IMP: ABNORMAL
WBC # BLD AUTO: 8.2 K/UL (ref 3.6–11)

## 2020-02-27 PROCEDURE — 85025 COMPLETE CBC W/AUTO DIFF WBC: CPT

## 2020-02-27 PROCEDURE — 74011250637 HC RX REV CODE- 250/637: Performed by: NURSE PRACTITIONER

## 2020-02-27 PROCEDURE — 74011250637 HC RX REV CODE- 250/637: Performed by: SURGERY

## 2020-02-27 PROCEDURE — 74011636637 HC RX REV CODE- 636/637: Performed by: SURGERY

## 2020-02-27 PROCEDURE — 99218 HC RM OBSERVATION: CPT

## 2020-02-27 PROCEDURE — 82962 GLUCOSE BLOOD TEST: CPT

## 2020-02-27 PROCEDURE — 36415 COLL VENOUS BLD VENIPUNCTURE: CPT

## 2020-02-27 RX ORDER — LORAZEPAM 0.5 MG/1
0.5 TABLET ORAL
Qty: 18 TAB | Refills: 0
Start: 2020-02-27 | End: 2020-03-04 | Stop reason: SDUPTHER

## 2020-02-27 RX ORDER — HYDROMORPHONE HYDROCHLORIDE 2 MG/1
2 TABLET ORAL
Qty: 15 TAB | Refills: 0
Start: 2020-02-27 | End: 2020-03-02

## 2020-02-27 RX ORDER — INSULIN GLARGINE 100 [IU]/ML
20 INJECTION, SOLUTION SUBCUTANEOUS ONCE
Status: COMPLETED | OUTPATIENT
Start: 2020-02-27 | End: 2020-02-27

## 2020-02-27 RX ADMIN — TRIAMTERENE AND HYDROCHLOROTHIAZIDE 1 TABLET: 37.5; 25 TABLET ORAL at 08:25

## 2020-02-27 RX ADMIN — METOPROLOL TARTRATE 50 MG: 50 TABLET, FILM COATED ORAL at 08:25

## 2020-02-27 RX ADMIN — DULOXETINE HYDROCHLORIDE 120 MG: 60 CAPSULE, DELAYED RELEASE ORAL at 08:25

## 2020-02-27 RX ADMIN — Medication 1 CAPSULE: at 08:25

## 2020-02-27 RX ADMIN — INSULIN GLARGINE 20 UNITS: 100 INJECTION, SOLUTION SUBCUTANEOUS at 06:52

## 2020-02-27 RX ADMIN — PANTOPRAZOLE SODIUM 40 MG: 40 TABLET, DELAYED RELEASE ORAL at 06:52

## 2020-02-27 NOTE — PROGRESS NOTES
Bedside shift change report given to [address] (oncoming nurse) by Nathanael Flores RN (offgoing nurse). Report included the following information SBAR, Kardex, Intake/Output, MAR and Recent Results.

## 2020-02-27 NOTE — DISCHARGE INSTRUCTIONS
[facility] at [facility]  Discharge Instructions    Admit Date: 2/18/2020      Patient ID:  Carter Linares  [id number]  70 y.o.  1945    Admit Date: 2/18/2020    Discharge Date: 2/27/2020    Patient Instructions:   Current Discharge Medication List      START taking these medications    Details   HYDROmorphone (DILAUDID) 2 mg tablet Take 1 Tab by mouth every four (4) hours as needed for Pain for up to 3 days. Max Daily Amount: 12 mg.  Qty: 15 Tab, Refills: 0    Associated Diagnoses: Postoperative hematoma involving circulatory system following cardiac bypass      insulin glargine (LANTUS) 100 unit/mL injection 40 Units by SubCUTAneous route Daily (before breakfast). Qty: 1 Vial, Refills: 0      LORazepam (ATIVAN) 0.5 mg tablet Take 1-2 Tabs by mouth every four (4) hours as needed for Anxiety. Max Daily Amount: 6 mg. Qty: 18 Tab, Refills: 0    Associated Diagnoses: Postoperative hematoma involving circulatory system following cardiac bypass         CONTINUE these medications which have CHANGED    Details   insulin lispro (HUMALOG) 100 unit/mL injection 10 Units by SubCUTAneous route Before breakfast, lunch, and dinner. Before dinner  Qty: 1 Vial, Refills: 0         CONTINUE these medications which have NOT CHANGED    Details   docusate sodium (COLACE) 100 mg capsule Take 1 Cap by mouth two (2) times a day. Qty: 30 Cap, Refills: 0      multivitamin (ONE A DAY) tablet Take 1 Tab by mouth daily. calcium carbonate (OS-DANI) 500 mg calcium (1,250 mg) tablet Take 3 Tabs by mouth nightly. triamterene-hydroCHLOROthiazide (MAXZIDE) 37.5-25 mg per tablet Take 1 Tab by mouth daily. pantoprazole (PROTONIX) 40 mg tablet Take 40 mg by mouth daily. insulin NPH (NOVOLIN N) 100 unit/mL injection 74 Units by SubCUTAneous route nightly. metoprolol (LOPRESSOR) 50 mg tablet Take 50 mg by mouth two (2) times a day. baclofen (LIORESAL) 10 mg tablet Take 10 mg by mouth daily as needed. ondansetron hcl (ZOFRAN) 4 mg tablet Take 4 mg by mouth every four (4) hours as needed for Nausea. DULoxetine (CYMBALTA) 60 mg capsule Take 120 mg by mouth daily. FOLLOW-UP:  Follow-up with Dr. Va Macedo on 3/4/2020 at 3:40pm.  Call office at [phone] if you need to make changes to this appointment time. Please arrive by 20 minutes before scheduled appointment time to complete paperwork. Make sure to bring your ID card and insurance information. We are located at [facility] in the [facility]. Call your physician if you have persistent fevers greater than 101 accompanied by fatigue, sweats or chills, drainage from your wound that is not clear or looks infected, increasing abdominal pain, problems urinating, or persistent nausea/vomiting.  *** You should be aware that you may have shoulder pain after surgery and that this will progressively go away. This is called 'referred pain' and is from the area of the diaphragm caused by gas that may be trapped under the diaphragm from laparoscopic surgery. This gas will progressively get reabsorbed by your body. ACTIVITY:  You are encouraged to cough and deep breath or use your incentive spirometer if you were given one, every 15-30 minutes when awake. This will help prevent respiratory complications and low grade fevers post-operatively. You may want to hug a pillow when coughing and sneezing to add additional support to the surgical area(s) which will decrease pain during these times. You are encouraged to walk and engage in light activity for the next two weeks. You should not lift more than 20 pounds during this time frame as it could put you at increased risk for a post-operative hernia. Twenty pounds is roughly equivalent to a plastic bag of groceries. · Most people are able to return to work within 1 to 2 weeks after surgery. · You may shower 24 hours after surgery. Pat the cut (incision) dry.  Do not take a bath for the first week. · No driving while you are taking narcotics. DIET:  You may eat any foods that you can tolerate. You may find it helpful to eat smaller sized, light meals more frequently for the first few days following surgery. It is a good idea to eat a high fiber diet and take in plenty of fluids to prevent constipation. If you do become constipated you may want to take a mild laxative or take ducolax tablets on a daily basis until your bowel habits are regular. Constipation can be very uncomfortable, along with straining, after recent abdominal surgery. WOUND CARE INSTRUCTIONS:   You may shower at home. Do not soak in a bath tub or swimming pool for two weeks after surgery. If clothing rubs against the wound or causes irritation and the wound is not draining you may cover it with a dry dressing during the daytime. Try to keep the wound dry and avoid ointments on the wound unless directed to do so. If the wound becomes bright red and painful or starts to drain infected material that is not clear, please contact your physician immediately. You should also call if you begin to drain fluid that is thin and greenish-brown from the wound and appears to look like bile. If the wound though is mildly pink and has a thick firm ridge underneath it, this is normal, and is referred to as a healing ridge. This will resolve over the next 4-6 weeks. Drain Care:  Empty drain twice daily, and more often if needed. Record output and bring this information with you to your follow-up appointment. MEDICATIONS:  Try to take narcotic medications and anti-inflammatory medications, such as tylenol, ibuprofen, naprosyn, etc., with food. This will minimize stomach upset from the medication. Should you develop nausea and vomiting from the pain medication, or develop a rash, please discontinue the medication and contact your physician.   You should not drive, make important decisions, or operate machinery when taking narcotic pain medication. · It is important that you take the medication exactly as they are prescribed. · Keep your medication in the bottles provided by the pharmacist and keep a list of the medication names, dosages, and times to be taken in your wallet. · Do not take other medications without consulting your doctor. QUESTIONS:  Please feel free to call the office ([phone]) if you have any questions, and they will be glad to assist you. Signed:  Ender Graham NP  2/27/2020  10:56 AM      What to Expect at [address] are likely to have pain for the next few days. You may also feel like you have the flu, and you may have a low fever and feel tired and nauseated. This is common. You should feel better after a few days and will probably feel much better in 7 days. For several weeks you may feel twinges or pulling in the surgical area when you move. This is normal.   This care sheet gives you a general idea about how long it will take for you to recover. But each person recovers at a different pace. Follow the steps below to get better as quickly as possible. How can you care for yourself at home? Activity  · Rest when you feel tired. Getting enough sleep will help you recover. Sleep with your head up by using three or four pillows. You can also try to sleep with your head up in a recliner chair. Do not sleep on your side or stomach. · Try to walk each day. Start by walking a little more than you did the day before. Bit by bit, increase the amount you walk. Walking boosts blood flow and helps prevent pneumonia and constipation. · Put ice or a cold pack on the area for 10 to 20 minutes at a time. Try to do this every 1 to 2 hours for the first 24 hours (when you are awake) or until the swelling goes down. Put a thin cloth between the ice and your skin.   · Avoid strenuous activities, such as biking, jogging, weight lifting, or aerobic exercise, until your doctor says it is okay. · Avoid lifting anything that would make you strain. This may include heavy grocery bags and milk containers, a heavy briefcase or backpack, cat litter or dog food bags, a vacuum , or a child. · You may drive when you are no longer taking pain medicine and can quickly move your foot from the gas pedal to the brake. You must also be able to sit comfortably for a long period of time, even if you do not plan to go far. You might get caught in traffic. When should you call for help? Call 911 anytime you think you may need emergency care. For example, call if:  · You passed out (lost consciousness). · You have sudden chest pain and shortness of breath, or you cough up blood. · You have severe pain in your belly. Call your doctor now or seek immediate medical care if:  · You are sick to your stomach and cannot keep fluids down. · You have signs of a blood clot, such as:  ¨ Pain in your calf, back of the knee, thigh, or groin. ¨ Redness and swelling in your leg or groin. · You have trouble passing urine or stool, especially if you have mild pain or swelling in your lower belly. · Bright red blood has soaked through the bandage over your incision. Watch closely for any changes in your health, and be sure to contact your doctor if:  · Your swelling is getting worse. · Your swelling is not going down. Diabetes Management:  1. Take a blood glucose readings four times daily:  First thing in the morning before you eat or drink anything. Then before lunch, dinner and bedtime. Avoid snacks at night. 2. Insulin changes: Lantus 40 units every morning and Humalog 10 units with meals. If you skip a meal, do not give Humalog. Goal A1C is below 7%. You were at 6.6%- keep up the good work! 3. Make a follow up appointment with your endocrinologist or primary care physician. Please see him within the next 2-4 weeks. 4. Make sure to get a DILATED eye exam every year.   This checks for retinal blood vessel damage caused by long term high blood sugar. 5. Make sure to examine your feet every day looking for any wound or foot irritation as sensation can be impaired in your feet. Always wear socks and/or slippers even while at home. This will protect your feet from injury. 6. Diabetes Self Management Training:  Outpatient appointments are available with a certified diabetic educator who can  you with meal planning, insulin administration and other diabetes management strategies. Please call [phone] to schedule at a location close to your home. Patient Education        Patient Education        Surgical Drain Care: Care Instructions  Your Care Instructions    After a surgery, fluid may collect inside your body in the surgical area. This makes an infection or other problems more likely. A surgical drain allows the fluid to flow out. The doctor puts a thin, flexible rubber tube into the area of your body where the fluid is likely to collect. The rubber tube carries the fluid outside your body. The most common type of surgical drain carries the fluid into a collection bulb that you empty. This is called a Jordon-Millard (AILEEN) drain. The drain uses suction created by the bulb to pull the fluid from your body into the bulb. The rubber tube will probably be held in place by one or two stitches in your skin. The bulb will probably be attached with a safety pin to your clothes or near the bandage so that it doesn't flip around or pull on the stitches. Another type of drain is called a Penrose drain. This type of drain doesn't have a bulb. Instead, the end of the tube is open. That allows the fluid to drain onto a dressing taped to your skin. The drain may be kept in place next to your skin with a stitch or a safety pin in the tube. When you first get the drain, the fluid will be bloody.  It will change color from red to pink to a light yellow or clear as the wound heals and the fluid starts to go away. Your doctor may give you information on when you no longer need the drain and when it will be removed. Follow-up care is a key part of your treatment and safety. Be sure to make and go to all appointments, and call your doctor if you are having problems. It's also a good idea to know your test results and keep a list of the medicines you take. How can you care for yourself at home? How to empty the bulb of a Jordon-Millard drain  Follow any instructions your doctor gives you. How often you empty the bulb depends on how much fluid is draining. Empty the bulb when it is half full. 1. Wash your hands with soap and water. 2. Take the plug out of the bulb. 3. Empty the bulb. If your doctor asks you to measure the fluid, empty the fluid into a measuring cup, and write down the color and how much you collected. Your doctor will want to know this information. 4. Clean the plug with alcohol. 5. Squeeze the bulb until it is flat. This removes all the air from the bulb. You may need to put the bulb on a table or a counter to flatten it. 6. Keep the bulb flat, and put the plug in. The bulb should stay flat after you put the plug back in. This creates the suction that pulls the fluid into the bulb. 7. Empty the fluid into the toilet. 8. Wash your hands. How to change the dressing around your surgical drain  You may have a dressing (bandage). The dressing is often made of gauze pads held on with tape. Your doctor will tell you how often to change it. 1. Wash your hands with soap and water. 2. Take off the dressing from around the drain. 3. Clean the drain site and the skin around it with soap and water. Use gauze or a cotton swab. 4. When the site is dry, put on a new dressing. The way your dressing is put on depends on what kind of drain you have. You will get instructions for your type of drain. 5. Wash your hands again with soap and water.   Your doctor may ask you to keep track of your dressing changes. Write down the time of day and the amount and color of the fluid on the dressing. How to help prevent clogs in your surgical drain  Squeezing or \"milking\" the tube of your surgical drain can help prevent clogs so that it drains correctly. Your doctor will tell you when you need to do this. In general, you do this when:  · You see a clot in the tube that prevents fluid from draining. The clot may look like a dark, stringy lining. · You see fluid leaking around the tube where it goes into the skin. Follow these steps for milking the tube. 1. Use one hand to hold and pinch the tube where it leaves the skin. 2. With the thumb and first finger of your other hand, pinch the tube just below where you're holding it. 3. Slowly and firmly push your thumb and first finger down the tubing toward the end of the tube. 4. Repeat this as many times as needed to move the clot. If you have a Jordon-Millard (AILEEN) drain, the clot should move down the tube and into the bulb. If you have a Penrose drain, the clot should move into the dressing. When should you call for help? Call your doctor now or seek immediate medical care if:    · You have signs of infection, such as:  ? Increased pain, swelling, warmth, or redness around the area. ? Red streaks leading from the area. ? Pus draining from the area. ? A fever.     · You see a sudden change in the color or smell of the drainage.     · The tube is coming loose where it leaves your skin.    Watch closely for changes in your health, and be sure to contact your doctor if:    · You see a lot of fluid around the drain.     · You cannot remove a clot from the tube by milking the tube. Where can you learn more? Go to http://chung-vick.info/. Enter K117 in the search box to learn more about \"Surgical Drain Care: Care Instructions. \"  Current as of: June 26, 2019  Content Version: 12.2  © 2598-7786 [employer].  Care instructions adapted under license by [employer] (which disclaims liability or warranty for this information). If you have questions about a medical condition or this instruction, always ask your healthcare professional. [address] any warranty or liability for your use of this information.

## 2020-02-27 NOTE — PROGRESS NOTES
Patient's BG at 0630 was 110. Paged Dr. Sheyla Choi regarding lantus 40 units. Dr. Sheyla Choi stated to give 20 units of lantus instead of 40 units.

## 2020-02-27 NOTE — DIABETES MGMT
[facility] SPECIAL[facility]   Discharge Recommendations    Presentation   Augustin Fong is a 70 y.o. female admitted with repair of ventral incisional herniaand consulted by Provider for advanced specialty nursing care related to inpatient diabetes management. Hyperglycemia management order set has been in place during hospital stay. Subjective   Ms Ryanne Wong transitioned successfully from a PM to Am dosing of lantus, bridged with a 1x NPH dose 24 hours ago. Fasting blood glucose this morning was 110. Patient blood glucose in goal with a blood glcuose 108-140 in the past 24 hours on Lantus, Bolus and correctional insulin. Currently being discharged. Objective   Physical exam    General Alert, oriented and in no acute distress. Conversant and cooperative  Vital Signs   Visit Vitals  /75   Pulse 63   Temp 98.6 °F (37 °C)   Resp 18   Ht 5' 1\" (1.549 m)   Wt 104 kg (229 lb 4.5 oz)   SpO2 96%   BMI 43.32 kg/m²     Skin  Warm and dry  Heart   Regular rate and rhythm. No murmurs, rubs or gallops  Lungs  Clear to auscultation without rales or rhonchi  Extremities No foot wounds    Laboratory      CBC WITH AUTOMATED DIFF    Collection Time: 02/27/20  2:23 AM   Result Value Ref Range    WBC 8.2 3.6 - 11.0 K/uL    RBC 3.21 (L) 3.80 - 5.20 M/uL    HGB 8.3 (L) 11.5 - 16.0 g/dL    HCT 27.4 (L) 35.0 - 47.0 %    MCV 85.4 80.0 - 99.0 FL    MCH 25.9 (L) 26.0 - 34.0 PG    MCHC 30.3 30.0 - 36.5 g/dL    RDW 15.1 (H) 11.5 - 14.5 %    PLATELET 474 (L) 995 - 400 K/uL    NRBC 0.0 0  WBC    ABSOLUTE NRBC 0.00 0.00 - 0.01 K/uL    NEUTROPHILS 50 32 - 75 %    LYMPHOCYTES 38 12 - 49 %    MONOCYTES 8 5 - 13 %    EOSINOPHILS 4 0 - 7 %    BASOPHILS 0 0 - 1 %    IMMATURE GRANULOCYTES 0 0.0 - 0.5 %    ABS. NEUTROPHILS 4.1 1.8 - 8.0 K/UL    ABS. LYMPHOCYTES 3.1 0.8 - 3.5 K/UL    ABS. MONOCYTES 0.7 0.0 - 1.0 K/UL    ABS. EOSINOPHILS 0.3 0.0 - 0.4 K/UL    ABS. BASOPHILS 0.0 0.0 - 0.1 K/UL    ABS. IMM.  GRANS. 0.0 0.00 - 0.04 K/UL    DF SMEAR SCANNED      RBC COMMENTS ANISOCYTOSIS  1+        RBC COMMENTS MACROCYTOSIS  1+        RBC COMMENTS MICROCYTOSIS  1+        RBC COMMENTS POLYCHROMASIA  1+        RBC COMMENTS ATYPICAL LYMPHOCYTES PRESENT           Blood glucose pattern      Assessment and Plan   Nursing Diagnosis Risk for unstable blood glucose pattern   Nursing Intervention Domain 4363 Decision-making Support   Nursing Interventions Examined current inpatient diabetes control   Explored factors facilitating and impeding inpatient management  Identified self-management practices impeding diabetes control  Explored corrective strategies with patient and responsible inpatient provider   Informed patient of rational for basal bolus insulin strategy while hospitalized  Instructed patient in Lantus and Humalog adjustments     Evaluation   Basal insulin dosing has stabilized blood glucose levels during this hospitalization. Bolus insulin dosing has controlled post-prandial blood glucose excursions. Patient's blood glucose within goal of 100-180.     Recommendations   Until seen by primary care provider,  O           Continue basal insulin at 40 units Lantus Qam  O           Continue mealtime/bolus insulin at 10 units of Humalog at each meal  O           Do not correct hyperglycemia; call primary care provider     Billing Code(s)   [address]

## 2020-02-27 NOTE — DISCHARGE SUMMARY
[facility]urgical Specialists at [facility]  Discharge Summary       Admit Date: 2/18/2020    Patient ID:  Kevin Edwards  [id number]  70 y.o.  1945    Admit Date: 2/18/2020    Discharge Date: 2/27/2020    Admission Diagnoses: Postoperative hematoma involving circulatory system following cardiac bypass [I97.631]    Discharge Diagnoses: Active Problems:    Postoperative hematoma involving circulatory system following cardiac bypass (2/19/2020)         Admission Condition: Poor    Discharge Condition: Stable    Last Procedure: * No surgery found *      Hospital Course:   70year old woman who underwent ventral incisional hernia repair with Dr. Bharath Barrera on 2/14/2020. Re-admitted on 2/18/2020 with abdominal wall hematoma and hemorrhage. She was treated with antibiotics and serial CBC monitoring. Follow-up CT showed decreased size of the hematoma and her drainage from the wound stopped. Drainage from the AILEEN drain is slowly decreasing and she will be discharge with this in place. WBC has normalized and antibiotics have been stopped. DTC assisted with management of her diabetes during her hospitalization. She will f/u in the office next week.        Consults: None    Disposition: home    Patient Instructions:   Current Discharge Medication List      START taking these medications    Details   HYDROmorphone (DILAUDID) 2 mg tablet Take 1 Tab by mouth every four (4) hours as needed for Pain for up to 3 days. Max Daily Amount: 12 mg.  Qty: 15 Tab, Refills: 0    Associated Diagnoses: Postoperative hematoma involving circulatory system following cardiac bypass      insulin glargine (LANTUS) 100 unit/mL injection 40 Units by SubCUTAneous route Daily (before breakfast). Qty: 1 Vial, Refills: 0      LORazepam (ATIVAN) 0.5 mg tablet Take 1-2 Tabs by mouth every four (4) hours as needed for Anxiety. Max Daily Amount: 6 mg.   Qty: 18 Tab, Refills: 0    Associated Diagnoses: Postoperative hematoma involving circulatory system following cardiac bypass         CONTINUE these medications which have CHANGED    Details   insulin lispro (HUMALOG) 100 unit/mL injection 10 Units by SubCUTAneous route Before breakfast, lunch, and dinner. Before dinner  Qty: 1 Vial, Refills: 0         CONTINUE these medications which have NOT CHANGED    Details   docusate sodium (COLACE) 100 mg capsule Take 1 Cap by mouth two (2) times a day. Qty: 30 Cap, Refills: 0      multivitamin (ONE A DAY) tablet Take 1 Tab by mouth daily. calcium carbonate (OS-DANI) 500 mg calcium (1,250 mg) tablet Take 3 Tabs by mouth nightly. triamterene-hydroCHLOROthiazide (MAXZIDE) 37.5-25 mg per tablet Take 1 Tab by mouth daily. pantoprazole (PROTONIX) 40 mg tablet Take 40 mg by mouth daily. insulin NPH (NOVOLIN N) 100 unit/mL injection 74 Units by SubCUTAneous route nightly. metoprolol (LOPRESSOR) 50 mg tablet Take 50 mg by mouth two (2) times a day. baclofen (LIORESAL) 10 mg tablet Take 10 mg by mouth daily as needed. ondansetron hcl (ZOFRAN) 4 mg tablet Take 4 mg by mouth every four (4) hours as needed for Nausea. DULoxetine (CYMBALTA) 60 mg capsule Take 120 mg by mouth daily.              Follow-up with Dr. Jameel Brizuela on Wednesday, March 4, 2020 at 3:40pm.     Signed:  Bess Gallagher NP  2/27/2020  11:01 AM

## 2020-02-27 NOTE — PROGRESS NOTES
Bedside and Verbal shift change report given to Kavon Sarabia (oncoming nurse) by Jacque Turner RN (offgoing nurse). Report included the following information SBAR and Kardex.

## 2020-02-27 NOTE — PROGRESS NOTES
11:49 AM    JUN:    1. Transition home today with family assistance. 2. Family to transport at 12:00 pm    No current CM consults or needs at this time.     Sotero Pack, MSW/CRM  Care Management

## 2020-02-27 NOTE — PROGRESS NOTES
[facility] [address]  Daily Progress Note    Admit Date: 2020      Subjective:     Last 24 hrs: Pain well controlled. Tolerating PO and + bowel function. Biggest complaint is having anxiety from her surgical complication. Has history of anxiety and depression, has taken lorazepam in the past prescribed by her psychiatrist.  She is asking for a Rx for a few days to help her with anxiety symptoms. WBC 8.2, Hgb 8.3 (stable). Objective:     Blood pressure 115/75, pulse 63, temperature 98.6 °F (37 °C), resp. rate 18, height 5' 1\" (1.549 m), weight 104 kg (229 lb 4.5 oz), SpO2 96 %. Temp (24hrs), Av.5 °F (36.9 °C), Min:98.2 °F (36.8 °C), Max:98.6 °F (37 °C)      _____________________  Physical Exam:     Alert and Oriented, ambulating around room, no acute distress. Cardiovascular: RRR, no peripheral edema  Lungs:CTAB   Abdomen: soft, NT. Incision healing well. AILEEN with ss fluid, 220 mL out yesterday. Assessment:   Active Problems:    Postoperative hematoma involving circulatory system following cardiac bypass (2020)    70year old woman who underwent ventral incisional hernia repair with Dr. Beatriz Bejarano on 2020. Re-admitted with abdominal wall hematoma and hemorrhage. Anxiety    Plan:     Stable for discharge  Instructions reviewed  Rx for dilaudid  Rx for lorazepam  Will keep drain  F/U in office with Dr. Beatriz Bejarano on 3/4/2020 at 3:40pm        [facility], [address] [facility] [place] [address],  [facility], [address] [place] [address]  [phone]    Data Review:    Recent Labs     20  0223 20  0012 20  0543   WBC 8.2 8.5 7.0   HGB 8.3* 8.3* 8.9*   HCT 27.4* 27.4* 29.4*   * 205 196     No results for input(s): NA, K, CL, CO2, GLU, BUN, CREA, CA, MG, PHOS, ALB, TBIL, TBILI, SGOT, ALT, INR, INREXT in the last 72 hours.   No results for input(s): AML, LPSE in the last 72 hours.        ______________________  Medications:    Current Facility-Administered Medications   Medication Dose Route Frequency    insulin lispro (HUMALOG) injection   SubCUTAneous AC&HS    insulin lispro (HUMALOG) injection 10 Units  10 Units SubCUTAneous TIDAC    insulin glargine (LANTUS) injection 40 Units  40 Units SubCUTAneous 7am    lactobac ac& pc-s.therm-b.anim (PAM Q/RISAQUAD)  1 Cap Oral DAILY    HYDROmorphone (DILAUDID) tablet 2 mg  2 mg Oral Q4H PRN    baclofen (LIORESAL) tablet 10 mg  10 mg Oral DAILY PRN    sodium chloride (NS) flush 5-40 mL  5-40 mL IntraVENous Q8H    sodium chloride (NS) flush 5-40 mL  5-40 mL IntraVENous PRN    lactated Ringers infusion  25 mL/hr IntraVENous CONTINUOUS    acetaminophen (TYLENOL) tablet 650 mg  650 mg Oral Q6H PRN    HYDROmorphone (PF) (DILAUDID) injection 0.5-1 mg  0.5-1 mg IntraVENous Q4H PRN    naloxone (NARCAN) injection 0.4 mg  0.4 mg IntraVENous PRN    ondansetron (ZOFRAN) injection 4 mg  4 mg IntraVENous Q4H PRN    DULoxetine (CYMBALTA) capsule 120 mg  120 mg Oral DAILY    metoprolol tartrate (LOPRESSOR) tablet 50 mg  50 mg Oral BID    pantoprazole (PROTONIX) tablet 40 mg  40 mg Oral ACB    triamterene-hydroCHLOROthiazide (MAXZIDE) 37.5-25 mg per tablet 1 Tab  1 Tab Oral DAILY    glucose chewable tablet 16 g  4 Tab Oral PRN    glucagon (GLUCAGEN) injection 1 mg  1 mg IntraMUSCular PRN    dextrose 10% infusion 0-250 mL  0-250 mL IntraVENous PRN

## 2020-02-27 NOTE — TELEPHONE ENCOUNTER
I received a call from Alma Beard pharmacist at [address] [facility] stating she thinks the  Ativan prescription is too high dosage as well as the Dilaudid. She also states patient has a medication allergy listed to Percocet and codeine. She is asking if these have been given in the past without any problem. I spoke with Windsor Runner, NP, and she states Ativan should be 0.5 mg every 8 hours PRN #18 and Dilaudid should be 2 mg -one tablet every 6 hours PRN #15. Per Windsor Runner, patient was given Dilaudid during recent hospital stay with no apparent side effects.

## 2020-03-04 ENCOUNTER — OFFICE VISIT (OUTPATIENT)
Dept: SURGERY | Age: 75
End: 2020-03-04

## 2020-03-04 VITALS
HEIGHT: 61 IN | OXYGEN SATURATION: 98 % | WEIGHT: 199 LBS | TEMPERATURE: 99.1 F | DIASTOLIC BLOOD PRESSURE: 80 MMHG | SYSTOLIC BLOOD PRESSURE: 128 MMHG | BODY MASS INDEX: 37.57 KG/M2 | HEART RATE: 80 BPM | RESPIRATION RATE: 18 BRPM

## 2020-03-04 DIAGNOSIS — I97.631: ICD-10-CM

## 2020-03-04 DIAGNOSIS — M96.840 POSTOPERATIVE HEMATOMA OF MUSCULOSKELETAL STRUCTURE FOLLOWING MUSCULOSKELETAL PROCEDURE: ICD-10-CM

## 2020-03-04 RX ORDER — LORAZEPAM 0.5 MG/1
0.5 TABLET ORAL
Qty: 42 TAB | Refills: 0 | Status: SHIPPED | OUTPATIENT
Start: 2020-03-04 | End: 2020-03-20

## 2020-03-04 RX ORDER — HYDROMORPHONE HYDROCHLORIDE 2 MG/1
2 TABLET ORAL
Qty: 56 TAB | Refills: 0 | Status: ON HOLD | OUTPATIENT
Start: 2020-03-04 | End: 2020-03-19

## 2020-03-04 NOTE — PROGRESS NOTES
Subjective:      Carter Linares  is a 70 y.o. female who presents for a follow-up. She underwent ventral incisional hernia repair on 02/14/2020 and was re-admitted from 02/18/2020-02/27/2020 for abdominal wall hematoma and hemorrhage. During her stay, she was treated with antibiotics until WBC normalized and serial CBC monitoring. She was discharged with AILEEN drain in place. At time of visit, she reports that she is doing well overall. She states that last night there was no drainage from her AILEEN drain, but this morning, she noticed a moderate amount. She continues to endorse lower abdominal pain that is around a 7/10 on the pain scale. She notes that the pain is mitigated by sitting and resting. She has been taking Dilaudid 2 mg and Ativan 0.5 mg as needed. Past Medical History:   Diagnosis Date    Adverse effect of anesthesia     \"I'M CRAZY WHEN I WAKE UP, I'M CRAZY.   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    Postoperative hematoma involving circulatory system following cardiac bypass 2/19/2020    PUD (peptic ulcer disease) 2019    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    Unspecified sleep apnea     NO C-PAP       Past Surgical History:   Procedure Laterality Date    COLONOSCOPY N/A 1/15/2019    COLONOSCOPY performed by Raffi Swartz MD at [facility] ENDOSCOPY    HX APPENDECTOMY      HX BREAST LUMPECTOMY Left     lumpectomy BENIGN    HX COLECTOMY      sigmoid    HX COLONOSCOPY      HX HYSTERECTOMY      hysto    HX ORTHOPAEDIC Left     shoulder CYST REMOVAL    HX OTHER SURGICAL  13    pyloric-sparing whipple,bx of portal and hepatic nodes by Dr Della Oconnell HX SALPINGO-OOPHORECTOMY Bilateral     HX TONSILLECTOMY      HX UROLOGICAL  2008    BLADDER TUCK       Social History     Tobacco Use    Smoking status: Former Smoker     Packs/day: 1.00     Years: 17.00     Pack years: 17.00     Last attempt to quit: 1983     Years since quittin.2    Smokeless tobacco: Never Used   Substance Use Topics    Alcohol use: No       Family History   Problem Relation Age of Onset    Heart Failure Mother     Diabetes Mother     Hypertension Mother     Anesth Problems Mother         HARD TO INTUBATE    Heart Failure Father     Kidney Disease Father         WAS ON DIALYSIS    Diabetes Sister     Depression Sister     Anesth Problems Sister         HARD TO INTUBATE    Depression Sister         BIPOLAR    Other Sister         DIFFICULTY INTUBATING    Cancer Sister         ADENOMA    Asthma Sister     Other Son         HEMATOMACHROSIS    No Known Problems Daughter        Current Outpatient Medications on File Prior to Visit   Medication Sig Dispense Refill    insulin lispro (HUMALOG) 100 unit/mL injection 10 Units by SubCUTAneous route Before breakfast, lunch, and dinner. Before dinner 1 Vial 0    insulin glargine (LANTUS) 100 unit/mL injection 40 Units by SubCUTAneous route Daily (before breakfast). 1 Vial 0    docusate sodium (COLACE) 100 mg capsule Take 1 Cap by mouth two (2) times a day. 30 Cap 0    multivitamin (ONE A DAY) tablet Take 1 Tab by mouth daily.  calcium carbonate (OS-DANI) 500 mg calcium (1,250 mg) tablet Take 3 Tabs by mouth nightly.  triamterene-hydroCHLOROthiazide (MAXZIDE) 37.5-25 mg per tablet Take 1 Tab by mouth daily.       pantoprazole (PROTONIX) 40 mg tablet Take 40 mg by mouth daily.  insulin NPH (NOVOLIN N) 100 unit/mL injection 74 Units by SubCUTAneous route nightly.  metoprolol (LOPRESSOR) 50 mg tablet Take 50 mg by mouth two (2) times a day.  baclofen (LIORESAL) 10 mg tablet Take 10 mg by mouth daily as needed.  ondansetron hcl (ZOFRAN) 4 mg tablet Take 4 mg by mouth every four (4) hours as needed for Nausea.  DULoxetine (CYMBALTA) 60 mg capsule Take 120 mg by mouth daily. No current facility-administered medications on file prior to visit. Allergies   Allergen Reactions    Tylenol [Acetaminophen] Other (comments)     Liver disorder. HAS HEMATOMACROSIS    Claritin [Loratadine] Other (comments)     Panic attack    Codeine Other (comments)     hallucinations    Percocet [Oxycodone-Acetaminophen] Other (comments)     hallucinations         Review of Systems:    A comprehensive review of systems was negative except for that written in the History of Present Illness. Objective:     Visit Vitals  /80 (BP 1 Location: Left arm, BP Patient Position: Sitting)   Pulse 80   Temp 99.1 °F (37.3 °C) (Oral)   Resp 18   Ht 5' 1\" (1.549 m)   Wt 199 lb (90.3 kg)   SpO2 98%   BMI 37.60 kg/m²        Physical Exam:  ABDOMEN: Incision looks clean. Drainage is very much diminished. Drain was removed. Labs: No results found for this or any previous visit (from the past 24 hour(s)). Data Review:      CT Abd Pelv w Cont (02/21/2020): FINDINGS:  Anterior abdominal wall fluid collection is less dense and has interspersed air with a drain in place. In addition, it has decreased in size significantly from the major compartment measurement of 13.2 x 5.0 x 10.7 cm to the current measurement of 8.9 x 4.3 x 8.2 cm (axial image 61 and coronal  image 23 and sagittal image 85).    IMPRESSION:  Decrease in size of anterior abdominal wall hematoma as described.  Interval development of a small amount of ascites, possibly reactive to the acute anterior abdominal wall process. Attention on follow-up. Assessment and Plan:       ICD-10-CM ICD-9-CM    1. Postoperative hematoma involving circulatory system following cardiac bypass I97.631 998.12 LORazepam (ATIVAN) 0.5 mg tablet      HYDROmorphone (DILAUDID) 2 mg tablet   2. Postoperative hematoma of musculoskeletal structure following musculoskeletal procedure M96.840 998.12        She seems to be recovering well. AILEEN drain was removed in the office today. She will follow up in a couple of weeks for further evaluation. This document was scribed by Jaiden Hunter as dictated by Dr. Karishma Quintanilla.      Signed By: Anthony Fam MD     03/04/20

## 2020-03-04 NOTE — LETTER
3/4/20 Patient: Augustin Fong YOB: 1945 Date of Visit: 3/4/2020 Valerie Espinosa NP 
[address] [address] VIA Facsimile: [phone] Dear Valerie Espinosa NP, Thank you for referring Ms. Augustin Fong to [facility] for evaluation. My notes for this consultation are attached. If you have questions, please do not hesitate to call me. I look forward to following your patient along with you. Sincerely, Livingston Runner, MD

## 2020-03-06 ENCOUNTER — TELEPHONE (OUTPATIENT)
Dept: SURGERY | Age: 75
End: 2020-03-06

## 2020-03-06 NOTE — TELEPHONE ENCOUNTER
I returned patients call and she states she was sitting on the sofa and all of a sudden her gown and got soaked all the way thru with clearish liquid. She states it is not coming from where the drainage tube was, that it seems to be coming form the abdominal incision. She state there is no redness, no foul odor and the bandage she put on there is dry now. She states the incision is not open. I told her to put a bandaid across the incision where the drainage is coming from if she can see it and try to pull the edges together a little just to give the incision a little support. I told her to call back if the incision opens up and otherwise to keep any bandage needed clean and dry.

## 2020-03-09 ENCOUNTER — TELEPHONE (OUTPATIENT)
Dept: SURGERY | Age: 75
End: 2020-03-09

## 2020-03-09 NOTE — TELEPHONE ENCOUNTER
I received an incoming call from patient and she states she is still having drainage from her incision, once or twice a day. No fevers, no redness, no infectious odor. She states the drainage is reddish brown. Appointment offered for tomorrow but she wanted to come in [place]. Put in with DANA Campos at 2:40pm and told her to call if anything changes before then.

## 2020-03-11 ENCOUNTER — OFFICE VISIT (OUTPATIENT)
Dept: SURGERY | Age: 75
End: 2020-03-11

## 2020-03-11 VITALS
HEART RATE: 100 BPM | BODY MASS INDEX: 37.69 KG/M2 | TEMPERATURE: 99.1 F | SYSTOLIC BLOOD PRESSURE: 131 MMHG | OXYGEN SATURATION: 98 % | RESPIRATION RATE: 18 BRPM | HEIGHT: 61 IN | DIASTOLIC BLOOD PRESSURE: 79 MMHG | WEIGHT: 199.6 LBS

## 2020-03-11 DIAGNOSIS — T14.8XXA HEMATOMA: Primary | ICD-10-CM

## 2020-03-11 DIAGNOSIS — Z98.890 S/P HERNIA REPAIR: ICD-10-CM

## 2020-03-11 DIAGNOSIS — Z87.19 S/P HERNIA REPAIR: ICD-10-CM

## 2020-03-11 DIAGNOSIS — R10.30 LOWER ABDOMINAL PAIN: ICD-10-CM

## 2020-03-11 RX ORDER — AMOXICILLIN AND CLAVULANATE POTASSIUM 875; 125 MG/1; MG/1
1 TABLET, FILM COATED ORAL 2 TIMES DAILY
Qty: 20 TAB | Refills: 0 | Status: SHIPPED | OUTPATIENT
Start: 2020-03-11 | End: 2020-03-20

## 2020-03-11 NOTE — PROGRESS NOTES
Subjective:    Genny Wagoner is a 70 y.o. female presents for postop care 5 weeks following ventral hernia repair by Dr. Justin Youngblood. Pt's post op recovery was complicated by abdominal wall hematoma and hemorrhage and pt was admitted February 18-27, 2020 and was discharged with drain in place. Dr. Estela Goodell removed the drain on 3/4/2020 in office. At that time, patient did not have any pain. The following day, a large amount of clear pink drainage came from her umbilicus. Abdominal pain started the following day after drainage and is now up to a level 9. It is in the lower abd. She noted the drainage turned brown 2 days ago, the same day that she noticed her abd was firm around the incision. No fevers, but she does not feel well, is cold and anxious. \"Make it go away,\" \"Make me like I used to be. \"    Pt is a L[employer] and does not take blood products    Patient has an advanced directive: YES  Education material provided about advance directives: NO    Objective:     Visit Vitals  /79   Pulse 100   Temp 99.1 °F (37.3 °C) (Oral)   Resp 18   Ht 5' 1\" (1.549 m)   Wt 199 lb 9.6 oz (90.5 kg)   SpO2 98%   BMI 37.71 kg/m²       General:  Alert, teary-eyed   Abdomen: bowel sounds active, obese, lower abd and pannus firm around lower abd incision, TTP around incision. +SS with a tan tinge drainage from umbilicus. Umbilicus probed with Q-tip but unable to see due to pt's habitus. Able to insert Q-tip approx 3 cm. Heart: regular rate and rhythm, S1, S2 normal, no murmur, click, rub or gallop   Lungs: clear to auscultation bilaterally       Assessment:     Ventral hernia status post repair. Abd pain  Post op drainage  HEmatoma      Plan:     D/W Dr. Patt Palmer  CT scan ABD PELV with contrast. Scheduled for tomorrow at 1:30pm.  rx for 10 day course of Augmentin  OK per Dr. Guillen Meter nurse for pt to see Dr. Mindy Arriola tomorrow to review results  Call if worsening s/s  F/U with Dr. Estela Goodell or I next week    Ms. Dixie Orellana has a reminder for a \"due or due soon\" health maintenance. I have asked that she contact her primary care provider for follow-up on this health maintenance. Patient verbalized understanding and agreement.

## 2020-03-11 NOTE — PROGRESS NOTES
1. Have you been to the ER, urgent care clinic since your last visit? Hospitalized since your last visit? Yes When: 02/18/2020  [facility] and was tranferred here for bleeding. 2. Have you seen or consulted any other health care providers outside of the [address] [place] [address] since your last visit? Include any pap smears or colon screening.  No

## 2020-03-11 NOTE — PATIENT INSTRUCTIONS
Scheduling department number: [phone] Please call us when your CT scan is complete. Dr. Rea Rose and Zohra Patiño will be out of the office until Monday, 3/16/2020, but the covering doctor will review the results. Please call if any symptoms worsen, fever develops.

## 2020-03-11 NOTE — LETTER
3/11/20 Patient: Dorothea Calvo YOB: 1945 Date of Visit: 3/11/2020 Bambi Montoya NP 
[address] [place] [address] VIA Facsimile: [phone] Dear Bambi Montoya NP, Thank you for referring Ms. Dorothea Calvo to [facility] for evaluation. My notes for this consultation are attached. If you have questions, please do not hesitate to call me. I look forward to following your patient along with you. Sincerely, Ghada Granger NP

## 2020-03-12 ENCOUNTER — HOSPITAL ENCOUNTER (INPATIENT)
Age: 75
LOS: 8 days | Discharge: HOME HEALTH | DRG: 857 | End: 2020-03-20
Attending: SURGERY | Admitting: SURGERY
Payer: MEDICARE

## 2020-03-12 ENCOUNTER — HOSPITAL ENCOUNTER (OUTPATIENT)
Dept: CT IMAGING | Age: 75
Discharge: HOME | DRG: 857 | End: 2020-03-12
Attending: NURSE PRACTITIONER
Payer: MEDICARE

## 2020-03-12 ENCOUNTER — OFFICE VISIT (OUTPATIENT)
Dept: SURGERY | Age: 75
End: 2020-03-12

## 2020-03-12 VITALS
DIASTOLIC BLOOD PRESSURE: 67 MMHG | WEIGHT: 201 LBS | RESPIRATION RATE: 19 BRPM | HEART RATE: 83 BPM | TEMPERATURE: 98.4 F | OXYGEN SATURATION: 96 % | HEIGHT: 61 IN | SYSTOLIC BLOOD PRESSURE: 114 MMHG | BODY MASS INDEX: 37.95 KG/M2

## 2020-03-12 DIAGNOSIS — Z98.890 S/P HERNIA REPAIR: ICD-10-CM

## 2020-03-12 DIAGNOSIS — R10.30 LOWER ABDOMINAL PAIN: ICD-10-CM

## 2020-03-12 DIAGNOSIS — Z87.19 S/P HERNIA REPAIR: ICD-10-CM

## 2020-03-12 DIAGNOSIS — T81.49XA WOUND INFECTION AFTER SURGERY: Primary | ICD-10-CM

## 2020-03-12 DIAGNOSIS — I97.631: ICD-10-CM

## 2020-03-12 DIAGNOSIS — E66.01 SEVERE OBESITY (HCC): ICD-10-CM

## 2020-03-12 DIAGNOSIS — T14.8XXA HEMATOMA: ICD-10-CM

## 2020-03-12 PROCEDURE — 74011250637 HC RX REV CODE- 250/637: Performed by: SURGERY

## 2020-03-12 PROCEDURE — 74011250636 HC RX REV CODE- 250/636: Performed by: SURGERY

## 2020-03-12 PROCEDURE — 65410000002 HC RM PRIVATE OB

## 2020-03-12 PROCEDURE — 74011636320 HC RX REV CODE- 636/320: Performed by: RADIOLOGY

## 2020-03-12 PROCEDURE — 74011000258 HC RX REV CODE- 258: Performed by: RADIOLOGY

## 2020-03-12 PROCEDURE — 74177 CT ABD & PELVIS W/CONTRAST: CPT

## 2020-03-12 PROCEDURE — 74011000258 HC RX REV CODE- 258: Performed by: SURGERY

## 2020-03-12 RX ORDER — DULOXETIN HYDROCHLORIDE 60 MG/1
120 CAPSULE, DELAYED RELEASE ORAL DAILY
Status: DISCONTINUED | OUTPATIENT
Start: 2020-03-12 | End: 2020-03-20 | Stop reason: HOSPADM

## 2020-03-12 RX ORDER — SODIUM CHLORIDE 0.9 % (FLUSH) 0.9 %
5-40 SYRINGE (ML) INJECTION EVERY 8 HOURS
Status: DISCONTINUED | OUTPATIENT
Start: 2020-03-12 | End: 2020-03-20 | Stop reason: HOSPADM

## 2020-03-12 RX ORDER — SODIUM CHLORIDE 0.9 % (FLUSH) 0.9 %
5-40 SYRINGE (ML) INJECTION AS NEEDED
Status: DISCONTINUED | OUTPATIENT
Start: 2020-03-12 | End: 2020-03-20 | Stop reason: HOSPADM

## 2020-03-12 RX ORDER — DULOXETIN HYDROCHLORIDE 60 MG/1
120 CAPSULE, DELAYED RELEASE ORAL DAILY
Status: DISCONTINUED | OUTPATIENT
Start: 2020-03-13 | End: 2020-03-12

## 2020-03-12 RX ORDER — SODIUM CHLORIDE, SODIUM LACTATE, POTASSIUM CHLORIDE, CALCIUM CHLORIDE 600; 310; 30; 20 MG/100ML; MG/100ML; MG/100ML; MG/100ML
75 INJECTION, SOLUTION INTRAVENOUS CONTINUOUS
Status: DISCONTINUED | OUTPATIENT
Start: 2020-03-12 | End: 2020-03-14

## 2020-03-12 RX ORDER — LORAZEPAM 2 MG/ML
1 INJECTION INTRAMUSCULAR
Status: DISCONTINUED | OUTPATIENT
Start: 2020-03-12 | End: 2020-03-20 | Stop reason: HOSPADM

## 2020-03-12 RX ORDER — DIPHENHYDRAMINE HYDROCHLORIDE 50 MG/ML
12.5 INJECTION, SOLUTION INTRAMUSCULAR; INTRAVENOUS
Status: ACTIVE | OUTPATIENT
Start: 2020-03-12 | End: 2020-03-13

## 2020-03-12 RX ORDER — HYDROMORPHONE HYDROCHLORIDE 1 MG/ML
1 INJECTION, SOLUTION INTRAMUSCULAR; INTRAVENOUS; SUBCUTANEOUS
Status: DISCONTINUED | OUTPATIENT
Start: 2020-03-12 | End: 2020-03-20 | Stop reason: HOSPADM

## 2020-03-12 RX ORDER — ONDANSETRON 2 MG/ML
4 INJECTION INTRAMUSCULAR; INTRAVENOUS
Status: DISCONTINUED | OUTPATIENT
Start: 2020-03-12 | End: 2020-03-20 | Stop reason: HOSPADM

## 2020-03-12 RX ORDER — SODIUM CHLORIDE 0.9 % (FLUSH) 0.9 %
10 SYRINGE (ML) INJECTION
Status: COMPLETED | OUTPATIENT
Start: 2020-03-12 | End: 2020-03-12

## 2020-03-12 RX ORDER — KETOROLAC TROMETHAMINE 30 MG/ML
15 INJECTION, SOLUTION INTRAMUSCULAR; INTRAVENOUS
Status: DISPENSED | OUTPATIENT
Start: 2020-03-12 | End: 2020-03-13

## 2020-03-12 RX ADMIN — Medication 10 ML: at 17:00

## 2020-03-12 RX ADMIN — SODIUM CHLORIDE 100 ML: 900 INJECTION, SOLUTION INTRAVENOUS at 14:21

## 2020-03-12 RX ADMIN — HYDROMORPHONE HYDROCHLORIDE 1 MG: 1 INJECTION, SOLUTION INTRAMUSCULAR; INTRAVENOUS; SUBCUTANEOUS at 22:20

## 2020-03-12 RX ADMIN — IOPAMIDOL 100 ML: 755 INJECTION, SOLUTION INTRAVENOUS at 14:21

## 2020-03-12 RX ADMIN — IOHEXOL 50 ML: 240 INJECTION, SOLUTION INTRATHECAL; INTRAVASCULAR; INTRAVENOUS; ORAL at 14:21

## 2020-03-12 RX ADMIN — DULOXETINE HYDROCHLORIDE 120 MG: 60 CAPSULE, DELAYED RELEASE ORAL at 18:40

## 2020-03-12 RX ADMIN — PIPERACILLIN AND TAZOBACTAM 3.38 G: 3; .375 INJECTION, POWDER, LYOPHILIZED, FOR SOLUTION INTRAVENOUS at 18:37

## 2020-03-12 RX ADMIN — Medication 10 ML: at 14:21

## 2020-03-12 RX ADMIN — Medication 10 ML: at 22:20

## 2020-03-12 RX ADMIN — SODIUM CHLORIDE, SODIUM LACTATE, POTASSIUM CHLORIDE, AND CALCIUM CHLORIDE 75 ML/HR: 600; 310; 30; 20 INJECTION, SOLUTION INTRAVENOUS at 18:34

## 2020-03-12 RX ADMIN — KETOROLAC TROMETHAMINE 15 MG: 30 INJECTION, SOLUTION INTRAMUSCULAR at 18:40

## 2020-03-12 NOTE — PROGRESS NOTES
1. Have you been to the ER, urgent care clinic since your last visit? Hospitalized since your last visit? No    2. Have you seen or consulted any other health care providers outside of the [address] [place] [address] since your last visit? Include any pap smears or colon screening.  No

## 2020-03-12 NOTE — PROGRESS NOTES
[facility] History and Physical    Chief Complaint: Wound infection    History of Present Illness:      Annita Mendez is a 70 y.o. female who is a patient of Dr. Jennifer Hawthorne's who is seen in follow-up today in clinic. The patient underwent a previous ventral hernia repair with Phasix ST mesh underlay repair in early February. She was subsequently readmitted with a hematoma in the subcutaneous tissue. This was drained and the patient was treated with antibiotics. Her drain has been removed now for a little over a week. She started having more pain and is now having spontaneous drainage from her umbilicus. She had a CT scan earlier today that suggest increased subcutaneous fluid with gas. There was no evidence of contrast extravasation into the wound to suggest a enterocutaneous fistula. She denies any fevers at home but has had chills. She has a lot of pain that has been worsening and is asking to be admitted. She denies any nausea or vomiting. Past Medical History:   Diagnosis Date    Adverse effect of anesthesia     \"I'M CRAZY WHEN I WAKE UP, I'M CRAZY.   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    Postoperative hematoma involving circulatory system following cardiac bypass 2/19/2020    PUD (peptic ulcer disease) 2019    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    Unspecified sleep apnea     NO C-PAP       Past Surgical History:   Procedure Laterality Date    COLONOSCOPY N/A 1/15/2019    COLONOSCOPY performed by Ryann Martinez MD at [facility] ENDOSCOPY    HX APPENDECTOMY      HX BREAST LUMPECTOMY Left     lumpectomy BENIGN    HX COLECTOMY      sigmoid    HX COLONOSCOPY      HX HYSTERECTOMY      hysto    HX ORTHOPAEDIC Left     shoulder CYST REMOVAL    HX OTHER SURGICAL  13    pyloric-sparing whipple,bx of portal and hepatic nodes by Dr Jessica Garcia HX SALPINGO-OOPHORECTOMY Bilateral     HX TONSILLECTOMY      HX UROLOGICAL  2008    BLADDER TUCK       Social History     Socioeconomic History    Marital status:      Spouse name: Not on file    Number of children: Not on file    Years of education: Not on file    Highest education level: Not on file   Occupational History    Not on file   Social Needs    Financial resource strain: Not on file    Food insecurity     Worry: Not on file     Inability: Not on file    Transportation needs     Medical: Not on file     Non-medical: Not on file   Tobacco Use    Smoking status: Former Smoker     Packs/day: 1.00     Years: 17.00     Pack years: 17.00     Last attempt to quit: 1983     Years since quittin.2    Smokeless tobacco: Never Used   Substance and Sexual Activity    Alcohol use: No    Drug use: No    Sexual activity: Not on file   Lifestyle    Physical activity     Days per week: Not on file     Minutes per session: Not on file    Stress: Not on file   Relationships    Social connections     Talks on phone: Not on file     Gets together: Not on file     Attends Cheondoism service: Not on file     Active member of club or organization: Not on file     Attends meetings of clubs or organizations: Not on file     Relationship status: Not on file    Intimate partner violence     Fear of current or ex partner: Not on file     Emotionally abused: Not on file     Physically abused: Not on file     Forced sexual activity: Not on file   Other Topics Concern    Not on file   Social History Narrative    Not on file       Family History   Problem Relation Age of Onset    Heart Failure Mother     Diabetes Mother     Hypertension Mother    [facility] Anesth Problems Mother         HARD TO INTUBATE    Heart Failure Father     Kidney Disease Father         WAS ON DIALYSIS    Diabetes Sister     Depression Sister     Anesth Problems Sister         HARD TO INTUBATE    Depression Sister         BIPOLAR    Other Sister         DIFFICULTY INTUBATING    Cancer Sister         ADENOMA    Asthma Sister     Other Son         HEMATOMACHROSIS    No Known Problems Daughter        No current facility-administered medications for this visit. No current outpatient medications on file. Facility-Administered Medications Ordered in Other Visits:     sodium chloride (NS) flush 5-40 mL, 5-40 mL, IntraVENous, Q8H, Daniel Madrid MD    sodium chloride (NS) flush 5-40 mL, 5-40 mL, IntraVENous, PRN, Daniel Madrid MD    lactated Ringers infusion, 75 mL/hr, IntraVENous, CONTINUOUS, Daniel Madrid MD    ketorolac (TORADOL) injection 15 mg, 15 mg, IntraVENous, Q6H PRN, Sandra Brown MD    ondansetron [facility]) injection 4 mg, 4 mg, IntraVENous, Q4H PRN, Sandra Brown MD    diphenhydrAMINE (BENADRYL) injection 12.5 mg, 12.5 mg, IntraVENous, ONCE PRN, Sandra Brown MD    piperacillin-tazobactam (ZOSYN) 3.375 g in 0.9% sodium chloride (MBP/ADV) 100 mL, 3.375 g, IntraVENous, Q8H, Daniel Madrid MD    Allergies   Allergen Reactions    Tylenol [Acetaminophen] Other (comments)     Liver disorder.  HAS HEMATOMACROSIS    Claritin [Loratadine] Other (comments)     Panic attack    Codeine Other (comments)     hallucinations    Percocet [Oxycodone-Acetaminophen] Other (comments)     hallucinations       ROS   Constitutional:  chills  Ears, Nose, Mouth, Throat, and Face: Negative  Respiratory: Negative  Cardiovascular: Negative  Gastrointestinal: See HPI  Genitourinary: Negative  Integument/Breast: Negative  Hematologic/Lymphatic: Negative  Behavioral/Psychiatric: Negative  Allergic/Immunologic: Negative      Physical Exam:     Visit Vitals  /67 (BP 1 Location: Left arm, BP Patient Position: Sitting)   Pulse 83   Temp 98.4 °F (36.9 °C) (Oral)   Resp 19   Ht 5' 1\" (1.549 m)   Wt 201 lb (91.2 kg)   SpO2 96%   BMI 37.98 kg/m²       General - alert and oriented, no apparent distress  HEENT - NC/AT. No scleral icterus  Pulm - CTAB, normal inspiratory effort  CV - RRR, no M/R/G  Abd - soft, NT, ND. The low midline incision is healed. There is some induration and erythema in the surrounding tissue. There is purulent appearing drainage coming from the base of the umbilicus which is separate from her incision. No enteric appearing fluid or material is identified. Ext - warm, well perfused, no edema  Lymphatics - no cervical, supraclavicular or inguinal adenopathy noted. Skin - supple, no rashes  Psychiatric - normal affect, good mood    Labs  None    Imaging  CT Results (most recent):  Results from Hospital Encounter encounter on 03/12/20   CT ABD PELV W CONT    Narrative INDICATION: Lower abdominal pain status post hernia repair    COMPARISON: 2/21/2020    TECHNIQUE:   Thin axial images were obtained through the abdomen and pelvis following  intravenous iodinated contrast administration. Coronal and sagittal  reconstructions were generated. Oral contrast was administered. CT dose  reduction was achieved through use of a standardized protocol tailored for this  examination and automatic exposure control for dose modulation. FINDINGS:     LIVER: No mass or biliary dilatation. GALLBLADDER: Surgically absent  SPLEEN: Unremarkable  PANCREAS: Status post Whipple. No remaining parenchymal abnormality  ADRENALS: Unremarkable.   KIDNEYS/URETERS: Symmetric nephrograms with low-density left renal lesion too  small to characterize. No evidence for enhancing renal mass. No hydronephrosis   PERITONEUM: Stable prominent but nonenlarged periportal and gastrohepatic  abdominal lymph nodes. No other evidence for any lymphadenopathy. No ascites. No  intraperitoneal fluid collection or abscess. Sequelae of ventral hernia mesh  repair is seen. Increase in size of irregular fluid and gas collection within  the anterior subcutaneous soft tissues measuring roughly 15.0 x 5.6 cm, compared  to 9.5 x 4.0 cm. There is been interval removal of the percutaneous drain. No  evidence for any intraperitoneal extension  COLON: Postsurgical changes seen in the rectosigmoid colon  APPENDIX: Not clearly seen  SMALL BOWEL: No acute abnormality or obstruction  STOMACH: Sequelae of gastric bypass  PELVIS: No pelvic lymphadenopathy or free fluid. BONES: Moderate degenerative changes in the lumbar spine   VISUALIZED THORAX: A couple tiny right lower lobe lung nodules measuring less  than 4 mm (image 1). ADDITIONAL COMMENTS: N/A      Impression IMPRESSION:    Increase in size of irregular fluid and gas collection within the anterior  abdominal subcutaneous soft tissues . Interval removal of percutaneous drain. No  other significant change in the appearance of the abdomen. A couple Tiny, < 4 mm  right lower lobe lung nodules. Follow-up guidelines  below. RECOMMENDATIONS FOR FOLLOW-UP AND MANAGEMENT OF NODULES SMALLER THAN 8 MM  DETECTED INCIDENTALLY AT NONSCREENING CT:    LOW-RISK PATIENTS:    LESS THAN OR EQUAL TO 4 MM:  No follow-up needed. GREATER THAN 4-6 MM:  Follow-up CT at 12 mo; if unchanged, no further follow-up. GREATER THAN 6-8 MM:  Initial follow-up CT at 6-12 months then at 18-24 months  if no change. GREATER THAN 8 MM:  Follow-up CT at around 3, 9, and 24 months, dynamic  contrast-enhanced CT, PET, and/or biopsy.       HIGH-RISK PATIENTS:    LESS THAN OR EQUAL TO 4 MM:  Follow-up CT at 15 months; if unchanged, no further  follow-up. GREATER THAN 4-6 MM:  Initial follow-up CT at 6-12 months then at 18-24 months  if no change. GREATER THAN 6-8 MM:  Initial follow-up CT at 3-6 months then at 9-12 and 24  months if no change. GREATER THAN 8 MM:  Same as for low-risk patient. 23X           I have reviewed and agree with all of the pertinent images    Assessment:     Aurora Rodgers is a 70 y.o. female with a wound infection after ventral hernia repair with mesh. Recommendations:     1. I will admit the patient with plans for surgical washout of the infected fluid collection tomorrow. She will be n.p.o. after midnight and placed on IV antibiotics. I will plan to culture the wound in the operating room. It appears her fascia is intact so hopefully this can be washed out and managed with dressing changes. 30 mins of time was spent with the patient of which > 50% of the time involved face-to-face counseling of the patient regarding the proposed treatment plan.       Addison Hinton MD  3/12/2020    CC: DANA Schumacher Dr.

## 2020-03-12 NOTE — PROGRESS NOTES
Bedside and Verbal shift change report given to MALIK Wade RN (oncoming nurse) by Karishma George RN (offgoing nurse). Report included the following information SBAR, Kardex, Procedure Summary, Intake/Output, MAR, Accordion and Recent Results. 1940- Pt complaining of burning at the IV site, Zosyn and maintenance fluid stopped. 2015- New IV placed and zosyn restarted    2145-Pt stating pain 7/10, paged on call physician regarding pain. 2150- Received order for IV dilaudid    0630- Blood glucose checked, 213. Paged Dr. Reno Bridges, order received for sliding scale insulin and Q6 glucose checks.

## 2020-03-12 NOTE — LETTER
3/12/20 Patient: Kristi Simmons YOB: 1945 Date of Visit: 3/12/2020 Girish Peralta NP 
[address] 
[address] [place] [id number] VIA Facsimile: [phone] Dear Girish Peralta NP, Thank you for referring Ms. Kristi Simmons to [facility] [address] for evaluation. My notes for this consultation are attached. If you have questions, please do not hesitate to call me. I look forward to following your patient along with you.  
 
 
Sincerely, 
 
Elvia Hendrickson MD

## 2020-03-13 ENCOUNTER — TELEPHONE (OUTPATIENT)
Dept: SURGERY | Age: 75
End: 2020-03-13

## 2020-03-13 ENCOUNTER — ANESTHESIA (OUTPATIENT)
Dept: SURGERY | Age: 75
DRG: 857 | End: 2020-03-13
Payer: MEDICARE

## 2020-03-13 ENCOUNTER — ANESTHESIA EVENT (OUTPATIENT)
Dept: SURGERY | Age: 75
DRG: 857 | End: 2020-03-13
Payer: MEDICARE

## 2020-03-13 LAB
ANION GAP SERPL CALC-SCNC: 8 MMOL/L (ref 5–15)
BASOPHILS # BLD: 0 K/UL (ref 0–0.1)
BASOPHILS NFR BLD: 0 % (ref 0–1)
BUN SERPL-MCNC: 8 MG/DL (ref 6–20)
BUN/CREAT SERPL: 10 (ref 12–20)
CALCIUM SERPL-MCNC: 9 MG/DL (ref 8.5–10.1)
CHLORIDE SERPL-SCNC: 98 MMOL/L (ref 97–108)
CO2 SERPL-SCNC: 26 MMOL/L (ref 21–32)
CREAT SERPL-MCNC: 0.83 MG/DL (ref 0.55–1.02)
DIFFERENTIAL METHOD BLD: ABNORMAL
EOSINOPHIL # BLD: 0.4 K/UL (ref 0–0.4)
EOSINOPHIL NFR BLD: 4 % (ref 0–7)
ERYTHROCYTE [DISTWIDTH] IN BLOOD BY AUTOMATED COUNT: 15.9 % (ref 11.5–14.5)
GLUCOSE BLD STRIP.AUTO-MCNC: 168 MG/DL (ref 65–100)
GLUCOSE BLD STRIP.AUTO-MCNC: 178 MG/DL (ref 65–100)
GLUCOSE BLD STRIP.AUTO-MCNC: 213 MG/DL (ref 65–100)
GLUCOSE BLD STRIP.AUTO-MCNC: 312 MG/DL (ref 65–100)
GLUCOSE SERPL-MCNC: 207 MG/DL (ref 65–100)
HCT VFR BLD AUTO: 31.8 % (ref 35–47)
HGB BLD-MCNC: 9.8 G/DL (ref 11.5–16)
IMM GRANULOCYTES # BLD AUTO: 0 K/UL (ref 0–0.04)
IMM GRANULOCYTES NFR BLD AUTO: 0 % (ref 0–0.5)
LYMPHOCYTES # BLD: 2.9 K/UL (ref 0.8–3.5)
LYMPHOCYTES NFR BLD: 31 % (ref 12–49)
MCH RBC QN AUTO: 25.1 PG (ref 26–34)
MCHC RBC AUTO-ENTMCNC: 30.8 G/DL (ref 30–36.5)
MCV RBC AUTO: 81.5 FL (ref 80–99)
MONOCYTES # BLD: 1 K/UL (ref 0–1)
MONOCYTES NFR BLD: 10 % (ref 5–13)
NEUTS SEG # BLD: 5.2 K/UL (ref 1.8–8)
NEUTS SEG NFR BLD: 55 % (ref 32–75)
NRBC # BLD: 0 K/UL (ref 0–0.01)
NRBC BLD-RTO: 0 PER 100 WBC
PLATELET # BLD AUTO: ABNORMAL K/UL (ref 150–400)
PLATELET COMMENTS,PCOM: ABNORMAL
POTASSIUM SERPL-SCNC: 3.9 MMOL/L (ref 3.5–5.1)
RBC # BLD AUTO: 3.9 M/UL (ref 3.8–5.2)
RBC MORPH BLD: ABNORMAL
RBC MORPH BLD: ABNORMAL
SERVICE CMNT-IMP: ABNORMAL
SODIUM SERPL-SCNC: 132 MMOL/L (ref 136–145)
WBC # BLD AUTO: 9.5 K/UL (ref 3.6–11)

## 2020-03-13 PROCEDURE — 74011250636 HC RX REV CODE- 250/636: Performed by: ANESTHESIOLOGY

## 2020-03-13 PROCEDURE — 74011000250 HC RX REV CODE- 250: Performed by: SURGERY

## 2020-03-13 PROCEDURE — 87205 SMEAR GRAM STAIN: CPT

## 2020-03-13 PROCEDURE — 0JB80ZZ EXCISION OF ABDOMEN SUBCUTANEOUS TISSUE AND FASCIA, OPEN APPROACH: ICD-10-PCS | Performed by: SURGERY

## 2020-03-13 PROCEDURE — 85025 COMPLETE CBC W/AUTO DIFF WBC: CPT

## 2020-03-13 PROCEDURE — 87075 CULTR BACTERIA EXCEPT BLOOD: CPT

## 2020-03-13 PROCEDURE — 74011000250 HC RX REV CODE- 250: Performed by: NURSE ANESTHETIST, CERTIFIED REGISTERED

## 2020-03-13 PROCEDURE — 74011250637 HC RX REV CODE- 250/637: Performed by: SURGERY

## 2020-03-13 PROCEDURE — 74011250636 HC RX REV CODE- 250/636: Performed by: SURGERY

## 2020-03-13 PROCEDURE — 77030011264 HC ELECTRD BLD EXT COVD -A: Performed by: SURGERY

## 2020-03-13 PROCEDURE — 77030010507 HC ADH SKN DERMBND J&J -B: Performed by: SURGERY

## 2020-03-13 PROCEDURE — 76210000016 HC OR PH I REC 1 TO 1.5 HR: Performed by: SURGERY

## 2020-03-13 PROCEDURE — 77030040361 HC SLV COMPR DVT MDII -B: Performed by: SURGERY

## 2020-03-13 PROCEDURE — 76010000138 HC OR TIME 0.5 TO 1 HR: Performed by: SURGERY

## 2020-03-13 PROCEDURE — 87077 CULTURE AEROBIC IDENTIFY: CPT

## 2020-03-13 PROCEDURE — 77030013079 HC BLNKT BAIR HGGR 3M -A: Performed by: ANESTHESIOLOGY

## 2020-03-13 PROCEDURE — 76060000033 HC ANESTHESIA 1 TO 1.5 HR: Performed by: SURGERY

## 2020-03-13 PROCEDURE — 77030008684 HC TU ET CUF COVD -B: Performed by: ANESTHESIOLOGY

## 2020-03-13 PROCEDURE — 74011636637 HC RX REV CODE- 636/637: Performed by: SURGERY

## 2020-03-13 PROCEDURE — 0W9F0ZZ DRAINAGE OF ABDOMINAL WALL, OPEN APPROACH: ICD-10-PCS | Performed by: SURGERY

## 2020-03-13 PROCEDURE — 74011000258 HC RX REV CODE- 258: Performed by: SURGERY

## 2020-03-13 PROCEDURE — 77030018836 HC SOL IRR NACL ICUM -A: Performed by: SURGERY

## 2020-03-13 PROCEDURE — 87186 SC STD MICRODIL/AGAR DIL: CPT

## 2020-03-13 PROCEDURE — 36415 COLL VENOUS BLD VENIPUNCTURE: CPT

## 2020-03-13 PROCEDURE — 77030018846 HC SOL IRR STRL H20 ICUM -A: Performed by: SURGERY

## 2020-03-13 PROCEDURE — 77030026438 HC STYL ET INTUB CARD -A: Performed by: ANESTHESIOLOGY

## 2020-03-13 PROCEDURE — 87102 FUNGUS ISOLATION CULTURE: CPT

## 2020-03-13 PROCEDURE — 77030011267 HC ELECTRD BLD COVD -A: Performed by: SURGERY

## 2020-03-13 PROCEDURE — 94760 N-INVAS EAR/PLS OXIMETRY 1: CPT

## 2020-03-13 PROCEDURE — 77030011640 HC PAD GRND REM COVD -A: Performed by: SURGERY

## 2020-03-13 PROCEDURE — 74011250636 HC RX REV CODE- 250/636: Performed by: NURSE ANESTHETIST, CERTIFIED REGISTERED

## 2020-03-13 PROCEDURE — 80048 BASIC METABOLIC PNL TOTAL CA: CPT

## 2020-03-13 PROCEDURE — 65410000002 HC RM PRIVATE OB

## 2020-03-13 PROCEDURE — 77030019702 HC WRP THER MENM -C: Performed by: SURGERY

## 2020-03-13 PROCEDURE — 82962 GLUCOSE BLOOD TEST: CPT

## 2020-03-13 RX ORDER — SODIUM CHLORIDE 0.9 % (FLUSH) 0.9 %
5-40 SYRINGE (ML) INJECTION AS NEEDED
Status: DISCONTINUED | OUTPATIENT
Start: 2020-03-13 | End: 2020-03-13 | Stop reason: HOSPADM

## 2020-03-13 RX ORDER — PHENYLEPHRINE HCL IN 0.9% NACL 0.4MG/10ML
SYRINGE (ML) INTRAVENOUS AS NEEDED
Status: DISCONTINUED | OUTPATIENT
Start: 2020-03-13 | End: 2020-03-13 | Stop reason: HOSPADM

## 2020-03-13 RX ORDER — CALCIUM CARBONATE 500(1250)
1500 TABLET ORAL
Status: DISCONTINUED | OUTPATIENT
Start: 2020-03-13 | End: 2020-03-20 | Stop reason: HOSPADM

## 2020-03-13 RX ORDER — ACETAMINOPHEN 325 MG/1
650 TABLET ORAL ONCE
Status: DISCONTINUED | OUTPATIENT
Start: 2020-03-13 | End: 2020-03-13 | Stop reason: HOSPADM

## 2020-03-13 RX ORDER — SODIUM CHLORIDE 9 MG/ML
25 INJECTION, SOLUTION INTRAVENOUS CONTINUOUS
Status: DISCONTINUED | OUTPATIENT
Start: 2020-03-13 | End: 2020-03-13 | Stop reason: HOSPADM

## 2020-03-13 RX ORDER — INSULIN LISPRO 100 [IU]/ML
INJECTION, SOLUTION INTRAVENOUS; SUBCUTANEOUS
Status: DISCONTINUED | OUTPATIENT
Start: 2020-03-13 | End: 2020-03-20 | Stop reason: HOSPADM

## 2020-03-13 RX ORDER — HYDROMORPHONE HYDROCHLORIDE 1 MG/ML
0.2 INJECTION, SOLUTION INTRAMUSCULAR; INTRAVENOUS; SUBCUTANEOUS
Status: DISCONTINUED | OUTPATIENT
Start: 2020-03-13 | End: 2020-03-13 | Stop reason: HOSPADM

## 2020-03-13 RX ORDER — HYDROMORPHONE HYDROCHLORIDE 2 MG/1
2 TABLET ORAL
Status: DISCONTINUED | OUTPATIENT
Start: 2020-03-13 | End: 2020-03-20 | Stop reason: HOSPADM

## 2020-03-13 RX ORDER — SODIUM CHLORIDE, SODIUM LACTATE, POTASSIUM CHLORIDE, CALCIUM CHLORIDE 600; 310; 30; 20 MG/100ML; MG/100ML; MG/100ML; MG/100ML
125 INJECTION, SOLUTION INTRAVENOUS CONTINUOUS
Status: DISCONTINUED | OUTPATIENT
Start: 2020-03-13 | End: 2020-03-13 | Stop reason: HOSPADM

## 2020-03-13 RX ORDER — DIPHENHYDRAMINE HYDROCHLORIDE 50 MG/ML
12.5 INJECTION, SOLUTION INTRAMUSCULAR; INTRAVENOUS AS NEEDED
Status: DISCONTINUED | OUTPATIENT
Start: 2020-03-13 | End: 2020-03-13 | Stop reason: HOSPADM

## 2020-03-13 RX ORDER — BACLOFEN 10 MG/1
10 TABLET ORAL
Status: DISCONTINUED | OUTPATIENT
Start: 2020-03-13 | End: 2020-03-20 | Stop reason: HOSPADM

## 2020-03-13 RX ORDER — FENTANYL CITRATE 50 UG/ML
INJECTION, SOLUTION INTRAMUSCULAR; INTRAVENOUS AS NEEDED
Status: DISCONTINUED | OUTPATIENT
Start: 2020-03-13 | End: 2020-03-13 | Stop reason: HOSPADM

## 2020-03-13 RX ORDER — PANTOPRAZOLE SODIUM 40 MG/1
40 TABLET, DELAYED RELEASE ORAL DAILY
Status: DISCONTINUED | OUTPATIENT
Start: 2020-03-14 | End: 2020-03-20 | Stop reason: HOSPADM

## 2020-03-13 RX ORDER — ONDANSETRON 2 MG/ML
INJECTION INTRAMUSCULAR; INTRAVENOUS AS NEEDED
Status: DISCONTINUED | OUTPATIENT
Start: 2020-03-13 | End: 2020-03-13 | Stop reason: HOSPADM

## 2020-03-13 RX ORDER — MAGNESIUM SULFATE 100 %
4 CRYSTALS MISCELLANEOUS AS NEEDED
Status: DISCONTINUED | OUTPATIENT
Start: 2020-03-13 | End: 2020-03-13 | Stop reason: SDUPTHER

## 2020-03-13 RX ORDER — LORAZEPAM 0.5 MG/1
0.5 TABLET ORAL
Status: DISCONTINUED | OUTPATIENT
Start: 2020-03-13 | End: 2020-03-20 | Stop reason: HOSPADM

## 2020-03-13 RX ORDER — DULOXETIN HYDROCHLORIDE 60 MG/1
120 CAPSULE, DELAYED RELEASE ORAL DAILY
Status: DISCONTINUED | OUTPATIENT
Start: 2020-03-14 | End: 2020-03-13 | Stop reason: SDUPTHER

## 2020-03-13 RX ORDER — DEXTROSE MONOHYDRATE 100 MG/ML
0-250 INJECTION, SOLUTION INTRAVENOUS AS NEEDED
Status: DISCONTINUED | OUTPATIENT
Start: 2020-03-13 | End: 2020-03-20 | Stop reason: HOSPADM

## 2020-03-13 RX ORDER — ROCURONIUM BROMIDE 10 MG/ML
INJECTION, SOLUTION INTRAVENOUS AS NEEDED
Status: DISCONTINUED | OUTPATIENT
Start: 2020-03-13 | End: 2020-03-13 | Stop reason: HOSPADM

## 2020-03-13 RX ORDER — SUCCINYLCHOLINE CHLORIDE 20 MG/ML
INJECTION INTRAMUSCULAR; INTRAVENOUS AS NEEDED
Status: DISCONTINUED | OUTPATIENT
Start: 2020-03-13 | End: 2020-03-13 | Stop reason: HOSPADM

## 2020-03-13 RX ORDER — CEFAZOLIN SODIUM/WATER 2 G/20 ML
2 SYRINGE (ML) INTRAVENOUS
Status: COMPLETED | OUTPATIENT
Start: 2020-03-13 | End: 2020-03-13

## 2020-03-13 RX ORDER — SODIUM CHLORIDE 0.9 % (FLUSH) 0.9 %
5-40 SYRINGE (ML) INJECTION EVERY 8 HOURS
Status: DISCONTINUED | OUTPATIENT
Start: 2020-03-13 | End: 2020-03-13 | Stop reason: HOSPADM

## 2020-03-13 RX ORDER — BUPIVACAINE HYDROCHLORIDE AND EPINEPHRINE 2.5; 5 MG/ML; UG/ML
INJECTION, SOLUTION EPIDURAL; INFILTRATION; INTRACAUDAL; PERINEURAL AS NEEDED
Status: DISCONTINUED | OUTPATIENT
Start: 2020-03-13 | End: 2020-03-13 | Stop reason: HOSPADM

## 2020-03-13 RX ORDER — PROPOFOL 10 MG/ML
INJECTION, EMULSION INTRAVENOUS AS NEEDED
Status: DISCONTINUED | OUTPATIENT
Start: 2020-03-13 | End: 2020-03-13 | Stop reason: HOSPADM

## 2020-03-13 RX ORDER — LIDOCAINE HYDROCHLORIDE 10 MG/ML
0.1 INJECTION, SOLUTION EPIDURAL; INFILTRATION; INTRACAUDAL; PERINEURAL AS NEEDED
Status: DISCONTINUED | OUTPATIENT
Start: 2020-03-13 | End: 2020-03-13 | Stop reason: HOSPADM

## 2020-03-13 RX ORDER — MORPHINE SULFATE 10 MG/ML
2 INJECTION, SOLUTION INTRAMUSCULAR; INTRAVENOUS
Status: DISCONTINUED | OUTPATIENT
Start: 2020-03-13 | End: 2020-03-13 | Stop reason: HOSPADM

## 2020-03-13 RX ORDER — MIDAZOLAM HYDROCHLORIDE 1 MG/ML
0.5 INJECTION, SOLUTION INTRAMUSCULAR; INTRAVENOUS
Status: DISCONTINUED | OUTPATIENT
Start: 2020-03-13 | End: 2020-03-13 | Stop reason: HOSPADM

## 2020-03-13 RX ORDER — FENTANYL CITRATE 50 UG/ML
50 INJECTION, SOLUTION INTRAMUSCULAR; INTRAVENOUS AS NEEDED
Status: DISCONTINUED | OUTPATIENT
Start: 2020-03-13 | End: 2020-03-13 | Stop reason: HOSPADM

## 2020-03-13 RX ORDER — FENTANYL CITRATE 50 UG/ML
25 INJECTION, SOLUTION INTRAMUSCULAR; INTRAVENOUS
Status: DISCONTINUED | OUTPATIENT
Start: 2020-03-13 | End: 2020-03-13 | Stop reason: HOSPADM

## 2020-03-13 RX ORDER — DOCUSATE SODIUM 100 MG/1
100 CAPSULE, LIQUID FILLED ORAL 2 TIMES DAILY
Status: DISCONTINUED | OUTPATIENT
Start: 2020-03-13 | End: 2020-03-20 | Stop reason: HOSPADM

## 2020-03-13 RX ORDER — MIDAZOLAM HYDROCHLORIDE 1 MG/ML
1 INJECTION, SOLUTION INTRAMUSCULAR; INTRAVENOUS AS NEEDED
Status: DISCONTINUED | OUTPATIENT
Start: 2020-03-13 | End: 2020-03-13 | Stop reason: HOSPADM

## 2020-03-13 RX ORDER — DEXAMETHASONE SODIUM PHOSPHATE 4 MG/ML
INJECTION, SOLUTION INTRA-ARTICULAR; INTRALESIONAL; INTRAMUSCULAR; INTRAVENOUS; SOFT TISSUE AS NEEDED
Status: DISCONTINUED | OUTPATIENT
Start: 2020-03-13 | End: 2020-03-13 | Stop reason: HOSPADM

## 2020-03-13 RX ORDER — METOPROLOL TARTRATE 50 MG/1
50 TABLET ORAL 2 TIMES DAILY
Status: DISCONTINUED | OUTPATIENT
Start: 2020-03-13 | End: 2020-03-20 | Stop reason: HOSPADM

## 2020-03-13 RX ORDER — LIDOCAINE HYDROCHLORIDE 20 MG/ML
INJECTION, SOLUTION EPIDURAL; INFILTRATION; INTRACAUDAL; PERINEURAL AS NEEDED
Status: DISCONTINUED | OUTPATIENT
Start: 2020-03-13 | End: 2020-03-13 | Stop reason: HOSPADM

## 2020-03-13 RX ORDER — MAGNESIUM SULFATE 100 %
4 CRYSTALS MISCELLANEOUS AS NEEDED
Status: DISCONTINUED | OUTPATIENT
Start: 2020-03-13 | End: 2020-03-20 | Stop reason: HOSPADM

## 2020-03-13 RX ORDER — INSULIN GLARGINE 100 [IU]/ML
0.2 INJECTION, SOLUTION SUBCUTANEOUS
Status: DISCONTINUED | OUTPATIENT
Start: 2020-03-13 | End: 2020-03-20 | Stop reason: HOSPADM

## 2020-03-13 RX ORDER — THERA TABS 400 MCG
1 TAB ORAL DAILY
Status: DISCONTINUED | OUTPATIENT
Start: 2020-03-14 | End: 2020-03-20 | Stop reason: HOSPADM

## 2020-03-13 RX ORDER — DEXTROSE MONOHYDRATE 100 MG/ML
0-250 INJECTION, SOLUTION INTRAVENOUS AS NEEDED
Status: DISCONTINUED | OUTPATIENT
Start: 2020-03-13 | End: 2020-03-13 | Stop reason: SDUPTHER

## 2020-03-13 RX ORDER — ONDANSETRON 2 MG/ML
4 INJECTION INTRAMUSCULAR; INTRAVENOUS AS NEEDED
Status: DISCONTINUED | OUTPATIENT
Start: 2020-03-13 | End: 2020-03-13 | Stop reason: HOSPADM

## 2020-03-13 RX ORDER — TRIAMTERENE/HYDROCHLOROTHIAZID 37.5-25 MG
1 TABLET ORAL DAILY
Status: DISCONTINUED | OUTPATIENT
Start: 2020-03-14 | End: 2020-03-20 | Stop reason: HOSPADM

## 2020-03-13 RX ORDER — KETOROLAC TROMETHAMINE 30 MG/ML
15 INJECTION, SOLUTION INTRAMUSCULAR; INTRAVENOUS
Status: ACTIVE | OUTPATIENT
Start: 2020-03-13 | End: 2020-03-16

## 2020-03-13 RX ORDER — OXYCODONE AND ACETAMINOPHEN 5; 325 MG/1; MG/1
1 TABLET ORAL AS NEEDED
Status: DISCONTINUED | OUTPATIENT
Start: 2020-03-13 | End: 2020-03-13 | Stop reason: HOSPADM

## 2020-03-13 RX ADMIN — FENTANYL CITRATE 25 MCG: 50 INJECTION, SOLUTION INTRAMUSCULAR; INTRAVENOUS at 16:02

## 2020-03-13 RX ADMIN — INSULIN LISPRO 4 UNITS: 100 INJECTION, SOLUTION INTRAVENOUS; SUBCUTANEOUS at 22:36

## 2020-03-13 RX ADMIN — Medication 80 MCG: at 16:10

## 2020-03-13 RX ADMIN — LIDOCAINE HYDROCHLORIDE 100 MG: 20 INJECTION, SOLUTION EPIDURAL; INFILTRATION; INTRACAUDAL; PERINEURAL at 16:02

## 2020-03-13 RX ADMIN — FENTANYL CITRATE 25 MCG: 50 INJECTION, SOLUTION INTRAMUSCULAR; INTRAVENOUS at 15:59

## 2020-03-13 RX ADMIN — METOPROLOL TARTRATE 50 MG: 50 TABLET, FILM COATED ORAL at 20:58

## 2020-03-13 RX ADMIN — ONDANSETRON HYDROCHLORIDE 4 MG: 2 INJECTION, SOLUTION INTRAMUSCULAR; INTRAVENOUS at 16:09

## 2020-03-13 RX ADMIN — PIPERACILLIN AND TAZOBACTAM 3.38 G: 3; .375 INJECTION, POWDER, LYOPHILIZED, FOR SOLUTION INTRAVENOUS at 18:00

## 2020-03-13 RX ADMIN — HYDROMORPHONE HYDROCHLORIDE 2 MG: 2 TABLET ORAL at 20:57

## 2020-03-13 RX ADMIN — PIPERACILLIN AND TAZOBACTAM 3.38 G: 3; .375 INJECTION, POWDER, LYOPHILIZED, FOR SOLUTION INTRAVENOUS at 02:11

## 2020-03-13 RX ADMIN — HYDROMORPHONE HYDROCHLORIDE 1 MG: 1 INJECTION, SOLUTION INTRAMUSCULAR; INTRAVENOUS; SUBCUTANEOUS at 02:11

## 2020-03-13 RX ADMIN — INSULIN GLARGINE 18 UNITS: 100 INJECTION, SOLUTION SUBCUTANEOUS at 22:37

## 2020-03-13 RX ADMIN — HUMAN INSULIN 3 UNITS: 100 INJECTION, SOLUTION SUBCUTANEOUS at 06:59

## 2020-03-13 RX ADMIN — PROPOFOL 150 MG: 10 INJECTION, EMULSION INTRAVENOUS at 16:02

## 2020-03-13 RX ADMIN — HYDROMORPHONE HYDROCHLORIDE 1 MG: 1 INJECTION, SOLUTION INTRAMUSCULAR; INTRAVENOUS; SUBCUTANEOUS at 07:05

## 2020-03-13 RX ADMIN — ROCURONIUM BROMIDE 10 MG: 10 SOLUTION INTRAVENOUS at 16:02

## 2020-03-13 RX ADMIN — SODIUM CHLORIDE, POTASSIUM CHLORIDE, SODIUM LACTATE AND CALCIUM CHLORIDE: 600; 310; 30; 20 INJECTION, SOLUTION INTRAVENOUS at 15:45

## 2020-03-13 RX ADMIN — HUMAN INSULIN 2 UNITS: 100 INJECTION, SOLUTION SUBCUTANEOUS at 17:19

## 2020-03-13 RX ADMIN — FENTANYL CITRATE 25 MCG: 50 INJECTION, SOLUTION INTRAMUSCULAR; INTRAVENOUS at 15:54

## 2020-03-13 RX ADMIN — Medication 10 ML: at 07:06

## 2020-03-13 RX ADMIN — CALCIUM 1500 MG: 500 TABLET ORAL at 22:21

## 2020-03-13 RX ADMIN — PIPERACILLIN AND TAZOBACTAM 3.38 G: 3; .375 INJECTION, POWDER, LYOPHILIZED, FOR SOLUTION INTRAVENOUS at 12:05

## 2020-03-13 RX ADMIN — SUCCINYLCHOLINE CHLORIDE 140 MG: 20 INJECTION, SOLUTION INTRAMUSCULAR; INTRAVENOUS at 16:03

## 2020-03-13 RX ADMIN — Medication 2 G: at 16:20

## 2020-03-13 RX ADMIN — FENTANYL CITRATE 100 MCG: 50 INJECTION INTRAMUSCULAR; INTRAVENOUS at 15:44

## 2020-03-13 RX ADMIN — LORAZEPAM 1 MG: 2 INJECTION INTRAMUSCULAR; INTRAVENOUS at 22:46

## 2020-03-13 RX ADMIN — PHENYLEPHRINE HYDROCHLORIDE 40 MCG/MIN: 10 INJECTION INTRAVENOUS at 16:12

## 2020-03-13 RX ADMIN — DEXAMETHASONE SODIUM PHOSPHATE 4 MG: 4 INJECTION, SOLUTION INTRAMUSCULAR; INTRAVENOUS at 16:09

## 2020-03-13 RX ADMIN — FENTANYL CITRATE 25 MCG: 50 INJECTION, SOLUTION INTRAMUSCULAR; INTRAVENOUS at 15:49

## 2020-03-13 NOTE — H&P
[facility]      Subjective     No acute events overnight. NPO this morning for surgery today. No new complaints. Objective     Patient Vitals for the past 24 hrs:   Temp Pulse Resp BP SpO2   03/13/20 0757 98.2 °F (36.8 °C) 77 16 132/63 93 %   03/13/20 0232 98.4 °F (36.9 °C) 79 16 114/78 93 %   03/12/20 2032 99.3 °F (37.4 °C) 81 16 112/68 97 %   03/12/20 1630 99 °F (37.2 °C) 96 18 146/76 100 %       Date 03/12/20 0700 - 03/13/20 0659 03/13/20 0700 - 03/14/20 0659   Shift 6523-0970 4573-5817 24 Hour Total 7057-8859 9552-6501 24 Hour Total   INTAKE   I.V.  147. 5 147.5        Volume (lactated Ringers infusion)  147.5 147.5      Shift Total  147.5 147.5      OUTPUT   Urine  700 700        Urine Voided  700 700      Stool           Stool Occurrence(s)  1 x 1 x      Shift Total  700 700      NET  -552.5 -552.5      Weight (kg)             PE  GEN - Awake, alert, communicating appropriately. NAD  Pulm - CTAB  CV - RRR  Abd - purulent drainage at umbilicus. Some tenderness and erythema in lower abdomen.     Ext - warm, well perfused    Labs  Recent Results (from the past 24 hour(s))   METABOLIC PANEL, BASIC    Collection Time: 03/13/20  2:33 AM   Result Value Ref Range    Sodium 132 (L) 136 - 145 mmol/L    Potassium 3.9 3.5 - 5.1 mmol/L    Chloride 98 97 - 108 mmol/L    CO2 26 21 - 32 mmol/L    Anion gap 8 5 - 15 mmol/L    Glucose 207 (H) 65 - 100 mg/dL    BUN 8 6 - 20 MG/DL    Creatinine 0.83 0.55 - 1.02 MG/DL    BUN/Creatinine ratio 10 (L) 12 - 20      GFR est AA >60 >60 ml/min/1.73m2    GFR est non-AA >60 >60 ml/min/1.73m2    Calcium 9.0 8.5 - 10.1 MG/DL   CBC WITH AUTOMATED DIFF    Collection Time: 03/13/20  4:47 AM   Result Value Ref Range    WBC 9.5 3.6 - 11.0 K/uL    RBC 3.90 3.80 - 5.20 M/uL    HGB 9.8 (L) 11.5 - 16.0 g/dL    HCT 31.8 (L) 35.0 - 47.0 %    MCV 81.5 80.0 - 99.0 FL    MCH 25.1 (L) 26.0 - 34.0 PG    MCHC 30.8 30.0 - 36.5 g/dL    RDW 15.9 (H) 11.5 - 14.5 %    PLATELET  038 - 283 K/uL     UNABLE TO REPORT ACCURATE COUNT DUE TO PLATELET AGGREGATION, HOWEVER, PLATELETS APPEAR NORMAL IN NUMBER ON SMEAR. PLEASE RESUBMIT SODIUM CITRATE (BLUE) AND EDTA (LAVENDER) TUBES FOR HEMATOLOGICAL TESTING. NRBC 0.0 0  WBC    ABSOLUTE NRBC 0.00 0.00 - 0.01 K/uL    NEUTROPHILS 55 32 - 75 %    LYMPHOCYTES 31 12 - 49 %    MONOCYTES 10 5 - 13 %    EOSINOPHILS 4 0 - 7 %    BASOPHILS 0 0 - 1 %    IMMATURE GRANULOCYTES 0 0.0 - 0.5 %    ABS. NEUTROPHILS 5.2 1.8 - 8.0 K/UL    ABS. LYMPHOCYTES 2.9 0.8 - 3.5 K/UL    ABS. MONOCYTES 1.0 0.0 - 1.0 K/UL    ABS. EOSINOPHILS 0.4 0.0 - 0.4 K/UL    ABS. BASOPHILS 0.0 0.0 - 0.1 K/UL    ABS. IMM. GRANS. 0.0 0.00 - 0.04 K/UL    DF SMEAR SCANNED      PLATELET COMMENTS CLUMPED PLATELETS      RBC COMMENTS POLYCHROMASIA  1+        RBC COMMENTS ANISOCYTOSIS  1+       GLUCOSE, POC    Collection Time: 03/13/20  6:36 AM   Result Value Ref Range    Glucose (POC) 213 (H) 65 - 100 mg/dL    Performed by Maia Chavez, POC    Collection Time: 03/13/20 12:00 PM   Result Value Ref Range    Glucose (POC) 178 (H) 65 - 100 mg/dL    Performed by Jose G Mercado (CON)        Assessment     Andres Valderrama is a 70 y. o.yr old female with a subcutaneous wound infection after ventral hernia repair. Plan     Plan for I&D of wound infection in OR today. A complete discussion of the risks, benefits and alternatives to surgery were discussed with the patient who was keen to proceed.         Alec Gabriel MD  3/13/2020  12:56 PM

## 2020-03-13 NOTE — TELEPHONE ENCOUNTER
I called Alfredo Andrew in file room back and let her know Dr. Anthony Mandel is out of town but the patient has been admitted to the hospital.

## 2020-03-13 NOTE — ANESTHESIA POSTPROCEDURE EVALUATION
Post-Anesthesia Evaluation and Assessment    Patient: Charley Gan MRN: [id number]  SSN: [id number]    YOB: 1945  Age: 70 y.o. Sex: female      I have evaluated the patient and they are stable and ready for discharge from the PACU. Cardiovascular Function/Vital Signs  Visit Vitals  /52   Pulse 84   Temp 37.2 °C (99 °F)   Resp 18   Ht 5' 1\" (1.549 m)   Wt 91.2 kg (201 lb)   SpO2 100%   BMI 37.98 kg/m²       Patient is status post General anesthesia for Procedure(s):  WASHOUT ABDOMINAL WALL WOUND. Nausea/Vomiting: None    Postoperative hydration reviewed and adequate. Pain:  Pain Scale 1: FLACC (03/13/20 1651)  Pain Intensity 1: 7 (03/13/20 1455)   Managed    Neurological Status: At baseline    Mental Status, Level of Consciousness: Alert and  oriented to person, place, and time    Pulmonary Status:   O2 Device: Nasal cannula (03/13/20 1657)   Adequate oxygenation and airway patent    Complications related to anesthesia: None    Post-anesthesia assessment completed. No concerns    Signed By: Juana Gonzáles MD     March 13, 2020              Procedure(s):  WASHOUT ABDOMINAL WALL WOUND.    general    <BSHSIANPOST>    Vitals Value Taken Time   /52 3/13/2020  5:15 PM   Temp 37.2 °C (99 °F) 3/13/2020  5:26 PM   Pulse 83 3/13/2020  5:28 PM   Resp 9 3/13/2020  5:28 PM   SpO2 100 % 3/13/2020  5:28 PM   Vitals shown include unvalidated device data.

## 2020-03-13 NOTE — ROUTINE PROCESS
Patient: Yessenia Menjivar MRN: [id number]  SSN: [id number] YOB: 1945  Age: 70 y.o. Sex: female Patient is status post Procedure(s): 
WASHOUT ABDOMINAL WALL WOUND. Surgeon(s) and Role: Qian Watters MD - Primary Local/Dose/Irrigation:  SEE MAR Peripheral IV 03/12/20 Right;Posterior Hand (Active) Site Assessment Clean, dry, & intact 3/13/2020  7:57 AM  
Phlebitis Assessment 0 3/13/2020  7:57 AM  
Infiltration Assessment 0 3/13/2020  7:57 AM  
Dressing Status Clean, dry, & intact 3/13/2020  7:57 AM  
Dressing Type Transparent 3/13/2020  7:57 AM  
Hub Color/Line Status Infusing 3/13/2020  7:57 AM  
Action Taken Open ports on tubing capped 3/12/2020 11:15 PM  
Alcohol Cap Used Yes 3/13/2020  7:57 AM  
        
Airway - Endotracheal Tube 03/13/20 Oral (Active) Dressing/Packing:  Wound Abdomen Anterior; Lower 03/12/20-Dressing Type: 4 x 4;ABD pad;Special tape (comment) (03/13/20 0404) Wound Abdomen-Dressing Type: 4 x 4;ABD pad; Other (Comment)(TAPE) (03/13/20 7553) Splint/Cast:  ] Other:

## 2020-03-13 NOTE — PROGRESS NOTES
TRANSFER - IN REPORT:    Verbal report received from REMY ESTRADA(name) on [place]  being received from 3E(unit) for ordered procedure      Report consisted of patients Situation, Background, Assessment and   Recommendations(SBAR). Information from the following report(s) SBAR, ED Summary, Intake/Output, MAR, Recent Results and Pre Procedure Checklist was reviewed with the receiving nurse. Opportunity for questions and clarification was provided. Assessment completed upon patients arrival to unit and care assumed.

## 2020-03-13 NOTE — PROGRESS NOTES
Reason for Admission:   Hematoma of abdomen                   RUR Score:  10%                   Plan for utilizing home health: None         PCP: First and Last name:  Baldemar Munoz   Name of Practice:    Are you a current patient: Yes   Approximate date of last visit:                     Current Advanced Directive/Advance Care Plan: In chart                         Transition of Care Plan:  Home after surgery    Care Management Interventions  PCP Verified by CM: Yes  Mode of Transport at Discharge:  Other (see comment)  MyChart Signup: No  Discharge Durable Medical Equipment: No  Health Maintenance Reviewed: Yes  Physical Therapy Consult: No  Occupational Therapy Consult: No  Speech Therapy Consult: No  Current Support Network: Lives Alone  Confirm Follow Up Transport: Family  Discharge Location  Discharge Placement: Home

## 2020-03-13 NOTE — BRIEF OP NOTE
BRIEF OPERATIVE NOTE    Date of Procedure: 3/13/2020   Preoperative Diagnosis: ABDOMINAL WALL WOUND INFECTION  Postoperative Diagnosis: ABDOMINAL WALL WOUND INFECTION    Procedure(s):  WASHOUT ABDOMINAL WALL WOUND  Surgeon(s) and Role:     * Angie Madrid MD - Primary         Surgical Assistant: None    Surgical Staff:  Circ-1: Cheikh Mariaden: Marc Scott RN  Scrub Tech-1: Jessica Alcazar  Scrub RN-Relief: Harmony Greenberg RN  Event Time In   Incision Start 03/13/2020 1625   Incision Close 03/13/2020 1635     Anesthesia: General   Estimated Blood Loss: 5 mL  Specimens:   ID Type Source Tests Collected by Time Destination   1 : ABDOMINAL WOUND Body Fluid Wound CULTURE, ANAEROBIC, AEROBIC CULTURE + GRAM STAIN, GRAM STAIN, CULTURE, FUNGUS Jarad Storey MD 3/13/2020 1627 Microbiology   2 : ABDOMINAL WOUND 2 Tissue Wound CULTURE, ANAEROBIC, CULTURE, FUNGUS, AEROBIC CULTURE + Judith Lo STAIN, GRAM Wolfgang Anthony MD 3/13/2020 1632 Microbiology      Findings: Large cavity in subcutaneous space with pus, necrotic fat and old hematoma superficial to fascia which appeared to be viable and closed/intact.    Complications: None  Implants: * No implants in log *

## 2020-03-13 NOTE — PROGRESS NOTES
Problem: Diabetes Self-Management  Goal: *Disease process and treatment process  Description: Define diabetes and identify own type of diabetes; list 3 options for treating diabetes. Outcome: Progressing Towards Goal  Goal: *Incorporating nutritional management into lifestyle  Description: Describe effect of type, amount and timing of food on blood glucose; list 3 methods for planning meals. Outcome: Progressing Towards Goal  Goal: *Incorporating physical activity into lifestyle  Description: State effect of exercise on blood glucose levels. Outcome: Progressing Towards Goal  Goal: *Developing strategies to promote health/change behavior  Description: Define the ABC's of diabetes; identify appropriate screenings, schedule and personal plan for screenings. Outcome: Progressing Towards Goal  Goal: *Using medications safely  Description: State effect of diabetes medications on diabetes; name diabetes medication taking, action and side effects. Outcome: Progressing Towards Goal  Goal: *Monitoring blood glucose, interpreting and using results  Description: Identify recommended blood glucose targets  and personal targets. Outcome: Progressing Towards Goal  Goal: *Prevention, detection, treatment of acute complications  Description: List symptoms of hyper- and hypoglycemia; describe how to treat low blood sugar and actions for lowering  high blood glucose level. Outcome: Progressing Towards Goal  Goal: *Prevention, detection and treatment of chronic complications  Description: Define the natural course of diabetes and describe the relationship of blood glucose levels to long term complications of diabetes.   Outcome: Progressing Towards Goal  Goal: *Developing strategies to address psychosocial issues  Description: Describe feelings about living with diabetes; identify support needed and support network  Outcome: Progressing Towards Goal  Goal: *Insulin pump training  Outcome: Progressing Towards Goal  Goal: *Sick day guidelines  Outcome: Progressing Towards Goal  Goal: *Patient Specific Goal (EDIT GOAL, INSERT TEXT)  Outcome: Progressing Towards Goal     Problem: Patient Education: Go to Patient Education Activity  Goal: Patient/Family Education  Outcome: Progressing Towards Goal     Problem: Risk for Spread of Infection  Goal: Prevent transmission of infectious organism to others  Description: Prevent the transmission of infectious organisms to other patients, staff members, and visitors. Outcome: Progressing Towards Goal     Problem: Patient Education:  Go to Education Activity  Goal: Patient/Family Education  Outcome: Progressing Towards Goal     Problem: Falls - Risk of  Goal: *Absence of Falls  Description: Document Gi Minium Fall Risk and appropriate interventions in the flowsheet. Outcome: Progressing Towards Goal  Note: Fall Risk Interventions:            Medication Interventions: Teach patient to arise slowly                   Problem: Patient Education: Go to Patient Education Activity  Goal: Patient/Family Education  Outcome: Progressing Towards Goal     Problem: Pain  Goal: *Control of Pain  Outcome: Progressing Towards Goal     Problem: Patient Education: Go to Patient Education Activity  Goal: Patient/Family Education  Outcome: Progressing Towards Goal     Problem: Pressure Injury - Risk of  Goal: *Prevention of pressure injury  Description: Document Anderson Scale and appropriate interventions in the flowsheet. Outcome: Progressing Towards Goal  Note: Pressure Injury Interventions:             Activity Interventions: Increase time out of bed                               Problem: Patient Education: Go to Patient Education Activity  Goal: Patient/Family Education  Outcome: Progressing Towards Goal     Problem: General Infection Care Plan (Adult and Pediatric)  Goal: Improvement in signs and symptoms of infection  Outcome: Progressing Towards Goal  Goal: *Optimize nutritional status  Outcome: Progressing Towards Goal     Problem: Patient Education: Go to Patient Education Activity  Goal: Patient/Family Education  Outcome: Progressing Towards Goal

## 2020-03-14 LAB
GLUCOSE BLD STRIP.AUTO-MCNC: 200 MG/DL (ref 65–100)
GLUCOSE BLD STRIP.AUTO-MCNC: 203 MG/DL (ref 65–100)
GLUCOSE BLD STRIP.AUTO-MCNC: 290 MG/DL (ref 65–100)
GLUCOSE BLD STRIP.AUTO-MCNC: 298 MG/DL (ref 65–100)
SERVICE CMNT-IMP: ABNORMAL

## 2020-03-14 PROCEDURE — 74011250637 HC RX REV CODE- 250/637: Performed by: SURGERY

## 2020-03-14 PROCEDURE — 74011000258 HC RX REV CODE- 258: Performed by: SURGERY

## 2020-03-14 PROCEDURE — 74011636637 HC RX REV CODE- 636/637: Performed by: SURGERY

## 2020-03-14 PROCEDURE — 74011250636 HC RX REV CODE- 250/636: Performed by: SURGERY

## 2020-03-14 PROCEDURE — 65410000002 HC RM PRIVATE OB

## 2020-03-14 PROCEDURE — 82962 GLUCOSE BLOOD TEST: CPT

## 2020-03-14 RX ORDER — INSULIN LISPRO 100 [IU]/ML
4 INJECTION, SOLUTION INTRAVENOUS; SUBCUTANEOUS
Status: DISCONTINUED | OUTPATIENT
Start: 2020-03-14 | End: 2020-03-20 | Stop reason: HOSPADM

## 2020-03-14 RX ADMIN — DULOXETINE HYDROCHLORIDE 120 MG: 60 CAPSULE, DELAYED RELEASE ORAL at 08:43

## 2020-03-14 RX ADMIN — PIPERACILLIN AND TAZOBACTAM 3.38 G: 3; .375 INJECTION, POWDER, LYOPHILIZED, FOR SOLUTION INTRAVENOUS at 10:28

## 2020-03-14 RX ADMIN — INSULIN LISPRO 3 UNITS: 100 INJECTION, SOLUTION INTRAVENOUS; SUBCUTANEOUS at 18:01

## 2020-03-14 RX ADMIN — INSULIN GLARGINE 18 UNITS: 100 INJECTION, SOLUTION SUBCUTANEOUS at 22:13

## 2020-03-14 RX ADMIN — METOPROLOL TARTRATE 50 MG: 50 TABLET, FILM COATED ORAL at 08:42

## 2020-03-14 RX ADMIN — METOPROLOL TARTRATE 50 MG: 50 TABLET, FILM COATED ORAL at 18:00

## 2020-03-14 RX ADMIN — THERA TABS 1 TABLET: TAB at 08:42

## 2020-03-14 RX ADMIN — INSULIN LISPRO 3 UNITS: 100 INJECTION, SOLUTION INTRAVENOUS; SUBCUTANEOUS at 10:51

## 2020-03-14 RX ADMIN — INSULIN LISPRO 4 UNITS: 100 INJECTION, SOLUTION INTRAVENOUS; SUBCUTANEOUS at 18:01

## 2020-03-14 RX ADMIN — CALCIUM 1500 MG: 500 TABLET ORAL at 21:36

## 2020-03-14 RX ADMIN — PIPERACILLIN AND TAZOBACTAM 3.38 G: 3; .375 INJECTION, POWDER, LYOPHILIZED, FOR SOLUTION INTRAVENOUS at 18:00

## 2020-03-14 RX ADMIN — HYDROMORPHONE HYDROCHLORIDE 2 MG: 2 TABLET ORAL at 18:01

## 2020-03-14 RX ADMIN — LORAZEPAM 0.5 MG: 0.5 TABLET ORAL at 21:36

## 2020-03-14 RX ADMIN — HYDROMORPHONE HYDROCHLORIDE 2 MG: 2 TABLET ORAL at 08:49

## 2020-03-14 RX ADMIN — INSULIN LISPRO 3 UNITS: 100 INJECTION, SOLUTION INTRAVENOUS; SUBCUTANEOUS at 22:14

## 2020-03-14 RX ADMIN — PIPERACILLIN AND TAZOBACTAM 3.38 G: 3; .375 INJECTION, POWDER, LYOPHILIZED, FOR SOLUTION INTRAVENOUS at 02:07

## 2020-03-14 RX ADMIN — TRIAMTERENE AND HYDROCHLOROTHIAZIDE 1 TABLET: 37.5; 25 TABLET ORAL at 08:44

## 2020-03-14 NOTE — OP NOTES
[address]   OPERATIVE REPORT    Name:  Jose Rowland  MR#:  [id number]  :  1945  ACCOUNT #:  [de-identified]  DATE OF SERVICE:  2020    PREOPERATIVE DIAGNOSIS:  Abdominal wall wound infection. POSTOPERATIVE DIAGNOSIS:  Abdominal wall wound infection. PROCEDURE PERFORMED:  Incision and drainage of abdominal wall wound with debridement of necrotic fat and tissue and washout of abdominal wall wound. SURGEON:  Mami Allen. MD Julius    SURGICAL ASSISTANT:  None. ANESTHESIA:  General.    COMPLICATIONS:  None. SPECIMENS REMOVED:  Abdominal wound's culture of fluid as well as the tissue sent for microbiology. IMPLANTS:  None. ESTIMATED BLOOD LOSS:  5 mL. FINDINGS:  The patient had a large cavity in the subcutaneous tissue in the lower abdomen exterior to the abdominal wall fascia which had a large amount of necrotic fat, old hematoma, and purulent fluid which was evacuated. The underlying fascia appeared to be viable and closed. INDICATIONS:  The patient is a 62-year-old female who underwent a previous open ventral hernia repair with Phasix underlay mesh. Postoperatively, she had a hematoma which was initially managed with a drain. Her surgery was approximately 6 weeks ago. She had her drain removed and subsequently was exhibiting signs of infection with worsened pain, chills and purulent drainage from a small sinus at her umbilicus. CT imaging was consistent with a large abscess anterior to the fascia in the lower abdomen where her surgery had taken place. Recommendation was made for washout and drainage of this in the operating room. A complete discussion of risks, benefits and alternatives of surgery were had with the patient and she was in agreement to proceed. DESCRIPTION OF OPERATION:  After informed consent was obtained, the patient was brought back to the operating room.   She was placed under general endotracheal anesthesia in the supine position on the operating room table. Her abdomen was prepped and draped in the usual sterile fashion. A proper time-out was performed. With this completed, we injected local anesthetic in the midline incision just beneath the umbilicus. We made an 8-cm incision through her old midline incision again below the umbilicus using a 16-FAPVE scalpel. This was deepened down through the subcutaneous tissue until we encountered a large cavity. Copious purulent fluid was expressed under pressure and this was cultured with a culture swab. We   opened the cavity along the length of the incision and we encountered a large amount of necrotic fat as well as old hematoma fluid that was clearly infected. This was all mechanically debrided and sharply excised from the cavity. We then copiously irrigated the wound with warm normal saline. At this point, the wound was fairly clean, and we were able to inspect the fascia which appeared to be viable and intact without any defects or breakdown of the previous closure. We then packed the wounds using a saline dampened Kerlix which was packed up into the entirety of the cavity and this essentially required an entire Kerlix wrap to complete. This was then covered with dry 4 x 4s and ABD pad and then secured with tape. The patient was then awakened, extubated and taken to the postanesthesia care unit in stable condition. All needle and instrument counts were correct at the completion of the case. I was present and scrubbed throughout the entirety of the case. There were no immediate complications. DISPOSITION:  PACU.       Jesi Mathur MD      [id number]  D:  03/13/2020 17:05  T:  03/14/2020 3:17  JOB #:  [id number]

## 2020-03-14 NOTE — PROGRESS NOTES
Bedside and Verbal shift change report given to Marianna ALBERTO (oncoming nurse) by Ajay Nixon RN (offgoing nurse). Report included the following information SBAR, Kardex, Intake/Output, MAR, Accordion and Recent Results. 0740: Dr Enoch Garrett at bedside. Aware pt refuse to take insulin. Dressing change performed by MD. Pt tolerated well.     0912: RN called Diabetes Management. Spoke to Noah Castaneda. Recommendations given for:  -Continued basal Lantus injection   -Continued sliding scale ACHS  -Start mealtimes bolus of 4units humalog with every meal if pt eating >50% of carbs. Will talk to patient again about possibility of taking insulin and see if agreeable to this plan. Diabetes Management states they do not see patients over the weekend and will round on patient on Monday. 1051: pt states she does not remember refusing her insulin or her pain medicine. Is very agreeable to taking her scheduled insulin. Dr Kaylyn Singletary notified. Orders to give the recommended insulin dosage from Diabetes Management.

## 2020-03-14 NOTE — PROGRESS NOTES
Bedside and Verbal shift change report given to Charles Mccoy (oncoming nurse) by Esau Royal (offgoing nurse). Report included the following information SBAR, Kardex, Intake/Output, MAR and Accordion.

## 2020-03-14 NOTE — PROGRESS NOTES
Ms. María Rivera has no specific complaints today. She is refusing her insulin. Tm 99 Tc 98.1 HR: 66 BP: 132/65 Resp Rate: 18 98% sat on room air. Intake/Output Summary (Last 24 hours) at 3/14/2020 0742  Last data filed at 3/13/2020 1724  Gross per 24 hour   Intake 750 ml   Output 3 ml   Net 747 ml   Exam: Cor: RRR. Lungs: Bilateral breath sounds. Clear to auscultation. Abd: Soft. Non distended. Non tender. Open wound is clean and beginning to granulate. Skin edges are indurated. Labs:   Recent Results (from the past 12 hour(s))   GLUCOSE, POC    Collection Time: 03/13/20 10:25 PM   Result Value Ref Range    Glucose (POC) 312 (H) 65 - 100 mg/dL    Performed by Sher Francois Dr, POC    Collection Time: 03/14/20  7:08 AM   Result Value Ref Range    Glucose (POC) 298 (H) 65 - 100 mg/dL    Performed by Avelina Wilson    Diabetic diet as tolerated. Saline lock IVF. Continue IV abx - Zosyn. Start local wound care. Stressed to Ms. María Rivera the need to take her insulin. Accuchecks and sliding scale insulin. OOB, Ambulate. DVT prophylaxis - compression boots.

## 2020-03-14 NOTE — PROGRESS NOTES
Dr. Low Barrios notified of Pt. Refusing to take her insulin with a blood sugar of 298. I explained to the Pt. How her elevated blood sugar could affect her healing process without her insulin. Pt. Stated she was not going to take her insulin.

## 2020-03-15 LAB
BACTERIA SPEC CULT: ABNORMAL
BACTERIA SPEC CULT: ABNORMAL
BACTERIA SPEC CULT: NORMAL
GLUCOSE BLD STRIP.AUTO-MCNC: 139 MG/DL (ref 65–100)
GLUCOSE BLD STRIP.AUTO-MCNC: 144 MG/DL (ref 65–100)
GLUCOSE BLD STRIP.AUTO-MCNC: 168 MG/DL (ref 65–100)
GLUCOSE BLD STRIP.AUTO-MCNC: 190 MG/DL (ref 65–100)
GRAM STN SPEC: ABNORMAL
GRAM STN SPEC: ABNORMAL
SERVICE CMNT-IMP: ABNORMAL
SERVICE CMNT-IMP: NORMAL

## 2020-03-15 PROCEDURE — 74011000258 HC RX REV CODE- 258: Performed by: SURGERY

## 2020-03-15 PROCEDURE — 74011636637 HC RX REV CODE- 636/637: Performed by: SURGERY

## 2020-03-15 PROCEDURE — 74011250637 HC RX REV CODE- 250/637: Performed by: SURGERY

## 2020-03-15 PROCEDURE — 74011250636 HC RX REV CODE- 250/636: Performed by: SURGERY

## 2020-03-15 PROCEDURE — 82962 GLUCOSE BLOOD TEST: CPT

## 2020-03-15 PROCEDURE — 65410000002 HC RM PRIVATE OB

## 2020-03-15 RX ADMIN — PANTOPRAZOLE SODIUM 40 MG: 40 TABLET, DELAYED RELEASE ORAL at 07:24

## 2020-03-15 RX ADMIN — LORAZEPAM 0.5 MG: 0.5 TABLET ORAL at 21:01

## 2020-03-15 RX ADMIN — METOPROLOL TARTRATE 50 MG: 50 TABLET, FILM COATED ORAL at 18:09

## 2020-03-15 RX ADMIN — CALCIUM 1500 MG: 500 TABLET ORAL at 21:01

## 2020-03-15 RX ADMIN — HYDROMORPHONE HYDROCHLORIDE 2 MG: 2 TABLET ORAL at 14:02

## 2020-03-15 RX ADMIN — INSULIN LISPRO 4 UNITS: 100 INJECTION, SOLUTION INTRAVENOUS; SUBCUTANEOUS at 14:24

## 2020-03-15 RX ADMIN — PIPERACILLIN AND TAZOBACTAM 3.38 G: 3; .375 INJECTION, POWDER, LYOPHILIZED, FOR SOLUTION INTRAVENOUS at 02:25

## 2020-03-15 RX ADMIN — TRIAMTERENE AND HYDROCHLOROTHIAZIDE 1 TABLET: 37.5; 25 TABLET ORAL at 08:36

## 2020-03-15 RX ADMIN — HYDROMORPHONE HYDROCHLORIDE 2 MG: 2 TABLET ORAL at 20:08

## 2020-03-15 RX ADMIN — THERA TABS 1 TABLET: TAB at 08:36

## 2020-03-15 RX ADMIN — INSULIN LISPRO 2 UNITS: 100 INJECTION, SOLUTION INTRAVENOUS; SUBCUTANEOUS at 18:08

## 2020-03-15 RX ADMIN — METOPROLOL TARTRATE 50 MG: 50 TABLET, FILM COATED ORAL at 08:37

## 2020-03-15 RX ADMIN — HYDROMORPHONE HYDROCHLORIDE 1 MG: 1 INJECTION, SOLUTION INTRAMUSCULAR; INTRAVENOUS; SUBCUTANEOUS at 09:59

## 2020-03-15 RX ADMIN — INSULIN LISPRO 4 UNITS: 100 INJECTION, SOLUTION INTRAVENOUS; SUBCUTANEOUS at 10:19

## 2020-03-15 RX ADMIN — INSULIN LISPRO 2 UNITS: 100 INJECTION, SOLUTION INTRAVENOUS; SUBCUTANEOUS at 12:47

## 2020-03-15 RX ADMIN — INSULIN GLARGINE 18 UNITS: 100 INJECTION, SOLUTION SUBCUTANEOUS at 22:25

## 2020-03-15 RX ADMIN — DULOXETINE HYDROCHLORIDE 120 MG: 60 CAPSULE, DELAYED RELEASE ORAL at 08:36

## 2020-03-15 RX ADMIN — Medication 10 ML: at 21:01

## 2020-03-15 RX ADMIN — HYDROMORPHONE HYDROCHLORIDE 2 MG: 2 TABLET ORAL at 00:11

## 2020-03-15 NOTE — PROGRESS NOTES
Bedside and Verbal shift change report given to MALIK Lobo RN (oncoming nurse) by Carleen Guevara RN (offgoing nurse). Report included the following information SBAR, Kardex, Intake/Output, MAR, Accordion and Recent Results. 2132- Dressing change completed. Pt tolerated fairly.

## 2020-03-15 NOTE — PROGRESS NOTES
0800 Verbal shift change report given to Abdi Corona RN (oncoming nurse) by Adali Diaz RN (offgoing nurse). Report included the following information SBAR, Kardex, Procedure Summary, Intake/Output, MAR, Accordion, Recent Results and Med Rec Status. Jeffery Herrera from lab called with tissue culture from abdomen, collected on 3/13. ESBL is growing in the wound. MD notified. 1000 NP at bedside for wound care. NP notified of ESBL results.

## 2020-03-15 NOTE — PROGRESS NOTES
Ms. Kt Drummond is feeling better today. Tm 98.9 Tc 98.3 HR: 65 BP: 118/79 Resp Rate: 14 96% sat on room air. Intake/Output Summary (Last 24 hours) at 3/15/2020 1141  Last data filed at 3/14/2020 2130  Gross per 24 hour   Intake 240 ml   Output    Net 240 ml   Exam: Cor: RRR. Lungs: Bilateral breath sounds. Clear to auscultation. Abd: Soft. Non tender. Dressing dry. Labs:   Recent Results (from the past 12 hour(s))   GLUCOSE, POC    Collection Time: 03/15/20  6:59 AM   Result Value Ref Range    Glucose (POC) 139 (H) 65 - 100 mg/dL    Performed by Brandon Le    Doing well following drainage of abdominal wall wound. Diabetic diet as tolerated. Will stop IV abx now that wound is opened. Continue local wound care. Wound care nurses to see for possible wound vac placement. Accuchecks and sliding scale insulin. Pain medication and anti-emetics as needed.

## 2020-03-15 NOTE — PROGRESS NOTES
General Surgery Daily Progress Note    Admit Date: 3/12/2020  Post-Operative Day: 2 Days Post-Op from Procedure(s):  WASHOUT ABDOMINAL WALL WOUND     Subjective:     Last 24 hrs: Pt is doing well w/o complaints of pain; tolerating diet. Objective:     Blood pressure 118/79, pulse 65, temperature 98.3 °F (36.8 °C), resp. rate 14, height 5' 1\" (1.549 m), weight 201 lb (91.2 kg), SpO2 96 %. Temp (24hrs), Av.2 °F (36.8 °C), Min:97.9 °F (36.6 °C), Max:98.4 °F (36.9 °C)      _____________________  Physical Exam:     Alert and Oriented, x3, in no acute distress. Cardiovascular: RRR, no peripheral edema  Abdomen: soft, +induration around incisional wound L>R; wound packed w/ a roll of luke; minimal drainage; wound bed w/ pink tissue and a sm amt of slough - wound tracks deep     Assessment:   Active Problems:    Wound infection after surgery (3/12/2020)            Plan:     Cont LWC  Cont abx  ? Wound vac soon  BS control    Data Review:    Recent Labs     20  0447   WBC 9.5   HGB 9.8*   HCT 31.8*   PLT UNABLE TO REPORT ACCURATE COUNT DUE TO PLATELET AGGREGATION, HOWEVER, PLATELETS APPEAR NORMAL IN NUMBER ON SMEAR. PLEASE RESUBMIT SODIUM CITRATE (BLUE) AND EDTA (LAVENDER) TUBES FOR HEMATOLOGICAL TESTING. Recent Labs     20  0233   *   K 3.9   CL 98   CO2 26   *   BUN 8   CREA 0.83   CA 9.0     No results for input(s): AML, LPSE in the last 72 hours.         ______________________  Medications:    Current Facility-Administered Medications   Medication Dose Route Frequency    insulin lispro (HUMALOG) injection 4 Units  4 Units SubCUTAneous TID WITH MEALS    baclofen (LIORESAL) tablet 10 mg  10 mg Oral DAILY PRN    calcium carbonate (OS-DANI) tablet 1,500 mg [elemental]  1,500 mg Oral QHS    docusate sodium (COLACE) capsule 100 mg  100 mg Oral BID    HYDROmorphone (DILAUDID) tablet 2 mg  2 mg Oral Q6H PRN    LORazepam (ATIVAN) tablet 0.5 mg  0.5 mg Oral Q8H PRN    metoprolol tartrate (LOPRESSOR) tablet 50 mg  50 mg Oral BID    therapeutic multivitamin (THERAGRAN) tablet 1 Tab  1 Tab Oral DAILY    pantoprazole (PROTONIX) tablet 40 mg  40 mg Oral DAILY    triamterene-hydroCHLOROthiazide (MAXZIDE) 37.5-25 mg per tablet 1 Tab  1 Tab Oral DAILY    insulin lispro (HUMALOG) injection   SubCUTAneous AC&HS    insulin glargine (LANTUS) injection 18 Units  0.2 Units/kg SubCUTAneous QHS    glucose chewable tablet 16 g  4 Tab Oral PRN    glucagon (GLUCAGEN) injection 1 mg  1 mg IntraMUSCular PRN    dextrose 10% infusion 0-250 mL  0-250 mL IntraVENous PRN    ketorolac (TORADOL) injection 15 mg  15 mg IntraVENous Q6H PRN    sodium chloride (NS) flush 5-40 mL  5-40 mL IntraVENous Q8H    sodium chloride (NS) flush 5-40 mL  5-40 mL IntraVENous PRN    ondansetron (ZOFRAN) injection 4 mg  4 mg IntraVENous Q4H PRN    DULoxetine (CYMBALTA) capsule 120 mg  120 mg Oral DAILY    HYDROmorphone (PF) (DILAUDID) injection 1 mg  1 mg IntraVENous Q3H PRN    LORazepam (ATIVAN) injection 1 mg  1 mg IntraVENous Q6H PRN       Briana Schulz NP  3/15/2020

## 2020-03-15 NOTE — PROGRESS NOTES
Bedside and Verbal shift change report given to ASHLEY Gan RN (oncoming nurse) by MALIK Mock RN (offgoing nurse). Report included the following information SBAR, Kardex, Procedure Summary, Intake/Output, MAR, Accordion and Recent Results. 2130 PO ativan given for before dressing change to calm nerves. Dressing changed, Pt tolerated well.

## 2020-03-16 LAB
GLUCOSE BLD STRIP.AUTO-MCNC: 157 MG/DL (ref 65–100)
GLUCOSE BLD STRIP.AUTO-MCNC: 161 MG/DL (ref 65–100)
GLUCOSE BLD STRIP.AUTO-MCNC: 163 MG/DL (ref 65–100)
GLUCOSE BLD STRIP.AUTO-MCNC: 203 MG/DL (ref 65–100)
GLUCOSE BLD STRIP.AUTO-MCNC: 212 MG/DL (ref 65–100)
SERVICE CMNT-IMP: ABNORMAL

## 2020-03-16 PROCEDURE — 74011250637 HC RX REV CODE- 250/637: Performed by: SURGERY

## 2020-03-16 PROCEDURE — 97605 NEG PRS WND THER DME<=50SQCM: CPT

## 2020-03-16 PROCEDURE — 82962 GLUCOSE BLOOD TEST: CPT

## 2020-03-16 PROCEDURE — 74011000258 HC RX REV CODE- 258: Performed by: SURGERY

## 2020-03-16 PROCEDURE — 74011636637 HC RX REV CODE- 636/637: Performed by: SURGERY

## 2020-03-16 PROCEDURE — 77030018836 HC SOL IRR NACL ICUM -A

## 2020-03-16 PROCEDURE — 77030018717 HC DRSG GRNUFM KCON -B

## 2020-03-16 PROCEDURE — 77030019952 HC CANSTR VAC ASST KCON -B

## 2020-03-16 PROCEDURE — 74011250636 HC RX REV CODE- 250/636: Performed by: SURGERY

## 2020-03-16 PROCEDURE — 65410000002 HC RM PRIVATE OB

## 2020-03-16 RX ADMIN — TRIAMTERENE AND HYDROCHLOROTHIAZIDE 1 TABLET: 37.5; 25 TABLET ORAL at 10:11

## 2020-03-16 RX ADMIN — CALCIUM 1500 MG: 500 TABLET ORAL at 22:47

## 2020-03-16 RX ADMIN — DOCUSATE SODIUM 100 MG: 100 CAPSULE, LIQUID FILLED ORAL at 10:11

## 2020-03-16 RX ADMIN — INSULIN GLARGINE 18 UNITS: 100 INJECTION, SOLUTION SUBCUTANEOUS at 22:47

## 2020-03-16 RX ADMIN — HYDROMORPHONE HYDROCHLORIDE 2 MG: 2 TABLET ORAL at 23:20

## 2020-03-16 RX ADMIN — Medication 10 ML: at 13:10

## 2020-03-16 RX ADMIN — THERA TABS 1 TABLET: TAB at 10:11

## 2020-03-16 RX ADMIN — INSULIN LISPRO 2 UNITS: 100 INJECTION, SOLUTION INTRAVENOUS; SUBCUTANEOUS at 18:37

## 2020-03-16 RX ADMIN — Medication 10 ML: at 06:03

## 2020-03-16 RX ADMIN — INSULIN LISPRO 4 UNITS: 100 INJECTION, SOLUTION INTRAVENOUS; SUBCUTANEOUS at 14:37

## 2020-03-16 RX ADMIN — METOPROLOL TARTRATE 50 MG: 50 TABLET, FILM COATED ORAL at 10:11

## 2020-03-16 RX ADMIN — INSULIN LISPRO 3 UNITS: 100 INJECTION, SOLUTION INTRAVENOUS; SUBCUTANEOUS at 14:37

## 2020-03-16 RX ADMIN — Medication: at 13:08

## 2020-03-16 RX ADMIN — HYDROMORPHONE HYDROCHLORIDE 2 MG: 2 TABLET ORAL at 06:03

## 2020-03-16 RX ADMIN — INSULIN LISPRO 2 UNITS: 100 INJECTION, SOLUTION INTRAVENOUS; SUBCUTANEOUS at 07:27

## 2020-03-16 RX ADMIN — HYDROMORPHONE HYDROCHLORIDE 2 MG: 2 TABLET ORAL at 10:10

## 2020-03-16 RX ADMIN — INSULIN LISPRO 4 UNITS: 100 INJECTION, SOLUTION INTRAVENOUS; SUBCUTANEOUS at 18:38

## 2020-03-16 RX ADMIN — Medication: at 21:08

## 2020-03-16 RX ADMIN — PIPERACILLIN AND TAZOBACTAM 3.38 G: 3; .375 INJECTION, POWDER, LYOPHILIZED, FOR SOLUTION INTRAVENOUS at 10:14

## 2020-03-16 RX ADMIN — PIPERACILLIN AND TAZOBACTAM 3.38 G: 3; .375 INJECTION, POWDER, LYOPHILIZED, FOR SOLUTION INTRAVENOUS at 18:01

## 2020-03-16 RX ADMIN — DULOXETINE HYDROCHLORIDE 120 MG: 60 CAPSULE, DELAYED RELEASE ORAL at 10:11

## 2020-03-16 RX ADMIN — HYDROMORPHONE HYDROCHLORIDE 2 MG: 2 TABLET ORAL at 17:01

## 2020-03-16 RX ADMIN — METOPROLOL TARTRATE 50 MG: 50 TABLET, FILM COATED ORAL at 18:05

## 2020-03-16 RX ADMIN — PANTOPRAZOLE SODIUM 40 MG: 40 TABLET, DELAYED RELEASE ORAL at 06:03

## 2020-03-16 NOTE — PROGRESS NOTES
Problem: Diabetes Self-Management  Goal: *Disease process and treatment process  Description: Define diabetes and identify own type of diabetes; list 3 options for treating diabetes. Outcome: Progressing Towards Goal  Goal: *Incorporating nutritional management into lifestyle  Description: Describe effect of type, amount and timing of food on blood glucose; list 3 methods for planning meals. Outcome: Progressing Towards Goal  Goal: *Incorporating physical activity into lifestyle  Description: State effect of exercise on blood glucose levels. Outcome: Progressing Towards Goal  Goal: *Developing strategies to promote health/change behavior  Description: Define the ABC's of diabetes; identify appropriate screenings, schedule and personal plan for screenings. Outcome: Progressing Towards Goal  Goal: *Using medications safely  Description: State effect of diabetes medications on diabetes; name diabetes medication taking, action and side effects. Outcome: Progressing Towards Goal  Goal: *Monitoring blood glucose, interpreting and using results  Description: Identify recommended blood glucose targets  and personal targets. Outcome: Progressing Towards Goal  Goal: *Prevention, detection, treatment of acute complications  Description: List symptoms of hyper- and hypoglycemia; describe how to treat low blood sugar and actions for lowering  high blood glucose level. Outcome: Progressing Towards Goal  Goal: *Prevention, detection and treatment of chronic complications  Description: Define the natural course of diabetes and describe the relationship of blood glucose levels to long term complications of diabetes.   Outcome: Progressing Towards Goal  Goal: *Developing strategies to address psychosocial issues  Description: Describe feelings about living with diabetes; identify support needed and support network  Outcome: Progressing Towards Goal  Goal: *Insulin pump training  Outcome: Progressing Towards Goal  Goal: *Sick day guidelines  Outcome: Progressing Towards Goal  Goal: *Patient Specific Goal (EDIT GOAL, INSERT TEXT)  Outcome: Progressing Towards Goal     Problem: Patient Education: Go to Patient Education Activity  Goal: Patient/Family Education  Outcome: Progressing Towards Goal     Problem: Risk for Spread of Infection  Goal: Prevent transmission of infectious organism to others  Description: Prevent the transmission of infectious organisms to other patients, staff members, and visitors. Outcome: Progressing Towards Goal     Problem: Patient Education:  Go to Education Activity  Goal: Patient/Family Education  Outcome: Progressing Towards Goal     Problem: Falls - Risk of  Goal: *Absence of Falls  Description: Document Thora Bare Fall Risk and appropriate interventions in the flowsheet. Outcome: Progressing Towards Goal  Note: Fall Risk Interventions:            Medication Interventions: Teach patient to arise slowly                   Problem: Patient Education: Go to Patient Education Activity  Goal: Patient/Family Education  Outcome: Progressing Towards Goal     Problem: Pain  Goal: *Control of Pain  Outcome: Progressing Towards Goal     Problem: Patient Education: Go to Patient Education Activity  Goal: Patient/Family Education  Outcome: Progressing Towards Goal     Problem: Pressure Injury - Risk of  Goal: *Prevention of pressure injury  Description: Document Anderson Scale and appropriate interventions in the flowsheet. Outcome: Progressing Towards Goal  Note: Pressure Injury Interventions:             Activity Interventions: Increase time out of bed         Nutrition Interventions: Document food/fluid/supplement intake                     Problem: Patient Education: Go to Patient Education Activity  Goal: Patient/Family Education  Outcome: Progressing Towards Goal     Problem: General Infection Care Plan (Adult and Pediatric)  Goal: Improvement in signs and symptoms of infection  Outcome: Progressing Towards Goal  Goal: *Optimize nutritional status  Outcome: Progressing Towards Goal     Problem: Patient Education: Go to Patient Education Activity  Goal: Patient/Family Education  Outcome: Progressing Towards Goal     Problem: Discharge Planning  Goal: *Discharge to safe environment  Outcome: Progressing Towards Goal

## 2020-03-16 NOTE — WOUND CARE
WOCN Note:  
 
New consult placed for assessment of abdominal wound. Dr Grisel Dumont at the bedside for assessment. Chart reviewed. Admitted DX:  Wound infection after surgery 3.13.2020 S/P Incision and drainage of abdominal wall wound with debridement of necrotic fat and tissue and washout of abdominal wall wound. Assessment:  
Patient is A&O x 3, communicative, continent and mobile. Bed: total care Patient reports no pain. Sacrum and buttocks intact without erythema. Scattered areas of dark dyschromia. 1. POA Abdomen, Open surgical wound: 7 x 2.2 x 7 cm; tunnels 6.8 cm at 3 o'clock; tunnels 7.7 cm at 9 o'clock; tunnels 6.8 cm at 12 o'clock; 70% red 30% tan/yellow devitalized tissue; moderate serosanguinous drainage; no odor or erythema. 2.  Groin folds, red moist rash consistent with Candidiasis. Treatment: 
Abdominal wound:  Cleansed with saline; Initiated NPWT at 125 mmHg using 1 continuous piece of white foam and 1 continuous piece of black. Wound, Pressure Prevention & Skin Care Recommendations: 1. Minimize layers of linen/pads under patient to optimize support surface. 2.  Turn/reposition approximately every 2 hours and offload heels 3. Abdominal wound:  Continue NPWT at 125 mmHg. Change twice weekly. 4.  Groin folds:  Apply Antifungal cream twice daily. Discussed above plan with patient, Dr Grisel Dumont and Cassius Mac RN Transition of Care: Plan to follow as needed while admitted to hospital. 
 
Nolan Giles, TOMN REM[facility] Inpatient Wound [facility] Available on [employer] Pager [phone] Office [phone]

## 2020-03-16 NOTE — PROGRESS NOTES
General Surgery Daily Progress Note    Admit Date: 3/12/2020  Post-Operative Day: 3 Days Post-Op from Procedure(s):  WASHOUT ABDOMINAL WALL WOUND     Subjective:     Last 24 hrs: Pt is doing well w/o complaints. OOB in chair. Not a big breakfast eater. Moving bowels       Objective:     Blood pressure 110/71, pulse 67, temperature 98.4 °F (36.9 °C), resp. rate 16, height 5' 1\" (1.549 m), weight 201 lb (91.2 kg), SpO2 96 %. Temp (24hrs), Av.4 °F (36.9 °C), Min:98.3 °F (36.8 °C), Max:98.5 °F (36.9 °C)      _____________________  Physical Exam:     Alert and Oriented, x3, in no acute distress. Cardiovascular: RRR, no peripheral edema  Abdomen: soft, drsg dry      Assessment:   Active Problems:    Wound infection after surgery (3/12/2020)            Plan: For wound vac placement today at 10am  Cont abx  Cont OOB  Hopefully home by the end of the week after 1st vac change    Data Review:    No results for input(s): WBC, HGB, HCT, PLT, HGBEXT, HCTEXT, PLTEXT in the last 72 hours. No results for input(s): NA, K, CL, CO2, GLU, BUN, CREA, CA, MG, PHOS, ALB, TBIL, SGOT, ALT, INR, INREXT in the last 72 hours. No results for input(s): AML, LPSE in the last 72 hours.         ______________________  Medications:    Current Facility-Administered Medications   Medication Dose Route Frequency    piperacillin-tazobactam (ZOSYN) 3.375 g in 0.9% sodium chloride (MBP/ADV) 100 mL  3.375 g IntraVENous Q8H    insulin lispro (HUMALOG) injection 4 Units  4 Units SubCUTAneous TID WITH MEALS    baclofen (LIORESAL) tablet 10 mg  10 mg Oral DAILY PRN    calcium carbonate (OS-DANI) tablet 1,500 mg [elemental]  1,500 mg Oral QHS    docusate sodium (COLACE) capsule 100 mg  100 mg Oral BID    HYDROmorphone (DILAUDID) tablet 2 mg  2 mg Oral Q6H PRN    LORazepam (ATIVAN) tablet 0.5 mg  0.5 mg Oral Q8H PRN    metoprolol tartrate (LOPRESSOR) tablet 50 mg  50 mg Oral BID    therapeutic multivitamin (THERAGRAN) tablet 1 Tab  1 Tab Oral DAILY    pantoprazole (PROTONIX) tablet 40 mg  40 mg Oral DAILY    triamterene-hydroCHLOROthiazide (MAXZIDE) 37.5-25 mg per tablet 1 Tab  1 Tab Oral DAILY    insulin lispro (HUMALOG) injection   SubCUTAneous AC&HS    insulin glargine (LANTUS) injection 18 Units  0.2 Units/kg SubCUTAneous QHS    glucose chewable tablet 16 g  4 Tab Oral PRN    glucagon (GLUCAGEN) injection 1 mg  1 mg IntraMUSCular PRN    dextrose 10% infusion 0-250 mL  0-250 mL IntraVENous PRN    ketorolac (TORADOL) injection 15 mg  15 mg IntraVENous Q6H PRN    sodium chloride (NS) flush 5-40 mL  5-40 mL IntraVENous Q8H    sodium chloride (NS) flush 5-40 mL  5-40 mL IntraVENous PRN    ondansetron (ZOFRAN) injection 4 mg  4 mg IntraVENous Q4H PRN    DULoxetine (CYMBALTA) capsule 120 mg  120 mg Oral DAILY    HYDROmorphone (PF) (DILAUDID) injection 1 mg  1 mg IntraVENous Q3H PRN    LORazepam (ATIVAN) injection 1 mg  1 mg IntraVENous Q6H PRN       Valerie Wayne NP  3/16/2020      I have independently examined the patient and have reviewed the chart. I agree with the above plan. Patient seen and examined with wound care team.  Wound appears fairly clean without obvious purulence or foul smell now. Will place wound vac today and start process for home vac application. The patient does not have assistance at home so a wound vac would be necessary for ongoing wound care. Will continue on antibiotics due to presence of a recently placed mesh deep to the fascia. Hopefully will be able to go home towards the end of the week after her first vac change.       Amna Andrea MD  3/16/2020  11:18 AM

## 2020-03-16 NOTE — PROGRESS NOTES
Bedside and Verbal shift change report given to [address] (oncoming nurse) by Blanca Cisneros (offgoing nurse).  Report included the following information SBAR, Kardex, ED Summary, Procedure Summary, Intake/Output and MAR.     0800- assesment complete, eating breakfast, no complaints of pain at this time    0930- NP at bedside    1000- Wound care RN at bedside, dressing changed     1100- wound vac placed

## 2020-03-16 NOTE — DIABETES MGMT
JOAO MATOS  CLINICAL NURSE SPECIALIST CONSULT  [facility]    Presentation   Sergey Smalls is a 70 y.o. female admitted with abdominal wound from recent hernia surgery. She is POD3 from abdominal wall washout. PMH: GERMAINE/controlled HTN/GERD/ liver disease/ pancreatic cancer(history)  She  has known Type 2 diabetes-A1C: 6.6%, this is down from Nov. 2019 she was 10.2%. Consulted by Provider for advanced diabetes nursing assessment and care, specifically related to   [] Transitioning off Freddy Plater   [x] Inpatient management strategy  [x] Home management assessment  [] Survival skill education    Diabetes-related medical history  Acute complications  NONE  Neurological complications  Peripheral neuropathy  Microvascular disease  NONE  Macrovascular disease  NONE  Other associated conditions     HTN/GERD/ pancreatic cancer/liver disease    Diabetes medication history  Drug class Currently in use Discontinued Never used   Biguanide      DDP-4 inhibitor       Sulfonylurea      Thiazolidinedione      GLP-1 RA      SGLT-2 inhibitors      Basal insulin Lantus 40units daily     Bolus insulin 10units Humalog with  meals       Subjective   I had the pleasure of talking with Ms. Riki Yeager today. She tells me since November 2019 she has been taking active steps to get her A1C down. Through diet and exercise she lost almost 20lbs. She also shared that she did a lot of reading on the internet and also started taking herbal supplements (tumeric, cinnamon, chamomile, abigail root, and black pepper). 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 Patient reports the following home diabetes self-care practices:  Eating pattern  [x] Breakfast Skips most of the time; but if she does have it she has eggs.   [x] Lunch  Soups with orange/or bananna  [x] Dinner  soup  [x] Snacks Cut out all snacking (no chips/sodas/ ice cream etc)  [x] Beverages Coffee/water   Physical activity pattern  [x] Aerobic exercise walking    Monitoring pattern- previously  in 200-300s , now likes <125  [x] Breakfast   [x] Lunch    [x] Dinner    [x] Bedtime   Taking medications pattern  [x] Consistent administration  [x] Affordable    Social determinants of health impacting diabetes self-management practices   None voiced    Objective   Physical exam  General Alert, oriented and in no acute distress. Conversant and cooperative. Vital Signs   Visit Vitals  /71 (BP 1 Location: Left arm, BP Patient Position: At rest)   Pulse 67   Temp 98.4 °F (36.9 °C)   Resp 16   Ht 5' 1\" (1.549 m)   Wt 91.2 kg (201 lb)   SpO2 96%   BMI 37.98 kg/m²   . Skin  Warm and dry. Heart   Regular rate and rhythm. No murmurs, rubs or gallops  Lungs  Clear without rales or rhonchi  Extremities Diabetic foot exam:    Left Foot     Visual Exam: normal    Pulse DP: 2+ (normal)   Filament test: reduced sensation ; middle/upper foot near toes     Right Foot   Visual Exam: normal    Pulse DP: 2+ (normal)   Filament test: normal sensation       Laboratory  Lab Results   Component Value Date/Time    Hemoglobin A1c 6.6 (H) 02/19/2020 03:55 AM     No results found for: LDL, LDLC, DLDLP  Lab Results   Component Value Date/Time    Creatinine (POC) 0.7 01/09/2019 11:20 AM    Creatinine 0.83 03/13/2020 02:33 AM     Lab Results   Component Value Date/Time    Sodium 132 (L) 03/13/2020 02:33 AM    Potassium 3.9 03/13/2020 02:33 AM    Chloride 98 03/13/2020 02:33 AM    CO2 26 03/13/2020 02:33 AM    Anion gap 8 03/13/2020 02:33 AM    Glucose 207 (H) 03/13/2020 02:33 AM    BUN 8 03/13/2020 02:33 AM    Creatinine 0.83 03/13/2020 02:33 AM    BUN/Creatinine ratio 10 (L) 03/13/2020 02:33 AM    GFR est AA >60 03/13/2020 02:33 AM    GFR est non-AA >60 03/13/2020 02:33 AM    Calcium 9.0 03/13/2020 02:33 AM    Bilirubin, total 1.0 02/18/2020 07:14 PM    AST (SGOT) 16 02/18/2020 07:14 PM    Alk.  phosphatase 129 (H) 02/18/2020 07:14 PM    Protein, total 7.1 02/18/2020 07:14 PM Albumin 2.9 (L) 02/18/2020 07:14 PM    Globulin 4.2 (H) 02/18/2020 07:14 PM    A-G Ratio 0.7 (L) 02/18/2020 07:14 PM    ALT (SGPT) 15 02/18/2020 07:14 PM     Lab Results   Component Value Date/Time    ALT (SGPT) 15 02/18/2020 07:14 PM       Blood glucose pattern       Evaluation   Ms. Kt Drummond, with well controlled Type 2  diabetes, has achieved inpatient blood glucose target of 100-180mg/dl with basal/bolus/correction insulin regimen while hospitalized.    Inpatient blood glucose management has been impacted by  [] Kidney dysfunction  [] Erratic meal consumption  [] Glucocorticoid use  [x]  infection____      Recommendations   Recommend:  CONTINUE Basal insulin   [x] 0.2 units/kg/D=18units    CONTINUE Bolus insulin  [x] Normal sensitivity=4 units humalog    Corrective insulin  [x] Normal sensitivity    Assessment and Plan   Nursing Diagnosis Risk for unstable blood glucose pattern   Nursing Intervention Domain 5250 Decision-making Support   Nursing Interventions Examined current inpatient diabetes control   Explored factors facilitating and impeding inpatient management  Identified self-management practices impeding diabetes control  Explored corrective strategies with patient and responsible inpatient provider   Informed patient of rational for insulin strategy while hospitalized       Referral   [] Behavioral health services  [] Inpatient nutrition services  [] Pharmacy services for medication management  [] Diabetes Self-Management Training through [facility] (Phone [phone] to schedule appointment)    Billing Code(s)     [x] 76515 IP subsequent hospital care - 45 minutes      MAHIN Mcdonald  Access via [facility] [phone]

## 2020-03-16 NOTE — PROGRESS NOTES
1500 - CM spoke with Mary Beth Bates RN from [facility]. She has accepted case. This CM faxed [facility] order form to: Olga [address] [phone] - (f) [phone]. Update to DEVENDRA Nurse - Marysol Mcadams RN. CM will continue to follow and assist with JUN needs as they arise. [facility]  MSN, [address] [facility], RN, [address] [place] [address] Avenue - [phone].    7224 -   Orders entered to arrange for skilled home health for skilled nursing for wound vac at home (see below). Patients chart reviewed and history noted. Demographic information verified as correct. Referral created to [facility]) [phone]. Update to BREANNE Nurse - Sushil Rapp RN. CM will continue to follow. [facility] MSN, BSCS, RN, CRM - [phone].  -------------------------------------------------------------------------------------------------------------------------------  Current change schedule is Monday and Thursdays   Wound care form on chart. Potential discharge:  03/20/2020  Question: Reason for Consult:  Answer: skilled nursing visits for wound vac management to abdominal wound

## 2020-03-17 ENCOUNTER — TELEPHONE (OUTPATIENT)
Dept: SURGERY | Age: 75
End: 2020-03-17

## 2020-03-17 LAB
ANION GAP SERPL CALC-SCNC: 6 MMOL/L (ref 5–15)
BACTERIA SPEC CULT: ABNORMAL
BASOPHILS # BLD: 0 K/UL (ref 0–0.1)
BASOPHILS NFR BLD: 0 % (ref 0–1)
BUN SERPL-MCNC: 10 MG/DL (ref 6–20)
BUN/CREAT SERPL: 13 (ref 12–20)
CALCIUM SERPL-MCNC: 8.9 MG/DL (ref 8.5–10.1)
CHLORIDE SERPL-SCNC: 102 MMOL/L (ref 97–108)
CO2 SERPL-SCNC: 29 MMOL/L (ref 21–32)
CREAT SERPL-MCNC: 0.78 MG/DL (ref 0.55–1.02)
DIFFERENTIAL METHOD BLD: ABNORMAL
EOSINOPHIL # BLD: 0.3 K/UL (ref 0–0.4)
EOSINOPHIL NFR BLD: 4 % (ref 0–7)
ERYTHROCYTE [DISTWIDTH] IN BLOOD BY AUTOMATED COUNT: 16 % (ref 11.5–14.5)
GLUCOSE BLD STRIP.AUTO-MCNC: 161 MG/DL (ref 65–100)
GLUCOSE BLD STRIP.AUTO-MCNC: 167 MG/DL (ref 65–100)
GLUCOSE BLD STRIP.AUTO-MCNC: 189 MG/DL (ref 65–100)
GLUCOSE BLD STRIP.AUTO-MCNC: 213 MG/DL (ref 65–100)
GLUCOSE SERPL-MCNC: 163 MG/DL (ref 65–100)
GRAM STN SPEC: ABNORMAL
HCT VFR BLD AUTO: 29.1 % (ref 35–47)
HGB BLD-MCNC: 8.7 G/DL (ref 11.5–16)
IMM GRANULOCYTES # BLD AUTO: 0 K/UL (ref 0–0.04)
IMM GRANULOCYTES NFR BLD AUTO: 0 % (ref 0–0.5)
LYMPHOCYTES # BLD: 2.5 K/UL (ref 0.8–3.5)
LYMPHOCYTES NFR BLD: 30 % (ref 12–49)
MCH RBC QN AUTO: 24.6 PG (ref 26–34)
MCHC RBC AUTO-ENTMCNC: 29.9 G/DL (ref 30–36.5)
MCV RBC AUTO: 82.4 FL (ref 80–99)
MONOCYTES # BLD: 0.7 K/UL (ref 0–1)
MONOCYTES NFR BLD: 8 % (ref 5–13)
NEUTS SEG # BLD: 4.8 K/UL (ref 1.8–8)
NEUTS SEG NFR BLD: 58 % (ref 32–75)
NRBC # BLD: 0 K/UL (ref 0–0.01)
NRBC BLD-RTO: 0 PER 100 WBC
PLATELET # BLD AUTO: 252 K/UL (ref 150–400)
PMV BLD AUTO: 10.7 FL (ref 8.9–12.9)
POTASSIUM SERPL-SCNC: 4.4 MMOL/L (ref 3.5–5.1)
RBC # BLD AUTO: 3.53 M/UL (ref 3.8–5.2)
SERVICE CMNT-IMP: ABNORMAL
SODIUM SERPL-SCNC: 137 MMOL/L (ref 136–145)
WBC # BLD AUTO: 8.4 K/UL (ref 3.6–11)

## 2020-03-17 PROCEDURE — 74011250636 HC RX REV CODE- 250/636: Performed by: SURGERY

## 2020-03-17 PROCEDURE — 74011636637 HC RX REV CODE- 636/637: Performed by: SURGERY

## 2020-03-17 PROCEDURE — 65410000002 HC RM PRIVATE OB

## 2020-03-17 PROCEDURE — 36415 COLL VENOUS BLD VENIPUNCTURE: CPT

## 2020-03-17 PROCEDURE — 82962 GLUCOSE BLOOD TEST: CPT

## 2020-03-17 PROCEDURE — 74011250637 HC RX REV CODE- 250/637: Performed by: SURGERY

## 2020-03-17 PROCEDURE — 85025 COMPLETE CBC W/AUTO DIFF WBC: CPT

## 2020-03-17 PROCEDURE — 80048 BASIC METABOLIC PNL TOTAL CA: CPT

## 2020-03-17 PROCEDURE — 74011000258 HC RX REV CODE- 258: Performed by: SURGERY

## 2020-03-17 RX ORDER — MICONAZOLE NITRATE 2 %
POWDER (GRAM) TOPICAL 2 TIMES DAILY
Status: DISCONTINUED | OUTPATIENT
Start: 2020-03-17 | End: 2020-03-20 | Stop reason: HOSPADM

## 2020-03-17 RX ADMIN — MICONAZOLE NITRATE 2 % TOPICAL POWDER: at 18:00

## 2020-03-17 RX ADMIN — INSULIN LISPRO 3 UNITS: 100 INJECTION, SOLUTION INTRAVENOUS; SUBCUTANEOUS at 18:10

## 2020-03-17 RX ADMIN — TRIAMTERENE AND HYDROCHLOROTHIAZIDE 1 TABLET: 37.5; 25 TABLET ORAL at 08:58

## 2020-03-17 RX ADMIN — INSULIN LISPRO 2 UNITS: 100 INJECTION, SOLUTION INTRAVENOUS; SUBCUTANEOUS at 11:25

## 2020-03-17 RX ADMIN — THERA TABS 1 TABLET: TAB at 08:59

## 2020-03-17 RX ADMIN — INSULIN GLARGINE 18 UNITS: 100 INJECTION, SOLUTION SUBCUTANEOUS at 22:50

## 2020-03-17 RX ADMIN — INSULIN LISPRO 4 UNITS: 100 INJECTION, SOLUTION INTRAVENOUS; SUBCUTANEOUS at 18:10

## 2020-03-17 RX ADMIN — CALCIUM 1500 MG: 500 TABLET ORAL at 22:50

## 2020-03-17 RX ADMIN — PIPERACILLIN AND TAZOBACTAM 3.38 G: 3; .375 INJECTION, POWDER, LYOPHILIZED, FOR SOLUTION INTRAVENOUS at 11:10

## 2020-03-17 RX ADMIN — PANTOPRAZOLE SODIUM 40 MG: 40 TABLET, DELAYED RELEASE ORAL at 07:04

## 2020-03-17 RX ADMIN — INSULIN LISPRO 4 UNITS: 100 INJECTION, SOLUTION INTRAVENOUS; SUBCUTANEOUS at 09:00

## 2020-03-17 RX ADMIN — PIPERACILLIN AND TAZOBACTAM 3.38 G: 3; .375 INJECTION, POWDER, LYOPHILIZED, FOR SOLUTION INTRAVENOUS at 02:23

## 2020-03-17 RX ADMIN — HYDROMORPHONE HYDROCHLORIDE 1 MG: 1 INJECTION, SOLUTION INTRAMUSCULAR; INTRAVENOUS; SUBCUTANEOUS at 08:56

## 2020-03-17 RX ADMIN — DULOXETINE HYDROCHLORIDE 120 MG: 60 CAPSULE, DELAYED RELEASE ORAL at 08:59

## 2020-03-17 RX ADMIN — MICONAZOLE NITRATE 2 % TOPICAL POWDER: at 12:51

## 2020-03-17 RX ADMIN — PIPERACILLIN AND TAZOBACTAM 3.38 G: 3; .375 INJECTION, POWDER, LYOPHILIZED, FOR SOLUTION INTRAVENOUS at 18:11

## 2020-03-17 RX ADMIN — INSULIN LISPRO 2 UNITS: 100 INJECTION, SOLUTION INTRAVENOUS; SUBCUTANEOUS at 07:04

## 2020-03-17 RX ADMIN — METOPROLOL TARTRATE 50 MG: 50 TABLET, FILM COATED ORAL at 08:59

## 2020-03-17 RX ADMIN — HYDROMORPHONE HYDROCHLORIDE 2 MG: 2 TABLET ORAL at 18:46

## 2020-03-17 NOTE — PROGRESS NOTES
..Bedside shift change report given to Chava Norris (oncoming nurse) by Chencho Vang RN (offgoing nurse). Report included the following information SBAR, Kardex, Procedure Summary, Intake/Output, MAR, Accordion and Recent Results.

## 2020-03-17 NOTE — PROGRESS NOTES
General Surgery Daily Progress Note    Admit Date: 3/12/2020  Post-Operative Day: 4 Days Post-Op from Procedure(s):  WASHOUT ABDOMINAL WALL WOUND     Subjective:     Last 24 hrs: Pt is sitting in the chair. No complaints. Objective:     Blood pressure 118/57, pulse 69, temperature 98.9 °F (37.2 °C), resp. rate 16, height 5' 1\" (1.549 m), weight 201 lb (91.2 kg), SpO2 99 %. Temp (24hrs), Av.7 °F (37.1 °C), Min:98.5 °F (36.9 °C), Max:98.9 °F (37.2 °C)      _____________________  Physical Exam:     Alert and Oriented, x3, in no acute distress. Cardiovascular: RRR, no peripheral edema  Abdomen: soft, wound vac suctioning appropriately; drainage ss      Assessment:   Active Problems:    Wound infection after surgery (3/12/2020)            Plan:     Cont wound vac  Cont abx  Cont OOB as tolerated  Home at end of week    Data Review:    Recent Labs     20  0538   WBC 8.4   HGB 8.7*   HCT 29.1*        Recent Labs     20  0538      K 4.4      CO2 29   *   BUN 10   CREA 0.78   CA 8.9     No results for input(s): AML, LPSE in the last 72 hours.         ______________________  Medications:    Current Facility-Administered Medications   Medication Dose Route Frequency    piperacillin-tazobactam (ZOSYN) 3.375 g in 0.9% sodium chloride (MBP/ADV) 100 mL  3.375 g IntraVENous Q8H    miconazole (MICONTIN) 2 % ointment   Topical BID    insulin lispro (HUMALOG) injection 4 Units  4 Units SubCUTAneous TID WITH MEALS    baclofen (LIORESAL) tablet 10 mg  10 mg Oral DAILY PRN    calcium carbonate (OS-DANI) tablet 1,500 mg [elemental]  1,500 mg Oral QHS    docusate sodium (COLACE) capsule 100 mg  100 mg Oral BID    HYDROmorphone (DILAUDID) tablet 2 mg  2 mg Oral Q6H PRN    LORazepam (ATIVAN) tablet 0.5 mg  0.5 mg Oral Q8H PRN    metoprolol tartrate (LOPRESSOR) tablet 50 mg  50 mg Oral BID    therapeutic multivitamin (THERAGRAN) tablet 1 Tab  1 Tab Oral DAILY    pantoprazole (PROTONIX) tablet 40 mg  40 mg Oral DAILY    triamterene-hydroCHLOROthiazide (MAXZIDE) 37.5-25 mg per tablet 1 Tab  1 Tab Oral DAILY    insulin lispro (HUMALOG) injection   SubCUTAneous AC&HS    insulin glargine (LANTUS) injection 18 Units  0.2 Units/kg SubCUTAneous QHS    glucose chewable tablet 16 g  4 Tab Oral PRN    glucagon (GLUCAGEN) injection 1 mg  1 mg IntraMUSCular PRN    dextrose 10% infusion 0-250 mL  0-250 mL IntraVENous PRN    sodium chloride (NS) flush 5-40 mL  5-40 mL IntraVENous Q8H    sodium chloride (NS) flush 5-40 mL  5-40 mL IntraVENous PRN    ondansetron (ZOFRAN) injection 4 mg  4 mg IntraVENous Q4H PRN    DULoxetine (CYMBALTA) capsule 120 mg  120 mg Oral DAILY    HYDROmorphone (PF) (DILAUDID) injection 1 mg  1 mg IntraVENous Q3H PRN    LORazepam (ATIVAN) injection 1 mg  1 mg IntraVENous Q6H PRN       Patti Wiggins NP  3/17/2020    I have independently examined the patient and have reviewed the chart. I agree with the above plan. Patient doing well. Vac with good seal.  Tolerating diet. Having bowel function. Complains of rash in intertriginous areas of bilateral groins and right axilla. This appears to be a heat rash/yeast.  Will change antifungal treatment to powder to see if this helps. Next vac change on Thursday. Working on home wound vac approval with insurance.       Whitley Antony MD  3/17/2020  11:12 AM

## 2020-03-17 NOTE — PROGRESS NOTES
8950 Bedside and Verbal shift change report given to Vazquez Lopez RN (oncoming nurse) by Li Polo RN (offgoing nurse). Report included the following information SBAR, Kardex, Procedure Summary, Intake/Output, MAR, Accordion, Recent Results and Med Rec Status.

## 2020-03-17 NOTE — DIABETES MGMT
JOAO MATOS  CLINICAL NURSE SPECIALIST CONSULT  PROGRAM FOR DIABETES HEALTH    FOLLOW-UP NOTE    Presentation   Jennie Neil is a 70 y.o. female admitted with abdominal wound from recent hernia surgery. She is POD4 from abdominal wall washout. PMH: GERMAINE/controlled HTN/GERD/ liver disease/ pancreatic cancer(history)  She  has known Type 2 diabetes-A1C: 6.6%, this is down from Nov. 2019 she was 10.2%. Wound vac was placed yesterday. Objective   Physical exam  General Alert, oriented and in no acute distress. Conversant and cooperative. Vital Signs   Visit Vitals  /57 (BP 1 Location: Left arm, BP Patient Position: Sitting)   Pulse 69   Temp 98.9 °F (37.2 °C)   Resp 16   Ht 5' 1\" (1.549 m)   Wt 91.2 kg (201 lb)   SpO2 99%   BMI 37.98 kg/m²   . Laboratory  Lab Results   Component Value Date/Time    Hemoglobin A1c 6.6 (H) 02/19/2020 03:55 AM     No results found for: LDL, LDLC, DLDLP  Lab Results   Component Value Date/Time    Creatinine (POC) 0.7 01/09/2019 11:20 AM    Creatinine 0.78 03/17/2020 05:38 AM     Lab Results   Component Value Date/Time    Sodium 137 03/17/2020 05:38 AM    Potassium 4.4 03/17/2020 05:38 AM    Chloride 102 03/17/2020 05:38 AM    CO2 29 03/17/2020 05:38 AM    Anion gap 6 03/17/2020 05:38 AM    Glucose 163 (H) 03/17/2020 05:38 AM    BUN 10 03/17/2020 05:38 AM    Creatinine 0.78 03/17/2020 05:38 AM    BUN/Creatinine ratio 13 03/17/2020 05:38 AM    GFR est AA >60 03/17/2020 05:38 AM    GFR est non-AA >60 03/17/2020 05:38 AM    Calcium 8.9 03/17/2020 05:38 AM    Bilirubin, total 1.0 02/18/2020 07:14 PM    AST (SGOT) 16 02/18/2020 07:14 PM    Alk.  phosphatase 129 (H) 02/18/2020 07:14 PM    Protein, total 7.1 02/18/2020 07:14 PM    Albumin 2.9 (L) 02/18/2020 07:14 PM    Globulin 4.2 (H) 02/18/2020 07:14 PM    A-G Ratio 0.7 (L) 02/18/2020 07:14 PM    ALT (SGPT) 15 02/18/2020 07:14 PM     Lab Results   Component Value Date/Time    ALT (SGPT) 15 02/18/2020 07:14 PM       Blood glucose pattern        Assessment and Plan   Nursing Diagnosis Risk for unstable blood glucose pattern   Nursing Intervention Domain 6265 Decision-making Support   Nursing Interventions Examined current inpatient diabetes control   Explored factors facilitating and impeding inpatient management     Evaluation   Ms Jessica Jacobo with well controlled  Type 2 diabetes, has achieved inpatient blood glucose target of 100-180mg/dl. She continues to be on basal/bolus/correction insulin regimen. Since A1C lower 6.6%, it is appropriate to lower her insulin needs accordingly. Recommendations   1. Continue current basal/bolus/correction insulin regimen. 2.  DISCHARGE  Recommendations  -REDUCE basal insulin (was on 40units daily according to PTA)=20units  -REDUCE mealtime bolus insulin (was on 10units with each meal)=4units    3. CONTINUE checking BG AC/HS and keep a log.        Billing Code(s)     [x] [id number] IP subsequent hospital care - 15 minutes    Julio Wolff CNS  Access via [facility] [phone]

## 2020-03-17 NOTE — PROGRESS NOTES
JUN:  -- Potential discharge Friday after wound care change on Thursday. -- Wound VAC ordered through [facility]. -- [facility] will follow. 1545 - Report to [facility] RN-CRM. CM will continue to follow and assist with JUN needs as they arise. . Available on [employer]. [address] [employer]  MSN, BSCS, RN, CRM - [phone].    1200 - [employer] Alert: Referral Received response from Dacia Scales re: Referral [id number] for patient in [id number]: Referral Received Thank you for the referral. We will evaluate the patient's information and get back to you. 1000 -   Orders entered to arrange for Wound Vac from home to [facility]. [facility] is not in network with patients insurance. [facility] form placed on chart for MD and Woundcare RN. Referral created via [employer] to [facility] @[phone] (f) [phone]. Update to Nadir Mendez RN via Mike Ch RN. CM will continue to follow. Available on [employer]. [address] [employer] MSN, 1400 [facility] [place], RN, [address] [facility] [address] - [phone].

## 2020-03-18 LAB
GLUCOSE BLD STRIP.AUTO-MCNC: 124 MG/DL (ref 65–100)
GLUCOSE BLD STRIP.AUTO-MCNC: 151 MG/DL (ref 65–100)
GLUCOSE BLD STRIP.AUTO-MCNC: 157 MG/DL (ref 65–100)
GLUCOSE BLD STRIP.AUTO-MCNC: 196 MG/DL (ref 65–100)
SERVICE CMNT-IMP: ABNORMAL

## 2020-03-18 PROCEDURE — 82962 GLUCOSE BLOOD TEST: CPT

## 2020-03-18 PROCEDURE — 65410000002 HC RM PRIVATE OB

## 2020-03-18 PROCEDURE — 74011250637 HC RX REV CODE- 250/637: Performed by: SURGERY

## 2020-03-18 PROCEDURE — 74011636637 HC RX REV CODE- 636/637: Performed by: SURGERY

## 2020-03-18 PROCEDURE — 74011000258 HC RX REV CODE- 258: Performed by: SURGERY

## 2020-03-18 PROCEDURE — 74011250636 HC RX REV CODE- 250/636: Performed by: SURGERY

## 2020-03-18 RX ADMIN — HYDROMORPHONE HYDROCHLORIDE 2 MG: 2 TABLET ORAL at 13:33

## 2020-03-18 RX ADMIN — METOPROLOL TARTRATE 50 MG: 50 TABLET, FILM COATED ORAL at 19:02

## 2020-03-18 RX ADMIN — THERA TABS 1 TABLET: TAB at 10:32

## 2020-03-18 RX ADMIN — CALCIUM 1500 MG: 500 TABLET ORAL at 21:03

## 2020-03-18 RX ADMIN — HYDROMORPHONE HYDROCHLORIDE 2 MG: 2 TABLET ORAL at 02:34

## 2020-03-18 RX ADMIN — INSULIN LISPRO 2 UNITS: 100 INJECTION, SOLUTION INTRAVENOUS; SUBCUTANEOUS at 13:55

## 2020-03-18 RX ADMIN — PIPERACILLIN AND TAZOBACTAM 3.38 G: 3; .375 INJECTION, POWDER, LYOPHILIZED, FOR SOLUTION INTRAVENOUS at 02:19

## 2020-03-18 RX ADMIN — HYDROMORPHONE HYDROCHLORIDE 2 MG: 2 TABLET ORAL at 21:03

## 2020-03-18 RX ADMIN — INSULIN GLARGINE 18 UNITS: 100 INJECTION, SOLUTION SUBCUTANEOUS at 22:34

## 2020-03-18 RX ADMIN — MICONAZOLE NITRATE 2 % TOPICAL POWDER: at 18:00

## 2020-03-18 RX ADMIN — MICONAZOLE NITRATE 2 % TOPICAL POWDER: at 09:00

## 2020-03-18 RX ADMIN — TRIAMTERENE AND HYDROCHLOROTHIAZIDE 1 TABLET: 37.5; 25 TABLET ORAL at 10:30

## 2020-03-18 RX ADMIN — METOPROLOL TARTRATE 50 MG: 50 TABLET, FILM COATED ORAL at 10:31

## 2020-03-18 RX ADMIN — PANTOPRAZOLE SODIUM 40 MG: 40 TABLET, DELAYED RELEASE ORAL at 07:11

## 2020-03-18 RX ADMIN — PIPERACILLIN AND TAZOBACTAM 3.38 G: 3; .375 INJECTION, POWDER, LYOPHILIZED, FOR SOLUTION INTRAVENOUS at 10:34

## 2020-03-18 RX ADMIN — DULOXETINE HYDROCHLORIDE 120 MG: 60 CAPSULE, DELAYED RELEASE ORAL at 10:31

## 2020-03-18 RX ADMIN — INSULIN LISPRO 2 UNITS: 100 INJECTION, SOLUTION INTRAVENOUS; SUBCUTANEOUS at 19:04

## 2020-03-18 NOTE — PROGRESS NOTES
Spiritual Care Assessment/Progress Note  [facility]      NAME: Andres Valderrama      MRN: [id number]  AGE: 70 y.o. SEX: female  Mu-ism Affiliation: Jehovah witness   Language: English     3/18/2020     Total Time (in minutes): 5     Spiritual Assessment begun in [address] through conversation with:         []Patient        [] Family    [] Friend(s)        Reason for Consult: Initial visit     Spiritual beliefs: (Please include comment if needed)     [] Identifies with a draryl tradition:         [] Supported by a darryl community:            [] Claims no spiritual orientation:           [] Seeking spiritual identity:                [] Adheres to an individual form of spirituality:           [x] Not able to assess:                           Identified resources for coping:      [] Prayer                               [] Music                  [] Guided Imagery     [] Family/friends                 [] Pet visits     [] Devotional reading                         [x] Unknown     [] Other:                                             Interventions offered during this visit: (See comments for more details)    Patient Interventions: Initial visit           Plan of Care:     [] Support spiritual and/or cultural needs    [] Support AMD and/or advance care planning process      [] Support grieving process   [] Coordinate Rites and/or Rituals    [] Coordination with community clergy   [] No spiritual needs identified at this time   [] Detailed Plan of Care below (See Comments)  [] Make referral to Music Therapy  [] Make referral to Pet Therapy     [] Make referral to Addiction services  [] Make referral to [facility]  [] Make referral to Spiritual Care Partner  [] No future visits requested        [x] Follow up visits as needed     Attempted to visit pt without success. Pt sleeping and no family present. Chaplains will continue to offer support as needed.   Chaplain Bernard MDiv, MS, BCC  [phone]

## 2020-03-18 NOTE — PROGRESS NOTES
General Surgery Daily Progress Note    Admit Date: 3/12/2020  Post-Operative Day: 5 Days Post-Op from Procedure(s):  WASHOUT ABDOMINAL WALL WOUND     Subjective:     Last 24 hrs: Pt is c/o some lower abd cramping - feels like menstrual cramps. Having gas and sm loose BMs       Objective:     Blood pressure 113/63, pulse 70, temperature 98.5 °F (36.9 °C), resp. rate 16, height 5' 1\" (1.549 m), weight 201 lb (91.2 kg), SpO2 95 %. Temp (24hrs), Av.7 °F (37.1 °C), Min:98.4 °F (36.9 °C), Max:99.4 °F (37.4 °C)      _____________________  Physical Exam:     Alert and Oriented, x3, in no acute distress. Cardiovascular: RRR, no peripheral edema  Abdomen: soft, vac intact in incisional wound; some induration and erythema L of wound      Assessment:   Active Problems:    Wound infection after surgery (3/12/2020)            Plan:     Wound vac change tomorrow at 8:30  Cont abx  Cont OOB as she is doing  Pain mgmt    Data Review:    Recent Labs     20  0538   WBC 8.4   HGB 8.7*   HCT 29.1*        Recent Labs     20  0538      K 4.4      CO2 29   *   BUN 10   CREA 0.78   CA 8.9     No results for input(s): AML, LPSE in the last 72 hours.         ______________________  Medications:    Current Facility-Administered Medications   Medication Dose Route Frequency    miconazole (MICOTIN) 2 % powder   Topical BID    piperacillin-tazobactam (ZOSYN) 3.375 g in 0.9% sodium chloride (MBP/ADV) 100 mL  3.375 g IntraVENous Q8H    insulin lispro (HUMALOG) injection 4 Units  4 Units SubCUTAneous TID WITH MEALS    baclofen (LIORESAL) tablet 10 mg  10 mg Oral DAILY PRN    calcium carbonate (OS-DANI) tablet 1,500 mg [elemental]  1,500 mg Oral QHS    docusate sodium (COLACE) capsule 100 mg  100 mg Oral BID    HYDROmorphone (DILAUDID) tablet 2 mg  2 mg Oral Q6H PRN    LORazepam (ATIVAN) tablet 0.5 mg  0.5 mg Oral Q8H PRN    metoprolol tartrate (LOPRESSOR) tablet 50 mg  50 mg Oral BID    therapeutic multivitamin (THERAGRAN) tablet 1 Tab  1 Tab Oral DAILY    pantoprazole (PROTONIX) tablet 40 mg  40 mg Oral DAILY    triamterene-hydroCHLOROthiazide (MAXZIDE) 37.5-25 mg per tablet 1 Tab  1 Tab Oral DAILY    insulin lispro (HUMALOG) injection   SubCUTAneous AC&HS    insulin glargine (LANTUS) injection 18 Units  0.2 Units/kg SubCUTAneous QHS    glucose chewable tablet 16 g  4 Tab Oral PRN    glucagon (GLUCAGEN) injection 1 mg  1 mg IntraMUSCular PRN    dextrose 10% infusion 0-250 mL  0-250 mL IntraVENous PRN    sodium chloride (NS) flush 5-40 mL  5-40 mL IntraVENous Q8H    sodium chloride (NS) flush 5-40 mL  5-40 mL IntraVENous PRN    ondansetron (ZOFRAN) injection 4 mg  4 mg IntraVENous Q4H PRN    DULoxetine (CYMBALTA) capsule 120 mg  120 mg Oral DAILY    HYDROmorphone (PF) (DILAUDID) injection 1 mg  1 mg IntraVENous Q3H PRN    LORazepam (ATIVAN) injection 1 mg  1 mg IntraVENous Q6H PRN       Nery Reyna NP  3/18/2020      I have independently examined the patient and have reviewed the chart. I agree with the above plan. Patient doing well. Has some cramping pains which I think are related to the wound vac and packing. Otherwise doing well. Plan for wound vac change tomorrow. Working on home wound vac approval.  Continue antibiotics. Anticipate d/c home later this week once vac set up.       Wagner Dwyer MD  3/18/2020  10:34 AM

## 2020-03-18 NOTE — PROGRESS NOTES
.Bedside shift change report given to Jasvir Quintana RN (oncoming nurse) by Ebony Najjar, RN (offgoing nurse). Report included the following information SBAR, Kardex, Procedure Summary, Intake/Output, MAR, Accordion and Recent Results.

## 2020-03-19 LAB
ANION GAP SERPL CALC-SCNC: 5 MMOL/L (ref 5–15)
ANION GAP SERPL CALC-SCNC: 6 MMOL/L (ref 5–15)
BASOPHILS # BLD: 0 K/UL (ref 0–0.1)
BASOPHILS NFR BLD: 0 % (ref 0–1)
BUN SERPL-MCNC: 8 MG/DL (ref 6–20)
BUN SERPL-MCNC: 8 MG/DL (ref 6–20)
BUN/CREAT SERPL: 11 (ref 12–20)
BUN/CREAT SERPL: 12 (ref 12–20)
CALCIUM SERPL-MCNC: 9.2 MG/DL (ref 8.5–10.1)
CALCIUM SERPL-MCNC: 9.4 MG/DL (ref 8.5–10.1)
CHLORIDE SERPL-SCNC: 102 MMOL/L (ref 97–108)
CHLORIDE SERPL-SCNC: 103 MMOL/L (ref 97–108)
CO2 SERPL-SCNC: 26 MMOL/L (ref 21–32)
CO2 SERPL-SCNC: 26 MMOL/L (ref 21–32)
CREAT SERPL-MCNC: 0.68 MG/DL (ref 0.55–1.02)
CREAT SERPL-MCNC: 0.76 MG/DL (ref 0.55–1.02)
DIFFERENTIAL METHOD BLD: ABNORMAL
EOSINOPHIL # BLD: 0.3 K/UL (ref 0–0.4)
EOSINOPHIL NFR BLD: 3 % (ref 0–7)
ERYTHROCYTE [DISTWIDTH] IN BLOOD BY AUTOMATED COUNT: 15.9 % (ref 11.5–14.5)
GLUCOSE BLD STRIP.AUTO-MCNC: 133 MG/DL (ref 65–100)
GLUCOSE BLD STRIP.AUTO-MCNC: 145 MG/DL (ref 65–100)
GLUCOSE BLD STRIP.AUTO-MCNC: 149 MG/DL (ref 65–100)
GLUCOSE BLD STRIP.AUTO-MCNC: 154 MG/DL (ref 65–100)
GLUCOSE SERPL-MCNC: 126 MG/DL (ref 65–100)
GLUCOSE SERPL-MCNC: 150 MG/DL (ref 65–100)
HCT VFR BLD AUTO: 30.2 % (ref 35–47)
HGB BLD-MCNC: 9.2 G/DL (ref 11.5–16)
IMM GRANULOCYTES # BLD AUTO: 0 K/UL (ref 0–0.04)
IMM GRANULOCYTES NFR BLD AUTO: 0 % (ref 0–0.5)
LYMPHOCYTES # BLD: 2.2 K/UL (ref 0.8–3.5)
LYMPHOCYTES NFR BLD: 27 % (ref 12–49)
MCH RBC QN AUTO: 24.7 PG (ref 26–34)
MCHC RBC AUTO-ENTMCNC: 30.5 G/DL (ref 30–36.5)
MCV RBC AUTO: 81.2 FL (ref 80–99)
MONOCYTES # BLD: 0.7 K/UL (ref 0–1)
MONOCYTES NFR BLD: 8 % (ref 5–13)
NEUTS SEG # BLD: 5.1 K/UL (ref 1.8–8)
NEUTS SEG NFR BLD: 62 % (ref 32–75)
NRBC # BLD: 0 K/UL (ref 0–0.01)
NRBC BLD-RTO: 0 PER 100 WBC
PLATELET # BLD AUTO: ABNORMAL K/UL (ref 150–400)
PLATELET COMMENTS,PCOM: ABNORMAL
POTASSIUM SERPL-SCNC: 3.7 MMOL/L (ref 3.5–5.1)
POTASSIUM SERPL-SCNC: 3.8 MMOL/L (ref 3.5–5.1)
RBC # BLD AUTO: 3.72 M/UL (ref 3.8–5.2)
RBC MORPH BLD: ABNORMAL
SERVICE CMNT-IMP: ABNORMAL
SODIUM SERPL-SCNC: 134 MMOL/L (ref 136–145)
SODIUM SERPL-SCNC: 134 MMOL/L (ref 136–145)
WBC # BLD AUTO: 8.3 K/UL (ref 3.6–11)

## 2020-03-19 PROCEDURE — 36415 COLL VENOUS BLD VENIPUNCTURE: CPT

## 2020-03-19 PROCEDURE — 65410000002 HC RM PRIVATE OB

## 2020-03-19 PROCEDURE — 74011250637 HC RX REV CODE- 250/637: Performed by: SURGERY

## 2020-03-19 PROCEDURE — 97605 NEG PRS WND THER DME<=50SQCM: CPT

## 2020-03-19 PROCEDURE — 74011250636 HC RX REV CODE- 250/636: Performed by: NURSE PRACTITIONER

## 2020-03-19 PROCEDURE — 74011636637 HC RX REV CODE- 636/637: Performed by: SURGERY

## 2020-03-19 PROCEDURE — 77030019952 HC CANSTR VAC ASST KCON -B

## 2020-03-19 PROCEDURE — 77030018836 HC SOL IRR NACL ICUM -A

## 2020-03-19 PROCEDURE — 80048 BASIC METABOLIC PNL TOTAL CA: CPT

## 2020-03-19 PROCEDURE — 77030018717 HC DRSG GRNUFM KCON -B

## 2020-03-19 PROCEDURE — 74011000258 HC RX REV CODE- 258: Performed by: NURSE PRACTITIONER

## 2020-03-19 PROCEDURE — 82962 GLUCOSE BLOOD TEST: CPT

## 2020-03-19 PROCEDURE — 85025 COMPLETE CBC W/AUTO DIFF WBC: CPT

## 2020-03-19 RX ORDER — INSULIN GLARGINE 100 [IU]/ML
20 INJECTION, SOLUTION SUBCUTANEOUS
Qty: 1 VIAL | Refills: 0 | Status: SHIPPED | OUTPATIENT
Start: 2020-03-19

## 2020-03-19 RX ORDER — AMOXICILLIN AND CLAVULANATE POTASSIUM 875; 125 MG/1; MG/1
1 TABLET, FILM COATED ORAL EVERY 12 HOURS
Qty: 20 TAB | Refills: 0 | Status: SHIPPED | OUTPATIENT
Start: 2020-03-19 | End: 2020-03-19

## 2020-03-19 RX ORDER — AMOXICILLIN AND CLAVULANATE POTASSIUM 875; 125 MG/1; MG/1
1 TABLET, FILM COATED ORAL EVERY 12 HOURS
Qty: 20 TAB | Refills: 0 | Status: SHIPPED | OUTPATIENT
Start: 2020-03-19 | End: 2020-03-29

## 2020-03-19 RX ORDER — INSULIN LISPRO 100 [IU]/ML
5 INJECTION, SOLUTION INTRAVENOUS; SUBCUTANEOUS
Qty: 1 VIAL | Refills: 0 | Status: SHIPPED
Start: 2020-03-19

## 2020-03-19 RX ORDER — HYDROMORPHONE HYDROCHLORIDE 2 MG/1
2 TABLET ORAL
Qty: 15 TAB | Refills: 0 | Status: SHIPPED | OUTPATIENT
Start: 2020-03-19 | End: 2020-04-02

## 2020-03-19 RX ADMIN — INSULIN LISPRO 2 UNITS: 100 INJECTION, SOLUTION INTRAVENOUS; SUBCUTANEOUS at 18:08

## 2020-03-19 RX ADMIN — METOPROLOL TARTRATE 50 MG: 50 TABLET, FILM COATED ORAL at 18:07

## 2020-03-19 RX ADMIN — INSULIN LISPRO 4 UNITS: 100 INJECTION, SOLUTION INTRAVENOUS; SUBCUTANEOUS at 09:44

## 2020-03-19 RX ADMIN — TRIAMTERENE AND HYDROCHLOROTHIAZIDE 1 TABLET: 37.5; 25 TABLET ORAL at 09:20

## 2020-03-19 RX ADMIN — INSULIN LISPRO 4 UNITS: 100 INJECTION, SOLUTION INTRAVENOUS; SUBCUTANEOUS at 13:54

## 2020-03-19 RX ADMIN — MICONAZOLE NITRATE 2 % TOPICAL POWDER: at 09:00

## 2020-03-19 RX ADMIN — DOCUSATE SODIUM 100 MG: 100 CAPSULE, LIQUID FILLED ORAL at 09:21

## 2020-03-19 RX ADMIN — HYDROMORPHONE HYDROCHLORIDE 2 MG: 2 TABLET ORAL at 22:01

## 2020-03-19 RX ADMIN — METOPROLOL TARTRATE 50 MG: 50 TABLET, FILM COATED ORAL at 09:21

## 2020-03-19 RX ADMIN — CALCIUM 1500 MG: 500 TABLET ORAL at 21:01

## 2020-03-19 RX ADMIN — Medication 10 ML: at 14:00

## 2020-03-19 RX ADMIN — INSULIN LISPRO 4 UNITS: 100 INJECTION, SOLUTION INTRAVENOUS; SUBCUTANEOUS at 18:08

## 2020-03-19 RX ADMIN — INSULIN GLARGINE 18 UNITS: 100 INJECTION, SOLUTION SUBCUTANEOUS at 22:01

## 2020-03-19 RX ADMIN — HYDROMORPHONE HYDROCHLORIDE 2 MG: 2 TABLET ORAL at 09:20

## 2020-03-19 RX ADMIN — DULOXETINE HYDROCHLORIDE 120 MG: 60 CAPSULE, DELAYED RELEASE ORAL at 09:20

## 2020-03-19 RX ADMIN — PIPERACILLIN AND TAZOBACTAM 3.38 G: 3; .375 INJECTION, POWDER, LYOPHILIZED, FOR SOLUTION INTRAVENOUS at 21:01

## 2020-03-19 RX ADMIN — HYDROMORPHONE HYDROCHLORIDE 2 MG: 2 TABLET ORAL at 15:37

## 2020-03-19 RX ADMIN — INSULIN LISPRO 2 UNITS: 100 INJECTION, SOLUTION INTRAVENOUS; SUBCUTANEOUS at 07:00

## 2020-03-19 RX ADMIN — MICONAZOLE NITRATE 2 % TOPICAL POWDER: at 18:00

## 2020-03-19 RX ADMIN — THERA TABS 1 TABLET: TAB at 09:20

## 2020-03-19 RX ADMIN — Medication 10 ML: at 21:01

## 2020-03-19 RX ADMIN — PANTOPRAZOLE SODIUM 40 MG: 40 TABLET, DELAYED RELEASE ORAL at 06:51

## 2020-03-19 NOTE — PROGRESS NOTES
1525 - TANYA spoke with patients PCP - Dr. Sage Alcantara and she asked that we make sure patient has pain meds (dilaudid) filled to cover until her appointment with Dr. Jt Lopez on 04/01/2020. They do not have a formulary at [facility] [place]. LYFT ride setup for noon tomorrow. Patient must be at [facility] Main Entr[facility] at 11:50a. Update to [facility]. CM will continue to follow and assist with JUN needs as they arise. . Available on [employer]. Jorje  MSN, BSCS, RN, [facility] - [phone].      1500 - Follow up appointment(s). CM will continue to follow. [address] MSN, 1400 [facility], RN, [address] [place] [address] Avenue - [phone]. Follow-up Information     Follow up With Specialties Details Why Contact Info    Marlena Sandoval MD General Surgery, Surgical Oncology, Colon and Rectal Surgery On 4/1/2020 [email or web address] (Patient must set up transportation through insurance - [employer] [phone] [place] [phone] [phone]      [facility] Internal Medicine   [address]  [phone]      [facility] [address] [address] ride  On 3/20/2020 @ [phone] for Hospital discharge Please be at [facility] Main Entrance at 11:55a for noon ride home. [facility]  On 3/21/2020 RN will touch base with patient for appointment [facility] @[phone]       1400 Vicky MARTÍNEZ spoke extensively with patient. Patient states she does NOT qualify for Medicaid. She knows a person that could help her with personal care if she needed but at this   time, she is not interested. Patient is also aware that [facility] @[phone] will be contacting her for admission time on Saturday. Patient aware Blanche Figueredo will be delivering wound vac in morning and will be ready at  12:00 [facility] for [place] ride home. Update to [address] [place] [address] RN. CM will continue to follow and assist with JUN needs as they arise. . Available on Cleverbug.  [address]  MSN, AWA, RN, CRM - [phone].    3538 -  JUN: CM will continue to follow and assist with JUN needs as they arise. Available on SEAT 4a. Jorje  MSN, BSCS, RN, CRM - [phone].    -- Dominique Hernandez by [facility] accepted patient. They will be in touch with patient for Saturday admission. Allscripts Alert: YES response from Dominique Hernandez by [facility] re: Referral [id number] for patient in [id number]: Yes, willing to accept patient  -- [facility] plans to deliver wound vac to hospital room tomorrow. -- [facility] Medicaid screening in process for help at home. -- Sandra Lara [phone]      [address] [place] [address] patient. TANYA spoke with Mariel Lara [phone] and she referred me to [address] [place] [address] for skilled nursing. Patient has been open to them before. Sandra Cr has their own [facility] relationships. TANYA spoke with Nancy Miller RN  and she has deleted referral.   Referral created via Allscripts to Dominique Monk by [facility] @[phone] -[address], [place] [address](W) [phone]. CM will continue to follow and assist with JUN needs as they arise. . Available on SEAT 4a. [address] [facility]  MSN, 1400 [facility], RN, [address] [facility] [address] - [phone].

## 2020-03-19 NOTE — DISCHARGE INSTRUCTIONS
[facility]  Discharge Instructions    Admit Date: 3/12/2020      Patient ID:  Avinash Oglesby  [id number]  70 y.o.  1945    Admit Date: 3/12/2020    Discharge Date: 3/19/2020    Last Procedure: Procedure(s):  WASHOUT ABDOMINAL WALL WOUND      Patient Instructions:     FOLLOW-UP:  Follow-up with Dr. Nan Amado on April 1, 2020 at 9:40 am.  Call office at [phone] if you need to make changes to this appointment. Please arrive by 20 minutes before scheduled appointment time to complete paperwork. Make sure to bring your ID card and insurance information. We are located at [facility] in the [facility]. Call your physician if you have persistent fevers greater than 101 accompanied by fatigue, sweats or chills, drainage from your wound that is not clear or looks infected, increasing abdominal pain, problems urinating, or persistent nausea/vomiting. Diabetes Management:  1. Take a blood glucose reading three times daily:  First thing in the morning before you eat or drink anything. Second and third readings before lunch and dinner. Avoid snacks at night. 2. Take your insulin as directed: Lantus first thing in the morning and Humalog with meals. 3. Make a follow up appointment with your endocrinologist or primary care physician. Please see him within the next 2-4 weeks. 4. Make sure to get a DILATED eye exam every year. This checks for retinal blood vessel damage caused by long term high blood sugar. 5. Make sure to examine your feet every day looking for any wound or foot irritation as sensation can be impaired in your feet. Always wear socks and/or slippers even while at home. This will protect your feet from injury. 6. Diabetes Self Management Training:  Outpatient appointments are available with a certified diabetic educator who can  you with meal planning, insulin administration and other diabetes management strategies. Please call [phone] to schedule at a location close to your home. ACTIVITY:  You are encouraged to cough and deep breath or use your incentive spirometer if you were given one, every 15-30 minutes when awake. This will help prevent respiratory complications and low grade fevers post-operatively. You may want to hug a pillow when coughing and sneezing to add additional support to the surgical area(s) which will decrease pain during these times. You are encouraged to walk and engage in light activity for the next two weeks. You should not lift more than 20 pounds while you have a wound vac, as it could put you at increased risk for a post-operative hernia. Twenty pounds is roughly equivalent to a plastic bag of groceries. · No driving while you are taking narcotics. DIET:  Diabetic Diet as tolerated. You may find it helpful to eat smaller sized, light meals more frequently for the first few days following surgery. It is a good idea to eat a high fiber diet and take in plenty of fluids to prevent constipation. If you do become constipated you may want to take a mild laxative or take ducolax tablets on a daily basis until your bowel habits are regular. Constipation can be very uncomfortable, along with straining, after recent abdominal surgery. WOUND CARE INSTRUCTIONS:   Wound Vac care as directed by Home Health. MEDICATIONS:  Try to take narcotic medications and anti-inflammatory medications, such as tylenol, ibuprofen, naprosyn, etc., with food. This will minimize stomach upset from the medication. Should you develop nausea and vomiting from the pain medication, or develop a rash, please discontinue the medication and contact your physician. You should not drive, make important decisions, or operate machinery when taking narcotic pain medication. · It is important that you take the medication exactly as they are prescribed.    · Keep your medication in the bottles provided by the pharmacist and keep a list of the medication names, dosages, and times to be taken in your wallet. · Do not take other medications without consulting your doctor. QUESTIONS:  Please feel free to call the office [phone]) if you have any questions, and they will be glad to assist you.

## 2020-03-19 NOTE — PROGRESS NOTES
Bedside and Verbal shift change report given to [address] Wick (oncoming nurse) by Yudi Ramesh (offgoing nurse).  Report included the following information SBAR, Kardex, OR Summary, Procedure Summary, Intake/Output and MAR.     0800- assessment complete    0830- Wound care at bedside    1300- Consult to ID called in    1400- CM at bedside

## 2020-03-19 NOTE — PROGRESS NOTES
Problem: Diabetes Self-Management  Goal: *Disease process and treatment process  Description: Define diabetes and identify own type of diabetes; list 3 options for treating diabetes. Outcome: Progressing Towards Goal  Goal: *Incorporating nutritional management into lifestyle  Description: Describe effect of type, amount and timing of food on blood glucose; list 3 methods for planning meals. Outcome: Progressing Towards Goal  Goal: *Incorporating physical activity into lifestyle  Description: State effect of exercise on blood glucose levels. Outcome: Progressing Towards Goal  Goal: *Developing strategies to promote health/change behavior  Description: Define the ABC's of diabetes; identify appropriate screenings, schedule and personal plan for screenings. Outcome: Progressing Towards Goal  Goal: *Using medications safely  Description: State effect of diabetes medications on diabetes; name diabetes medication taking, action and side effects. Outcome: Progressing Towards Goal  Goal: *Monitoring blood glucose, interpreting and using results  Description: Identify recommended blood glucose targets  and personal targets. Outcome: Progressing Towards Goal  Goal: *Prevention, detection, treatment of acute complications  Description: List symptoms of hyper- and hypoglycemia; describe how to treat low blood sugar and actions for lowering  high blood glucose level. Outcome: Progressing Towards Goal  Goal: *Prevention, detection and treatment of chronic complications  Description: Define the natural course of diabetes and describe the relationship of blood glucose levels to long term complications of diabetes.   Outcome: Progressing Towards Goal  Goal: *Developing strategies to address psychosocial issues  Description: Describe feelings about living with diabetes; identify support needed and support network  Outcome: Progressing Towards Goal  Goal: *Insulin pump training  Outcome: Progressing Towards Goal  Goal: *Sick day guidelines  Outcome: Progressing Towards Goal  Goal: *Patient Specific Goal (EDIT GOAL, INSERT TEXT)  Outcome: Progressing Towards Goal     Problem: Patient Education: Go to Patient Education Activity  Goal: Patient/Family Education  Outcome: Progressing Towards Goal     Problem: Risk for Spread of Infection  Goal: Prevent transmission of infectious organism to others  Description: Prevent the transmission of infectious organisms to other patients, staff members, and visitors. Outcome: Progressing Towards Goal     Problem: Patient Education:  Go to Education Activity  Goal: Patient/Family Education  Outcome: Progressing Towards Goal     Problem: Falls - Risk of  Goal: *Absence of Falls  Description: Document Henrik Sinha Fall Risk and appropriate interventions in the flowsheet. Outcome: Progressing Towards Goal  Note: Fall Risk Interventions:            Medication Interventions: Teach patient to arise slowly                   Problem: Patient Education: Go to Patient Education Activity  Goal: Patient/Family Education  Outcome: Progressing Towards Goal     Problem: Pain  Goal: *Control of Pain  Outcome: Progressing Towards Goal     Problem: Patient Education: Go to Patient Education Activity  Goal: Patient/Family Education  Outcome: Progressing Towards Goal     Problem: Pressure Injury - Risk of  Goal: *Prevention of pressure injury  Description: Document Anderson Scale and appropriate interventions in the flowsheet. Outcome: Progressing Towards Goal  Note: Pressure Injury Interventions:             Activity Interventions: Increase time out of bed         Nutrition Interventions: Document food/fluid/supplement intake                     Problem: Patient Education: Go to Patient Education Activity  Goal: Patient/Family Education  Outcome: Progressing Towards Goal     Problem: General Infection Care Plan (Adult and Pediatric)  Goal: Improvement in signs and symptoms of infection  Outcome: Progressing Towards Goal  Goal: *Optimize nutritional status  Outcome: Progressing Towards Goal     Problem: Patient Education: Go to Patient Education Activity  Goal: Patient/Family Education  Outcome: Progressing Towards Goal     Problem: Discharge Planning  Goal: *Discharge to safe environment  Outcome: Progressing Towards Goal

## 2020-03-19 NOTE — PROGRESS NOTES
CM received call from [address] [place] [address] with Micheline Brenner. Pt's son is questioning pt readiness for Medicaid screening, however if pt is alert and oriented this will be pt's decision.      Merline Raddle, RN

## 2020-03-19 NOTE — PROGRESS NOTES
General Surgery Daily Progress Note    Admit Date: 3/12/2020  Post-Operative Day: 6 Days Post-Op from Procedure(s):  WASHOUT ABDOMINAL WALL WOUND     Subjective:     Last 24 hrs: No acute events overnight. She continues to complain of lower abdominal cramping. She is eating but does not have a great appetite. She is having bowel function. Afebrile. She tolerated her VAC change at the bedside well today. Objective:     Blood pressure 118/62, pulse 66, temperature 98.4 °F (36.9 °C), resp. rate 16, height 5' 1\" (1.549 m), weight 201 lb (91.2 kg), SpO2 98 %. Temp (24hrs), Av.3 °F (36.8 °C), Min:98.1 °F (36.7 °C), Max:98.5 °F (36.9 °C)      _____________________  Physical Exam:       GEN: NAD, Alert and Oriented, x3  Cardiovascular: RRR, no peripheral edema  Abdomen: soft, ND. The wound VAC was removed and the cavity was explored. There was minimal amounts of fibrinous or necrotic tissue. The majority of the tissue appeared healthy. There is a small amount of erythema to the left of the midline incision. This appears stable and no underlying abscesses noted on exam and when probing the wound. Her fascia appears to remain intact without evidence of exposed mesh. Ext: Warm, well perfused    Assessment:   Active Problems:    Wound infection after surgery (3/12/2020)    Diabetes Mellitus  Obesity    Plan:     - Wound vac changed today. Her next VAC change would be due for Monday. - Continue antibiotics for a planned 2-week course. - Working with case management for home wound VAC approval.  - PRN pain control  -Hopefully she could go home either later today or tomorrow morning if her wound VAC is approved. Isabel Nunn MD  3/19/2020  9:55 AM      Data Review:    Recent Labs     20  0649 20  0538   WBC 8.3 8.4   HGB 9.2* 8.7*   HCT 30.2* 29.1*   PLT UNABLE TO REPORT ACCURATE COUNT DUE TO PLATELET AGGREGATION, HOWEVER, PLATELETS APPEAR NORMAL IN NUMBER ON SMEAR.   PLEASE RESUBMIT SODIUM CITRATE (BLUE) AND EDTA (LAVENDER) TUBES FOR HEMATOLOGICAL TESTING. 252     Recent Labs     03/19/20  0649 03/19/20  0327 03/17/20  0538   * 134* 137   K 3.8 3.7 4.4    102 102   CO2 26 26 29   * 126* 163*   BUN 8 8 10   CREA 0.68 0.76 0.78   CA 9.2 9.4 8.9     No results for input(s): AML, LPSE in the last 72 hours.         ______________________  Medications:    Current Facility-Administered Medications   Medication Dose Route Frequency    miconazole (MICOTIN) 2 % powder   Topical BID    insulin lispro (HUMALOG) injection 4 Units  4 Units SubCUTAneous TID WITH MEALS    baclofen (LIORESAL) tablet 10 mg  10 mg Oral DAILY PRN    calcium carbonate (OS-DANI) tablet 1,500 mg [elemental]  1,500 mg Oral QHS    docusate sodium (COLACE) capsule 100 mg  100 mg Oral BID    HYDROmorphone (DILAUDID) tablet 2 mg  2 mg Oral Q6H PRN    LORazepam (ATIVAN) tablet 0.5 mg  0.5 mg Oral Q8H PRN    metoprolol tartrate (LOPRESSOR) tablet 50 mg  50 mg Oral BID    therapeutic multivitamin (THERAGRAN) tablet 1 Tab  1 Tab Oral DAILY    pantoprazole (PROTONIX) tablet 40 mg  40 mg Oral DAILY    triamterene-hydroCHLOROthiazide (MAXZIDE) 37.5-25 mg per tablet 1 Tab  1 Tab Oral DAILY    insulin lispro (HUMALOG) injection   SubCUTAneous AC&HS    insulin glargine (LANTUS) injection 18 Units  0.2 Units/kg SubCUTAneous QHS    glucose chewable tablet 16 g  4 Tab Oral PRN    glucagon (GLUCAGEN) injection 1 mg  1 mg IntraMUSCular PRN    dextrose 10% infusion 0-250 mL  0-250 mL IntraVENous PRN    sodium chloride (NS) flush 5-40 mL  5-40 mL IntraVENous Q8H    sodium chloride (NS) flush 5-40 mL  5-40 mL IntraVENous PRN    ondansetron (ZOFRAN) injection 4 mg  4 mg IntraVENous Q4H PRN    DULoxetine (CYMBALTA) capsule 120 mg  120 mg Oral DAILY    HYDROmorphone (PF) (DILAUDID) injection 1 mg  1 mg IntraVENous Q3H PRN    LORazepam (ATIVAN) injection 1 mg  1 mg IntraVENous Q6H PRN       Inez Dickens, MD  3/19/2020        Addendum: The patient's infection appeared to be an infected hematoma that occurred as a result of complication from her hernia repair surgery. She was at increased risk for this due to her obesity and diabetes.       Niki Santiago MD

## 2020-03-19 NOTE — DIABETES MGMT
JOAO MATOS  CLINICAL NURSE SPECIALIST   DISCHARGE RECOMMENDATIONS    Presentation   Olegario Obrien is a 70 y.o. female admitted with abdominal wound infection. Preparing for discharge. Subjective   Ms Yessi Weiner remains in acute care. Tolerating VAC change well, case management working on home care for wound vac change. Blood glucose in goal of 100-180. Objective   Physical exam    General Alert, oriented and in no acute distress/ill-appearing. Conversant and cooperative. Vital Signs   Visit Vitals  /62 (BP 1 Location: Left arm, BP Patient Position: At rest)   Pulse 66   Temp 98.4 °F (36.9 °C)   Resp 16   Ht 5' 1\" (1.549 m)   Wt 91.2 kg (201 lb)   SpO2 98%   BMI 37.98 kg/m²     Skin  Warm and dry  Heart   Regular rate and rhythm. No murmurs, rubs or gallops  Lungs  Clear to auscultation without rales or rhonchi  Extremities No foot wounds    Blood glucose pattern      Assessment and Plan   Nursing Diagnosis Readiness for enhanced self-care   Readiness for enhanced health management   Nursing Intervention Domain Self-care  Health Management   Nursing Interventions Discussed diabetes self-management practices that are improving and not improving long-term diabetes control  Identified specific measures that the patient will commit to address discharge until seen by primary care provider  Offered group diabetes education through the [facility] - Phone [phone] to schedule appointment     Evaluation   Patient is prepared for discharge. Discharge Recommendations   Until seen by primary care provider,    Return to pre-hospitalization diabetes medication regimen, specifically Blood glucose monitoring before breakfast, lunch, dinner and bedtime  1. Adjust basal insulin to 20 units Lantus daily (was on 40 units daily). If blood glucose is over 200 in the morning, call PCP to discuss dose changes.     3.    Continue mealtime/bolus insulin at   [x] 5 units at each meal (was on 10 units/meal)   If blood glucose is over 200 before breakfast, lunch or dinner- increase  dose to 8 units Humalog with meals.       Billing Code(s)     [x] [address] [facility], [address] [address]  [phone]

## 2020-03-19 NOTE — WOUND CARE
WOCN Note:  
  
Follow up for VAC change. Dr Price Goes at the bedside for assessment. 
  
Chart reviewed. Admitted DX:  Wound infection after surgery 3.13.2020 S/P Incision and drainage of abdominal wall wound with debridement of necrotic fat and tissue and washout of abdominal wall wound.   
  
Assessment:  
Patient is A&O x 3, communicative, continent and mobile. Patient sleeps in the recliner. Bed: total care Patient reports \"crampy\" pain. Sacrum and buttocks intact without erythema. Scattered areas of dark dyschromia. 
  
1. POA Abdomen, Open surgical wound: 7 x 2.2 x 7 cm; tunnels 6.8 cm at 3 o'clock; tunnels 7.7 cm at 9 o'clock; tunnels 6.8 cm at 12 o'clock; 80% red 20% tan/yellow devitalized tissue; moderate serosanguinous drainage (300 cc in the canister); no odor or erythema; Induration to periwound L>R; mild pink erythema on the left. 2.  Groin folds, red moist rash consistent with Candidiasis. 
  
Treatment: 
Abdominal wound:  Removed 1 black and 1 white; cleansed with saline; skin prep and picture framed periwound; resumed NPWT at 125 mmHg using 1 continuous piece of white foam to the base and 1 continuous piece of black. Connected to new canister. 
  
Wound, Pressure Prevention & Skin Care Recommendations: 1. Minimize layers of linen/pads under patient to optimize support surface. 2.  Turn/reposition approximately every 2 hours and offload heels 3. Abdominal wound:  Continue NPWT at 125 mmHg. Change twice weekly. 4.  Groin folds:  Continue antifungal powder twice daily. 
  
Discussed above plan with patient and Dr Albert Osuna. 
  
Transition of Care: Plan to follow for Monday and Thursday VAC changes. 
  
CORDELL Lewis RN [address] Inpatient Wound Care Available on [employer] Pager [phone] Office [phone]

## 2020-03-19 NOTE — PROGRESS NOTES
Bedside and Verbal shift change report given to MALIK Plascencia RN (oncoming nurse) by Robert Luna. Eliana Grove RN (offgoing nurse). Report included the following information SBAR, Kardex, Intake/Output, MAR and Accordion. 0330- Labs collected and sent down. 1- Lab called stating labs had clotted, need to redraw.

## 2020-03-20 VITALS
TEMPERATURE: 97.9 F | RESPIRATION RATE: 16 BRPM | HEART RATE: 68 BPM | WEIGHT: 201 LBS | SYSTOLIC BLOOD PRESSURE: 123 MMHG | DIASTOLIC BLOOD PRESSURE: 76 MMHG | OXYGEN SATURATION: 98 % | HEIGHT: 61 IN | BODY MASS INDEX: 37.95 KG/M2

## 2020-03-20 LAB
GLUCOSE BLD STRIP.AUTO-MCNC: 137 MG/DL (ref 65–100)
SERVICE CMNT-IMP: ABNORMAL

## 2020-03-20 PROCEDURE — 74011000258 HC RX REV CODE- 258: Performed by: NURSE PRACTITIONER

## 2020-03-20 PROCEDURE — 82962 GLUCOSE BLOOD TEST: CPT

## 2020-03-20 PROCEDURE — 74011250637 HC RX REV CODE- 250/637: Performed by: SURGERY

## 2020-03-20 PROCEDURE — 74011250636 HC RX REV CODE- 250/636: Performed by: NURSE PRACTITIONER

## 2020-03-20 RX ADMIN — DULOXETINE HYDROCHLORIDE 120 MG: 60 CAPSULE, DELAYED RELEASE ORAL at 08:39

## 2020-03-20 RX ADMIN — THERA TABS 1 TABLET: TAB at 08:39

## 2020-03-20 RX ADMIN — TRIAMTERENE AND HYDROCHLOROTHIAZIDE 1 TABLET: 37.5; 25 TABLET ORAL at 08:39

## 2020-03-20 RX ADMIN — PIPERACILLIN AND TAZOBACTAM 3.38 G: 3; .375 INJECTION, POWDER, LYOPHILIZED, FOR SOLUTION INTRAVENOUS at 05:22

## 2020-03-20 RX ADMIN — METOPROLOL TARTRATE 50 MG: 50 TABLET, FILM COATED ORAL at 08:39

## 2020-03-20 RX ADMIN — PANTOPRAZOLE SODIUM 40 MG: 40 TABLET, DELAYED RELEASE ORAL at 07:03

## 2020-03-20 RX ADMIN — MICONAZOLE NITRATE 2 % TOPICAL POWDER: at 08:45

## 2020-03-20 NOTE — DIABETES MGMT
JOAO MATOS  CLINICAL NURSE SPECIALIST   DISCHARGE RECOMMENDATIONS    Presentation   Erlinda Mccloud is a 70 y.o. female admitted with abdominal wound infection. Preparing for discharge. Subjective   Ms Edith Cai remains in acute care. Tolerating VAC change well, case management working on home care for wound vac change. Blood glucose in goal of 100-180. Objective   Physical exam    General Alert, oriented and in no acute distress/ill-appearing. Conversant and cooperative. Vital Signs   Visit Vitals  /76 (BP 1 Location: Left arm, BP Patient Position: At rest)   Pulse 68   Temp 97.9 °F (36.6 °C)   Resp 16   Ht 5' 1\" (1.549 m)   Wt 91.2 kg (201 lb)   SpO2 98%   BMI 37.98 kg/m²     Skin  Warm and dry  Heart   Regular rate and rhythm. No murmurs, rubs or gallops  Lungs  Clear to auscultation without rales or rhonchi  Extremities No foot wounds    Blood glucose pattern      Assessment and Plan   Nursing Diagnosis Readiness for enhanced self-care   Readiness for enhanced health management   Nursing Intervention Domain Self-care  Health Management   Nursing Interventions Discussed diabetes self-management practices that are improving and not improving long-term diabetes control  Identified specific measures that the patient will commit to address discharge until seen by primary care provider  Offered group diabetes education through the [facility] - Phone [phone] to schedule appointment     Evaluation   Patient is prepared for discharge. Discharge Recommendations   Until seen by primary care provider,     Return to pre-hospitalization diabetes medication regimen, specifically Blood glucose monitoring before breakfast, lunch, dinner and bedtime    1. Adjust basal insulin to 20 units Lantus daily (was on 40 units daily). If blood glucose is over 200 in the morning, call PCP to discuss dose changes.     2.     Continue mealtime/bolus insulin at                [x]?        5 units at each meal (was on 10 units/meal)                If blood glucose is over 200 before breakfast, lunch or dinner- increase     dose to 8 units Humalog with meals.       Billing Code(s)     [x] [address] [facility]   [phone]  Access through [facility]

## 2020-03-20 NOTE — PROGRESS NOTES
Problem: Diabetes Self-Management  Goal: *Disease process and treatment process  Description: Define diabetes and identify own type of diabetes; list 3 options for treating diabetes. Outcome: Progressing Towards Goal  Goal: *Incorporating nutritional management into lifestyle  Description: Describe effect of type, amount and timing of food on blood glucose; list 3 methods for planning meals. Outcome: Progressing Towards Goal  Goal: *Incorporating physical activity into lifestyle  Description: State effect of exercise on blood glucose levels. Outcome: Progressing Towards Goal  Goal: *Developing strategies to promote health/change behavior  Description: Define the ABC's of diabetes; identify appropriate screenings, schedule and personal plan for screenings. Outcome: Progressing Towards Goal  Goal: *Using medications safely  Description: State effect of diabetes medications on diabetes; name diabetes medication taking, action and side effects. Outcome: Progressing Towards Goal  Goal: *Monitoring blood glucose, interpreting and using results  Description: Identify recommended blood glucose targets  and personal targets. Outcome: Progressing Towards Goal  Goal: *Prevention, detection, treatment of acute complications  Description: List symptoms of hyper- and hypoglycemia; describe how to treat low blood sugar and actions for lowering  high blood glucose level. Outcome: Progressing Towards Goal  Goal: *Prevention, detection and treatment of chronic complications  Description: Define the natural course of diabetes and describe the relationship of blood glucose levels to long term complications of diabetes.   Outcome: Progressing Towards Goal  Goal: *Developing strategies to address psychosocial issues  Description: Describe feelings about living with diabetes; identify support needed and support network  Outcome: Progressing Towards Goal  Goal: *Insulin pump training  Outcome: Progressing Towards Goal  Goal: *Sick day guidelines  Outcome: Progressing Towards Goal  Goal: *Patient Specific Goal (EDIT GOAL, INSERT TEXT)  Outcome: Progressing Towards Goal     Problem: Patient Education: Go to Patient Education Activity  Goal: Patient/Family Education  Outcome: Progressing Towards Goal     Problem: Risk for Spread of Infection  Goal: Prevent transmission of infectious organism to others  Description: Prevent the transmission of infectious organisms to other patients, staff members, and visitors. Outcome: Progressing Towards Goal     Problem: Patient Education:  Go to Education Activity  Goal: Patient/Family Education  Outcome: Progressing Towards Goal     Problem: Falls - Risk of  Goal: *Absence of Falls  Description: Document Mary Ortiz Fall Risk and appropriate interventions in the flowsheet. Outcome: Progressing Towards Goal  Note: Fall Risk Interventions:            Medication Interventions: Teach patient to arise slowly                   Problem: Patient Education: Go to Patient Education Activity  Goal: Patient/Family Education  Outcome: Progressing Towards Goal     Problem: Pain  Goal: *Control of Pain  Outcome: Progressing Towards Goal     Problem: Patient Education: Go to Patient Education Activity  Goal: Patient/Family Education  Outcome: Progressing Towards Goal     Problem: Pressure Injury - Risk of  Goal: *Prevention of pressure injury  Description: Document Anderson Scale and appropriate interventions in the flowsheet. Outcome: Progressing Towards Goal  Note: Pressure Injury Interventions:             Activity Interventions: Increase time out of bed         Nutrition Interventions: Document food/fluid/supplement intake                     Problem: Patient Education: Go to Patient Education Activity  Goal: Patient/Family Education  Outcome: Progressing Towards Goal     Problem: General Infection Care Plan (Adult and Pediatric)  Goal: Improvement in signs and symptoms of infection  Outcome: Progressing Towards Goal  Goal: *Optimize nutritional status  Outcome: Progressing Towards Goal     Problem: Patient Education: Go to Patient Education Activity  Goal: Patient/Family Education  Outcome: Progressing Towards Goal     Problem: Discharge Planning  Goal: *Discharge to safe environment  Outcome: Progressing Towards Goal

## 2020-03-20 NOTE — DISCHARGE SUMMARY
Physician Discharge Summary     Patient ID:  Roberto Zapata  [id number]  29 y.o.  1945    Admit Date: 3/12/2020    Discharge Date: 3/20/2020    Admission Diagnoses: Wound infection after surgery [T81.49XA]    Discharge Diagnoses: Active Problems:    Wound infection after surgery (3/12/2020)         Admission Condition: Fair    Discharge Condition: Good    Last Procedure: Procedure(s):  [address] Course:   Normal hospital course for this procedure. Post operatively pt had BID wound care and was on zosyn. Culture from abd wound washout grew e coli (ESBL) and enterococcus faecalis. She was taken off abx (zosyn) on 3/18 as the pharmacist recommended merrem and pt hadn't had fever and had a nl WBC for some time. On 3/16 she got a wound vac placed and was changed on 3/19. Home on 3/20 after wound vac removed for discharge. Consults: Infectious Disease    Disposition: home    Patient Instructions:   Current Discharge Medication List      CONTINUE these medications which have CHANGED    Details   insulin glargine (LANTUS) 100 unit/mL injection 20 Units by SubCUTAneous route Daily (before breakfast). Qty: 1 Vial, Refills: 0      insulin lispro (HUMALOG) 100 unit/mL injection 5 Units by SubCUTAneous route Before breakfast, lunch, and dinner. If your blood glucose level is greater than 200 before meals, take 8 units  Qty: 1 Vial, Refills: 0      amoxicillin-clavulanate (AUGMENTIN) 875-125 mg per tablet Take 1 Tab by mouth every twelve (12) hours for 10 days. Qty: 20 Tab, Refills: 0      HYDROmorphone (DILAUDID) 2 mg tablet Take 1 Tab by mouth every six (6) hours as needed for Pain for up to 14 days. Max Daily Amount: 8 mg. Qty: 15 Tab, Refills: 0    Associated Diagnoses: Wound infection after surgery         CONTINUE these medications which have NOT CHANGED    Details   docusate sodium (COLACE) 100 mg capsule Take 1 Cap by mouth two (2) times a day.   Qty: 30 Cap, Refills: 0 multivitamin (ONE A DAY) tablet Take 1 Tab by mouth daily. calcium carbonate (OS-DANI) 500 mg calcium (1,250 mg) tablet Take 3 Tabs by mouth nightly. triamterene-hydroCHLOROthiazide (MAXZIDE) 37.5-25 mg per tablet Take 1 Tab by mouth daily. pantoprazole (PROTONIX) 40 mg tablet Take 40 mg by mouth daily. metoprolol (LOPRESSOR) 50 mg tablet Take 50 mg by mouth two (2) times a day. baclofen (LIORESAL) 10 mg tablet Take 10 mg by mouth daily as needed. ondansetron hcl (ZOFRAN) 4 mg tablet Take 4 mg by mouth every four (4) hours as needed for Nausea. DULoxetine (CYMBALTA) 60 mg capsule Take 120 mg by mouth daily. STOP taking these medications       LORazepam (ATIVAN) 0.5 mg tablet Comments:   Reason for Stopping:         insulin NPH (NOVOLIN N) 100 unit/mL injection Comments:   Reason for Stopping:             Activity: No lifting, pushing or pulling anything heavier than 20 lbs x 2 weeks. Walking as tolerated. No driving while you are taking narcotics. Diet: Diabetic Diet. If you have cramps or nausea, try eating smaller light meals more frequently. You may find it helpful to take an over-the-counter stool softener such as colace if you feel constipated. Wound Care: wound vac changes twice per week    Please follow up w/ your PCP regarding BG management as your insulin doses have changed. Follow-up with Dr. Vidya Mills on 4/1 at 9:40am.  Call office at [phone] to reschedule appointment if necessary. We are located at [facility] in the [facility]. Signed:   Yaneth Barragan NP  3/20/2020  9:57 AM

## 2020-03-20 NOTE — PROGRESS NOTES
Bedside and Verbal shift change report given to [address] (oncoming nurse) by Barron Ceballos RN (offgoing nurse). Report included the following information SBAR, Kardex, Intake/Output, MAR, Accordion and Recent Results.

## 2020-03-20 NOTE — WOUND CARE
VAC dressing removed to prepare for discharge. 1 white foam & 1 black foam removed. Visible base is moist red with scattered yellow - see Lindsay Oneill's note from yesterday for full description. 100cc of peach serosanguinous exudate in canister. Packed with saline-moist and ABD to cover. Notes indicate that home health is set up with continuation of NPWT. SEAN Torres

## 2020-03-20 NOTE — PROGRESS NOTES
General Surgery Daily Progress Note    Admit Date: 3/12/2020  Post-Operative Day: 7 Days Post-Op from Procedure(s):  WASHOUT ABDOMINAL WALL WOUND     Subjective:     Last 24 hrs: Pt is doing well. No complaints       Objective:     Blood pressure 123/76, pulse 68, temperature 97.9 °F (36.6 °C), resp. rate 16, height 5' 1\" (1.549 m), weight 201 lb (91.2 kg), SpO2 98 %. Temp (24hrs), Av.9 °F (36.6 °C), Min:97.6 °F (36.4 °C), Max:98.3 °F (36.8 °C)      _____________________  Physical Exam:     Alert and Oriented, x3, in no acute distress. Cardiovascular: RRR, no peripheral edema  Abdomen: wound vac intact; drainage thin bloody      Assessment:   Active Problems:    Wound infection after surgery (3/12/2020)            Plan:     Home today  Vac will be taken down prior to d/c  F/u w/ Dr Mckenna Rose on 20 at 9:40    Data Review:    Recent Labs     20  0649   WBC 8.3   HGB 9.2*   HCT 30.2*   PLT UNABLE TO REPORT ACCURATE COUNT DUE TO PLATELET AGGREGATION, HOWEVER, PLATELETS APPEAR NORMAL IN NUMBER ON SMEAR. PLEASE RESUBMIT SODIUM CITRATE (BLUE) AND EDTA (LAVENDER) TUBES FOR HEMATOLOGICAL TESTING. Recent Labs     20  0649 20  0327   * 134*   K 3.8 3.7    102   CO2 26 26   * 126*   BUN 8 8   CREA 0.68 0.76   CA 9.2 9.4     No results for input(s): AML, LPSE in the last 72 hours.         ______________________  Medications:    Current Facility-Administered Medications   Medication Dose Route Frequency    piperacillin-tazobactam (ZOSYN) 3.375 g in 0.9% sodium chloride (MBP/ADV) 100 mL  3.375 g IntraVENous Q8H    miconazole (MICOTIN) 2 % powder   Topical BID    insulin lispro (HUMALOG) injection 4 Units  4 Units SubCUTAneous TID WITH MEALS    baclofen (LIORESAL) tablet 10 mg  10 mg Oral DAILY PRN    calcium carbonate (OS-DANI) tablet 1,500 mg [elemental]  1,500 mg Oral QHS    docusate sodium (COLACE) capsule 100 mg  100 mg Oral BID    HYDROmorphone (DILAUDID) tablet 2 mg  2 mg Oral Q6H PRN    LORazepam (ATIVAN) tablet 0.5 mg  0.5 mg Oral Q8H PRN    metoprolol tartrate (LOPRESSOR) tablet 50 mg  50 mg Oral BID    therapeutic multivitamin (THERAGRAN) tablet 1 Tab  1 Tab Oral DAILY    pantoprazole (PROTONIX) tablet 40 mg  40 mg Oral DAILY    triamterene-hydroCHLOROthiazide (MAXZIDE) 37.5-25 mg per tablet 1 Tab  1 Tab Oral DAILY    insulin lispro (HUMALOG) injection   SubCUTAneous AC&HS    insulin glargine (LANTUS) injection 18 Units  0.2 Units/kg SubCUTAneous QHS    glucose chewable tablet 16 g  4 Tab Oral PRN    glucagon (GLUCAGEN) injection 1 mg  1 mg IntraMUSCular PRN    dextrose 10% infusion 0-250 mL  0-250 mL IntraVENous PRN    sodium chloride (NS) flush 5-40 mL  5-40 mL IntraVENous Q8H    sodium chloride (NS) flush 5-40 mL  5-40 mL IntraVENous PRN    ondansetron (ZOFRAN) injection 4 mg  4 mg IntraVENous Q4H PRN    DULoxetine (CYMBALTA) capsule 120 mg  120 mg Oral DAILY    HYDROmorphone (PF) (DILAUDID) injection 1 mg  1 mg IntraVENous Q3H PRN    LORazepam (ATIVAN) injection 1 mg  1 mg IntraVENous Q6H PRN       Peter Lopes NP  3/20/2020

## 2020-03-20 NOTE — PROGRESS NOTES
Bedside and Verbal shift change report given to MALIK Kaur RN (oncoming nurse) by Christy Pisano RN (offgoing nurse). Report included the following information SBAR, Kardex, Intake/Output, MAR, Accordion and Recent Results. 2012- Calling ID consult. 2019- Spoke with Dr. Cassie Obregon, orders to call [facility] in the morning.

## 2020-03-20 NOTE — PROGRESS NOTES
Discharge medications reviewed with patient and appropriate educational materials and side effects teaching were provided. I have reviewed discharge instructions with the patient. The patient verbalized understanding. Prescriptions filled and with patient. Home wound vac delivered and with patient. 1203: pt left via wheelchair to discharge.

## 2020-03-21 ENCOUNTER — PATIENT OUTREACH (OUTPATIENT)
Dept: CARDIOLOGY CLINIC | Age: 75
End: 2020-03-21

## 2020-03-21 NOTE — PROGRESS NOTES
COVID-19 Screening Initial Follow-up Note    Patient contacted regarding COVID-19:  Risk. Care Transition Nurse/ Ambulatory Care Manager has attempted to contact the patient by telephone to perform post discharge assessment. Patient has following risk factors of: DM-IDDM, HTN. Non-healing surgical wound. Care Transition Nurse/ Ambulatory Care Manager left the [employer] exposure line [phone] and [address]  ([phone]\"} and PCP office for questions related to their healthcare. CTN/ACM provided contact information for future reference. CTN/ACM has been unable to reach patient after 3 unsuccessful attempts. Resolving outreach episode.

## 2020-03-30 ENCOUNTER — TELEPHONE (OUTPATIENT)
Dept: SURGERY | Age: 75
End: 2020-03-30

## 2020-03-30 NOTE — CDMP QUERY
Pt admitted with infection at ventra hernia surgical site. Noted documentation of abdominal wound wash out in op note on 3/13. If possible, please document in progress notes and discharge summary: ? Sclerosing mesenteritis ? Post Procedural Abcess ? Post Procedural Infection due to another source ? Clinically unable to determine  
? Other, please specify ? Unknown Risk Factors: 76yo, ventra hernia , DM Clinical Indicators: From 3/13 op note: \"Large cavity in subcutaneous space with pus, necrotic fat\", E. Coli in wound culture Treatment: surgery for washout abdominal wall wound, antibiotics to narrow specificity, wound vac Thank you, Constantine Hearing [phone]

## 2020-04-01 ENCOUNTER — OFFICE VISIT (OUTPATIENT)
Dept: SURGERY | Age: 75
End: 2020-04-01

## 2020-04-01 VITALS
DIASTOLIC BLOOD PRESSURE: 84 MMHG | TEMPERATURE: 98.7 F | RESPIRATION RATE: 18 BRPM | HEIGHT: 61 IN | BODY MASS INDEX: 35.5 KG/M2 | WEIGHT: 188 LBS | SYSTOLIC BLOOD PRESSURE: 134 MMHG | HEART RATE: 92 BPM | OXYGEN SATURATION: 99 %

## 2020-04-01 DIAGNOSIS — T81.49XA WOUND INFECTION AFTER SURGERY: Primary | ICD-10-CM

## 2020-04-01 NOTE — PROGRESS NOTES
Subjective:      Joann Sullivan  is a 70 y.o. female who presents for f/u s/p ventral incisional hernia repair with mesh on 02/04/20. Pt was re-admitted from 02/18-02/27 for abdominal wall hematoma and hemorrhage. This was treated with antibiotics and pt was discharged with AILEEN drain. Drain was removed on 03/04/20. Pt continued to have wosening abdominal pain and chills at home, along with spontaneous drainage from her umbilicus. CT scan on 03/12/20 suggested increased subcutaneous fluid with gas, with no evidence of contrast extravasation into the wound to suggest a enterocutaneous fistula. Pt underwent washout abdominal wall wound on 03/13/20 with Dr. Tito Medrano. Pt has discontinued antibiotics secondary to diarrhea. Overall pt states she is feeling much better and appetite is slowly returning to normal. Pt has lost some weight. Pt is a [employer] and does not take blood products    Past Medical History:   Diagnosis Date    Adverse effect of anesthesia     \"I'M CRAZY WHEN I WAKE UP, I'M CRAZY.   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    Postoperative hematoma involving circulatory system following cardiac bypass 2/19/2020    PUD (peptic ulcer disease) 2019    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    Unspecified sleep apnea     NO C-PAP       Past Surgical History:   Procedure Laterality Date    COLONOSCOPY N/A 1/15/2019    COL[facility]cloud [facility]    HX APPENDECTOMY      HX BREAST LUMPECTOMY Left     lumpectomy BENIGN    HX COLECTOMY      sigmoid    HX COLONOSCOPY      HX HYSTERECTOMY      hysto    HX ORTHOPAEDIC Left     shoulder CYST REMOVAL    HX OTHER SURGICAL  13    pyloric-sparing whipple,bx of portal and hepatic nodes by Dr Silvina Duffy HX SALPINGO-OOPHORECTOMY Bilateral     HX TONSILLECTOMY      HX UROLOGICAL  2008    BLADDER TUCK       Social History     Tobacco Use    Smoking status: Former Smoker     Packs/day: 1.00     Years: 17.00     Pack years: 17.00     Last attempt to quit: 1983     Years since quittin.2    Smokeless tobacco: Never Used   Substance Use Topics    Alcohol use: No       Family History   Problem Relation Age of Onset    Heart Failure Mother     Diabetes Mother     Hypertension Mother     Anesth Problems Mother         HARD TO INTUBATE    Heart Failure Father     Kidney Disease Father         WAS ON DIALYSIS    Diabetes Sister     Depression Sister     Anesth Problems Sister         HARD TO INTUBATE    Depression Sister         BIPOLAR    Other Sister         DIFFICULTY INTUBATING    Cancer Sister         ADENOMA    Asthma Sister     Other Son         HEMATOMACHROSIS    No Known Problems Daughter        Current Outpatient Medications on File Prior to Visit   Medication Sig Dispense Refill    insulin glargine (LANTUS) 100 unit/mL injection 20 Units by SubCUTAneous route Daily (before breakfast). 1 Vial 0    insulin lispro (HUMALOG) 100 unit/mL injection 5 Units by SubCUTAneous route Before breakfast, lunch, and dinner. If your blood glucose level is greater than 200 before meals, take 8 units 1 Vial 0    HYDROmorphone (DILAUDID) 2 mg tablet Take 1 Tab by mouth every six (6) hours as needed for Pain for up to 14 days. Max Daily Amount: 8 mg.  15 Tab 0    multivitamin (ONE A DAY) tablet Take 1 Tab by mouth daily.  triamterene-hydroCHLOROthiazide (MAXZIDE) 37.5-25 mg per tablet Take 1 Tab by mouth daily.  pantoprazole (PROTONIX) 40 mg tablet Take 40 mg by mouth daily.  metoprolol (LOPRESSOR) 50 mg tablet Take 50 mg by mouth two (2) times a day.  baclofen (LIORESAL) 10 mg tablet Take 10 mg by mouth daily as needed.  ondansetron hcl (ZOFRAN) 4 mg tablet Take 4 mg by mouth every four (4) hours as needed for Nausea.  DULoxetine (CYMBALTA) 60 mg capsule Take 120 mg by mouth daily.  docusate sodium (COLACE) 100 mg capsule Take 1 Cap by mouth two (2) times a day. 30 Cap 0    calcium carbonate (OS-DANI) 500 mg calcium (1,250 mg) tablet Take 3 Tabs by mouth nightly. No current facility-administered medications on file prior to visit. Allergies   Allergen Reactions    Tylenol [Acetaminophen] Other (comments)     Liver disorder. HAS HEMATOMACROSIS    Claritin [Loratadine] Other (comments)     Panic attack    Codeine Other (comments)     hallucinations    Percocet [Oxycodone-Acetaminophen] Other (comments)     hallucinations     Review of Systems:    A comprehensive review of systems was negative except for that written in the History of Present Illness. Objective:     Visit Vitals  /84 (BP 1 Location: Left arm, BP Patient Position: Sitting)   Pulse 92   Temp 98.7 °F (37.1 °C) (Oral)   Resp 18   Ht 5' 1\" (1.549 m)   Wt 188 lb (85.3 kg)   SpO2 99%   BMI 35.52 kg/m²        Physical Exam:  ABDOMEN: Removed wound vac sponge. Edges of wound appear healthy. Wound is very deep, and can not appreciate total depth on exam . 4 cm surrounding adipose tissue is firm. Labs: No results found for this or any previous visit (from the past 24 hour(s)).     Data Review:      CT abd pel with contrast 03/12/20  IMPRESSION: Increase in size of irregular fluid and gas collection within the anterior abdominal subcutaneous soft tissues. Interval removal of percutaneous drain. No other significant change in the appearance of the abdomen. A couple Tiny, < 4 mm  right lower lobe lung nodules. Follow-up guidelines below. Assessment and Plan:       ICD-10-CM ICD-9-CM    1. Wound infection after surgery T81.49XA 998.59        F/U in 2 weeks. All questions were answered and pt is in agreement with this plan. This document was scribed by Roopa Lewis as dictated by Dr. Mckenna Rose.      Signed By: Fernando Norris MD     04/01/20

## 2020-04-01 NOTE — LETTER
4/1/20 Patient: Mily Lin YOB: 1945 Date of Visit: 4/1/2020 [address] Hendrix [address] [id number] VIA Facsimile: [phone] Dear Chip Darling, Thank you for referring Ms. Mily Lin to [facility] for evaluation. My notes for this consultation are attached. If you have questions, please do not hesitate to call me. I look forward to following your patient along with you. Sincerely, Dawson Agarwal MD

## 2020-04-01 NOTE — PROGRESS NOTES
1. Have you been to the ER, urgent care clinic since your last visit? Hospitalized since your last visit? No    2. Have you seen or consulted any other health care providers outside of the [address] [place] [address] since your last visit? Include any pap smears or colon screening. No    Patient states home health is coming twice a week but starting next week will be coming 3 time a week.

## 2020-04-13 LAB
BACTERIA SPEC CULT: NORMAL
BACTERIA SPEC CULT: NORMAL
SERVICE CMNT-IMP: NORMAL
SERVICE CMNT-IMP: NORMAL

## 2020-04-22 ENCOUNTER — OFFICE VISIT (OUTPATIENT)
Dept: SURGERY | Age: 75
End: 2020-04-22

## 2020-04-22 VITALS
OXYGEN SATURATION: 99 % | RESPIRATION RATE: 18 BRPM | TEMPERATURE: 98.1 F | WEIGHT: 191 LBS | SYSTOLIC BLOOD PRESSURE: 120 MMHG | HEIGHT: 61 IN | DIASTOLIC BLOOD PRESSURE: 80 MMHG | HEART RATE: 81 BPM | BODY MASS INDEX: 36.06 KG/M2

## 2020-04-22 DIAGNOSIS — T81.49XA WOUND INFECTION AFTER SURGERY: Primary | ICD-10-CM

## 2020-04-22 NOTE — PROGRESS NOTES
1. Have you been to the ER, urgent care clinic since your last visit? Hospitalized since your last visit? No    2. Have you seen or consulted any other health care providers outside of the [address] [place] [address] since your last visit? Include any pap smears or colon screening. No    Home health is coming 3 times a week to change wound vac. Patient states wound has no odor now.

## 2020-04-22 NOTE — PROGRESS NOTES
Subjective:      Rima Nieves  is a 70 y.o. female who presents for f/u s/p ventral incisional hernia repair with mesh on 02/04/20.      Pt was re-admitted from 02/18-02/27 for abdominal wall hematoma and hemorrhage. This was treated with antibiotics and pt was discharged with AILEEN drain. Drain was removed on 03/04/20.      Pt continued to have wosening abdominal pain and chills at home, along with spontaneous drainage from her umbilicus. CT scan on 03/12/20 suggested increased subcutaneous fluid with gas, with no evidence of contrast extravasation into the wound to suggest a enterocutaneous fistula. Pt underwent washout abdominal wall wound on 03/13/20 with Dr. Jacobo Sheppard.     Pt has discontinued antibiotics secondary to diarrhea. Pt continues to wear wound vac and assistance through wound care specialist. Overall, pt reports she is doing much better and wound is improving.       Pt is a [employer] and does not take blood products    Past Medical History:   Diagnosis Date    Adverse effect of anesthesia     \"I'M CRAZY WHEN I WAKE UP, I'M CRAZY.   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    Postoperative hematoma involving circulatory system following cardiac bypass 2/19/2020    PUD (peptic ulcer disease) 2019    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    Unspecified sleep apnea     NO C-PAP       Past Surgical History:   Procedure Laterality Date    COLONOSCOPY N/A 1/15/2019    COLON[facility] Jaron Carlson [facility]    HX APPENDECTOMY      HX BREAST LUMPECTOMY Left     lumpectomy BENIGN    HX COLECTOMY      sigmoid    HX COLONOSCOPY      HX HYSTERECTOMY      hysto    HX ORTHOPAEDIC Left     shoulder CYST REMOVAL    HX OTHER SURGICAL  13    pyloric-sparing whipple,bx of portal and hepatic nodes by Dr Rafi Bernabe      ileostomy-AND REVERSAL    HX OTHER SURGICAL  2020    Dr. Madrid-I&D Abdominal wall wound with debriedment of nectotic fat and tissue and washout abdominal wall wound    HX SALPINGO-OOPHORECTOMY Bilateral     HX TONSILLECTOMY      HX UROLOGICAL  2008    BLADDER TUCK       Social History     Tobacco Use    Smoking status: Former Smoker     Packs/day: 1.00     Years: 17.00     Pack years: 17.00     Last attempt to quit: 1983     Years since quittin.3    Smokeless tobacco: Never Used   Substance Use Topics    Alcohol use: No       Family History   Problem Relation Age of Onset    Heart Failure Mother     Diabetes Mother     Hypertension Mother     Anesth Problems Mother         HARD TO INTUBATE    Heart Failure Father     Kidney Disease Father         WAS ON DIALYSIS    Diabetes Sister     Depression Sister     Anesth Problems Sister         HARD TO INTUBATE    Depression Sister         BIPOLAR    Other Sister         DIFFICULTY INTUBATING    Cancer Sister         ADENOMA    Asthma Sister     Other Son         HEMATOMACHROSIS    No Known Problems Daughter        Current Outpatient Medications on File Prior to Visit   Medication Sig Dispense Refill    insulin glargine (LANTUS) 100 unit/mL injection 20 Units by SubCUTAneous route Daily (before breakfast). 1 Vial 0    insulin lispro (HUMALOG) 100 unit/mL injection 5 Units by SubCUTAneous route Before breakfast, lunch, and dinner.  If your blood glucose level is greater than 200 before meals, take 8 units 1 Vial 0    triamterene-hydroCHLOROthiazide (MAXZIDE) 37.5-25 mg per tablet Take 1 Tab by mouth daily.  pantoprazole (PROTONIX) 40 mg tablet Take 40 mg by mouth daily.  metoprolol (LOPRESSOR) 50 mg tablet Take 50 mg by mouth two (2) times a day.  baclofen (LIORESAL) 10 mg tablet Take 10 mg by mouth daily as needed.  ondansetron hcl (ZOFRAN) 4 mg tablet Take 4 mg by mouth every four (4) hours as needed for Nausea.  DULoxetine (CYMBALTA) 60 mg capsule Take 120 mg by mouth daily.  docusate sodium (COLACE) 100 mg capsule Take 1 Cap by mouth two (2) times a day. 30 Cap 0    multivitamin (ONE A DAY) tablet Take 1 Tab by mouth daily.  calcium carbonate (OS-DANI) 500 mg calcium (1,250 mg) tablet Take 3 Tabs by mouth nightly. No current facility-administered medications on file prior to visit. Allergies   Allergen Reactions    Tylenol [Acetaminophen] Other (comments)     Liver disorder. HAS HEMATOMACROSIS    Claritin [Loratadine] Other (comments)     Panic attack    Codeine Other (comments)     hallucinations    Percocet [Oxycodone-Acetaminophen] Other (comments)     hallucinations     Review of Systems:    A comprehensive review of systems was negative except for that written in the History of Present Illness. Objective:     Visit Vitals  /80 (BP 1 Location: Left arm, BP Patient Position: Sitting)   Pulse 81   Temp 98.1 °F (36.7 °C) (Oral)   Resp 18   Ht 5' 1\" (1.549 m)   Wt 191 lb (86.6 kg)   SpO2 99%   BMI 36.09 kg/m²        Physical Exam:  ABDOMEN: Wound is healing well and is now half the initial size. Labs: No results found for this or any previous visit (from the past 24 hour(s)). Assessment and Plan:       ICD-10-CM ICD-9-CM    1. Wound infection after surgery T81.49XA 998.59        Pt will continue managing wound with wound vac.  Will see pt back in office when home health nurses agree that wound vac is no longer necessary. All questions were answered and pt is in agreement with this plan. This document was scribed by Meme Pryor as dictated by Dr. Narendra Laurent.      Signed By: Adonay Vang MD     04/22/20

## 2020-08-25 ENCOUNTER — TELEPHONE (OUTPATIENT)
Dept: SURGERY | Age: 75
End: 2020-08-25

## 2020-08-25 NOTE — TELEPHONE ENCOUNTER
Returned call to patient. Two patient identifiers used. Patient wanted to confirm her appointment was here at [facility] and not . Verified appt with patient who stated she would have to cancel because she arranged for transportation to take her to [facility] and she can't change it in time. Transferred patient to [facility] to reschedule appointment. Patient in agreement.

## 2020-08-25 NOTE — TELEPHONE ENCOUNTER
Patient called to verify appt for tomorrow. When I gave her the location and address she stated that the appt was supposed to be scheduled for [facility] location. I told her Dr Rima Borges does not see patients there. She stated she was told he wound care would be at [facility]- Please advise and I will notify patient of correct location.

## 2020-09-21 ENCOUNTER — OFFICE VISIT (OUTPATIENT)
Dept: SURGERY | Age: 75
End: 2020-09-21
Payer: MEDICARE

## 2020-09-21 VITALS
HEIGHT: 61 IN | HEART RATE: 73 BPM | OXYGEN SATURATION: 98 % | DIASTOLIC BLOOD PRESSURE: 80 MMHG | WEIGHT: 205.6 LBS | SYSTOLIC BLOOD PRESSURE: 130 MMHG | RESPIRATION RATE: 16 BRPM | TEMPERATURE: 98.5 F | BODY MASS INDEX: 38.82 KG/M2

## 2020-09-21 DIAGNOSIS — T81.49XA WOUND INFECTION AFTER SURGERY: Primary | ICD-10-CM

## 2020-09-21 PROCEDURE — G8754 DIAS BP LESS 90: HCPCS | Performed by: SURGERY

## 2020-09-21 PROCEDURE — G8417 CALC BMI ABV UP PARAM F/U: HCPCS | Performed by: SURGERY

## 2020-09-21 PROCEDURE — 1101F PT FALLS ASSESS-DOCD LE1/YR: CPT | Performed by: SURGERY

## 2020-09-21 PROCEDURE — 1090F PRES/ABSN URINE INCON ASSESS: CPT | Performed by: SURGERY

## 2020-09-21 PROCEDURE — G9711 PT HX TOT COL OR COLON CA: HCPCS | Performed by: SURGERY

## 2020-09-21 PROCEDURE — G8432 DEP SCR NOT DOC, RNG: HCPCS | Performed by: SURGERY

## 2020-09-21 PROCEDURE — G8752 SYS BP LESS 140: HCPCS | Performed by: SURGERY

## 2020-09-21 PROCEDURE — G8427 DOCREV CUR MEDS BY ELIG CLIN: HCPCS | Performed by: SURGERY

## 2020-09-21 PROCEDURE — G8400 PT W/DXA NO RESULTS DOC: HCPCS | Performed by: SURGERY

## 2020-09-21 PROCEDURE — 99213 OFFICE O/P EST LOW 20 MIN: CPT | Performed by: SURGERY

## 2020-09-21 PROCEDURE — G8536 NO DOC ELDER MAL SCRN: HCPCS | Performed by: SURGERY

## 2020-09-21 RX ORDER — TRAZODONE HYDROCHLORIDE 50 MG/1
TABLET ORAL
COMMUNITY

## 2020-09-21 NOTE — PROGRESS NOTES
1. Have you been to the ER, urgent care clinic since your last visit? Hospitalized since your last visit? No    2. Have you seen or consulted any other health care providers outside of the [address] [place] [address] since your last visit? Include any pap smears or colon screening. No    Finished wound vac therapy 8/24/2020 and the w/d zeroform dressings until 9/7/2020.

## 2020-09-21 NOTE — LETTER
9/21/20 Patient: Jennifer Ren YOB: 1945 Date of Visit: 9/21/2020 J[address] Hendrix [address] [address] VIA Facsimile: [phone] Dear Gerson Tobin, Thank you for referring Ms. Jennifer Ren to [facility] for evaluation. My notes for this consultation are attached. If you have questions, please do not hesitate to call me. I look forward to following your patient along with you. Sincerely, Sheng Vasquez MD

## 2020-09-21 NOTE — PROGRESS NOTES
Subjective:      Janeen Dixon  is a 70 y.o. female who presents for wound check s/p ventral incisional hernia repair with mesh on 02/04/20.      Pt was re-admitted from 02/18-02/27 for abdominal wall hematoma and hemorrhage. This was treated with antibiotics and pt was discharged with AILEEN drain. Drain was removed on 03/04/20.      Pt continued to have wosening abdominal pain and chills at home, along with spontaneous drainage from her umbilicus. CT scan on 03/12/20 suggested increased subcutaneous fluid with gas, with no evidence of contrast extravasation into the wound to suggest a enterocutaneous fistula. Pt underwent washout abdominal wall wound on 03/13/20 with Dr. Rebecca Fraser.     Pt has discontinued antibiotics secondary to diarrhea. She has been managing wound with home health and wound vac.      Pt finished wound vac therapy on 8/24/2020 and continued with wet to dry zeroform dressings until 9/7/2020. Pt notes incision has completely healed.      Pt is a [employer] and does not take blood products    Past Medical History:   Diagnosis Date    Adverse effect of anesthesia     \"I'M CRAZY WHEN I WAKE UP, I'M CRAZY.   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    Postoperative hematoma involving circulatory system following cardiac bypass 2/19/2020    PUD (peptic ulcer disease) 2019    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 Madrid-I&D Abdominal wall wound with debriedment of nectotic fat and tissue and washout abdominal wall wound    HX SALPINGO-OOPHORECTOMY Bilateral     HX TONSILLECTOMY      HX UROLOGICAL  2008    BLADDER TUCK       Social History     Tobacco Use    Smoking status: Former Smoker     Packs/day: 1.00     Years: 17.00     Pack years: 17.00     Last attempt to quit: 1983     Years since quittin.7    Smokeless tobacco: Never Used   Substance Use Topics    Alcohol use: No       Family History   Problem Relation Age of Onset    Heart Failure Mother     Diabetes Mother     Hypertension Mother     Anesth Problems Mother         HARD TO INTUBATE    Heart Failure Father     Kidney Disease Father         WAS ON DIALYSIS    Diabetes Sister     Depression Sister     Anesth Problems Sister         HARD TO INTUBATE    Depression Sister         BIPOLAR    Other Sister         DIFFICULTY INTUBATING    Cancer Sister         ADENOMA    Asthma Sister     Other Son         HEMATOMACHROSIS    No Known Problems Daughter        Current Outpatient Medications on File Prior to Visit   Medication Sig Dispense Refill    traZODone (DESYREL) 50 mg tablet Take  by mouth nightly.  insulin glargine (LANTUS) 100 unit/mL injection 20 Units by SubCUTAneous route Daily (before breakfast).  1 Vial 0    insulin lispro (HUMALOG) 100 unit/mL injection 5 Units by SubCUTAneous route Before breakfast, lunch, and dinner. If your blood glucose level is greater than 200 before meals, take 8 units 1 Vial 0    multivitamin (ONE A DAY) tablet Take 1 Tab by mouth daily.  calcium carbonate (OS-DANI) 500 mg calcium (1,250 mg) tablet Take 3 Tabs by mouth nightly.  triamterene-hydroCHLOROthiazide (MAXZIDE) 37.5-25 mg per tablet Take 1 Tab by mouth daily.  pantoprazole (PROTONIX) 40 mg tablet Take 40 mg by mouth daily.  metoprolol (LOPRESSOR) 50 mg tablet Take 50 mg by mouth two (2) times a day.  baclofen (LIORESAL) 10 mg tablet Take 10 mg by mouth daily as needed.  DULoxetine (CYMBALTA) 60 mg capsule Take 120 mg by mouth daily.  docusate sodium (COLACE) 100 mg capsule Take 1 Cap by mouth two (2) times a day. 30 Cap 0    ondansetron hcl (ZOFRAN) 4 mg tablet Take 4 mg by mouth every four (4) hours as needed for Nausea. No current facility-administered medications on file prior to visit. Allergies   Allergen Reactions    Tylenol [Acetaminophen] Other (comments)     Liver disorder. HAS HEMATOMACROSIS    Claritin [Loratadine] Other (comments)     Panic attack    Codeine Other (comments)     hallucinations    Percocet [Oxycodone-Acetaminophen] Other (comments)     hallucinations       Review of Systems:    A comprehensive review of systems was negative except for that written in the History of Present Illness. Objective:     Visit Vitals  /80 (BP 1 Location: Left arm, BP Patient Position: Sitting)   Pulse 73   Temp 98.5 °F (36.9 °C) (Oral)   Resp 16   Ht 5' 1\" (1.549 m)   Wt 205 lb 9.6 oz (93.3 kg)   SpO2 98%   BMI 38.85 kg/m²        Physical Exam:  ABDOMEN: Incision is closed and completely healed. Labs: No results found for this or any previous visit (from the past 24 hour(s)). Assessment and Plan:       ICD-10-CM ICD-9-CM    1.  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 F/U PRN. All questions were answered and pt is in agreement with this plan. This document was scribed by Sammie Beckman as dictated by Dr. Jhonny Merlin.      Signed By: David Mcgrath MD     09/21/20

## 2021-04-15 NOTE — PROGRESS NOTES
[address] [place] [address] [place] FORM RECEIVED VIA FAX  WILL BE PLACED IN YOUR BIN AT ASSIGNED DELIVERY TIMES      Order #[id number] Primary Nurse Namrata Brown and Italia Rodriguez RN performed a dual skin assessment on this patient Impairment noted- see wound doc flow sheet  Anderson score is 23

## 2021-05-16 ENCOUNTER — APPOINTMENT (OUTPATIENT)
Dept: CT IMAGING | Age: 76
DRG: 378 | End: 2021-05-16
Attending: EMERGENCY MEDICINE
Payer: MEDICARE

## 2021-05-16 ENCOUNTER — HOSPITAL ENCOUNTER (INPATIENT)
Age: 76
LOS: 2 days | Discharge: HOME | DRG: 378 | End: 2021-05-18
Attending: EMERGENCY MEDICINE | Admitting: INTERNAL MEDICINE
Payer: MEDICARE

## 2021-05-16 DIAGNOSIS — K92.2 GASTROINTESTINAL HEMORRHAGE, UNSPECIFIED GASTROINTESTINAL HEMORRHAGE TYPE: Primary | ICD-10-CM

## 2021-05-16 LAB
ALBUMIN SERPL-MCNC: 3.2 G/DL (ref 3.5–5)
ALBUMIN/GLOB SERPL: 0.8 {RATIO} (ref 1.1–2.2)
ALP SERPL-CCNC: 186 U/L (ref 45–117)
ALT SERPL-CCNC: 32 U/L (ref 12–78)
ANION GAP SERPL CALC-SCNC: 7 MMOL/L (ref 5–15)
APTT PPP: 21.3 SEC (ref 22.1–31)
AST SERPL-CCNC: 30 U/L (ref 15–37)
ATRIAL RATE: 69 BPM
BASOPHILS # BLD: 0.2 K/UL (ref 0–0.1)
BASOPHILS NFR BLD: 2 % (ref 0–1)
BILIRUB SERPL-MCNC: 0.4 MG/DL (ref 0.2–1)
BUN SERPL-MCNC: 22 MG/DL (ref 6–20)
BUN/CREAT SERPL: 27 (ref 12–20)
CALCIUM SERPL-MCNC: 8.7 MG/DL (ref 8.5–10.1)
CALCULATED P AXIS, ECG09: 37 DEGREES
CALCULATED R AXIS, ECG10: -27 DEGREES
CALCULATED T AXIS, ECG11: 18 DEGREES
CHLORIDE SERPL-SCNC: 102 MMOL/L (ref 97–108)
CO2 SERPL-SCNC: 24 MMOL/L (ref 21–32)
COMMENT, HOLDF: NORMAL
CREAT SERPL-MCNC: 0.81 MG/DL (ref 0.55–1.02)
DIAGNOSIS, 93000: NORMAL
DIFFERENTIAL METHOD BLD: ABNORMAL
EOSINOPHIL # BLD: 0 K/UL (ref 0–0.4)
EOSINOPHIL NFR BLD: 0 % (ref 0–7)
ERYTHROCYTE [DISTWIDTH] IN BLOOD BY AUTOMATED COUNT: 13.8 % (ref 11.5–14.5)
EST. AVERAGE GLUCOSE BLD GHB EST-MCNC: 232 MG/DL
GLOBULIN SER CALC-MCNC: 4 G/DL (ref 2–4)
GLUCOSE BLD STRIP.AUTO-MCNC: 181 MG/DL (ref 65–117)
GLUCOSE BLD STRIP.AUTO-MCNC: 424 MG/DL (ref 65–117)
GLUCOSE SERPL-MCNC: 339 MG/DL (ref 65–100)
HBA1C MFR BLD: 9.7 % (ref 4–5.6)
HCT VFR BLD AUTO: 29 % (ref 35–47)
HEMOCCULT STL QL: POSITIVE
HGB BLD-MCNC: 9.7 G/DL (ref 11.5–16)
IMM GRANULOCYTES # BLD AUTO: 0 K/UL
IMM GRANULOCYTES NFR BLD AUTO: 0 %
INR PPP: 1.1 (ref 0.9–1.1)
LIPASE SERPL-CCNC: 52 U/L (ref 73–393)
LYMPHOCYTES # BLD: 4.2 K/UL (ref 0.8–3.5)
LYMPHOCYTES NFR BLD: 37 % (ref 12–49)
MCH RBC QN AUTO: 29.2 PG (ref 26–34)
MCHC RBC AUTO-ENTMCNC: 33.4 G/DL (ref 30–36.5)
MCV RBC AUTO: 87.3 FL (ref 80–99)
MONOCYTES # BLD: 0.2 K/UL (ref 0–1)
MONOCYTES NFR BLD: 2 % (ref 5–13)
NEUTS BAND NFR BLD MANUAL: 1 % (ref 0–6)
NEUTS SEG # BLD: 6.8 K/UL (ref 1.8–8)
NEUTS SEG NFR BLD: 58 % (ref 32–75)
NRBC # BLD: 0 K/UL (ref 0–0.01)
NRBC BLD-RTO: 0 PER 100 WBC
P-R INTERVAL, ECG05: 180 MS
PLATELET # BLD AUTO: 71 K/UL (ref 150–400)
PLATELET COMMENTS,PCOM: ABNORMAL
POTASSIUM SERPL-SCNC: 4.7 MMOL/L (ref 3.5–5.1)
PROT SERPL-MCNC: 7.2 G/DL (ref 6.4–8.2)
PROTHROMBIN TIME: 11.2 SEC (ref 9–11.1)
Q-T INTERVAL, ECG07: 420 MS
QRS DURATION, ECG06: 94 MS
QTC CALCULATION (BEZET), ECG08: 450 MS
RBC # BLD AUTO: 3.32 M/UL (ref 3.8–5.2)
RBC MORPH BLD: ABNORMAL
SAMPLES BEING HELD,HOLD: NORMAL
SERVICE CMNT-IMP: ABNORMAL
SERVICE CMNT-IMP: ABNORMAL
SODIUM SERPL-SCNC: 133 MMOL/L (ref 136–145)
THERAPEUTIC RANGE,PTTT: ABNORMAL SECS (ref 58–77)
VENTRICULAR RATE, ECG03: 69 BPM
WBC # BLD AUTO: 11.4 K/UL (ref 3.6–11)

## 2021-05-16 PROCEDURE — 85025 COMPLETE CBC W/AUTO DIFF WBC: CPT

## 2021-05-16 PROCEDURE — 74011636637 HC RX REV CODE- 636/637: Performed by: INTERNAL MEDICINE

## 2021-05-16 PROCEDURE — 96375 TX/PRO/DX INJ NEW DRUG ADDON: CPT

## 2021-05-16 PROCEDURE — 74011250636 HC RX REV CODE- 250/636: Performed by: INTERNAL MEDICINE

## 2021-05-16 PROCEDURE — 77030040361 HC SLV COMPR DVT MDII -B

## 2021-05-16 PROCEDURE — 74011250636 HC RX REV CODE- 250/636: Performed by: EMERGENCY MEDICINE

## 2021-05-16 PROCEDURE — 96374 THER/PROPH/DIAG INJ IV PUSH: CPT

## 2021-05-16 PROCEDURE — 36415 COLL VENOUS BLD VENIPUNCTURE: CPT

## 2021-05-16 PROCEDURE — 85730 THROMBOPLASTIN TIME PARTIAL: CPT

## 2021-05-16 PROCEDURE — 85610 PROTHROMBIN TIME: CPT

## 2021-05-16 PROCEDURE — 83036 HEMOGLOBIN GLYCOSYLATED A1C: CPT

## 2021-05-16 PROCEDURE — 82962 GLUCOSE BLOOD TEST: CPT

## 2021-05-16 PROCEDURE — 74178 CT ABD&PLV WO CNTR FLWD CNTR: CPT

## 2021-05-16 PROCEDURE — 83690 ASSAY OF LIPASE: CPT

## 2021-05-16 PROCEDURE — C9113 INJ PANTOPRAZOLE SODIUM, VIA: HCPCS | Performed by: INTERNAL MEDICINE

## 2021-05-16 PROCEDURE — 74011000636 HC RX REV CODE- 636: Performed by: RADIOLOGY

## 2021-05-16 PROCEDURE — 82272 OCCULT BLD FECES 1-3 TESTS: CPT

## 2021-05-16 PROCEDURE — 74011250637 HC RX REV CODE- 250/637: Performed by: INTERNAL MEDICINE

## 2021-05-16 PROCEDURE — 93005 ELECTROCARDIOGRAM TRACING: CPT

## 2021-05-16 PROCEDURE — 80053 COMPREHEN METABOLIC PANEL: CPT

## 2021-05-16 PROCEDURE — 65660000000 HC RM CCU STEPDOWN

## 2021-05-16 PROCEDURE — C9113 INJ PANTOPRAZOLE SODIUM, VIA: HCPCS | Performed by: EMERGENCY MEDICINE

## 2021-05-16 PROCEDURE — 99284 EMERGENCY DEPT VISIT MOD MDM: CPT

## 2021-05-16 RX ORDER — INSULIN GLARGINE 100 [IU]/ML
15 INJECTION, SOLUTION SUBCUTANEOUS DAILY
Status: DISCONTINUED | OUTPATIENT
Start: 2021-05-17 | End: 2021-05-17

## 2021-05-16 RX ORDER — PROMETHAZINE HYDROCHLORIDE 25 MG/1
12.5 TABLET ORAL
Status: DISCONTINUED | OUTPATIENT
Start: 2021-05-16 | End: 2021-05-18 | Stop reason: HOSPADM

## 2021-05-16 RX ORDER — ONDANSETRON 2 MG/ML
4 INJECTION INTRAMUSCULAR; INTRAVENOUS
Status: COMPLETED | OUTPATIENT
Start: 2021-05-16 | End: 2021-05-16

## 2021-05-16 RX ORDER — INSULIN LISPRO 100 [IU]/ML
INJECTION, SOLUTION INTRAVENOUS; SUBCUTANEOUS EVERY 6 HOURS
Status: DISCONTINUED | OUTPATIENT
Start: 2021-05-16 | End: 2021-05-18 | Stop reason: HOSPADM

## 2021-05-16 RX ORDER — INSULIN GLARGINE 100 [IU]/ML
15 INJECTION, SOLUTION SUBCUTANEOUS DAILY
Status: DISCONTINUED | OUTPATIENT
Start: 2021-05-17 | End: 2021-05-16

## 2021-05-16 RX ORDER — TRAZODONE HYDROCHLORIDE 50 MG/1
50 TABLET ORAL
Status: DISCONTINUED | OUTPATIENT
Start: 2021-05-16 | End: 2021-05-18 | Stop reason: HOSPADM

## 2021-05-16 RX ORDER — BACLOFEN 10 MG/1
10 TABLET ORAL
Status: DISCONTINUED | OUTPATIENT
Start: 2021-05-16 | End: 2021-05-16 | Stop reason: SDUPTHER

## 2021-05-16 RX ORDER — EPINEPHRINE 0.1 MG/ML
1 INJECTION INTRACARDIAC; INTRAVENOUS
Status: ACTIVE | OUTPATIENT
Start: 2021-05-16 | End: 2021-05-17

## 2021-05-16 RX ORDER — MIDAZOLAM HYDROCHLORIDE 1 MG/ML
.25-5 INJECTION, SOLUTION INTRAMUSCULAR; INTRAVENOUS
Status: DISCONTINUED | OUTPATIENT
Start: 2021-05-16 | End: 2021-05-17

## 2021-05-16 RX ORDER — DEXTROMETHORPHAN/PSEUDOEPHED 2.5-7.5/.8
1.2 DROPS ORAL
Status: DISCONTINUED | OUTPATIENT
Start: 2021-05-16 | End: 2021-05-17

## 2021-05-16 RX ORDER — SODIUM CHLORIDE 9 MG/ML
50 INJECTION, SOLUTION INTRAVENOUS CONTINUOUS
Status: DISPENSED | OUTPATIENT
Start: 2021-05-16 | End: 2021-05-16

## 2021-05-16 RX ORDER — MORPHINE SULFATE 4 MG/ML
4 INJECTION INTRAVENOUS ONCE
Status: COMPLETED | OUTPATIENT
Start: 2021-05-16 | End: 2021-05-16

## 2021-05-16 RX ORDER — SODIUM CHLORIDE 0.9 % (FLUSH) 0.9 %
5-40 SYRINGE (ML) INJECTION AS NEEDED
Status: DISCONTINUED | OUTPATIENT
Start: 2021-05-16 | End: 2021-05-18 | Stop reason: HOSPADM

## 2021-05-16 RX ORDER — DULOXETIN HYDROCHLORIDE 30 MG/1
120 CAPSULE, DELAYED RELEASE ORAL DAILY
Status: DISCONTINUED | OUTPATIENT
Start: 2021-05-16 | End: 2021-05-18 | Stop reason: HOSPADM

## 2021-05-16 RX ORDER — FLUMAZENIL 0.1 MG/ML
0.2 INJECTION INTRAVENOUS
Status: ACTIVE | OUTPATIENT
Start: 2021-05-16 | End: 2021-05-16

## 2021-05-16 RX ORDER — POLYETHYLENE GLYCOL 3350 17 G/17G
17 POWDER, FOR SOLUTION ORAL DAILY PRN
Status: DISCONTINUED | OUTPATIENT
Start: 2021-05-16 | End: 2021-05-18 | Stop reason: HOSPADM

## 2021-05-16 RX ORDER — SODIUM CHLORIDE 0.9 % (FLUSH) 0.9 %
5-40 SYRINGE (ML) INJECTION EVERY 8 HOURS
Status: DISCONTINUED | OUTPATIENT
Start: 2021-05-16 | End: 2021-05-18 | Stop reason: HOSPADM

## 2021-05-16 RX ORDER — PANTOPRAZOLE SODIUM 40 MG/10ML
40 INJECTION, POWDER, LYOPHILIZED, FOR SOLUTION INTRAVENOUS
Status: COMPLETED | OUTPATIENT
Start: 2021-05-16 | End: 2021-05-16

## 2021-05-16 RX ORDER — MAGNESIUM SULFATE 100 %
4 CRYSTALS MISCELLANEOUS AS NEEDED
Status: DISCONTINUED | OUTPATIENT
Start: 2021-05-16 | End: 2021-05-18 | Stop reason: HOSPADM

## 2021-05-16 RX ORDER — ATROPINE SULFATE 0.1 MG/ML
0.4 INJECTION INTRAVENOUS
Status: ACTIVE | OUTPATIENT
Start: 2021-05-16 | End: 2021-05-17

## 2021-05-16 RX ORDER — DEXTROSE 50 % IN WATER (D50W) INTRAVENOUS SYRINGE
25-50 AS NEEDED
Status: DISCONTINUED | OUTPATIENT
Start: 2021-05-16 | End: 2021-05-18 | Stop reason: HOSPADM

## 2021-05-16 RX ORDER — NALOXONE HYDROCHLORIDE 0.4 MG/ML
0.4 INJECTION, SOLUTION INTRAMUSCULAR; INTRAVENOUS; SUBCUTANEOUS
Status: ACTIVE | OUTPATIENT
Start: 2021-05-16 | End: 2021-05-16

## 2021-05-16 RX ORDER — ONDANSETRON 2 MG/ML
4 INJECTION INTRAMUSCULAR; INTRAVENOUS
Status: DISCONTINUED | OUTPATIENT
Start: 2021-05-16 | End: 2021-05-18 | Stop reason: HOSPADM

## 2021-05-16 RX ORDER — LORAZEPAM 2 MG/ML
0.5 INJECTION INTRAMUSCULAR
Status: DISCONTINUED | OUTPATIENT
Start: 2021-05-16 | End: 2021-05-18 | Stop reason: HOSPADM

## 2021-05-16 RX ORDER — BACLOFEN 10 MG/1
10 TABLET ORAL
Status: DISCONTINUED | OUTPATIENT
Start: 2021-05-16 | End: 2021-05-18 | Stop reason: HOSPADM

## 2021-05-16 RX ORDER — SODIUM CHLORIDE, SODIUM LACTATE, POTASSIUM CHLORIDE, CALCIUM CHLORIDE 600; 310; 30; 20 MG/100ML; MG/100ML; MG/100ML; MG/100ML
125 INJECTION, SOLUTION INTRAVENOUS CONTINUOUS
Status: DISCONTINUED | OUTPATIENT
Start: 2021-05-16 | End: 2021-05-18 | Stop reason: HOSPADM

## 2021-05-16 RX ADMIN — SODIUM CHLORIDE, POTASSIUM CHLORIDE, SODIUM LACTATE AND CALCIUM CHLORIDE 1000 ML: 600; 310; 30; 20 INJECTION, SOLUTION INTRAVENOUS at 11:46

## 2021-05-16 RX ADMIN — INSULIN LISPRO 15 UNITS: 100 INJECTION, SOLUTION INTRAVENOUS; SUBCUTANEOUS at 18:53

## 2021-05-16 RX ADMIN — Medication 10 ML: at 15:19

## 2021-05-16 RX ADMIN — MORPHINE SULFATE 4 MG: 4 INJECTION, SOLUTION INTRAMUSCULAR; INTRAVENOUS at 17:15

## 2021-05-16 RX ADMIN — IOPAMIDOL 100 ML: 755 INJECTION, SOLUTION INTRAVENOUS at 06:58

## 2021-05-16 RX ADMIN — PANTOPRAZOLE SODIUM 40 MG: 40 INJECTION, POWDER, FOR SOLUTION INTRAVENOUS at 05:55

## 2021-05-16 RX ADMIN — PANTOPRAZOLE SODIUM 40 MG: 40 INJECTION, POWDER, FOR SOLUTION INTRAVENOUS at 21:04

## 2021-05-16 RX ADMIN — DULOXETINE HYDROCHLORIDE 120 MG: 30 CAPSULE, DELAYED RELEASE ORAL at 15:35

## 2021-05-16 RX ADMIN — SODIUM CHLORIDE, POTASSIUM CHLORIDE, SODIUM LACTATE AND CALCIUM CHLORIDE 125 ML/HR: 600; 310; 30; 20 INJECTION, SOLUTION INTRAVENOUS at 15:40

## 2021-05-16 RX ADMIN — LORAZEPAM 0.5 MG: 2 INJECTION INTRAMUSCULAR; INTRAVENOUS at 15:37

## 2021-05-16 RX ADMIN — ONDANSETRON 4 MG: 2 INJECTION INTRAMUSCULAR; INTRAVENOUS at 05:58

## 2021-05-16 RX ADMIN — SODIUM CHLORIDE, POTASSIUM CHLORIDE, SODIUM LACTATE AND CALCIUM CHLORIDE 125 ML/HR: 600; 310; 30; 20 INJECTION, SOLUTION INTRAVENOUS at 19:31

## 2021-05-16 RX ADMIN — Medication 10 ML: at 21:04

## 2021-05-16 NOTE — PROGRESS NOTES
5/16/2021  Reason for Admission:  ENRIQUE keith                   RUR Score:       13% low             Plan for utilizing home health:  Has used [facility] in the past, unsure if she will need this admission     PCP: First and Last name:  Betsy Fierro   Name of Practice:  [facility]    Are you a current patient: Yes/No: Yes   Approximate date of last visit: 2 months past   Can you participate in a virtual visit with your PCP: Yes                    Current Advanced Directive/Advance Care Plan: Full Code    Healthcare Decision Maker:   Click here to complete [address] including selection of the Healthcare Decision Maker Relationship (ie \"Primary\")                           Transition of Care Plan:                    Patient lives alone in a single story apartment with no steps to enter. 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 She has had home health in the past through [facility] and liked them. She uses a walker regularly. Her emergency contact is a friend Wheatley Armagh who can be reached at [phone]. A friend may be able to bring her home from the hospital but I let her know we could arrange a ride if needed as well. Patient sees Dr. Niki Mckenzie as her PCP and last had a visit in March. She uses [facility] in [facility] [address] [facility] as her pharmacy and prescriptions are typically covered. No CM needs noted at this time, will continue to follow    Transition of Care Plan  1. Continue medical management/treatment  2. Likely home when ready   3. HH if needed  4. Follow up w PCP, specialties as necessary  5. CM will continue to follow    Care Management Interventions  PCP Verified by CM: Yes(Dr. Niki Mckenzie)  Mode of Transport at Discharge:  Other (see comment)(friend)  Transition of Care Consult (CM Consult): Discharge Planning  Current Support Network: Lives Alone  Confirm Follow Up Transport: Wheelchair Zbigniew Priestly  Discharge Location  Discharge Placement: Home with family assistance    DELIA Childress

## 2021-05-16 NOTE — ROUTINE PROCESS
3586-Chart accessed for review due to elevated MEWS 3. Pt with GI bleed and upper abdominal/chest pain; reporting 5/10 pain has a respiratory rate of 30. All other vitals are stable. Plan for EGD tomorrow. Will follow; may need pain/anxiety medication. Has recently arrived to the unit from the emergency department. Will follow.  Sherrie ALBERTO

## 2021-05-16 NOTE — ED NOTES
Dr. Abran Alvarenga in room to evaluate and assess patient. Patient complaining of chest pressure during this time. EKG done and given to Dr. Abran Alvarenga.

## 2021-05-16 NOTE — CONSULTS
[address] [place] [address]. Suzan Clark M.D.  [phone]                    GASTROENTEROLOGY CONSULTATION NOTE              NAME:  Sherrie Gordillo   :   1945   MRN:   [id number]       Referring Physician:    Dr. Sangeeta Colunga Date:   2021 1:00 PM    Chief Complaint:    Melena and acute blood loss anemia     History of Present Illness:    Patient is a 78 y. o. who is well known to me, started with black foul smelling stools 6 days ago, continued to have black stools until yesterday where she saw some red blood and came to the ER. She reports she has been having some upper abdominal pain as well. She was taking motrin for fibromyalgia but stopped when the black stools occurred and started taking pantoprazole and mylanta. She has a history of PUD, history of pyloric sparing Whipple surgery by Dr. 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 PMH:  Past Medical History:   Diagnosis Date    Adverse effect of anesthesia     \"I'M CRAZY WHEN I WAKE UP, I'M CRAZY.   THEY GAVE ME ATAVAN AND THAT WORKED\"    Arrhythmia     PALPITATIONS    Arthritis     BACK    Autoimmune disease ([address]     FIBROMYALGIA    Chronic pain     Depression     Diabetes ([address]     TYPE II    Difficult intubation     easy mask ventilation but required videolaryngoscope to intubate    Diverticulitis     Fatty liver     Fibromyalgia     GERD (gastroesophageal reflux disease)     Hemochromatosis     Hypertension     Incisional hernia, without obstruction or gangrene 2019    Irregular heart beat     Pancreatic cancer ([address]     Papillary neoplasm of ampulla of Vater 2013    Postoperative hematoma involving circulatory system following cardiac bypass 2020    PUD (peptic ulcer disease) 2019    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 Madrid-I&D Abdominal wall wound with debriedment of nectotic fat and tissue and washout abdominal wall wound    HX SALPINGO-OOPHORECTOMY Bilateral     HX TONSILLECTOMY      HX TOTAL COLECTOMY      sigmoid    HX UROLOGICAL  2008    BLADDER TUCK       Allergies: Allergies   Allergen Reactions    Tylenol [Acetaminophen] Other (comments)     Liver disorder. HAS HEMATOMACROSIS    Claritin [Loratadine] Other (comments)     Panic attack    Codeine Other (comments)     hallucinations    Percocet [Oxycodone-Acetaminophen] Other (comments)     hallucinations       Home Medications:  Prior to Admission Medications   Prescriptions Last Dose Informant Patient Reported? Taking? DULoxetine (CYMBALTA) 60 mg capsule  Self Yes No   Sig: Take 120 mg by mouth daily. baclofen (LIORESAL) 10 mg tablet  Self Yes No   Sig: Take 10 mg by mouth daily as needed. calcium carbonate (OS-DANI) 500 mg calcium (1,250 mg) tablet   Yes No   Sig: Take 3 Tabs by mouth nightly. docusate sodium (COLACE) 100 mg capsule   No No   Sig: Take 1 Cap by mouth two (2) times a day. insulin glargine (LANTUS) 100 unit/mL injection   No No   Si Units by SubCUTAneous route Daily (before breakfast). insulin lispro (HUMALOG) 100 unit/mL injection   No No   Si Units by SubCUTAneous route Before breakfast, lunch, and dinner. If your blood glucose level is greater than 200 before meals, take 8 units   metoprolol (LOPRESSOR) 50 mg tablet  Self Yes No   Sig: Take 50 mg by mouth two (2) times a day.    multivitamin (ONE A DAY) tablet   Yes No   Sig: Take 1 Tab by mouth daily. ondansetron hcl (ZOFRAN) 4 mg tablet  Self Yes No   Sig: Take 4 mg by mouth every four (4) hours as needed for Nausea. pantoprazole (PROTONIX) 40 mg tablet   Yes No   Sig: Take 40 mg by mouth daily. traZODone (DESYREL) 50 mg tablet   Yes No   Sig: Take  by mouth nightly. triamterene-hydroCHLOROthiazide (MAXZIDE) 37.5-25 mg per tablet   Yes No   Sig: Take 1 Tab by mouth daily.       Facility-Administered Medications: None       Hospital Medications:  Current Facility-Administered Medications   Medication Dose Route Frequency    sodium chloride (NS) flush 5-40 mL  5-40 mL IntraVENous Q8H    sodium chloride (NS) flush 5-40 mL  5-40 mL IntraVENous PRN    polyethylene glycol (MIRALAX) packet 17 g  17 g Oral DAILY PRN    promethazine (PHENERGAN) tablet 12.5 mg  12.5 mg Oral Q6H PRN    Or    ondansetron (ZOFRAN) injection 4 mg  4 mg IntraVENous Q6H PRN    pantoprazole (PROTONIX) 40 mg in 0.9% sodium chloride 10 mL injection  40 mg IntraVENous Q12H    lactated ringers bolus infusion 1,000 mL  1,000 mL IntraVENous ONCE    DULoxetine (CYMBALTA) capsule 120 mg  120 mg Oral DAILY    traZODone (DESYREL) tablet 50 mg  50 mg Oral QHS PRN    glucose chewable tablet 16 g  4 Tab Oral PRN    dextrose (D50W) injection syrg 12.5-25 g  25-50 mL IntraVENous PRN    glucagon (GLUCAGEN) injection 1 mg  1 mg IntraMUSCular PRN    insulin lispro (HUMALOG) injection   SubCUTAneous Q6H    insulin glargine (LANTUS) injection 15 Units  15 Units SubCUTAneous DAILY    baclofen (LIORESAL) tablet 10 mg  10 mg Oral DAILY PRN    lactated Ringers infusion  125 mL/hr IntraVENous CONTINUOUS       Social History:  Social History     Tobacco Use    Smoking status: Former Smoker     Packs/day: 1.00     Years: 17.00     Pack years: 17.00     Quit date: 1983     Years since quittin.4    Smokeless tobacco: Never Used   Substance Use Topics    Alcohol use: No       Family History:  Family History   Problem Relation Age of Onset    Heart Failure Mother     Diabetes Mother     Hypertension Mother    Mami Menezes Anesth Problems Mother         HARD TO INTUBATE    Heart Failure Father     Kidney Disease Father         WAS ON DIALYSIS    Diabetes Sister     Depression Sister     Anesth Problems Sister         HARD TO INTUBATE    Depression Sister         BIPOLAR    Other Sister         DIFFICULTY INTUBATING    Cancer Sister         ADENOMA    Asthma Sister     Other Son         Oneida Kincaid    No Known Problems Daughter        Review of Systems:  Constitutional: negative fever, negative chills, negative weight loss  Eyes:   negative visual changes  ENT:   negative sore throat, tongue or lip swelling  Respiratory:  negative cough, negative dyspnea  Cards:  negative for chest pain, palpitations, lower extremity edema  GI:   See HPI  :  negative for frequency, dysuria  Integument:  negative for rash and pruritus  Heme:  negative for easy bruising and gum/nose bleeding  Musculoskel: negative for myalgias,  back pain and muscle weakness  Neuro: negative for headaches, dizziness, vertigo  Psych:  negative for feelings of anxiety, depression     Objective:     Patient Vitals for the past 8 hrs:   BP Temp Pulse Resp SpO2   05/16/21 1146 (!) 143/54 98.1 °F (36.7 °C) 71 18 98 %   05/16/21 0530 (!) 108/49    97 %   05/16/21 0515 (!) 119/39    95 %     No intake/output data recorded. No intake/output data recorded. EXAM:     NEURO-alert, oriented x3, affect appropriate, no focal neurological deficits, moves all extremities well, no involuntary movements   HEENT-Head: Normocephalic, no lesions, without obvious abnormality. LUNGS-clear to auscultation bilaterally    COR-regular rate and rhythym     ABD- soft, mild tenderness over upper abdomen. Bowel sounds normal. Healing surgical incisions. No masses,  no organomegaly     EXT-no edema    Skin - No rash     Data Review     Recent Labs     05/16/21 0442   WBC 11.4*   HGB 9.7*   HCT 29.0*   PLT 71*     Recent Labs     05/16/21 0442   *   K 4.7      CO2 24   BUN 22*   CREA 0.81   *   CA 8.7     Recent Labs     05/16/21 0442   *   TP 7.2   ALB 3.2*   GLOB 4.0   LPSE 52*     Recent Labs     05/16/21 0442   INR 1.1   PTP 11.2*   APTT 21.3*       Patient Active Problem List   Diagnosis Code    Hemochromatosis E83.119    DM type 2 (diabetes mellitus, type 2) (HCC) E11.9    Essential hypertension I10    Hyponatremia E87.1    Cholangitis K83.09    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 Agree with IV PPI BID  Continue to monitor hemoglobin  Will plan for EGD tomorrow morning. Instructed to avoid taking any NSAIDs. Collins Nissen for clears today and NPO after MN. Thanks for allowing me to participate in the care of this patient.   Signed By: Pati Ponce MD     5/16/2021  1:00 PM

## 2021-05-16 NOTE — ED NOTES
TRANSFER - OUT REPORT:    Verbal report given to (name) on Cb  being transferred to [facility](unit) for routine progression of care       Report consisted of patients Situation, Background, Assessment and   Recommendations(SBAR). Information from the following report(s) SBAR, Kardex and ED Summary was reviewed with the receiving nurse. Lines:   Peripheral IV 05/16/21 Right Wrist (Active)   Site Assessment Clean, dry, & intact 05/16/21 0441   Phlebitis Assessment 0 05/16/21 0441   Infiltration Assessment 0 05/16/21 0441   Dressing Status Clean, dry, & intact 05/16/21 0441   Dressing Type Tape;Transparent 05/16/21 0441   Hub Color/Line Status Pink;Flushed;Patent 05/16/21 0441   Action Taken Blood drawn 05/16/21 0441       Peripheral IV 05/16/21 Right Forearm (Active)        Opportunity for questions and clarification was provided.       Patient transported with:  tech

## 2021-05-16 NOTE — ED PROVIDER NOTES
Andrew Stoddard is a 79 yo F with h/o pancreatic cancer s/p whipple in 2013 and partal colectomy and ileostomy reversal after bowel perforation and GI bleeds as well as hemachromatosis who presents to the ED with concern for GI bleed. She states that she has been having dark foul smelling stools for past 6 days and tonight she passed maroon stool. She states that she has been trying to manage her symptoms at home by taking pantoprazole. She has had lightheadedness and palpitations but denies shortness of breath or chest pain. Past Medical History:   Diagnosis Date    Adverse effect of anesthesia     \"I'M CRAZY WHEN I WAKE UP, I'M CRAZY.   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    Postoperative hematoma involving circulatory system following cardiac bypass 2/19/2020    PUD (peptic ulcer disease) 2019    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    Unspecified sleep apnea     NO C-PAP       Past Surgical History:   Procedure Laterality Date    COLONOSCOPY N/A 1/15/2019    COLONOSCOPY performed by Tae Bejarano MD at [facility] HX APPENDECTOMY      HX BREAST LUMPECTOMY Left     lumpectomy BENIGN    HX COLONOSCOPY      HX HYSTERECTOMY      hysto    HX ORTHOPAEDIC Left     shoulder CYST REMOVAL    HX OTHER SURGICAL  12/31/13    pyloric-sparing whipple,bx of portal and hepatic nodes by Dr Lambert Echavarria SURGICAL      ileostomy-AND REVERSAL    HX OTHER SURGICAL  2020    Dr. Madrid-I&D Abdominal wall wound with debriedment of nectotic fat and tissue and washout abdominal wall wound    HX SALPINGO-OOPHORECTOMY Bilateral     HX TONSILLECTOMY      HX TOTAL COLECTOMY      sigmoid    HX UROLOGICAL  2008    BLADDER TUCK         Family History:   Problem Relation Age of Onset    Heart Failure Mother     Diabetes Mother     Hypertension Mother    Bureysabel Witt Anesth Problems Mother         HARD TO INTUBATE    Heart Failure Father     Kidney Disease Father         WAS ON DIALYSIS    Diabetes Sister     Depression Sister     Anesth Problems Sister         HARD TO INTUBATE    Depression Sister         BIPOLAR    Other Sister         DIFFICULTY INTUBATING    Cancer Sister         ADENOMA    Asthma Sister     Other Son         HEMATOMACHROSIS    No Known Problems Daughter        Social History     Socioeconomic History    Marital status:      Spouse name: Not on file    Number of children: Not on file    Years of education: Not on file    Highest education level: Not on file   Occupational History    Not on file   Social Needs    Financial resource strain: Not on file    Food insecurity     Worry: Not on file     Inability: Not on file    Transportation needs     Medical: Not on file     Non-medical: Not on file   Tobacco Use    Smoking status: Former Smoker     Packs/day: 1.00     Years: 17.00     Pack years: 17.00     Quit date: 1983     Years since quittin.4    Smokeless tobacco: Never Used   Substance and Sexual Activity    Alcohol use: No    Drug use: No    Sexual activity: Not on file   Lifestyle    Physical activity     Days per week: Not on file     Minutes per session: Not on file    Stress: Not on file   Relationships    Social connections     Talks on phone: Not on file     Gets together: Not on file     Attends Christian service: Not on file     Active member of club or organization: Not on file     Attends meetings of clubs or organizations: Not on file     Relationship status: Not on file    Intimate partner violence     Fear of current or ex partner: Not on file     Emotionally abused: Not on file     Physically abused: Not on file     Forced sexual activity: Not on file   Other Topics Concern    Not on file   Social History Narrative    Not on file         ALLERGIES: Tylenol [acetaminophen], Claritin [loratadine], Codeine, and Percocet [oxycodone-acetaminophen]    Review of Systems   Constitutional: Negative for fever. HENT: Negative for sore throat. Eyes: Negative for visual disturbance. Respiratory: Negative for cough and shortness of breath. Cardiovascular: Positive for palpitations. Negative for chest pain. Gastrointestinal: Positive for blood in stool and melena. Negative for abdominal pain. Genitourinary: Negative for dysuria. Musculoskeletal: Negative for back pain. Skin: Negative for rash. Neurological: Positive for light-headedness. Negative for headaches. Vitals:    05/16/21 0445 05/16/21 0500 05/16/21 0515 05/16/21 0530   BP: (!) 130/55 (!) 113/45 (!) 119/39 (!) 108/49   Pulse:       Resp:       Temp:       SpO2: 99% 98% 95% 97%   Weight:       Height:                Physical Exam  Vitals signs and nursing note reviewed. Constitutional:       General: She is not in acute distress. Appearance: She is well-developed. HENT:      Head: Normocephalic and atraumatic. Eyes:      Conjunctiva/sclera: Conjunctivae normal.   Neck:      Musculoskeletal: Normal range of motion. Trachea: Phonation normal.   Cardiovascular:      Rate and Rhythm: Normal rate. Pulmonary:      Effort: Pulmonary effort is normal. No respiratory distress. Abdominal:      General: There is no distension. Tenderness: There is no abdominal tenderness. There is no guarding. Genitourinary:     Rectum: Guaiac result positive (black stool). Musculoskeletal: Normal range of motion. General: No tenderness. Skin:     General: Skin is warm and dry. Neurological:      Mental Status: She is alert. She is not disoriented. Motor: No abnormal muscle tone. ED EKG interpretation:  Rhythm: normal sinus rhythm; and regular . Rate (approx.): 69; Axis: normal; P wave: normal; QRS interval: normal ; ST/T wave: normal; Other findings: borderline ekg. This EKG was interpreted by Amanda Rodríguez MD,ED Provider. [facility]       7:35 AM  Change of shift. Care of patient signed over to Dr. Jazmine Rebolledo. Bedside handoff complete. Awaiting CT abd/pelvis results, anticipate admission. Procedures        Perfect Serve Consult for Admission  10:05 AM    ED Room Number: ER06/06  Patient Name and age:  Marine Adhikari 78 y.o.  female  Working Diagnosis:   1.  Gastrointestinal hemorrhage, unspecified gastrointestinal hemorrhage type        COVID-19 Suspicion:  no  Sepsis present:  no  Reassessment needed: no  Code Status:  Full Code  Readmission: no  Isolation Requirements:  no  Recommended Level of Care:  med/surg  Department:[facility] ED - [phone]  Other:

## 2021-05-16 NOTE — ED TRIAGE NOTES
Pt ambulatory to triage with mask, c/o possible GI bleed. States Monday started with dark, foul smelling stools. States that tonight started with now bright red stools. Reports cramping, nausea and fatigue, and dizziness. Reports hx of Gi bleed, states refuses blood d/t [facility] reasons.   Took protonix approx 1 hour ago, tylenol at approx 0200

## 2021-05-16 NOTE — H&P
Hospitalist Admission Note    NAME: Rehan Salgado   :  1945   MRN:  [id number]     Date/Time:  2021 10:45 AM    Patient PCP: Pilar Dec  ________________________________________________________________________    Given the patient's current clinical presentation, I have a high level of concern for decompensation if discharged from the emergency department. Complex decision making was performed, which includes reviewing the patient's available past medical records, laboratory results, and x-ray films. My assessment of this patient's clinical condition and my plan of care is as follows. Assessment / Plan:    #Acute GIB: Predominately UGIB with e/o melena; however, she did also have some bright red blood which could be hemorrhoidal vs diverticular. Vitals too stable to suggest brisk upper, but will be cognizant of this. Precipitant for UGIB is motrin 800mg bid without taking home PPI. She has hx hemachromatosis but no hx cirrhosis, so variceal bleed unlikely; INR nl   - Ensure IV access; IVF   - IV PPI BID   - NPO for scope, GI consulted   - Hold home anti-HTN   - No transfusions per pt preference   - Check stool for infectious hemorrhagic diarrhea, but low c/f this    - Check H pylori stool Ag   - follow plts    #Thrombocytopenia: This appears to be new for her. INR is normal. Not a drinker. Known hx hemochromatosis   - Trend for now    #HTN: On triamterene/hctz and metop at home   - hold in s/o above    #DM: 2/2 pancreatic insufficiency. Home glar 20u, ssi   - Glar 15u as npo for now; SSI    #Anxiety: Anxious now but cognizant of it   - Cont home duloxetine; hold on benzos    #FM: Recommend against future use of NSAIDs   - Cont dulox; baclofen        I have personally reviewed the radiographs, laboratory data in Epic and decisions and statements above are based partially on this personal interpretation.     Code Status: BSHSI BLANK LIST: Full Code  DVT Prophylaxis: SCD's  GI Prophylaxis: BSHSI BLANK LIST: PPI       Subjective:   CHIEF COMPLAINT: \"black stools\"    HISTORY OF PRESENT ILLNESS:     Brittney Wisdom is a 78 y.o.  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 She has had 3-4 foul smelling black BMs daily since that time. Then this morning she had a BM with red blood in it, so called her doc office, who said to come to ER. She notes that she has FM and has been prabha motrin 800mg BID for many weeks, and has not been consistent with her pantoprazole. She does not drink alcohol. She is Yarsanism and does not take blood transfusions. Past Medical History:   Diagnosis Date    Adverse effect of anesthesia     \"I'M CRAZY WHEN I WAKE UP, I'M CRAZY.   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    Postoperative hematoma involving circulatory system following cardiac bypass 2/19/2020    PUD (peptic ulcer disease) 2019    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 [place]-[facility]&D Abdominal wall wound with debriedment of nectotic fat and tissue and washout abdominal wall wound    HX SALPINGO-OOPHORECTOMY Bilateral     HX TONSILLECTOMY      HX TOTAL COLECTOMY      sigmoid    HX UROLOGICAL  2008    BLADDER TUCK     Social History     Tobacco Use    Smoking status: Former Smoker     Packs/day: 1.00     Years: 17.00     Pack years: 17.00     Quit date: 1983     Years since quittin.4    Smokeless tobacco: Never Used   Substance Use Topics    Alcohol use: No      Family History   Problem Relation Age of Onset    Heart Failure Mother     Diabetes Mother     Hypertension Mother     Anesth Problems Mother         HARD TO INTUBATE    Heart Failure Father     Kidney Disease Father         WAS ON DIALYSIS    Diabetes Sister     Depression Sister     Anesth Problems Sister         HARD TO INTUBATE    Depression Sister         BIPOLAR    Other Sister         DIFFICULTY INTUBATING    Cancer Sister         ADENOMA    Asthma Sister     Other Son         HEMATOMACHROSIS    No Known Problems Daughter         Allergies   Allergen Reactions    Tylenol [Acetaminophen] Other (comments)     Liver disorder. HAS HEMATOMACROSIS    Claritin [Loratadine] Other (comments)     Panic attack    Codeine Other (comments)     hallucinations    Percocet [Oxycodone-Acetaminophen] Other (comments)     hallucinations        Prior to Admission medications    Medication Sig Start Date End Date Taking? Authorizing Provider   traZODone (DESYREL) 50 mg tablet Take  by mouth nightly. Provider, Historical   insulin glargine (LANTUS) 100 unit/mL injection 20 Units by SubCUTAneous route Daily (before breakfast).  3/19/20 Lynda Lawrence NP   insulin lispro (HUMALOG) 100 unit/mL injection 5 Units by SubCUTAneous route Before breakfast, lunch, and dinner. If your blood glucose level is greater than 200 before meals, take 8 units 3/19/20   Lynda Lawrence NP   docusate sodium (COLACE) 100 mg capsule Take 1 Cap by mouth two (2) times a day. 2/9/20   Abi Spivey MD   multivitamin (ONE A DAY) tablet Take 1 Tab by mouth daily. Provider, Historical   calcium carbonate (OS-DANI) 500 mg calcium (1,250 mg) tablet Take 3 Tabs by mouth nightly. Provider, Historical   triamterene-hydroCHLOROthiazide (MAXZIDE) 37.5-25 mg per tablet Take 1 Tab by mouth daily. Provider, Historical   pantoprazole (PROTONIX) 40 mg tablet Take 40 mg by mouth daily. Provider, Historical   metoprolol (LOPRESSOR) 50 mg tablet Take 50 mg by mouth two (2) times a day. Provider, Historical   baclofen (LIORESAL) 10 mg tablet Take 10 mg by mouth daily as needed. Provider, Historical   ondansetron hcl (ZOFRAN) 4 mg tablet Take 4 mg by mouth every four (4) hours as needed for Nausea. Provider, Historical   DULoxetine (CYMBALTA) 60 mg capsule Take 120 mg by mouth daily.     Other, MD Karrie     REVIEW OF SYSTEMS:  See HPI for details  General: negative for fever, chills, sweats, weakness, weight loss  Eyes: negative for blurred vision, eye pain, loss of vision, diplopia  Ear Nose and Throat: negative for rhinorrhea, pharyngitis, otalgia, tinnitus, speech or swallowing difficulties  Respiratory:  negative for pleuritic pain, cough, sputum production, wheezing, SOB, ERYES  Cardiology:  negative for chest pain, palpitations, orthopnea, PND, edema, syncope   Gastrointestinal: +for abdominal pain, change in bowel habits, bleeding  Genitourinary: negative for frequency, urgency, dysuria, hematuria, incontinence  Muskuloskeletal : negative for arthralgia, myalgia  Hematology: negative for easy bruising, bleeding, lymphadenopathy  Dermatological: negative for rash, ulceration, mole change, new lesion  Endocrine: negative for hot flashes or polydipsia  Neurological: negative for headache, dizziness, confusion, focal weakness, paresthesia, memory loss, gait disturbance  Psychological: +for anxiety, - for depression, agitation      Objective:   VITALS:    Visit Vitals  BP (!) 108/49   Pulse 70   Temp 99.1 °F (37.3 °C)   Resp 22   Ht 5' 1\" (1.549 m)   Wt 88.9 kg (196 lb)   SpO2 97%   BMI 37.03 kg/m²     PHYSICAL EXAM:    Physical Exam:    Gen: Well-developed, well-nourished, in no acute distress  HEENT:  Pink conjunctivae, PERRL, hearing intact to voice, moist mucous membranes  Neck: Supple, without masses, thyroid non-tender  Resp: No accessory muscle use, clear breath sounds without wheezes rales or rhonchi  Card: No murmurs, normal S1, S2 without thrills, bruits or peripheral edema  Abd:  Soft, non-tender, non-distended, normoactive bowel sounds are present, no palpable organomegaly and no detectable hernias  Lymph:  No cervical or inguinal adenopathy  Musc: No cyanosis or clubbing  Skin: No rashes or ulcers, skin turgor is good  Neuro:  Cranial nerves are grossly intact, no focal motor weakness, follows commands appropriately  Psych:  Good insight, oriented to person, place and time, alert          _______________________________________________________________________  Care Plan discussed with:    Comments   Patient x Discussed with patient in room.  POC outlined and Questions answered    Family      RN x    Care Manager                    Consultant:  zahra FALK MD   _______________________________________________________________________  Recommended Disposition:   Home with Family x   HH/PT/OT/RN    SNF/LTC    JUANY    ________________________________________________________________________  TOTAL TIME:  60 Minutes        Comments   >50% of visit spent in counseling and coordination of care  Chart reviewed  Discussion with patient and/or family and questions answered ________________________________________________________________________  Signed: Toby Parra MD    This note will not be viewable in [address] Procedures: see electronic medical records for all procedures/Xrays and details which were not copied into this note but were reviewed prior to creation of Plan. LAB DATA REVIEWED:    Recent Results (from the past 24 hour(s))   SAMPLES BEING HELD    Collection Time: 05/16/21  4:42 AM   Result Value Ref Range    SAMPLES BEING HELD 1RED,1BLU,1SST     COMMENT        Add-on orders for these samples will be processed based on acceptable specimen integrity and analyte stability, which may vary by analyte. CBC WITH AUTOMATED DIFF    Collection Time: 05/16/21  4:42 AM   Result Value Ref Range    WBC 11.4 (H) 3.6 - 11.0 K/uL    RBC 3.32 (L) 3.80 - 5.20 M/uL    HGB 9.7 (L) 11.5 - 16.0 g/dL    HCT 29.0 (L) 35.0 - 47.0 %    MCV 87.3 80.0 - 99.0 FL    MCH 29.2 26.0 - 34.0 PG    MCHC 33.4 30.0 - 36.5 g/dL    RDW 13.8 11.5 - 14.5 %    PLATELET 71 (L) 628 - 400 K/uL    NRBC 0.0 0  WBC    ABSOLUTE NRBC 0.00 0.00 - 0.01 K/uL    NEUTROPHILS 58 32 - 75 %    BAND NEUTROPHILS 1 0 - 6 %    LYMPHOCYTES 37 12 - 49 %    MONOCYTES 2 (L) 5 - 13 %    EOSINOPHILS 0 0 - 7 %    BASOPHILS 2 (H) 0 - 1 %    IMMATURE GRANULOCYTES 0 %    ABS. NEUTROPHILS 6.8 1.8 - 8.0 K/UL    ABS. LYMPHOCYTES 4.2 (H) 0.8 - 3.5 K/UL    ABS. MONOCYTES 0.2 0.0 - 1.0 K/UL    ABS. EOSINOPHILS 0.0 0.0 - 0.4 K/UL    ABS. BASOPHILS 0.2 (H) 0.0 - 0.1 K/UL    ABS. IMM.  GRANS. 0.0 K/UL    DF MANUAL      PLATELET COMMENTS CLUMPED PLATELETS      RBC COMMENTS NORMOCYTIC, NORMOCHROMIC     METABOLIC PANEL, COMPREHENSIVE    Collection Time: 05/16/21  4:42 AM   Result Value Ref Range    Sodium 133 (L) 136 - 145 mmol/L    Potassium 4.7 3.5 - 5.1 mmol/L    Chloride 102 97 - 108 mmol/L    CO2 24 21 - 32 mmol/L    Anion gap 7 5 - 15 mmol/L    Glucose 339 (H) 65 - 100 mg/dL    BUN 22 (H) 6 - 20 MG/DL    Creatinine 0.81 0.55 - 1.02 MG/DL    BUN/Creatinine ratio 27 (H) 12 - 20      GFR est AA >60 >60 ml/min/1.73m2    GFR est non-AA >60 >60 ml/min/1.73m2    Calcium 8.7 8.5 - 10.1 MG/DL    Bilirubin, total 0.4 0.2 - 1.0 MG/DL    ALT (SGPT) 32 12 - 78 U/L    AST (SGOT) 30 15 - 37 U/L    Alk.  phosphatase 186 (H) 45 - 117 U/L    Protein, total 7.2 6.4 - 8.2 g/dL    Albumin 3.2 (L) 3.5 - 5.0 g/dL    Globulin 4.0 2.0 - 4.0 g/dL    A-G Ratio 0.8 (L) 1.1 - 2.2     LIPASE    Collection Time: 05/16/21  4:42 AM   Result Value Ref Range    Lipase 52 (L) 73 - 393 U/L   PROTHROMBIN TIME + INR    Collection Time: 05/16/21  4:42 AM   Result Value Ref Range    INR 1.1 0.9 - 1.1      Prothrombin time 11.2 (H) 9.0 - 11.1 sec   PTT    Collection Time: 05/16/21  4:42 AM   Result Value Ref Range    aPTT 21.3 (L) 22.1 - 31.0 sec    aPTT, therapeutic range     58.0 - 77.0 SECS   EKG, 12 LEAD, INITIAL    Collection Time: 05/16/21  4:43 AM   Result Value Ref Range    Ventricular Rate 69 BPM    Atrial Rate 69 BPM    P-R Interval 180 ms    QRS Duration 94 ms    Q-T Interval 420 ms    QTC Calculation (Bezet) 450 ms    Calculated P Axis 37 degrees    Calculated R Axis -27 degrees    Calculated T Axis 18 degrees    Diagnosis       Normal sinus rhythm with sinus arrhythmia  Moderate voltage criteria for LVH, may be normal variant  Borderline ECG  When compared with ECG of 20-NOV-2019 14:23,  No significant change was found     OCCULT BLOOD, STOOL    Collection Time: 05/16/21  5:51 AM   Result Value Ref Range    Occult blood, stool Positive (A) NEG

## 2021-05-17 ENCOUNTER — ANESTHESIA EVENT (OUTPATIENT)
Dept: ENDOSCOPY | Age: 76
DRG: 378 | End: 2021-05-17
Payer: MEDICARE

## 2021-05-17 ENCOUNTER — ANESTHESIA (OUTPATIENT)
Dept: ENDOSCOPY | Age: 76
DRG: 378 | End: 2021-05-17
Payer: MEDICARE

## 2021-05-17 LAB
ANION GAP SERPL CALC-SCNC: 4 MMOL/L (ref 5–15)
BASOPHILS # BLD: 0 K/UL (ref 0–0.1)
BASOPHILS NFR BLD: 0 % (ref 0–1)
BUN SERPL-MCNC: 16 MG/DL (ref 6–20)
BUN/CREAT SERPL: 23 (ref 12–20)
CALCIUM SERPL-MCNC: 7.9 MG/DL (ref 8.5–10.1)
CHLORIDE SERPL-SCNC: 106 MMOL/L (ref 97–108)
CO2 SERPL-SCNC: 27 MMOL/L (ref 21–32)
COVID-19 RAPID TEST, COVR: NOT DETECTED
CREAT SERPL-MCNC: 0.69 MG/DL (ref 0.55–1.02)
DIFFERENTIAL METHOD BLD: ABNORMAL
EOSINOPHIL # BLD: 0.1 K/UL (ref 0–0.4)
EOSINOPHIL NFR BLD: 2 % (ref 0–7)
ERYTHROCYTE [DISTWIDTH] IN BLOOD BY AUTOMATED COUNT: 14 % (ref 11.5–14.5)
GLUCOSE BLD STRIP.AUTO-MCNC: 204 MG/DL (ref 65–117)
GLUCOSE BLD STRIP.AUTO-MCNC: 235 MG/DL (ref 65–117)
GLUCOSE BLD STRIP.AUTO-MCNC: 248 MG/DL (ref 65–117)
GLUCOSE BLD STRIP.AUTO-MCNC: 270 MG/DL (ref 65–117)
GLUCOSE BLD STRIP.AUTO-MCNC: 279 MG/DL (ref 65–117)
GLUCOSE BLD STRIP.AUTO-MCNC: 295 MG/DL (ref 65–117)
GLUCOSE SERPL-MCNC: 225 MG/DL (ref 65–100)
HCT VFR BLD AUTO: 26.5 % (ref 35–47)
HGB BLD-MCNC: 8.6 G/DL (ref 11.5–16)
IMM GRANULOCYTES # BLD AUTO: 0 K/UL (ref 0–0.04)
IMM GRANULOCYTES NFR BLD AUTO: 0 % (ref 0–0.5)
LYMPHOCYTES # BLD: 2.5 K/UL (ref 0.8–3.5)
LYMPHOCYTES NFR BLD: 39 % (ref 12–49)
MCH RBC QN AUTO: 28.8 PG (ref 26–34)
MCHC RBC AUTO-ENTMCNC: 32.5 G/DL (ref 30–36.5)
MCV RBC AUTO: 88.6 FL (ref 80–99)
MONOCYTES # BLD: 0.5 K/UL (ref 0–1)
MONOCYTES NFR BLD: 8 % (ref 5–13)
NEUTS SEG # BLD: 3.3 K/UL (ref 1.8–8)
NEUTS SEG NFR BLD: 51 % (ref 32–75)
NRBC # BLD: 0 K/UL (ref 0–0.01)
NRBC BLD-RTO: 0 PER 100 WBC
PLATELET # BLD AUTO: 150 K/UL (ref 150–400)
PMV BLD AUTO: 10.9 FL (ref 8.9–12.9)
POTASSIUM SERPL-SCNC: 4.4 MMOL/L (ref 3.5–5.1)
RBC # BLD AUTO: 2.99 M/UL (ref 3.8–5.2)
SERVICE CMNT-IMP: ABNORMAL
SODIUM SERPL-SCNC: 137 MMOL/L (ref 136–145)
SOURCE, COVRS: NORMAL
TROPONIN I SERPL-MCNC: <0.05 NG/ML
WBC # BLD AUTO: 6.4 K/UL (ref 3.6–11)

## 2021-05-17 PROCEDURE — 74011636637 HC RX REV CODE- 636/637: Performed by: STUDENT IN AN ORGANIZED HEALTH CARE EDUCATION/TRAINING PROGRAM

## 2021-05-17 PROCEDURE — 82962 GLUCOSE BLOOD TEST: CPT

## 2021-05-17 PROCEDURE — 76060000031 HC ANESTHESIA FIRST 0.5 HR: Performed by: INTERNAL MEDICINE

## 2021-05-17 PROCEDURE — 2709999900 HC NON-CHARGEABLE SUPPLY: Performed by: INTERNAL MEDICINE

## 2021-05-17 PROCEDURE — 65660000000 HC RM CCU STEPDOWN

## 2021-05-17 PROCEDURE — 74011636637 HC RX REV CODE- 636/637: Performed by: INTERNAL MEDICINE

## 2021-05-17 PROCEDURE — 74011250637 HC RX REV CODE- 250/637: Performed by: INTERNAL MEDICINE

## 2021-05-17 PROCEDURE — 76040000019: Performed by: INTERNAL MEDICINE

## 2021-05-17 PROCEDURE — 85025 COMPLETE CBC W/AUTO DIFF WBC: CPT

## 2021-05-17 PROCEDURE — 87635 SARS-COV-2 COVID-19 AMP PRB: CPT

## 2021-05-17 PROCEDURE — 36415 COLL VENOUS BLD VENIPUNCTURE: CPT

## 2021-05-17 PROCEDURE — C9113 INJ PANTOPRAZOLE SODIUM, VIA: HCPCS | Performed by: INTERNAL MEDICINE

## 2021-05-17 PROCEDURE — 80048 BASIC METABOLIC PNL TOTAL CA: CPT

## 2021-05-17 PROCEDURE — 77030010936 HC CLP LIG BSC -C: Performed by: INTERNAL MEDICINE

## 2021-05-17 PROCEDURE — 77010033678 HC OXYGEN DAILY

## 2021-05-17 PROCEDURE — 0DJ08ZZ INSPECTION OF UPPER INTESTINAL TRACT, VIA NATURAL OR ARTIFICIAL OPENING ENDOSCOPIC: ICD-10-PCS | Performed by: INTERNAL MEDICINE

## 2021-05-17 PROCEDURE — 74011250636 HC RX REV CODE- 250/636: Performed by: NURSE ANESTHETIST, CERTIFIED REGISTERED

## 2021-05-17 PROCEDURE — 84484 ASSAY OF TROPONIN QUANT: CPT

## 2021-05-17 PROCEDURE — 74011250636 HC RX REV CODE- 250/636: Performed by: INTERNAL MEDICINE

## 2021-05-17 DEVICE — CLIP INT L235CM WRK CHAN DIA2.8MM OPN 11MM LCK MECHANISM MR: Type: IMPLANTABLE DEVICE | Site: STOMACH | Status: FUNCTIONAL

## 2021-05-17 RX ORDER — NALOXONE HYDROCHLORIDE 0.4 MG/ML
0.4 INJECTION, SOLUTION INTRAMUSCULAR; INTRAVENOUS; SUBCUTANEOUS
Status: DISCONTINUED | OUTPATIENT
Start: 2021-05-17 | End: 2021-05-17 | Stop reason: HOSPADM

## 2021-05-17 RX ORDER — PROPOFOL 10 MG/ML
INJECTION, EMULSION INTRAVENOUS
Status: DISCONTINUED | OUTPATIENT
Start: 2021-05-17 | End: 2021-05-17 | Stop reason: HOSPADM

## 2021-05-17 RX ORDER — PROPOFOL 10 MG/ML
INJECTION, EMULSION INTRAVENOUS AS NEEDED
Status: DISCONTINUED | OUTPATIENT
Start: 2021-05-17 | End: 2021-05-17 | Stop reason: HOSPADM

## 2021-05-17 RX ORDER — SODIUM CHLORIDE 9 MG/ML
50 INJECTION, SOLUTION INTRAVENOUS CONTINUOUS
Status: DISPENSED | OUTPATIENT
Start: 2021-05-17 | End: 2021-05-17

## 2021-05-17 RX ORDER — MORPHINE SULFATE 4 MG/ML
4 INJECTION INTRAVENOUS ONCE
Status: COMPLETED | OUTPATIENT
Start: 2021-05-17 | End: 2021-05-17

## 2021-05-17 RX ORDER — ATROPINE SULFATE 0.1 MG/ML
0.4 INJECTION INTRAVENOUS
Status: DISCONTINUED | OUTPATIENT
Start: 2021-05-17 | End: 2021-05-17 | Stop reason: HOSPADM

## 2021-05-17 RX ORDER — MORPHINE SULFATE 2 MG/ML
2 INJECTION, SOLUTION INTRAMUSCULAR; INTRAVENOUS
Status: DISCONTINUED | OUTPATIENT
Start: 2021-05-17 | End: 2021-05-18 | Stop reason: HOSPADM

## 2021-05-17 RX ORDER — EPINEPHRINE 0.1 MG/ML
1 INJECTION INTRACARDIAC; INTRAVENOUS
Status: DISCONTINUED | OUTPATIENT
Start: 2021-05-17 | End: 2021-05-17 | Stop reason: HOSPADM

## 2021-05-17 RX ORDER — PANTOPRAZOLE SODIUM 40 MG/1
40 TABLET, DELAYED RELEASE ORAL 2 TIMES DAILY
Status: DISCONTINUED | OUTPATIENT
Start: 2021-05-17 | End: 2021-05-18 | Stop reason: HOSPADM

## 2021-05-17 RX ORDER — FLUMAZENIL 0.1 MG/ML
0.2 INJECTION INTRAVENOUS
Status: DISCONTINUED | OUTPATIENT
Start: 2021-05-17 | End: 2021-05-17 | Stop reason: HOSPADM

## 2021-05-17 RX ORDER — MIDAZOLAM HYDROCHLORIDE 1 MG/ML
.25-5 INJECTION, SOLUTION INTRAMUSCULAR; INTRAVENOUS
Status: DISCONTINUED | OUTPATIENT
Start: 2021-05-17 | End: 2021-05-17 | Stop reason: HOSPADM

## 2021-05-17 RX ORDER — INSULIN GLARGINE 100 [IU]/ML
20 INJECTION, SOLUTION SUBCUTANEOUS DAILY
Status: DISCONTINUED | OUTPATIENT
Start: 2021-05-18 | End: 2021-05-18 | Stop reason: HOSPADM

## 2021-05-17 RX ORDER — DEXTROMETHORPHAN/PSEUDOEPHED 2.5-7.5/.8
1.2 DROPS ORAL
Status: DISCONTINUED | OUTPATIENT
Start: 2021-05-17 | End: 2021-05-17 | Stop reason: HOSPADM

## 2021-05-17 RX ADMIN — PROPOFOL INJECTABLE EMULSION 40 MG: 10 INJECTION, EMULSION INTRAVENOUS at 14:25

## 2021-05-17 RX ADMIN — PROPOFOL INJECTABLE EMULSION 225 MCG/KG/MIN: 10 INJECTION, EMULSION INTRAVENOUS at 14:27

## 2021-05-17 RX ADMIN — PANTOPRAZOLE SODIUM 40 MG: 40 INJECTION, POWDER, FOR SOLUTION INTRAVENOUS at 08:05

## 2021-05-17 RX ADMIN — INSULIN LISPRO 5 UNITS: 100 INJECTION, SOLUTION INTRAVENOUS; SUBCUTANEOUS at 12:58

## 2021-05-17 RX ADMIN — PROPOFOL INJECTABLE EMULSION 80 MG: 10 INJECTION, EMULSION INTRAVENOUS at 14:22

## 2021-05-17 RX ADMIN — INSULIN LISPRO 3 UNITS: 100 INJECTION, SOLUTION INTRAVENOUS; SUBCUTANEOUS at 18:20

## 2021-05-17 RX ADMIN — PANTOPRAZOLE SODIUM 40 MG: 40 TABLET, DELAYED RELEASE ORAL at 18:20

## 2021-05-17 RX ADMIN — INSULIN LISPRO 3 UNITS: 100 INJECTION, SOLUTION INTRAVENOUS; SUBCUTANEOUS at 07:49

## 2021-05-17 RX ADMIN — Medication 10 ML: at 18:22

## 2021-05-17 RX ADMIN — MORPHINE SULFATE 2 MG: 2 INJECTION, SOLUTION INTRAMUSCULAR; INTRAVENOUS at 18:52

## 2021-05-17 RX ADMIN — HUMAN INSULIN 5 UNITS: 100 INJECTION, SOLUTION SUBCUTANEOUS at 13:54

## 2021-05-17 RX ADMIN — INSULIN GLARGINE 15 UNITS: 100 INJECTION, SOLUTION SUBCUTANEOUS at 08:05

## 2021-05-17 RX ADMIN — ONDANSETRON 4 MG: 2 INJECTION INTRAMUSCULAR; INTRAVENOUS at 11:07

## 2021-05-17 RX ADMIN — SODIUM CHLORIDE, POTASSIUM CHLORIDE, SODIUM LACTATE AND CALCIUM CHLORIDE 125 ML/HR: 600; 310; 30; 20 INJECTION, SOLUTION INTRAVENOUS at 18:52

## 2021-05-17 RX ADMIN — DULOXETINE HYDROCHLORIDE 120 MG: 30 CAPSULE, DELAYED RELEASE ORAL at 08:05

## 2021-05-17 RX ADMIN — MORPHINE SULFATE 4 MG: 4 INJECTION INTRAVENOUS at 11:02

## 2021-05-17 RX ADMIN — PROPOFOL INJECTABLE EMULSION 30 MG: 10 INJECTION, EMULSION INTRAVENOUS at 14:24

## 2021-05-17 RX ADMIN — SODIUM CHLORIDE, POTASSIUM CHLORIDE, SODIUM LACTATE AND CALCIUM CHLORIDE 125 ML/HR: 600; 310; 30; 20 INJECTION, SOLUTION INTRAVENOUS at 08:06

## 2021-05-17 NOTE — PERIOP NOTES
Pt admitted to unit by this RN. Pt and family member educated on procedure, and POC. Pt verbalized understanding. Reviewed all admission paperwork including medical and surgical history, medications, NPO status, discharge instructions and expectations. Consent reviewed and signed by anesthesia, patient and this RN. Offered support and answered questions as needed. VSS. Will continue to monitor throughout preoperative stay. FSBS 275, per Dr. Rosey Schwab 5 units of Regular Insulin now.

## 2021-05-17 NOTE — PROGRESS NOTES
Bedside and Verbal shift change report given to Danielle Gaines RN (oncoming nurse) by Jose Crain RN (offgoing nurse). Report included the following information SBAR, Kardex, Procedure Summary, Intake/Output, MAR, Accordion, Recent Results and Med Rec Status.

## 2021-05-17 NOTE — ANESTHESIA POSTPROCEDURE EVALUATION
Procedure(s):  ESOPHAGOGASTRODUODENOSCOPY (EGD)  RESOLUTION CLIP. MAC    Anesthesia Post Evaluation      Multimodal analgesia: multimodal analgesia used between 6 hours prior to anesthesia start to PACU discharge  Patient location during evaluation: PACU  Patient participation: complete - patient participated  Level of consciousness: awake and alert  Pain management: adequate  Airway patency: patent  Anesthetic complications: no  Cardiovascular status: acceptable  Respiratory status: acceptable  Hydration status: acceptable  Post anesthesia nausea and vomiting:  none  Final Post Anesthesia Temperature Assessment:  Normothermia (36.0-37.5 degrees C)      INITIAL Post-op Vital signs:   Vitals Value Taken Time   /60 05/17/21 1448   Temp     Pulse 75 05/17/21 1451   Resp 18 05/17/21 1451   SpO2 98 % 05/17/21 1451   Vitals shown include unvalidated device data.

## 2021-05-17 NOTE — ANESTHESIA PREPROCEDURE EVALUATION
Anesthetic History     Increased risk of difficult airway          Review of Systems / Medical History  Patient summary reviewed, nursing notes reviewed and pertinent labs reviewed    Pulmonary        Sleep apnea: No treatment           Neuro/Psych         Psychiatric history     Cardiovascular    Hypertension: well controlled              Exercise tolerance: >4 METS     GI/Hepatic/Renal     GERD: well controlled      Liver disease    Comments: Diverticulitis Endo/Other    Diabetes: poorly controlled, type 2, using insulin    Obesity, arthritis and cancer (h/o pancreatic cancer)     Other Findings   Comments: Hemochromatosis controlled with phlebotomy  Fibromyalgia           Physical Exam    Airway  Mallampati: III  TM Distance: 4 - 6 cm  Neck ROM: normal range of motion, short neck   Mouth opening: Diminished (comment)     Cardiovascular    Rhythm: regular  Rate: normal         Dental    Dentition: Lower partial plate and Bridges  Comments: Lower partial plate slightly loose   Pulmonary  Breath sounds clear to auscultation               Abdominal         Other Findings            Anesthetic Plan    ASA: 3  Anesthesia type: MAC          Induction: Intravenous  Anesthetic plan and risks discussed with: Patient

## 2021-05-17 NOTE — PROGRESS NOTES
[facility] [address]  [address] [facility], [address] [place] [address]  [phone]      Medical Progress Note      NAME: Sam Watson   :  1945  MRM:  [id number]    Date/Time: 2021  1:13 PM           Assessment / Plan:     #Acute GIB: Predominately UGIB with e/o melena; however, she did also have some bright red blood which could be hemorrhoidal vs diverticular. Vitals too stable to suggest brisk upper, but will be cognizant of this. Precipitant for UGIB is motrin 800mg bid without taking home PPI. She has hx hemachromatosis but no hx cirrhosis, so variceal bleed unlikely; INR nl              - Ensure IV access; IVF              - IV PPI BID              - scope today              - Hold home anti-HTN              - No transfusions per pt preference              - Check stool for infectious hemorrhagic diarrhea, but low c/f this                          - Check H pylori stool Ag              - follow plts    #rectal pain: Possible inflamed hemorrhoids; defer to GI     #Thrombocytopenia: Resolved; lab error?     #HTN: On triamterene/hctz and metop at home              - hold in s/o above     #DM: 2/2 pancreatic insufficiency. Home glar 20u, ssi              - Glar 20, SSI     #Anxiety: Anxious now but cognizant of it              - Cont home duloxetine; hold on benzos     #FM: Recommend against future use of NSAIDs              - Cont dulox; baclofen                     Care Plan discussed with: Patient    Discussed:  Care Plan    Prophylaxis:  SCD's    Disposition:  Home w/Family           ___________________________________________________    Attending Physician: Emeterio Sadler MD        Subjective:     Chief Complaint:  Neha Baeza. Endorses intense rectal pain, which was relieved with morphine last night    ROS:  (bold if positive, if negative)            Objective:       Vitals:          Last 24hrs VS reviewed since prior progress note.  Most recent are:    Visit Vitals  /64 (BP 1 Location: Left upper arm, BP Patient Position: At rest)   Pulse 81   Temp 97.8 °F (36.6 °C)   Resp 20   Ht 5' 1\" (1.549 m)   Wt 88.9 kg (196 lb)   SpO2 97%   BMI 37.03 kg/m²     SpO2 Readings from Last 6 Encounters:   05/17/21 97%   09/21/20 98%   04/22/20 99%   04/01/20 99%   03/20/20 98%   03/12/20 96%            Intake/Output Summary (Last 24 hours) at 5/17/2021 1313  Last data filed at 5/17/2021 0805  Gross per 24 hour   Intake 1574.58 ml   Output    Net 1574.58 ml          Exam:     Physical Exam:    Gen:  Well-developed, well-nourished, in no acute distress  HEENT:  Pink conjunctivae, PERRL, hearing intact to voice, moist mucous membranes  Neck:  Supple, without masses, thyroid non-tender  Resp:  No accessory muscle use, clear breath sounds without wheezes rales or rhonchi  Card:  No murmurs, normal S1, S2 without thrills, bruits or peripheral edema  Abd:  Soft, non-tender, non-distended, normoactive bowel sounds are present  Musc:  No cyanosis or clubbing  Skin:  No rashes or ulcers, skin turgor is good  Neuro:  Cranial nerves 3-12 are grossly intact,  strength is 5/5 bilaterally and dorsi / plantarflexion is 5/5 bilaterally, follows commands appropriately  Psych:  Good insight, oriented to person, place and time, alert         Medications Reviewed: (see below)    Lab Data Reviewed: (see below)    ______________________________________________________________________    Medications:     Current Facility-Administered Medications   Medication Dose Route Frequency    sodium chloride (NS) flush 5-40 mL  5-40 mL IntraVENous Q8H    sodium chloride (NS) flush 5-40 mL  5-40 mL IntraVENous PRN    polyethylene glycol (MIRALAX) packet 17 g  17 g Oral DAILY PRN    promethazine (PHENERGAN) tablet 12.5 mg  12.5 mg Oral Q6H PRN    Or    ondansetron (ZOFRAN) injection 4 mg  4 mg IntraVENous Q6H PRN    pantoprazole (PROTONIX) 40 mg in 0.9% sodium chloride 10 mL injection  40 mg IntraVENous Q12H    DULoxetine (CYMBALTA) capsule 120 mg  120 mg Oral DAILY    traZODone (DESYREL) tablet 50 mg  50 mg Oral QHS PRN    glucose chewable tablet 16 g  4 Tab Oral PRN    dextrose (D50W) injection syrg 12.5-25 g  25-50 mL IntraVENous PRN    glucagon (GLUCAGEN) injection 1 mg  1 mg IntraMUSCular PRN    insulin lispro (HUMALOG) injection   SubCUTAneous Q6H    insulin glargine (LANTUS) injection 15 Units  15 Units SubCUTAneous DAILY    baclofen (LIORESAL) tablet 10 mg  10 mg Oral DAILY PRN    lactated Ringers infusion  125 mL/hr IntraVENous CONTINUOUS    midazolam (VERSED) injection 0.25-5 mg  0.25-5 mg IntraVENous Multiple    simethicone (MYLICON) 84VS/2.3ZJ oral drops 80 mg  1.2 mL Oral Multiple    LORazepam (ATIVAN) injection 0.5 mg  0.5 mg IntraVENous Q6H PRN            Lab Review:     Recent Labs     05/17/21  0146 05/16/21  0442   WBC 6.4 11.4*   HGB 8.6* 9.7*   HCT 26.5* 29.0*    71*     Recent Labs     05/17/21  0146 05/16/21  0442    133*   K 4.4 4.7    102   CO2 27 24   * 339*   BUN 16 22*   CREA 0.69 0.81   CA 7.9* 8.7   ALB  --  3.2*   ALT  --  32   INR  --  1.1     No components found for: [place]

## 2021-05-17 NOTE — PROGRESS NOTES
Transition of Care Plan:  RUR-14%  1. Will need w/c [facility] bin transport  2. EGD this afternoon, 5/17  3. HH if needed with [facility]  4. Follow up w PCP, specialties as necessary  5. CM will continue to follow  PARISA Carmona

## 2021-05-17 NOTE — ROUTINE PROCESS
Bedside shift change report given to Luis Dial RN (oncoming nurse) by Norm Arceo RN (offgoing nurse). Report included the following information SBAR, Kardex, Intake/Output, MAR and Accordion.

## 2021-05-17 NOTE — PERIOP NOTES
Received report from [employer], see anesthesia note. Scopes all washed at bedside by Frida Gonzalez. Pt transferred to recovery and report given to [facility] regarding procedures, diagnosis, etc. Pt updated on POC. VSS, abdoman soft. TRANSFER - OUT REPORT:    Verbal report given to Griselda on Carmelina Jonas  being transferred to  for routine progression of care       Report consisted of patients Situation, Background, Assessment and   Recommendations(SBAR). Information from the following report(s) SBAR was reviewed with the receiving nurse. Lines:   Peripheral IV 05/16/21 Right Forearm (Active)   Site Assessment Clean, dry, & intact 05/17/21 1456   Phlebitis Assessment 0 05/17/21 1456   Infiltration Assessment 0 05/17/21 1456   Dressing Status Clean, dry, & intact 05/17/21 1456   Dressing Type Tape;Transparent 05/17/21 1456   Hub Color/Line Status Pink;Capped 05/17/21 1456   Action Taken Open ports on tubing capped 05/17/21 1326   Alcohol Cap Used Yes 05/17/21 1456        Opportunity for questions and clarification was provided.       Patient transported with:   [place]

## 2021-05-17 NOTE — PERIOP NOTES
Jina Alanis  1945  [id number]    Situation:    Scheduled Procedure: Procedure(s):  ESOPHAGOGASTRODUODENOSCOPY (EGD)  Verbal report received from: REMY Villalobos  Preoperative diagnosis: Melena with acute blood loss anemia    Background:    Procedure: Procedure(s):  ESOPHAGOGASTRODUODENOSCOPY (EGD)  Physician performing procedure; Dr. Rubi Davis MD     [facility] QUESTIONS FLOOR TO ENDO RN    NPO Status/Last PO Intake: yes, except meds around 0830am    Pregnancy Test:Not applicable If yes, result: none    Is the patient taking Blood Thinners: NO If   Is the patient diabetic:yes       If yes, what was the last BS:  235  Time taken?  0629 5/17/21  Anything given? yes 3 units of Lispro, 15 units of Lantus            Does the patient have a Pacemaker/Defibrillator in place?: no   Does the patient need antibiotics before/during/after procedure: no     Does the patient have SCD in place:no   Is patient on CONTACT precautions:yes      If yes, what kind of CONTACT precautions: ESBL (contact)    Assessment:  Are the vital signs stable prior to patient coming to ENDO?  yes  Is the patient alert/oriented and able to sign consent for the procedures:yes  How does the patient's abdomen feel prior to coming to ENDO? round and soft yes   Does the patient have a patient IV in place?  Yes, 22G R FA     Recommendation:  Family or Friend present no     Permission to share finding with Family or Friend n/a

## 2021-05-17 NOTE — PROCEDURES
Declan Ge M.D.  [phone]           2021                EGD Operative Report  Ana Vazquez  :  1945  [facility] Medical Record Number:  [id number]      Indication:  Abdominal pain, epigastric, Melena/hematochezia     : Zahira Pacheco MD    Referring Provider:  Adriana Benson      Anesthesia/Sedation:  MAC anesthesia    Airway assessment: No airway problems anticipated    Pre-Procedural Exam:      Airway: clear, no airway problems anticipated  Heart: RRR, without gallops or rubs  Lungs: clear bilaterally without wheezes, crackles, or rhonchi  Abdomen: soft, nontender, nondistended, bowel sounds present  Mental Status: awake, alert and oriented to person, place and time       Procedure Details     After infomed consent was obtained for the procedure, with all risks and benefits of procedure explained the patient was taken to the endoscopy suite and placed in the left lateral decubitus position. Following sequential administration of sedation as per above, the endoscope was inserted into the mouth and advanced under direct vision to second portion of the duodenum. A careful inspection was made as the gastroscope was withdrawn, including a retroflexed view of the proximal stomach; findings and interventions are described below. Findings:   Esophagus:normal  Stomach: Few benign nodules in the fundus, consistent with benign fundic gland nodules. Otherwise mucosa within normal  Duodenum/jejunum: Evidence of previous surgery with afferent and efferent jejunal limbs seen. A superficial ulcer was noted at the anastomosis about 1 cm in size with one small visible vessel. No active bleeding seen. Therapies:  Two clips placed over the ulcer and visible vessel. No bleeding seen. Specimens: none           Complications:   None; patient tolerated the procedure well. EBL:  None.            Impression:    Ulcer at the anastomosis with a visible vessel clipped Recommendations:    -Continue acid suppression.  -Will check serology for H.pylori  -Avoid NSAIDs  -Monitor overnight for any bleeding and repeat hemoglobin in am    Maggie Packer MD

## 2021-05-17 NOTE — ROUTINE PROCESS
Demian Arnold 1945 
[id number] Situation: 
Verbal report received from: Yrn García Procedure: Procedure(s): ESOPHAGOGASTRODUODENOSCOPY (EGD) RESOLUTION CLIP Background: 
 
Preoperative diagnosis: Melena with acute blood loss anemia Postoperative diagnosis: Ulcer at the anastomosis with a visible vessel clipped :  Dr. Ashleigh Finney Assistant(s): Endoscopy Technician-1: Montez Weiss Endoscopy RN-1: Rajesh Escoto RN Specimens: * No specimens in log * H. Pylori  no Assessment: 
Intra-procedure medications Anesthesia gave intra-procedure sedation and medications, see anesthesia flow sheet yes Intravenous fluids: NS@ [place] Vital signs stable Abdominal assessment: round and soft Recommendation: 
Discharge patient per MD order. Return to floor Dr. Ashleigh Finney discussed with patient procedure findings and next steps.  
 
 
\

## 2021-05-18 VITALS
DIASTOLIC BLOOD PRESSURE: 63 MMHG | RESPIRATION RATE: 18 BRPM | OXYGEN SATURATION: 94 % | HEIGHT: 61 IN | WEIGHT: 196 LBS | BODY MASS INDEX: 37 KG/M2 | TEMPERATURE: 98.2 F | SYSTOLIC BLOOD PRESSURE: 138 MMHG | HEART RATE: 79 BPM

## 2021-05-18 LAB
ATRIAL RATE: 71 BPM
CALCULATED P AXIS, ECG09: 29 DEGREES
CALCULATED R AXIS, ECG10: -27 DEGREES
CALCULATED T AXIS, ECG11: 14 DEGREES
DIAGNOSIS, 93000: NORMAL
ERYTHROCYTE [DISTWIDTH] IN BLOOD BY AUTOMATED COUNT: 14.2 % (ref 11.5–14.5)
GLUCOSE BLD STRIP.AUTO-MCNC: 232 MG/DL (ref 65–117)
GLUCOSE BLD STRIP.AUTO-MCNC: 262 MG/DL (ref 65–117)
HCT VFR BLD AUTO: 24.5 % (ref 35–47)
HGB BLD-MCNC: 7.9 G/DL (ref 11.5–16)
MCH RBC QN AUTO: 29 PG (ref 26–34)
MCHC RBC AUTO-ENTMCNC: 32.2 G/DL (ref 30–36.5)
MCV RBC AUTO: 90.1 FL (ref 80–99)
NRBC # BLD: 0 K/UL (ref 0–0.01)
NRBC BLD-RTO: 0 PER 100 WBC
P-R INTERVAL, ECG05: 172 MS
PLATELET # BLD AUTO: ABNORMAL K/UL (ref 150–400)
Q-T INTERVAL, ECG07: 414 MS
QRS DURATION, ECG06: 90 MS
QTC CALCULATION (BEZET), ECG08: 449 MS
RBC # BLD AUTO: 2.72 M/UL (ref 3.8–5.2)
SERVICE CMNT-IMP: ABNORMAL
SERVICE CMNT-IMP: ABNORMAL
VENTRICULAR RATE, ECG03: 71 BPM
WBC # BLD AUTO: 5.6 K/UL (ref 3.6–11)

## 2021-05-18 PROCEDURE — 74011636637 HC RX REV CODE- 636/637: Performed by: INTERNAL MEDICINE

## 2021-05-18 PROCEDURE — 74011250637 HC RX REV CODE- 250/637: Performed by: INTERNAL MEDICINE

## 2021-05-18 PROCEDURE — 82962 GLUCOSE BLOOD TEST: CPT

## 2021-05-18 PROCEDURE — 74011250636 HC RX REV CODE- 250/636: Performed by: INTERNAL MEDICINE

## 2021-05-18 PROCEDURE — 86677 HELICOBACTER PYLORI ANTIBODY: CPT

## 2021-05-18 PROCEDURE — 36415 COLL VENOUS BLD VENIPUNCTURE: CPT

## 2021-05-18 PROCEDURE — 85027 COMPLETE CBC AUTOMATED: CPT

## 2021-05-18 RX ORDER — HYDROCORTISONE 25 MG/G
CREAM TOPICAL 4 TIMES DAILY
Status: DISCONTINUED | OUTPATIENT
Start: 2021-05-18 | End: 2021-05-18 | Stop reason: HOSPADM

## 2021-05-18 RX ORDER — PANTOPRAZOLE SODIUM 40 MG/1
40 TABLET, DELAYED RELEASE ORAL 2 TIMES DAILY
Qty: 60 TAB | Refills: 1 | Status: SHIPPED | OUTPATIENT
Start: 2021-05-18 | End: 2021-06-17

## 2021-05-18 RX ADMIN — INSULIN LISPRO 5 UNITS: 100 INJECTION, SOLUTION INTRAVENOUS; SUBCUTANEOUS at 00:40

## 2021-05-18 RX ADMIN — INSULIN LISPRO 5 UNITS: 100 INJECTION, SOLUTION INTRAVENOUS; SUBCUTANEOUS at 12:20

## 2021-05-18 RX ADMIN — ONDANSETRON 4 MG: 2 INJECTION INTRAMUSCULAR; INTRAVENOUS at 07:10

## 2021-05-18 RX ADMIN — INSULIN LISPRO 3 UNITS: 100 INJECTION, SOLUTION INTRAVENOUS; SUBCUTANEOUS at 06:26

## 2021-05-18 RX ADMIN — MORPHINE SULFATE 2 MG: 2 INJECTION, SOLUTION INTRAMUSCULAR; INTRAVENOUS at 06:26

## 2021-05-18 RX ADMIN — PANTOPRAZOLE SODIUM 40 MG: 40 TABLET, DELAYED RELEASE ORAL at 08:45

## 2021-05-18 RX ADMIN — INSULIN GLARGINE 20 UNITS: 100 INJECTION, SOLUTION SUBCUTANEOUS at 08:45

## 2021-05-18 RX ADMIN — DULOXETINE HYDROCHLORIDE 120 MG: 30 CAPSULE, DELAYED RELEASE ORAL at 08:45

## 2021-05-18 RX ADMIN — HYDROCORTISONE: 25 CREAM TOPICAL at 12:20

## 2021-05-18 NOTE — PROGRESS NOTES
[address] NP  [phone]           GI PROGRESS NOTE        NAME: Eduardo Cain   :  1945   MRN:  [id number]       Subjective:   Feels weak this afternoon. Tolerated GI lite diet. Objective:   NAD      VITALS:   Last 24hrs VS reviewed since prior progress note. Most recent are:  Visit Vitals  /63 (BP 1 Location: Right upper arm, BP Patient Position: At rest;Sitting)   Pulse 79   Temp 98.2 °F (36.8 °C)   Resp 18   Ht 5' 1\" (1.549 m)   Wt 88.9 kg (196 lb)   SpO2 94%   Breastfeeding No   BMI 37.03 kg/m²       Intake/Output Summary (Last 24 hours) at 2021 1353  Last data filed at 2021 1301  Gross per 24 hour   Intake 3450.83 ml   Output 300 ml   Net 3150.83 ml       PHYSICAL EXAM:  General: Alert, in no acute distress    HEENT: Anicteric sclerae. Lungs:            CTA Bilaterally. Heart:  Regular  rhythm,    Abdomen: Soft, Non distended, Non tender.  (+)Bowel sounds, no HSM  Extremities: No c/c/e  Neurologic:  CN 2-12 gi, Alert and oriented X 3. No acute neurological distress   Psych:   Good insight. Not anxious nor agitated. Lab Data Reviewed:   Recent Labs     21  0541 21  0146   WBC 5.6 6.4   HGB 7.9* 8.6*   HCT 24.5* 26.5*   PLT UNABLE TO REPORT ACCURATE COUNT DUE TO PLATELET AGGREGATION, HOWEVER, PLATELETS APPEAR NORMAL IN NUMBER ON SMEAR. PLEASE RESUBMIT SODIUM CITRATE (BLUE) AND EDTA (LAVENDER) TUBES FOR HEMATOLOGICAL TESTING.  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 Hemoglobin stable at 7.9 this morning. Discussed with her plan to continue BID PPI and follow up with us in the office. I will have the office staff contact her for an appointment.         Signed By: Hardy Rivera NP     5/18/2021  1:53 PM

## 2021-05-18 NOTE — PROGRESS NOTES
Physician Progress Note      Laura Estevez  CSN #:                  [id number]  :                       1945  ADMIT DATE:       2021 4:31 AM  [address] [place] DATE:  RESPONDING  PROVIDER #:        Arturo Marroquin MD          QUERY TEXT:    Patient admitted with a GI Bleed. 21 EGD states \"Anastomotic ulcer and  Two clips placed over the ulcer and visible vessel\"  \"A superficial ulcer was noted at the anastomosis about 1 cm in size with one small visible vessel. No active bleeding seen. \"  If possible, please document in progress notes and discharge summary the cause of the GI bleeding: The medical record reflects the following:  Risk Factors: 79 yo F admitted with a Gi Bleed  Clinical Indicators: H&P states \"Acute GIB: Predominately UGIB and rectal pain: Possible inflamed hemorrhoids\"  21 EGD states \" Ulcer at the anastomosis with a visible vessel clipped\"  \"Duodenum/jejunum: Evidence of previous surgery with afferent and efferent jejunal limbs seen. A superficial ulcer was noted at the anastomosis about 1 cm in size with one small visible vessel. No active bleeding seen. \"  Treatment: EGD with 2 clips, IV Protonix, avoid nsaids  Options provided:  -- GI Bleeding due to Acute Anastomotic ulcer  -- GI bleeding due to hemorrhoids  -- GI Bleeding due to, #(list cause). -- Other - I will add my own diagnosis  -- Disagree - Not applicable / Not valid  -- Disagree - Clinically unable to determine / Unknown  -- Refer to Clinical Documentation Reviewer    PROVIDER RESPONSE TEXT:    GI Bleeding due to NSAID induced ulcer    Query created by: Agnieszka Hester on 2021 11:41 AM      QUERY TEXT:    Pt admitted with a GI Bleed and an Acute Anastomotic ulcer per the EGD report. Pt noted to have a h/o PUD and h/o whipple procedure in 2013.   If possible, please document in progress notes and discharge summary:    The medical record reflects the following:  Risk Factors: 79 yo F admitted with a GI Bleed  Clinical Indicators: Noted to have an  Acute Anastomotic ulcer per the EGD report and has a h/o PUD and h/o whipple procedure in 2013 5/17/21 EGD report sates 'Evidence of previous surgery with afferent and efferent jejunal limbs seen. A superficial ulcer was noted at the anastomosis about 1 cm in size with one small visible vessel. No active bleeding seen. \" ? Treatment: IV Protonix, avoid NSAIDS  Options provided:  -- Acute Anastomotic ulcer as a complication of previous whipple procedure  -- Acute Anastomotic ulcer as an expected/inherent condition that occurred and not a complication of whipple procedure  -- Acute Anastomotic ulcer related to other incidental risk factor (please specify) occurring  and not a complication of the previous whipple procedure, Please document incidental risk factor. -- Other - I will add my own diagnosis  -- Disagree - Not applicable / Not valid  -- Disagree - Clinically unable to determine / Unknown  -- Refer to Clinical Documentation Reviewer    PROVIDER RESPONSE TEXT:    *** is related to other incidental risk factor occurring following a previous whipple procedure and not a complication.  NSAID use    Query created by: Janet Schilling on 5/18/2021 11:48 AM      Electronically signed by:  Jocelyn Carlson MD 5/18/2021 12:48 PM

## 2021-05-18 NOTE — PROGRESS NOTES
CM Note:  Pt to d/c to home today transported by a friend who will pick her up at 2 pm..  No CM needs. She is to f/u with her PCP ALLAN Eller RN

## 2021-05-18 NOTE — ROUTINE PROCESS
I have reviewed discharge instructions with the patient. The patient verbalized understanding. Paper copy of AVS given to patient with 1 prescription called into pharmacy. Patient e-signed AVS in computer. PIV removed with tip intact. Patient did not require assistance getting dressed or gathering belongings. Patient left via wheelchair to personal vehicle and left with friend.

## 2021-05-18 NOTE — DISCHARGE SUMMARY
Physician Discharge Summary     Patient ID:  Rehan Salgado  [id number]  76 y.o.  1945    Admit date: 5/16/2021    Discharge date and time: 5/18/2021    Admission Diagnoses: UGIB (upper gastrointestinal bleed) [K92.2]    Discharge Diagnoses: Active Problems:    UGIB (upper gastrointestinal bleed) (5/16/2021)           Hospital Course:   Lucy Avalos is a 78 y.o.  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 She has had 3-4 foul smelling black BMs daily since that time. Morning of arrival she had a BM with red blood in it, so called her doc office, who said to come to ER. She notes that she has FM and has been taking motrin 800mg BID for many weeks, and has not been consistent with her pantoprazole. She does not drink alcohol. She is Sikhism and does not take blood transfusions. #Acute GIB: She underwent EGD which showed anastomotic ulcer, treated with clip. She was treated with IVF, IV PPI and will begin BID PPI with GI follow up. Have reinforced that she is to avoid NSAIDs. I did recommend circumin for FM/OA     #Rectal pain: Possible inflamed hemorrhoids; Rec outpt gen surg eval        #HTN: On triamterene/hctz and metop at home which were held in s/o acute blood loss     #DM: 2/2 pancreatic insufficiency.  Home glar 20u, ssi     #Anxiety: Cont home meds     #FM: Recommend against future use of NSAIDs                    PCP: Pilar Charles     Consults: GI    Condition of patient at discharge: good and improved    Discharge Exam:    Physical Exam:    Gen: Well-developed, well-nourished, in no acute distress  HEENT:  Pink conjunctivae, PERRL, hearing intact to voice, moist mucous membranes  Neck: Supple, without masses, thyroid non-tender  Resp: No accessory muscle use, clear breath sounds without wheezes rales or rhonchi  Card: No murmurs, normal S1, S2 without thrills, bruits or peripheral edema  Abd:  Soft, non-tender, non-distended, normoactive bowel sounds are present, no palpable organomegaly and no detectable hernias  Lymph:  No cervical or inguinal adenopathy  Musc: No cyanosis or clubbing  Skin: No rashes or ulcers, skin turgor is good  Neuro:  Cranial nerves are grossly intact, no focal motor weakness, follows commands appropriately  Psych:  Good insight, oriented to person, place and time, alert          Disposition: home    Patient Instructions:   Current Discharge Medication List      CONTINUE these medications which have CHANGED    Details   pantoprazole (Protonix) 40 mg tablet Take 1 Tab by mouth two (2) times a day for 30 days. Qty: 60 Tab, Refills: 1         CONTINUE these medications which have NOT CHANGED    Details   insulin glargine (LANTUS) 100 unit/mL injection 20 Units by SubCUTAneous route Daily (before breakfast). Qty: 1 Vial, Refills: 0      insulin lispro (HUMALOG) 100 unit/mL injection 5 Units by SubCUTAneous route Before breakfast, lunch, and dinner. If your blood glucose level is greater than 200 before meals, take 8 units  Qty: 1 Vial, Refills: 0      multivitamin (ONE A DAY) tablet Take 1 Tab by mouth daily. calcium carbonate (OS-DANI) 500 mg calcium (1,250 mg) tablet Take 3 Tabs by mouth nightly. triamterene-hydroCHLOROthiazide (MAXZIDE) 37.5-25 mg per tablet Take 1 Tab by mouth daily. metoprolol (LOPRESSOR) 50 mg tablet Take 50 mg by mouth two (2) times a day. baclofen (LIORESAL) 10 mg tablet Take 10 mg by mouth daily as needed. DULoxetine (CYMBALTA) 60 mg capsule Take 120 mg by mouth daily. traZODone (DESYREL) 50 mg tablet Take  by mouth nightly. docusate sodium (COLACE) 100 mg capsule Take 1 Cap by mouth two (2) times a day. Qty: 30 Cap, Refills: 0      ondansetron hcl (ZOFRAN) 4 mg tablet Take 4 mg by mouth every four (4) hours as needed for Nausea. Activity: Activity as tolerated  Diet: Diabetic Diet  Wound Care: None needed    Follow-up with Aury Josue in 1 week. Follow-up tests/labs None    Approximate time spent in patient care on day of discharge: 35 min    Signed:   Jerilynn Mcardle, MD  5/18/2021  12:57 PM

## 2021-05-18 NOTE — ADT AUTH CERT NOTES
Comments Comment Last edited by  on  at Patient Demographics Patient Name [facility]  
[id number] Sex Female   
1945 Address [address] [place] [address] [address] Phone [phone] (Home) *Preferred*  
[phone] [facility]) Patient Demographics Patient Name [facility]  
[id number] Sex Female   
1945 Address [address] [place], [place] [address] [address] Phone [phone] (Home) *Preferred*  
[phone] [facility]) CSN:  
[id number] Admit Date: Admit Time Room Bed May 16, 2021  4:31  [[id number]] 01 Ira Goddard Attending Providers Provider Pager From To Cheri Ramirez MD  21 Endy Grover MD  21 Miladys Amaya MD  21 Emergency Contact(s) Name Relation Home Work Mobile Saud Sifuentes Friend [phone] Margarette Estrella Daughter [phone] Christine Covington Son [phone] Trevorankita Lockwood Friend [phone] Luis Alfredo Arreola [phone] Utilization Reviews 
 
  
Gastrointestinal Bleeding, Upper - Care Day 3 (2021) by Elsa Hollins RN 
 
  
Review Entered Review Status 2021 11:53 Completed  
  
Criteria Review Care Day: 3 Care Date: 2021 Level of Care: Inpatient Floor Guideline Day 3 Level Of Care ( ) Floor to discharge 2021 11:53:56 EDT by Rivas Thompson remote tele Clinical Status   
(X) * Hemodynamic stability 2021 11:53:56 EDT by Rivas Thompson 98.4 P 78 /50, 138/63 R 18 
93% RA   
(X) * Stable Hgb/Hct   
(X) * Bleeding absent   
( ) * Surgical and other acute interventions not needed   
(X) * Pain absent or managed 2021 11:53:56 EDT by Sunshine Leyva   
  IV morphine x 1 
anusol creme scheduled ( ) * Discharge plans and education understood Activity   
(X) * Ambulatory or acceptable for next level of care 2021 11:53:56 EDT by Sunshine Leyva   up [address]   
(X) * Oral hydration, medications, and diet 5/18/2021 11:53:56 EDT by Gerald Markham   
  GI lite diet Interventions   
(X) Hgb/Hct   
5/18/2021 11:53:56 EDT by Gerald Markham   
  WBC 5.6 Hgb 7.9 Hct 24.5 H PYLORI ABS, IGA AND IGG pending Medications (X) Proton pump inhibitor 5/18/2021 11:53:56 EDT by Shabbir Collins protonix 40 mg po bid Morphine 2 mg IV q6h prn x 1 Zofran 4 mg IV prn once LR IV @ 125 ml/h Anusol-HC Perianal qid * Milestone  
  
Gastrointestinal Bleeding, Upper - Care Day 2 (5/17/2021) by Sonya Daniel RN 
 
  
Review Entered Review Status 5/18/2021 11:45 Completed  
  
Criteria Review Care Day: 2 Care Date: 5/17/2021 Level of Care: Inpatient Floor Guideline Day 2 Level Of Care (X) Floor 5/18/2021 11:45:30 EDT by Shabbir Collins remote tele Clinical Status   
(X) * Hypotension absent 5/18/2021 11:45:30 EDT by Shabbir Collins 98.5 P 77, 104, 73 /64, 107/49, 136/80 R 21, 18 
95% RA   
(X) * Bleeding absent or reduced 5/18/2021 11:45:30 EDT by Gerald Markham   
  see procedure note Activity (X) Activity as tolerated 5/18/2021 11:45:30 EDT by Shabbir Collins up ad [address]   
(X) * Oral hydration and diet tolerated 5/18/2021 11:45:30 EDT by Gerald Markham   
  GI lite post procedureLR IV @ 125 ml/h Morphine 4 mg IV once, 2 mg IV q6h prn x 1 
zofran 4 mg IV prn x 1 Interventions   
(X) * NG tube absent   
(X) Hgb/Hct   
5/18/2021 11:45:30 EDT by Gerald Markham   
  Hgb 8.6 from 9.7 Hct 26.5 from 29.0  BUN 16 
troponin <0.05 H pylori ordered Contact isolation ordered Medications (X) Proton pump inhibitor 5/18/2021 11:45:30 EDT by Gerald Markham   
  Protonix 40 mg IV q12h changed to 40 mg po bid * Milestone Additional Notes Pre-procedure Diagnoses Melena [K92.1] Post-procedure Diagnoses Anastomotic ulcer [K28.9] Procedures UPPER GI ENDOSCOPY,CTRL BLEED [[id number]]  
 5/17/2021         
EGD Operative Report Indication:  Abdominal pain, epigastric, Melena/hematochezia    
  
Anesthesia/Sedation:  MAC anesthesia  
   
Airway assessment: No airway problems anticipated  
   
Pre-Procedural Exam:  
    
Airway: clear, no airway problems anticipated Heart: RRR, without gallops or rubs Lungs: clear bilaterally without wheezes, crackles, or rhonchi Abdomen: soft, nontender, nondistended, bowel sounds present Mental Status: awake, alert and oriented to person, place and time Findings:   
Esophagus:normal  
Stomach: Few benign nodules in the fundus, consistent with benign fundic gland nodules. Otherwise mucosa within normal  
Duodenum/jejunum: Evidence of previous surgery with afferent and efferent jejunal limbs seen. A superficial ulcer was noted at the anastomosis about 1 cm in size with one small visible vessel. No active bleeding seen.   
   
Therapies:  Two clips placed over the ulcer and visible vessel. No bleeding seen.  
   
Specimens: none          
Complications:   None; patient tolerated the procedure well.  
   
EBL:  None.  
         
Impression:    Ulcer at the anastomosis with a visible vessel clipped    
   
Recommendations:  
   
-Continue acid suppression.  
-Will check serology for H.pylori  
-Avoid NSAIDs  
-Monitor overnight for any bleeding and repeat hemoglobin in am  
___________________ Medical Progress Note  
Date/Time:      5/17/2021  1:13 PM  
 Assessment / Plan:  
   
#Acute GIB: Predominately UGIB with e/o melena; however, she did also have some bright red blood which could be hemorrhoidal vs diverticular. Vitals too stable to suggest brisk upper, but will be cognizant of this. Precipitant for UGIB is motrin 800mg bid without taking home PPI.  She has hx hemachromatosis but no hx cirrhosis, so variceal bleed unlikely; INR nl  
            - Ensure IV access; IVF  
            - IV PPI BID             - BPTSG today  
            - Hold home anti-HTN  
            - No transfusions per pt preference  
            - Check stool for infectious hemorrhagic diarrhea, but low c/f this  
                        - Check H pylori stool Ag  
            - follow plts  
   
#rectal pain: Possible inflamed hemorrhoids; defer to GI  
   
#Thrombocytopenia: Resolved; lab error?  
   
#HTN: On triamterene/hctz and metop at home  
            - hold in s/o above  
  #DM: 2/2 pancreatic insufficiency. Home glar 20u, ssi  
            - Glar 20, SSI  
   
#Anxiety: Anxious now but cognizant of it  
            - Cont home duloxetine; hold on benzos  
   
#FM: Recommend against future use of NSAIDs             - Cont dulox; baclofen  
                                                                                                               
Care Plan discussed with: Patient  
   
Discussed:  Care Plan  
   
Prophylaxis:  SCD's  
   
Disposition:  Home w/Family Subjective:  
   
Chief Complaint:  NAEON. Endorses intense rectal pain, which was relieved with morphine last night

## 2021-05-18 NOTE — DISCHARGE INSTRUCTIONS
HOSPITALIST DISCHARGE INSTRUCTIONS  NAME: Tyesha Weaver   :  1945   MRN:  [id number]     Date/Time:  2021 12:55 PM    ADMIT DATE: 2021     DISCHARGE DATE: 2021     ADMITTING DIAGNOSIS:  Bleeding Gastric Ulcer    DISCHARGE DIAGNOSIS:  Bleeding Gastric Ulcer    MEDICATIONS:    · You were admitted for evaluation of black stools, which was caused by a stomach ulcer. This most likely occurred do to use of Motrin. You were discharged on an acid reduce, pantoprazole, which you need to take twice daily for 8 weeks. Please do not take any more NSAIDS (motrin, aleve, ibuprofen). You can take circumin (turmeric) 2g daily to help with arthritis  · Please follow up with your PCP regarding symptomatic hemorrhoids. You may need a surgical evaluation as an outpatient  · It is important that you take the medication exactly as they are prescribed. · Keep your medication in the bottles provided by the pharmacist and keep a list of the medication names, dosages, and times to be taken in your wallet. · Do not take other medications without consulting your doctor. · [facility]/ED Visit Follow-Up Instructions/Information    You may have an in home follow up visit set up with [email or web address] or may wish to contact [facility] to set-up a visit:    What are we? [facility] is an in-home urgent care service staffed with emergency trained medical teams. We come to your home in a vehicle stocked with medical supplies and technology. An ER physician is always available if needed. When? As a part of your hospital follow-up, an appointment has been/ or can be set up for us to come see you. Usually, this will be 24-72 hours after you leave the hospital or as needed. [email or web address] is open 7am-9pm, 7 days a week, 365 days a year, including holidays. Why?   We know that you cannot always get to your doctor after being in the hospital and that your doctor is not always available when you need them. Once your workup is complete, we'll call in your prescriptions, update your family doctor, and handle billing with your insurance so you can focus on feeling better, faster without leaving home. How much? We accept most major health insurance plans, including Medicaid, Medicare, and Medicare Advantage [facility], [facility], and [employer]. We also accept: credit, debit, health savings account (HSA), health reimbursement account (HRA) and flexible spending account (FSA) payments. [facility]'s prices compare to conventional urgent care facilities, but we bring the care to you. How to reach us? Getting care is easy- use our mobile rama ([employer]), website ([email or web address]) or call us [phone]. ·       If you experience any of the following symptoms then please call your primary care physician or return to the emergency room if you cannot get hold of your doctor:  Fever, chills, nausea, vomiting, diarrhea, change in mentation, falling, bleeding, shortness of breath    Follow Up:  Dr. Jung Rogers  you are to call and set up an appointment to see them in 1 week. Information obtained by :  I understand that if any problems occur once I am at home I am to contact my physician. I understand and acknowledge receipt of the instructions indicated above.                                                                                                                                            Physician's or R.N.'s Signature                                                                  Date/Time                                                                                                                                              Patient or Representative Signature                                                          Date/Time    [facility] Visit Follow-Up Instructions/Information    You may have an in home follow up visit set up with [facility] or may [facility] to [facility] to set-up a visit:    What are we? [facility] is an in-home urgent care service staffed with emergency trained medical teams. We come to your home in a vehicle stocked with medical supplies and technology. An ER physician is always available if needed. When? As a part of your hospital follow-up, an appointment has been/ or can be set up for us to come see you. Usually, this will be 24-72 hours after you leave the hospital or as needed. [facility] is open 7am-9pm, 7 days a week, 365 days a year, including holidays. Why? We know that you cannot always get to your doctor after being in the hospital and that your doctor is not always available when you need them. Once your workup is complete, we'll call in your prescriptions, update your family doctor, and handle billing with your insurance so you can focus on feeling better, faster without leaving home. How much? We accept most major health insurance plans, including Medicaid, Medicare, and Medicare Advantage [facility], [employer], and [employer]. We also accept: credit, debit, health savings account (HSA), health reimbursement account (HRA) and flexible spending account (FSA) payments. [facility]'s prices compare to conventional urgent care facilities, but we bring the care to you. How to reach us? Getting care is easy- use our mobile rama ([employer]), website ([email or web address]) or call us [phone].

## 2021-05-19 LAB
H PYLORI IGA SER-ACNC: 14.1 UNITS (ref 0–8.9)
H PYLORI IGG SER IA-ACNC: 0.51 INDEX VALUE (ref 0–0.79)

## 2021-12-08 ENCOUNTER — TRANSCRIBE ORDER (OUTPATIENT)
Dept: SCHEDULING | Age: 76
End: 2021-12-08

## 2021-12-08 DIAGNOSIS — R79.89 ELEVATED LFTS: Primary | ICD-10-CM

## 2021-12-20 ENCOUNTER — HOSPITAL ENCOUNTER (OUTPATIENT)
Dept: ULTRASOUND IMAGING | Age: 76
Discharge: HOME | End: 2021-12-20
Attending: PHYSICIAN ASSISTANT
Payer: MEDICARE

## 2021-12-20 VITALS
HEART RATE: 60 BPM | DIASTOLIC BLOOD PRESSURE: 68 MMHG | OXYGEN SATURATION: 96 % | RESPIRATION RATE: 19 BRPM | SYSTOLIC BLOOD PRESSURE: 130 MMHG

## 2021-12-20 DIAGNOSIS — R79.89 ELEVATED LFTS: ICD-10-CM

## 2021-12-20 PROCEDURE — 74011000272 HC RX REV CODE- 272: Performed by: STUDENT IN AN ORGANIZED HEALTH CARE EDUCATION/TRAINING PROGRAM

## 2021-12-20 PROCEDURE — 88307 TISSUE EXAM BY PATHOLOGIST: CPT

## 2021-12-20 PROCEDURE — 77030014115

## 2021-12-20 PROCEDURE — 47000 NEEDLE BIOPSY OF LIVER PERQ: CPT

## 2021-12-20 PROCEDURE — 74011250636 HC RX REV CODE- 250/636: Performed by: STUDENT IN AN ORGANIZED HEALTH CARE EDUCATION/TRAINING PROGRAM

## 2021-12-20 PROCEDURE — 88313 SPECIAL STAINS GROUP 2: CPT

## 2021-12-20 PROCEDURE — 77030040395 HC NDL BIOP COAX INTRO MRTM -B

## 2021-12-20 PROCEDURE — 99152 MOD SED SAME PHYS/QHP 5/>YRS: CPT

## 2021-12-20 RX ORDER — LIDOCAINE HYDROCHLORIDE 10 MG/ML
4 INJECTION, SOLUTION EPIDURAL; INFILTRATION; INTRACAUDAL; PERINEURAL
Status: DISCONTINUED | OUTPATIENT
Start: 2021-12-20 | End: 2021-12-20

## 2021-12-20 RX ORDER — FENTANYL CITRATE 50 UG/ML
25-100 INJECTION, SOLUTION INTRAMUSCULAR; INTRAVENOUS
Status: DISCONTINUED | OUTPATIENT
Start: 2021-12-20 | End: 2021-12-20

## 2021-12-20 RX ORDER — SODIUM CHLORIDE 9 MG/ML
25 INJECTION, SOLUTION INTRAVENOUS CONTINUOUS
Status: DISCONTINUED | OUTPATIENT
Start: 2021-12-20 | End: 2021-12-20

## 2021-12-20 RX ORDER — MIDAZOLAM HYDROCHLORIDE 1 MG/ML
1-5 INJECTION, SOLUTION INTRAMUSCULAR; INTRAVENOUS
Status: DISCONTINUED | OUTPATIENT
Start: 2021-12-20 | End: 2021-12-20

## 2021-12-20 RX ADMIN — SODIUM CHLORIDE 25 ML/HR: 9 INJECTION, SOLUTION INTRAVENOUS at 10:42

## 2021-12-20 RX ADMIN — MIDAZOLAM 1 MG: 1 INJECTION INTRAMUSCULAR; INTRAVENOUS at 10:22

## 2021-12-20 RX ADMIN — MIDAZOLAM 1 MG: 1 INJECTION INTRAMUSCULAR; INTRAVENOUS at 10:25

## 2021-12-20 RX ADMIN — GELATIN ABSORBABLE SPONGE 12-7 MM 1 EACH: 12-7 MISC at 10:40

## 2021-12-20 RX ADMIN — FENTANYL CITRATE 25 MCG: 0.05 INJECTION, SOLUTION INTRAMUSCULAR; INTRAVENOUS at 10:25

## 2021-12-20 RX ADMIN — FENTANYL CITRATE 25 MCG: 0.05 INJECTION, SOLUTION INTRAMUSCULAR; INTRAVENOUS at 10:22

## 2021-12-20 NOTE — PROGRESS NOTES
0900 Patient brought to [facility] for scheduled CT/US guide liver biopsy. 0900 Initial VS recorded/allergies reviewed, Pt monitored x 3, A/O x 4, IV start and no labs per Sejal Avendaño MD. Patient denies blood for Judaism reasons, and denies cardiac bypass surgery in chart. Patient reports being told she is difficult intubation but unsure of why. Reported all to Sejal Avendaño MD.    3320 verito PIKE at bedside to review procedure and sedation plan, CONSENT obtained with Pt/RN. 1020 Pt taken to [facility], Procedure started at 1022 and ended at . Pt received a total of 50 mcg FENTANYL and 2 mg of VERSED during the procedure. Pt tolerated procedure well. Pain 0/10. Pt VS monitored x 3 (see doc flowsheet). 1040 Pt back to Radiology/Holding. Procedure site c/d/i. Pt reporting 0/10 pain at site, reports tolerable. 1108 Pt. resting in stretcher. Fluids and snacks provided. 1040 Pt ride called/notified procedure complete and pickup time @ 1135. Pt will be in recovery for approximately 1 hour per Sejal Avendaño MD.     5811 Discharge instructions reviewed with Pt/SO; patient verbalizes understanding; time allowed for questions/clarifications. I/V removed, dressing applied. Pt is able to dress self and escorted via wheelchair to vehicle.

## 2021-12-20 NOTE — H&P
Radiology History and Physical    Patient: Ibeth Herring 78 y.o. female       Chief Complaint: No chief complaint on file. History of Present Illness: Elevated liver enzymes, for liver bx    History:    Past Medical History:   Diagnosis Date    Adverse effect of anesthesia     \"I'M CRAZY WHEN I WAKE UP, I'M CRAZY.   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    Postoperative hematoma involving circulatory system following cardiac bypass 2/19/2020    PUD (peptic ulcer disease) 2019    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    Unspecified sleep apnea     NO C-PAP     Family History   Problem Relation Age of Onset    Heart Failure Mother     Diabetes Mother     Hypertension Mother    [facility] Anesth Problems Mother         HARD TO INTUBATE    Heart Failure Father     Kidney Disease Father         WAS ON DIALYSIS    Diabetes Sister     Depression Sister     Anesth Problems Sister         HARD TO INTUBATE    Depression Sister         BIPOLAR    Other Sister         DIFFICULTY INTUBATING    Cancer Sister         ADENOMA    Asthma Sister     Other Son         HEMATOMACHROSIS    No Known Problems Daughter      Social History     Socioeconomic History    Marital status:      Spouse name: Not on file    Number of children: Not on file    Years of education: Not on file    Highest education level: Not on file   Occupational History    Not on file   Tobacco Use    Smoking status: Former Smoker     Packs/day: 1.00     Years: 17.00     Pack years: 17.00     Quit date: 1983     Years since quittin.0    Smokeless tobacco: Never Used   Substance and Sexual Activity    Alcohol use: No    Drug use: No    Sexual activity: Not on file   Other Topics Concern    Not on file   Social History Narrative    Not on file     Social Determinants of Health     Financial Resource Strain:     Difficulty of Paying Living Expenses: Not on file   Food Insecurity:     Worried About Running Out of Food in the Last Year: Not on file    Vonda of Food in the Last Year: Not on file   Transportation Needs:     Lack of Transportation (Medical): Not on file    Lack of Transportation (Non-Medical): Not on file   Physical Activity:     Days of Exercise per Week: Not on file    Minutes of Exercise per Session: Not on file   Stress:     Feeling of Stress : Not on file   Social Connections:     Frequency of Communication with Friends and Family: Not on file    Frequency of Social Gatherings with Friends and Family: Not on file    Attends Hoahaoism Services: Not on file    Active Member of [address] [place] [address] [employer] or Organizations: Not on file    Attends Club or Organization Meetings: Not on file    Marital Status: Not on file   Intimate Partner Violence:     Fear of Current or Ex-Partner: Not on file    Emotionally Abused: Not on file    Physically Abused: Not on file    Sexually Abused: Not on file   Housing Stability:     Unable to Pay for Housing in the Last Year: Not on file    Number of [place] in the Last Year: Not on file    Unstable Housing in the Last Year: Not on file       Allergies: Allergies   Allergen Reactions    Tylenol [Acetaminophen] Other (comments)     Liver disorder.  HAS HEMATOMACROSIS    Claritin [Loratadine] Other (comments)     Panic attack    Codeine Other (comments)     hallucinations    Percocet [Oxycodone-Acetaminophen] Other (comments)     hallucinations       Current Medications:  Current Outpatient Medications   Medication Sig    traZODone (DESYREL) 50 mg tablet Take  by mouth nightly.  insulin glargine (LANTUS) 100 unit/mL injection 20 Units by SubCUTAneous route Daily (before breakfast).  insulin lispro (HUMALOG) 100 unit/mL injection 5 Units by SubCUTAneous route Before breakfast, lunch, and dinner. If your blood glucose level is greater than 200 before meals, take 8 units    docusate sodium (COLACE) 100 mg capsule Take 1 Cap by mouth two (2) times a day.  multivitamin (ONE A DAY) tablet Take 1 Tab by mouth daily.  calcium carbonate (OS-DANI) 500 mg calcium (1,250 mg) tablet Take 3 Tabs by mouth nightly.  triamterene-hydroCHLOROthiazide (MAXZIDE) 37.5-25 mg per tablet Take 1 Tab by mouth daily.  metoprolol (LOPRESSOR) 50 mg tablet Take 50 mg by mouth two (2) times a day.  baclofen (LIORESAL) 10 mg tablet Take 10 mg by mouth daily as needed.  ondansetron hcl (ZOFRAN) 4 mg tablet Take 4 mg by mouth every four (4) hours as needed for Nausea.  DULoxetine (CYMBALTA) 60 mg capsule Take 120 mg by mouth daily. Current Facility-Administered Medications   Medication Dose Route Frequency    fentaNYL citrate (PF) injection  mcg   mcg IntraVENous RAD PRN    midazolam (VERSED) injection 1-5 mg  1-5 mg IntraVENous RAD PRN    gelatin adsorbable (GELFOAM) 12-7 mm sponge 1 Each  1 Each Topical PRN    0.9% sodium chloride infusion  25 mL/hr IntraVENous CONTINUOUS        Physical Exam:  There were no vitals taken for this visit. GENERAL: alert, cooperative, no distress, appears stated age  LUNG: clear to auscultation bilaterally  HEART: regular rate and rhythm  ABD: Non tender, non distended.       Alerts:    Hospital Problems  Date Reviewed: 9/21/2020    None          Laboratory:    No results for input(s): HGB, HCT, WBC, PLT, INR, BUN, CREA, K, CRCLT, HGBEXT, HCTEXT, PLTEXT, INREXT in the last 72 hours. No lab exists for component: PTT, PT      Plan of Care/Planned Procedure:  Risks, benefits, and alternatives reviewed with patient and she agrees to proceed with the procedure. Deemed appropriate for moderate sedation with versed and fentanyl.       Tejas Poe MD

## 2021-12-20 NOTE — DISCHARGE INSTRUCTIONS
Patient Education        Percutaneous Liver Biopsy: What to Expect at Home  Your Recovery  A needle biopsy of the liver is a procedure to take a tiny sample (biopsy) of your liver tissue. The tissue sample is looked at under a microscope. Your doctor can look for infection or other liver problems. You may have some pain where the biopsy needle entered your skin (the puncture site). You may also have pain in your shoulder. This is called referred pain. It is caused by pain traveling along a nerve near the biopsy site. The referred pain usually lasts less than 12 hours. You may have a small amount of bleeding from the puncture site. You can probably go home if you have no problems after the test. You will need to take it easy at home for 1 or 2 days after the procedure. You will probably be able to return to work and most of your usual activities after that. This care sheet gives you a general idea about how long it will take for you to recover. But each person recovers at a different pace. Follow the steps below to get better as quickly as possible. How can you care for yourself at home? Activity    · Rest when you feel tired. Getting enough sleep will help you recover.     · Try to walk each day. Start by walking a little more than you did the day before. Bit by bit, increase the amount you walk. Walking boosts blood flow and helps prevent pneumonia and constipation.     · Avoid exercises that use your belly muscles and strenuous activities, such as bicycle riding, jogging, weight lifting, or aerobic exercise, for 1 week or until your doctor says it is okay.     · Ask your doctor when you can drive again.     · You will probably need to take 1 or 2 days off from work. It depends on the type of work you do and how you feel.     · You will probably be able to shower the same day as the test, if your doctor says it is okay. Pat the puncture site dry.  Do not take a bath for at least 2 days after the test, or until your doctor tells you it is okay. Diet    · You can eat your normal diet. If your stomach is upset, try bland, low-fat foods like plain rice, broiled chicken, toast, and yogurt.     · Drink plenty of fluids (unless your doctor tells you not to). Medicines    · Your doctor will tell you if and when you can restart your medicines. He or she will also give you instructions about taking any new medicines.     · If you take aspirin or some other blood thinner, ask your doctor if and when to start taking it again. Make sure that you understand exactly what your doctor wants you to do.     · Be safe with medicines. Take pain medicines exactly as directed. ? If the doctor gave you a prescription medicine for pain, take it as prescribed. ? If you are not taking a prescription pain medicine, take an over-the-counter medicine that your doctor recommends. Read and follow all instructions on the label. ? Do not take aspirin, ibuprofen (Advil, Motrin), naproxen (Aleve), or other nonsteroidal anti-inflammatory drugs (NSAIDs) unless your doctor says it is okay.     · If you think your pain medicine is making you sick to your stomach:  ? Take your medicine after meals (unless your doctor has told you not to). ? Ask your doctor for a different kind of pain medicine. Care of the puncture site    · Keep a bandage over the puncture site for the first 1 or 2 days. Follow-up care is a key part of your treatment and safety. Be sure to make and go to all appointments, and call your doctor if you are having problems. It's also a good idea to know your test results and keep a list of the medicines you take. When should you call for help? Call 911 anytime you think you may need emergency care.  For example, call if:    · You passed out (lost consciousness).     · You have severe trouble breathing.     · You have sudden chest pain and shortness of breath, or you cough up blood.     · You have severe pain in your chest, shoulder, or belly. Call your doctor now or seek immediate medical care if:    · You have new or worse shortness of breath.     · Bright red blood has soaked through the bandage over the puncture site.     · You have pain that does not get better after you take your pain medicine.     · You are sick to your stomach or cannot keep fluids down.     · You have a fever, chills, or body aches.     · You have signs of infection, such as:  ? Increased pain, swelling, warmth, or redness. ? Red streaks leading from the puncture site. ? Pus draining from the puncture site. ? A fever.     · You have new or worse pain at the puncture site.     · You have new or worse belly swelling or bloating.     · You have trouble passing urine or stool.     · Your stools are black and tarlike or have streaks of blood.     · You have pale-colored stools along with dark urine and itching. Watch closely for changes in your health, and be sure to contact your doctor if you have any problems. Where can you learn more? Go to http://www.gray.com/  Enter J636 in the search box to learn more about \"Percutaneous Liver Biopsy: What to Expect at Home. \"  Current as of: June 17, 2021               Content Version: 13.0  © 2006-2021 Healthwise, Incorporated. Care instructions adapted under license by [employer] (which disclaims liability or warranty for this information). If you have questions about a medical condition or this instruction, always ask your healthcare professional. [place] [address] any warranty or liability for your use of this information.

## 2022-03-18 PROBLEM — F41.9 ANXIETY: Status: ACTIVE | Noted: 2017-12-23

## 2022-03-18 PROBLEM — K56.609 SBO (SMALL BOWEL OBSTRUCTION) (HCC): Status: ACTIVE | Noted: 2018-02-10

## 2022-03-19 PROBLEM — E66.01 SEVERE OBESITY (HCC): Status: ACTIVE | Noted: 2020-03-12

## 2022-03-19 PROBLEM — K43.2 VENTRAL INCISIONAL HERNIA: Status: ACTIVE | Noted: 2020-02-04

## 2022-03-20 PROBLEM — K92.2 UGIB (UPPER GASTROINTESTINAL BLEED): Status: ACTIVE | Noted: 2021-05-16

## 2022-03-20 PROBLEM — K43.2 INCISIONAL HERNIA, WITHOUT OBSTRUCTION OR GANGRENE: Status: ACTIVE | Noted: 2019-11-06

## 2022-03-20 PROBLEM — Z93.2 ILEOSTOMY STATUS (HCC): Status: ACTIVE | Noted: 2017-12-20

## 2022-12-16 ENCOUNTER — APPOINTMENT (OUTPATIENT)
Dept: GENERAL RADIOLOGY | Age: 77
End: 2022-12-16
Attending: INTERNAL MEDICINE
Payer: MEDICARE

## 2022-12-16 ENCOUNTER — HOSPITAL ENCOUNTER (OUTPATIENT)
Age: 77
Setting detail: OUTPATIENT SURGERY
Discharge: HOME | End: 2022-12-16
Attending: INTERNAL MEDICINE | Admitting: INTERNAL MEDICINE
Payer: MEDICARE

## 2022-12-16 ENCOUNTER — ANESTHESIA (OUTPATIENT)
Dept: ENDOSCOPY | Age: 77
End: 2022-12-16
Payer: MEDICARE

## 2022-12-16 ENCOUNTER — ANESTHESIA EVENT (OUTPATIENT)
Dept: ENDOSCOPY | Age: 77
End: 2022-12-16
Payer: MEDICARE

## 2022-12-16 VITALS
RESPIRATION RATE: 17 BRPM | HEIGHT: 61 IN | SYSTOLIC BLOOD PRESSURE: 140 MMHG | TEMPERATURE: 97.1 F | WEIGHT: 203.71 LBS | BODY MASS INDEX: 38.46 KG/M2 | DIASTOLIC BLOOD PRESSURE: 73 MMHG | HEART RATE: 73 BPM | OXYGEN SATURATION: 94 %

## 2022-12-16 LAB
GLUCOSE BLD STRIP.AUTO-MCNC: 238 MG/DL (ref 65–117)
SERVICE CMNT-IMP: ABNORMAL

## 2022-12-16 PROCEDURE — 74011250636 HC RX REV CODE- 250/636: Performed by: NURSE ANESTHETIST, CERTIFIED REGISTERED

## 2022-12-16 PROCEDURE — 74018 RADEX ABDOMEN 1 VIEW: CPT

## 2022-12-16 PROCEDURE — 82962 GLUCOSE BLOOD TEST: CPT

## 2022-12-16 PROCEDURE — 76060000031 HC ANESTHESIA FIRST 0.5 HR: Performed by: INTERNAL MEDICINE

## 2022-12-16 PROCEDURE — 74011000250 HC RX REV CODE- 250: Performed by: NURSE ANESTHETIST, CERTIFIED REGISTERED

## 2022-12-16 PROCEDURE — 2709999900 HC NON-CHARGEABLE SUPPLY: Performed by: INTERNAL MEDICINE

## 2022-12-16 PROCEDURE — 76040000019: Performed by: INTERNAL MEDICINE

## 2022-12-16 PROCEDURE — 88305 TISSUE EXAM BY PATHOLOGIST: CPT

## 2022-12-16 PROCEDURE — 74011000258 HC RX REV CODE- 258: Performed by: NURSE ANESTHETIST, CERTIFIED REGISTERED

## 2022-12-16 RX ORDER — SODIUM CHLORIDE 9 MG/ML
50 INJECTION, SOLUTION INTRAVENOUS CONTINUOUS
Status: DISCONTINUED | OUTPATIENT
Start: 2022-12-16 | End: 2022-12-16 | Stop reason: HOSPADM

## 2022-12-16 RX ORDER — ASCORBIC ACID/MULTIVIT-MIN 1000 MG
EFFERVESCENT POWDER IN PACKET ORAL
COMMUNITY

## 2022-12-16 RX ORDER — LIDOCAINE HYDROCHLORIDE 20 MG/ML
INJECTION, SOLUTION EPIDURAL; INFILTRATION; INTRACAUDAL; PERINEURAL AS NEEDED
Status: DISCONTINUED | OUTPATIENT
Start: 2022-12-16 | End: 2022-12-16 | Stop reason: HOSPADM

## 2022-12-16 RX ORDER — ATROPINE SULFATE 0.1 MG/ML
0.4 INJECTION INTRAVENOUS
Status: DISCONTINUED | OUTPATIENT
Start: 2022-12-16 | End: 2022-12-16 | Stop reason: HOSPADM

## 2022-12-16 RX ORDER — NALOXONE HYDROCHLORIDE 0.4 MG/ML
0.4 INJECTION, SOLUTION INTRAMUSCULAR; INTRAVENOUS; SUBCUTANEOUS
Status: DISCONTINUED | OUTPATIENT
Start: 2022-12-16 | End: 2022-12-16 | Stop reason: HOSPADM

## 2022-12-16 RX ORDER — MIDAZOLAM HYDROCHLORIDE 1 MG/ML
.25-5 INJECTION, SOLUTION INTRAMUSCULAR; INTRAVENOUS
Status: DISCONTINUED | OUTPATIENT
Start: 2022-12-16 | End: 2022-12-16 | Stop reason: HOSPADM

## 2022-12-16 RX ORDER — EPINEPHRINE 0.1 MG/ML
1 INJECTION INTRACARDIAC; INTRAVENOUS
Status: DISCONTINUED | OUTPATIENT
Start: 2022-12-16 | End: 2022-12-16 | Stop reason: HOSPADM

## 2022-12-16 RX ORDER — PROPOFOL 10 MG/ML
INJECTION, EMULSION INTRAVENOUS
Status: DISCONTINUED | OUTPATIENT
Start: 2022-12-16 | End: 2022-12-16 | Stop reason: HOSPADM

## 2022-12-16 RX ORDER — PROPOFOL 10 MG/ML
INJECTION, EMULSION INTRAVENOUS AS NEEDED
Status: DISCONTINUED | OUTPATIENT
Start: 2022-12-16 | End: 2022-12-16 | Stop reason: HOSPADM

## 2022-12-16 RX ORDER — POLYETHYLENE GLYCOL 3350 17 G/17G
17 POWDER, FOR SOLUTION ORAL DAILY
COMMUNITY

## 2022-12-16 RX ORDER — VITAMIN E 268 MG
400 CAPSULE ORAL DAILY
COMMUNITY

## 2022-12-16 RX ORDER — FLUMAZENIL 0.1 MG/ML
0.2 INJECTION INTRAVENOUS
Status: DISCONTINUED | OUTPATIENT
Start: 2022-12-16 | End: 2022-12-16 | Stop reason: HOSPADM

## 2022-12-16 RX ORDER — SODIUM CHLORIDE 9 MG/ML
INJECTION, SOLUTION INTRAVENOUS
Status: DISCONTINUED | OUTPATIENT
Start: 2022-12-16 | End: 2022-12-16

## 2022-12-16 RX ORDER — SODIUM CHLORIDE 9 MG/ML
INJECTION, SOLUTION INTRAVENOUS
Status: DISCONTINUED | OUTPATIENT
Start: 2022-12-16 | End: 2022-12-16 | Stop reason: HOSPADM

## 2022-12-16 RX ORDER — DEXTROMETHORPHAN/PSEUDOEPHED 2.5-7.5/.8
1.2 DROPS ORAL
Status: DISCONTINUED | OUTPATIENT
Start: 2022-12-16 | End: 2022-12-16 | Stop reason: HOSPADM

## 2022-12-16 RX ADMIN — PROPOFOL 100 MCG/KG/MIN: 10 INJECTION, EMULSION INTRAVENOUS at 08:17

## 2022-12-16 RX ADMIN — SODIUM CHLORIDE: 9 INJECTION, SOLUTION INTRAVENOUS at 08:09

## 2022-12-16 RX ADMIN — PROPOFOL 80 MG: 10 INJECTION, EMULSION INTRAVENOUS at 08:17

## 2022-12-16 RX ADMIN — LIDOCAINE HYDROCHLORIDE 100 MG: 20 INJECTION, SOLUTION EPIDURAL; INFILTRATION; INTRACAUDAL; PERINEURAL at 08:17

## 2022-12-16 NOTE — PROGRESS NOTES
Endoscopy discharge instructions have been reviewed and given to patient. The patient verbalized understanding and acceptance of instructions. Dr. Julian Watters discussed with patient procedure findings and next steps.

## 2022-12-16 NOTE — PROGRESS NOTES
Roxana Denver  1945  [id number]    Situation:  Verbal report received from: [de-identified]  Procedure: Procedure(s):  ESOPHAGOGASTRODUODENOSCOPY (EGD)  ESOPHAGOGASTRODUODENAL (EGD) BIOPSY    Background:    Preoperative diagnosis: Nausea [R11.0]  Postoperative diagnosis: gastric polyps    :  Dr. Wes Orozco  Assistant(s): Endoscopy Technician-1: Marquis Grimm  Endoscopy RN-1: Elvia Barragan RN    Specimens:   ID Type Source Tests Collected by Time Destination   1 : gastric biopsy Preservative Gastric  Leesa Canas MD 12/16/2022 6746 Pathology     H. Pylori  no    Assessment:  Anesthesia gave intra-procedure sedation and medications, see anesthesia flow sheet yes    Intravenous fluids: NS@ KVO     Vital signs stable yes    Abdominal assessment: round and soft yes    Recommendation:  Discharge patient per MD order yes.   Return to floor na  Family or Friend family  Permission to share finding with family or friend yes

## 2022-12-16 NOTE — DISCHARGE INSTRUCTIONS
Karma Gonzalez M.D.  [phone]           2022  Anjana Devi  :  1945  [address] [place] [address] Medical Record Number:  [id number]        ENDOSCOPY FINDINGS:   Your endoscopy showed mild irritation in the stomach, otherwise looked within normal. Biopsies were obtained. EGD DISCHARGE INSTRUCTIONS    DISCOMFORT:  Sore throat- throat lozenges or warm salt water gargle  redness at IV site- apply warm compress to area; if redness or soreness persist- contact your physician  Gaseous discomfort- walking, belching will help relieve any discomfort  You may not operate a vehicle for 12 hours  You may not engage in an occupation involving machinery or appliances for rest of today  You may not drink alcoholic beverages for at least 12 hours  Avoid making any critical decisions for at least 24 hour    DIET:   You may resume your regular diet. ACTIVITY  Spend the remainder of the day resting -  avoid any strenuous activity. Avoid driving or operating machinery. CALL M.D. ANY SIGN OF   Increasing pain, nausea, vomiting  Abdominal distension (swelling)  New increased bleeding (oral or rectal)  Fever (chills)  Pain in chest area  Bloody discharge from nose or mouth  Shortness of breath    Follow-up Instructions:   Call Dr. Jayna Correia for any questions or problems. Telephone # [phone]  Bopsies were obtained, the results will be available  in  5 to 7 days. We will call you to notify you of these results. Continue same medications. Will get an abdomen Xray today to assess amount of stools in the colon. Follow up in the office.

## 2022-12-16 NOTE — PERIOP NOTES
5023  Timeout performed. Anesthesia staff at patient's bedside administering anesthesia and monitoring patients vital signs throughout procedure. See anesthesia note. Post procedure, report received from Berta MONTERO      8491  Endoscope was pre-cleaned at bedside immediately following procedure by Mercedes leblanc    4328  Patient tolerated procedure. Abdomen soft and patient arousable and voices no complaints. Patient transported to endoscopy recovery area.    Report given to post procedure RNLyla

## 2022-12-16 NOTE — H&P
The patient is a 78year old female who presents with a complaint of Nausea. 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 Hx of hemochromatosis but no iron deposition present. Followed by Dr. Carlotta Quintero for phlebotomy. Hx of pancreatic cancer s/p whipple. Last visit in February given colestipol for generalized pruritis. Last US was 2/2022- no mass (due for repeat). Last EGD was inpatient- 5/2021 with Dr. Akin Nix- ulcer at anastomosis with a visible vessel clipped. Last colon was 2019- diverticulosis and 1 tubular adenoma removed. To repeat 2024. Recommend EGD at that time as well for varice screening. For persistent epigastric pain on protonix 40 mg and carafate. Her most recent phlebotomy was cancelled as her labs were normal.  She has been working on weight loss. She reports right upper quadrant pain- this comes and goes. Putting pressure on it helps. Hx of cholecystectomy during whipple. She has had this pain for a couple months now- she has had it years off and on. She describes the pain as dull. Reports lack of appetite particularly in the morning along with nausea. She remains on protonix 40 mg and carafate at bedtime. She denies having heartburn- takes these meds for epigastric pain which is well controlled. No dysphagia. She has a BM every day- does not always feel complete- sometimes has to go 2-3 times a day. No blood  in the stool or black/tarry stools. Her RUQ pain does not seem associated with bowel movements. No alcohol or drug use. She used tobacco for 17 years- quit age 31. She does take motrin at night. Problem List/Past Medical (Wendy Garcia; 11/7/2022 11:14 AM)  Rectal bleeding (K62.5)   Pt presents with melena and hematochezia which started once she took motrin for fibromyalgia. This persisted for 11 days but has now stopped. She is no longer taking motrin.   Pancreas disorder    Non-alcoholic fatty liver disease (K76.0)    Cirrhosis (K74.60)    Generalized pruritus (L29.9)    Abdominal pain, periumbilical (M82.59)    Abdominal pain, RUQ (R10.11)    Abdominal swelling, generalized (R19.07)    Hemochromatosis (S90.240)   Follows with Dr. Roscoe Tabares for phlebotomy for hemachromatosis. Epigastric pain (R10.13)    Elevated LFTs (R79.89)    Arthritis    Hypertension   Hemochromotosis  fibromyalgia    Diabetes    Depression    Anxiety Disorder    Kidney Stone   [09/12/2008]:    Past Surgical History (Farhan Olson; 11/7/2022 11:14 AM)  Tonsillectomy    Cholecystectomy    whipple    Hysterectomy; Total    Left breast biopsy    Appendectomy    GASTRIC PERFORATION REPAIR (88415)    Hernia Repair    Ileostomy   And reversal    Allergies (Wendy Garcia; 11/7/2022 11:14 AM)  Codeine      Medication History (Wendy Garcia; 11/7/2022 11:15 AM)  Pantoprazole Sodium  (40MG Tablet DR, 1 (one) Tablet Oral BID, Taken starting 11/02/2018) Active. Carafate  (1GM Tablet, 1 (one) Oral before meals and at bedtime, Taken starting 09/03/2021) Active. Colestipol HCl  (1GM Tablet, 1 (one) Oral bid, Taken starting 02/02/2022) Active. Triamterene/HCTZ  (50-25MG Tablet, Oral) Active. HumaLOG  (100UNIT/ML Solution, Subcutaneous 5 to 8 units three times day) Active. Zofran  (4MG Tablet, Oral as needed) Active. Vitamin D  (35110GUDU Capsule, Oral Weekly) Active. Cymbalta  (60MG Capsule DR Part, 2 Oral Daily) Active. Metoprolol Tartrate  (25MG Tablet, Oral Daily) Active. Protonix  (40MG Packet, Oral) Active. Baclofen  (10MG Tablet, Oral) Active. Lantus  (100UNIT/ML Solution, 24 U Subcutaneous Daily) Active. Medications Reconciled   Liver Flavor  Active. Family History (Farhan Olson; 11/7/2022 11:14 AM)  Kidney failure   Father. Diabetes Mellitus   Mother. Hypertension   Mother. Colon Cancer   Sister. Liver Cancer   Sister. Lung Cancer   Sister.     Social History (Farhan Olson; 11/7/2022 11:14 AM)  Marital status   . Employment status   Retired. [employer]   No. Jehovas Witness  Tobacco Use   Never smoker. Alcohol Use   Has never drank. Diagnostic Studies History (Farhan Olson; 11/7/2022 11:14 AM)  Colonoscopy   [2019]:  Endoscopy   [2021]:    Health Maintenance History (Farhan Olson; 11/7/2022 11:14 AM)  Flu Vaccine   [10/2022]:  NFVFT-04 vaccine   [2021]:  Pneumovax   [2016]:        Review of Systems (Farhan Olson; 11/7/2022 11:14 AM)  General Not Present- Chronic Fatigue, Poor Appetite, Weight Gain and Weight Loss. Skin Not Present- Itching, Rash and Skin Color Changes. HEENT Not Present- Hearing Loss and Vertigo. Respiratory Not Present- Difficulty Breathing and TB exposure. Cardiovascular Not Present- Chest Pain, Use of Antibiotics before Dental Procedures and Use of Blood Thinners. Gastrointestinal Present- See HPI. Musculoskeletal Not Present- Arthritis, Hip Replacement Surgery and Knee Replacement Surgery. Neurological Not Present- Weakness. Psychiatric Not Present- Depression. Endocrine Not Present- Diabetes and Thyroid Problems. Hematology Not Present- Anemia. Vitals (Farhan Olson; 11/7/2022 11:17 AM)  11/7/2022 11:15 AM  Weight: 207 lb   Height: 61 in   Body Surface Area: 1.92 m²   Body Mass Index: 39.11 kg/m²    Temp.: 97.6° F    Pulse: 76 (Regular)     BP: 154/80(Sitting, Left Arm, Standard)              Physical Exam (Kendra Juarez PA-C; 11/7/2022 11:38 AM)  General  Mental Status - Alert. General Appearance - Cooperative, Pleasant, Not in acute distress. Build & Nutrition - Obese and Well developed. Voice - Normal.    Eye  Eyeball - Left - No Exophthalmos - Left. Eyeball - Right - No Exophthalmos - Right. Sclera/Conjunctiva - Left - No Jaundice - Left. Sclera/Conjunctiva - Right - No Jaundice - Right.     Chest and Lung Exam  Chest and lung exam reveals  - normal excursion with symmetric chest walls, quiet, even and easy respiratory effort with no use of accessory muscles and on auscultation, normal breath sounds, no adventitious sounds and normal vocal resonance. Cardiovascular  Cardiovascular examination reveals  - no digital clubbing, cyanosis, edema, increased warmth or tenderness. Auscultation  Heart Sounds - S1 WNL and S2 WNL. Murmurs & Other Heart Sounds - Auscultation of the heart reveals - No Murmurs. Abdomen  Inspection  Inspection of the abdomen reveals - Non-distended. Incisional scars - Incisional Scar present. Palpation/Percussion  Tenderness - Epigastrium and Right Upper Quadrant. Rebound tenderness - No rebound. Liver - Normal size palpable at right costal margin. Spleen - Other Characteristics - Non Palpable. Abdominal Mass Palpable - No masses. Other Characteristics - No Ascites. Organomegally - None. Auscultation  Auscultation of the abdomen reveals - Bowel sounds normal.        Assessment & Plan (Kendra Juarez PA-C; 11/7/2022 11:38 AM)  Cirrhosis (K74.60) <HCC28>  Impression: Pt with cirrhosis on liver biopsy. Likely from MCNAIR. No iron staining on the biopsy. Autoimmune labs are normal    Past due for [address] screening- setting up for US and elastography  Continue screening every 6 months. Pt to work on weight loss with PCP, current BMI 38- I recommended she establish with a dietician to come up with eating plan. Also recommended she avoid high fructose corn syrup  Had last EGD in 2021- recommend for screening purposes in 2024 with her screening colon  Due for labs  Current Plans  CBC, PLATELETS & AUT DIFF  METABOLIC PANEL, COMPREHENSIVE  FibroSURE, MCNAIR (97098)  ALPHA-FETOPROTEIN TUMOR MARKER (23395)  PT (PROTHROMBIN TIME) (55504)  Ultrasound Elastography of Liver/ non- billable (24843)  Abdominal pain, RUQ (R10.11)  Impression: Comes and goes for months now- dull achy. can radiate to back. hx of whipple and cholecystectomy  will proceed with US and labs  not associated with BM's per patient.  Encouraged daily miralax with feeling of incomplete emptying. if there is no improvement with miralax/pepcid consider CT next with her hx of pancreatic cancer. will have 6 week follow up  Current Plans  Ultrasound abdominal limited/ non-billable (16153)  Nausea (R11.0)  Impression: nausea in the am and lack of appetite- this improves throughout the day. gives her insulin in the am. recommend addition of pepcid at bedtime  reports ongoing epigastric pain for 1 year now  hx of ulcer at surgical anastomosis in 2021  taking motrin every night  tender on exam. recommend proceeding with repeat EGD. instructed to hold insulin morning of. r/o PUD, gastritis. will see how she improves with addition of pepcid. continue carafate and protonix. strongly encouraged her to stop motrin  Current Plans  Started Famotidine 40 MG Oral Tablet, 1 (one) Tablet at bedtime, #30, 30 days starting 11/07/2022, Ref. x3. EGD W/BIOPSY (06493)  Pt Education - How to access health information online: discussed with patient and provided information. Patient is to call me for any questions or concerns.   Signed by Thor Duggan PA-C (11/7/2022 11:39 AM)

## 2022-12-16 NOTE — PROCEDURES
Mason Lee M.D.  [phone]           2022                EGD Operative Report  Nish Rosales  :  1945  [facility] Medical Record Number:  [id number]      Indication:  Abdominal pain, epigastric     : Shahana Dick MD    Referring Provider:  Ananya Kelly      Anesthesia/Sedation:  MAC anesthesia    Airway assessment: No airway problems anticipated    Pre-Procedural Exam:      Airway: clear, no airway problems anticipated  Heart: RRR, without gallops or rubs  Lungs: clear bilaterally without wheezes, crackles, or rhonchi  Abdomen: soft, nontender, nondistended, bowel sounds present  Mental Status: awake, alert and oriented to person, place and time       Procedure Details     After infomed consent was obtained for the procedure, with all risks and benefits of procedure explained the patient was taken to the endoscopy suite and placed in the left lateral decubitus position. Following sequential administration of sedation as per above, the endoscope was inserted into the mouth and advanced under direct vision to second portion of the duodenum. A careful inspection was made as the gastroscope was withdrawn, including a retroflexed view of the proximal stomach; findings and interventions are described below. Findings:   Esophagus:Mucosa within normal throughout the esophagus, minimal erythema noted at the GE-junction. No lesions seen. Stomach: Mild erythema noted in the antrum, otherwise mucosa within normal. A small amount of food residue seen, suggestive of delay in gastric emptying. Few benign looking gastric polyps seen, consistent with fundic gland polyps. Duodenum/jejunum:  Evidence of previous pylorus preserving Whipple surgery, jejunal anastomosis and mucosa appeared within normal    Therapies:  none    Specimens: Stomach           Complications:   None; patient tolerated the procedure well. EBL:  None.            Impression:    Mild erythema in the gastric antrum and benign polyps. Possible mild delay in gastric emptying    Recommendations:    -Continue acid suppression.  -Await pathology.  -Will get a KUB today to assess if any underlying constipation.     Seth Gordon MD

## 2022-12-16 NOTE — PROGRESS NOTES
Patient leaves endoscopy to radiology. Radiology to phone endoscopy when patient leaves there so we may discharge.

## 2022-12-21 NOTE — ANESTHESIA POSTPROCEDURE EVALUATION
Procedure(s):  ESOPHAGOGASTRODUODENOSCOPY (EGD)  ESOPHAGOGASTRODUODENAL (EGD) BIOPSY.     MAC    Anesthesia Post Evaluation      Multimodal analgesia: multimodal analgesia not used between 6 hours prior to anesthesia start to PACU discharge  Patient location during evaluation: bedside  Level of consciousness: awake and alert  Pain score: 0  Pain management: satisfactory to patient  Airway patency: patent  Anesthetic complications: no  Cardiovascular status: acceptable  Respiratory status: acceptable  Hydration status: acceptable  Post anesthesia nausea and vomiting:  controlled  Final Post Anesthesia Temperature Assessment:  Normothermia (36.0-37.5 degrees C)      INITIAL Post-op Vital signs:   Vitals Value Taken Time   /73 12/16/22 0851   Temp 36.2 °C (97.1 °F) 12/16/22 0831   Pulse 73 12/16/22 0851   Resp 17 12/16/22 0851   SpO2 94 % 12/16/22 0851

## 2022-12-21 NOTE — ANESTHESIA PREPROCEDURE EVALUATION
Relevant Problems   No relevant active problems       Anesthetic History   No history of anesthetic complications  Increased risk of difficult airway          Review of Systems / Medical History  Patient summary reviewed, nursing notes reviewed and pertinent labs reviewed    Pulmonary  Within defined limits      Sleep apnea           Neuro/Psych   Within defined limits      Psychiatric history     Cardiovascular  Within defined limits  Hypertension        Dysrhythmias            GI/Hepatic/Renal  Within defined limits   GERD    Renal disease  PUD and liver disease     Endo/Other  Within defined limits  Diabetes    Morbid obesity and arthritis     Other Findings                   Anesthetic Plan    ASA: 2  Anesthesia type: MAC          Induction: Intravenous  Anesthetic plan and risks discussed with: Patient

## 2023-05-23 RX ORDER — POLYETHYLENE GLYCOL 3350 17 G/17G
17 POWDER, FOR SOLUTION ORAL DAILY
COMMUNITY

## 2023-05-23 RX ORDER — METOPROLOL TARTRATE 50 MG/1
50 TABLET, FILM COATED ORAL 2 TIMES DAILY
COMMUNITY

## 2023-05-23 RX ORDER — IBUPROFEN 200 MG
3 CAPSULE ORAL
COMMUNITY

## 2023-05-23 RX ORDER — TRIAMTERENE AND HYDROCHLOROTHIAZIDE 37.5; 25 MG/1; MG/1
1 TABLET ORAL DAILY
COMMUNITY

## 2023-05-23 RX ORDER — BACLOFEN 10 MG/1
10 TABLET ORAL DAILY PRN
COMMUNITY

## 2023-05-23 RX ORDER — DULOXETIN HYDROCHLORIDE 60 MG/1
120 CAPSULE, DELAYED RELEASE ORAL DAILY
COMMUNITY

## 2023-05-23 RX ORDER — TRAZODONE HYDROCHLORIDE 50 MG/1
TABLET ORAL
COMMUNITY

## 2023-05-23 RX ORDER — INSULIN GLARGINE 100 [IU]/ML
20 INJECTION, SOLUTION SUBCUTANEOUS
COMMUNITY
Start: 2020-03-19

## 2023-05-23 RX ORDER — INSULIN LISPRO 100 [IU]/ML
5 INJECTION, SOLUTION INTRAVENOUS; SUBCUTANEOUS
COMMUNITY
Start: 2020-03-19

## 2023-05-23 RX ORDER — PSEUDOEPHEDRINE HCL 30 MG
100 TABLET ORAL 2 TIMES DAILY
COMMUNITY
Start: 2020-02-09

## 2023-05-23 RX ORDER — ONDANSETRON 4 MG/1
4 TABLET, FILM COATED ORAL EVERY 4 HOURS PRN
COMMUNITY

## 2023-10-23 ENCOUNTER — HOSPITAL ENCOUNTER (EMERGENCY)
Facility: HOSPITAL | Age: 78
Discharge: HOME | End: 2023-10-23
Attending: EMERGENCY MEDICINE
Payer: MEDICARE

## 2023-10-23 ENCOUNTER — APPOINTMENT (OUTPATIENT)
Facility: HOSPITAL | Age: 78
End: 2023-10-23
Payer: MEDICARE

## 2023-10-23 VITALS
DIASTOLIC BLOOD PRESSURE: 78 MMHG | HEART RATE: 76 BPM | RESPIRATION RATE: 18 BRPM | SYSTOLIC BLOOD PRESSURE: 147 MMHG | BODY MASS INDEX: 38.51 KG/M2 | TEMPERATURE: 98 F | OXYGEN SATURATION: 100 % | WEIGHT: 204 LBS | HEIGHT: 61 IN

## 2023-10-23 DIAGNOSIS — K59.00 CONSTIPATION, UNSPECIFIED CONSTIPATION TYPE: Primary | ICD-10-CM

## 2023-10-23 DIAGNOSIS — R11.0 NAUSEA: ICD-10-CM

## 2023-10-23 LAB
ALBUMIN SERPL-MCNC: 3.3 G/DL (ref 3.5–5)
ALBUMIN/GLOB SERPL: 0.8 (ref 1.1–2.2)
ALP SERPL-CCNC: 121 U/L (ref 45–117)
ALT SERPL-CCNC: 24 U/L (ref 12–78)
ANION GAP SERPL CALC-SCNC: 4 MMOL/L (ref 5–15)
AST SERPL-CCNC: 20 U/L (ref 15–37)
BASOPHILS # BLD: 0 K/UL (ref 0–0.1)
BASOPHILS NFR BLD: 0 % (ref 0–1)
BILIRUB SERPL-MCNC: 0.8 MG/DL (ref 0.2–1)
BUN SERPL-MCNC: 15 MG/DL (ref 6–20)
BUN/CREAT SERPL: 20 (ref 12–20)
CALCIUM SERPL-MCNC: 9.2 MG/DL (ref 8.5–10.1)
CHLORIDE SERPL-SCNC: 106 MMOL/L (ref 97–108)
CO2 SERPL-SCNC: 28 MMOL/L (ref 21–32)
COMMENT:: NORMAL
CREAT SERPL-MCNC: 0.74 MG/DL (ref 0.55–1.02)
DIFFERENTIAL METHOD BLD: ABNORMAL
EOSINOPHIL # BLD: 0.1 K/UL (ref 0–0.4)
EOSINOPHIL NFR BLD: 2 % (ref 0–7)
ERYTHROCYTE [DISTWIDTH] IN BLOOD BY AUTOMATED COUNT: 12.9 % (ref 11.5–14.5)
GLOBULIN SER CALC-MCNC: 4.1 G/DL (ref 2–4)
GLUCOSE SERPL-MCNC: 211 MG/DL (ref 65–100)
HCT VFR BLD AUTO: 37.2 % (ref 35–47)
HGB BLD-MCNC: 12.7 G/DL (ref 11.5–16)
IMM GRANULOCYTES # BLD AUTO: 0 K/UL (ref 0–0.04)
IMM GRANULOCYTES NFR BLD AUTO: 0 % (ref 0–0.5)
LIPASE SERPL-CCNC: 19 U/L (ref 13–75)
LYMPHOCYTES # BLD: 3.6 K/UL (ref 0.8–3.5)
LYMPHOCYTES NFR BLD: 43 % (ref 12–49)
MAGNESIUM SERPL-MCNC: 1.8 MG/DL (ref 1.6–2.4)
MCH RBC QN AUTO: 30.8 PG (ref 26–34)
MCHC RBC AUTO-ENTMCNC: 34.1 G/DL (ref 30–36.5)
MCV RBC AUTO: 90.3 FL (ref 80–99)
MONOCYTES # BLD: 0.5 K/UL (ref 0–1)
MONOCYTES NFR BLD: 6 % (ref 5–13)
NEUTS SEG # BLD: 4 K/UL (ref 1.8–8)
NEUTS SEG NFR BLD: 48 % (ref 32–75)
NRBC # BLD: 0 K/UL (ref 0–0.01)
NRBC BLD-RTO: 0 PER 100 WBC
PLATELET # BLD AUTO: ABNORMAL K/UL (ref 150–400)
PMV BLD AUTO: ABNORMAL FL (ref 8.9–12.9)
POTASSIUM SERPL-SCNC: 3.4 MMOL/L (ref 3.5–5.1)
PROT SERPL-MCNC: 7.4 G/DL (ref 6.4–8.2)
RBC # BLD AUTO: 4.12 M/UL (ref 3.8–5.2)
SODIUM SERPL-SCNC: 138 MMOL/L (ref 136–145)
SPECIMEN HOLD: NORMAL
WBC # BLD AUTO: 8.2 K/UL (ref 3.6–11)

## 2023-10-23 PROCEDURE — 74176 CT ABD & PELVIS W/O CONTRAST: CPT

## 2023-10-23 PROCEDURE — 85025 COMPLETE CBC W/AUTO DIFF WBC: CPT

## 2023-10-23 PROCEDURE — 83690 ASSAY OF LIPASE: CPT

## 2023-10-23 PROCEDURE — 6360000002 HC RX W HCPCS: Performed by: EMERGENCY MEDICINE

## 2023-10-23 PROCEDURE — 99284 EMERGENCY DEPT VISIT MOD MDM: CPT

## 2023-10-23 PROCEDURE — 36415 COLL VENOUS BLD VENIPUNCTURE: CPT

## 2023-10-23 PROCEDURE — 80053 COMPREHEN METABOLIC PANEL: CPT

## 2023-10-23 PROCEDURE — 83735 ASSAY OF MAGNESIUM: CPT

## 2023-10-23 RX ORDER — POLYETHYLENE GLYCOL 3350, SODIUM CHLORIDE, POTASSIUM CHLORIDE, SODIUM BICARBONATE, AND SODIUM SULFATE 240; 5.84; 2.98; 6.72; 22.72 G/4L; G/4L; G/4L; G/4L; G/4L
4000 POWDER, FOR SOLUTION ORAL ONCE
Qty: 4000 ML | Refills: 0 | Status: SHIPPED | OUTPATIENT
Start: 2023-10-23 | End: 2023-10-23

## 2023-10-23 RX ORDER — ONDANSETRON 2 MG/ML
4 INJECTION INTRAMUSCULAR; INTRAVENOUS
Status: COMPLETED | OUTPATIENT
Start: 2023-10-23 | End: 2023-10-23

## 2023-10-23 RX ORDER — ONDANSETRON 4 MG/1
4 TABLET, ORALLY DISINTEGRATING ORAL 3 TIMES DAILY PRN
Qty: 21 TABLET | Refills: 0 | Status: SHIPPED | OUTPATIENT
Start: 2023-10-23

## 2023-10-23 RX ADMIN — ONDANSETRON 4 MG: 2 INJECTION INTRAMUSCULAR; INTRAVENOUS at 17:15

## 2023-10-23 ASSESSMENT — PAIN - FUNCTIONAL ASSESSMENT: PAIN_FUNCTIONAL_ASSESSMENT: 0-10

## 2023-10-23 ASSESSMENT — ENCOUNTER SYMPTOMS
VOMITING: 0
COUGH: 0
SORE THROAT: 0

## 2023-10-23 ASSESSMENT — PAIN SCALES - GENERAL: PAINLEVEL_OUTOF10: 9

## 2023-10-23 ASSESSMENT — PAIN DESCRIPTION - LOCATION: LOCATION: ABDOMEN

## 2023-10-23 NOTE — ED NOTES
Patient does not appear to be in any acute distress/shows no evidence of clinical instability at this time. Provider has reviewed discharge instructions with the patient. The patient verbalized understanding instructions as well as need for follow up for any further symptoms. Discharge papers given, education provided, and any questions answered. Patient discharged by provider.       Nikki Salazar RN  10/23/23 3528

## 2023-10-23 NOTE — ED TRIAGE NOTES
Patient arrived ambulatory via POV with c/c constipation last BM 10/19 , abdominal pain, nausea.  Patient took dulcolax and colon cleanser    Follows with Dr. Mike Walker, Hx cirrhosis and whipple procedure 2013 for pancreatic ca

## 2023-10-30 ENCOUNTER — TELEPHONE (OUTPATIENT)
Age: 78
End: 2023-10-30

## 2023-10-30 NOTE — TELEPHONE ENCOUNTER
Called patient to follow up on referral to [facility]. No answer, left voicemail advising patient to return call.

## 2023-11-03 ENCOUNTER — OFFICE VISIT (OUTPATIENT)
Age: 78
End: 2023-11-03
Payer: COMMERCIAL

## 2023-11-03 VITALS
WEIGHT: 199 LBS | HEART RATE: 88 BPM | SYSTOLIC BLOOD PRESSURE: 138 MMHG | HEIGHT: 61 IN | OXYGEN SATURATION: 98 % | TEMPERATURE: 98 F | RESPIRATION RATE: 18 BRPM | DIASTOLIC BLOOD PRESSURE: 82 MMHG | BODY MASS INDEX: 37.57 KG/M2

## 2023-11-03 DIAGNOSIS — R10.31 RIGHT LOWER QUADRANT ABDOMINAL PAIN: Primary | ICD-10-CM

## 2023-11-03 PROCEDURE — 3078F DIAST BP <80 MM HG: CPT | Performed by: STUDENT IN AN ORGANIZED HEALTH CARE EDUCATION/TRAINING PROGRAM

## 2023-11-03 PROCEDURE — 1123F ACP DISCUSS/DSCN MKR DOCD: CPT | Performed by: STUDENT IN AN ORGANIZED HEALTH CARE EDUCATION/TRAINING PROGRAM

## 2023-11-03 PROCEDURE — 99203 OFFICE O/P NEW LOW 30 MIN: CPT | Performed by: STUDENT IN AN ORGANIZED HEALTH CARE EDUCATION/TRAINING PROGRAM

## 2023-11-03 PROCEDURE — 3074F SYST BP LT 130 MM HG: CPT | Performed by: STUDENT IN AN ORGANIZED HEALTH CARE EDUCATION/TRAINING PROGRAM

## 2023-11-03 RX ORDER — INSULIN ASPART 100 [IU]/ML
INJECTION, SOLUTION INTRAVENOUS; SUBCUTANEOUS
COMMUNITY
Start: 2023-08-17

## 2023-11-03 RX ORDER — METOPROLOL SUCCINATE 100 MG/1
TABLET, EXTENDED RELEASE ORAL
COMMUNITY
Start: 2023-10-05

## 2023-11-03 RX ORDER — BACLOFEN 10 MG/1
TABLET ORAL
COMMUNITY
Start: 2023-11-01

## 2023-11-03 RX ORDER — PANTOPRAZOLE SODIUM 40 MG/1
TABLET, DELAYED RELEASE ORAL
COMMUNITY
Start: 2023-10-05

## 2023-11-03 RX ORDER — TRIAMTERENE AND HYDROCHLOROTHIAZIDE 37.5; 25 MG/1; MG/1
TABLET ORAL
COMMUNITY
Start: 2023-10-05

## 2023-11-03 RX ORDER — CLINDAMYCIN PHOSPHATE 11.9 MG/ML
SOLUTION TOPICAL
COMMUNITY
Start: 2023-10-04

## 2023-11-03 RX ORDER — ONDANSETRON 4 MG/1
TABLET, ORALLY DISINTEGRATING ORAL
COMMUNITY
Start: 2023-10-23

## 2023-11-03 RX ORDER — DULOXETIN HYDROCHLORIDE 60 MG/1
CAPSULE, DELAYED RELEASE ORAL
COMMUNITY
Start: 2023-10-05

## 2023-11-03 RX ORDER — INSULIN GLARGINE 100 [IU]/ML
INJECTION, SOLUTION SUBCUTANEOUS NIGHTLY
COMMUNITY

## 2023-11-03 RX ORDER — CALCIUM CITRATE/VITAMIN D3 200MG-6.25
TABLET ORAL
COMMUNITY
Start: 2023-10-05

## 2023-11-03 RX ORDER — DOXYCYCLINE 100 MG/1
CAPSULE ORAL
COMMUNITY
Start: 2023-10-04

## 2023-11-08 ENCOUNTER — TELEPHONE (OUTPATIENT)
Age: 78
End: 2023-11-08

## 2023-11-08 NOTE — TELEPHONE ENCOUNTER
Attempted to contact patient regarding appointment on 11/9 with Dr. Armin Greco. No answer, left voicemail for a return call.

## 2023-11-10 ENCOUNTER — CLINICAL DOCUMENTATION (OUTPATIENT)
Age: 78
End: 2023-11-10

## 2023-11-10 ENCOUNTER — TELEPHONE (OUTPATIENT)
Age: 78
End: 2023-11-10

## 2023-11-10 NOTE — PROGRESS NOTES
Called patient to discuss imaging findings. Patient seen in clinic last week. I did not have ED images at that point in time. We scheduled a phone visit, however, this somehow fell off my clinic list. She called the office today and I was able to call her back. .    On initial evaluation last week, patient had pain in her right side at a bruised area. She had been to the ED and was told she had a hernia although I could not palpate anything. Today the patient tells me her pain is different. She now has pain at the hernia site seen on imaging (epigastric). The pain is progressive. She describes a lump and nausea. She is eating her usual amount and passing flatus but feels the pain has acutely worsened today. I reviewed the prior CT images. Patient does have an epigastric hernia. On cross-section imaging the site appears to be a diastasis however, sagittal images appear to show a fascial defect. This could be a symptomatic hernia, however, I am unable to tell based on our conversation if there is something else going on. She does not appear to be obstructed but her pain is getting worse. I suggested she should be seen in an urgent care or the ED. If this proves to be a symptomatic hernia, she can follow-up with us again in the clinic. Patient agrees and will plan on being evaluated.      Cristela Pierre MD

## 2023-11-10 NOTE — TELEPHONE ENCOUNTER
Patient called and stated she was supposed to receive a call from Dr. Dunne yesterday but did not hear anything.

## 2024-02-07 ENCOUNTER — HOSPITAL ENCOUNTER (OUTPATIENT)
Facility: HOSPITAL | Age: 79
Discharge: HOME | End: 2024-02-10
Attending: INTERNAL MEDICINE
Payer: MEDICARE

## 2024-02-07 DIAGNOSIS — K74.60 CIRRHOSIS OF LIVER WITHOUT ASCITES, UNSPECIFIED HEPATIC CIRRHOSIS TYPE (HCC): ICD-10-CM

## 2024-02-07 PROCEDURE — 76705 ECHO EXAM OF ABDOMEN: CPT

## 2024-07-11 ENCOUNTER — ANESTHESIA (OUTPATIENT)
Facility: HOSPITAL | Age: 79
End: 2024-07-11
Payer: MEDICARE

## 2024-07-11 ENCOUNTER — HOSPITAL ENCOUNTER (OUTPATIENT)
Facility: HOSPITAL | Age: 79
Setting detail: OUTPATIENT SURGERY
Discharge: HOME | End: 2024-07-11
Attending: INTERNAL MEDICINE | Admitting: INTERNAL MEDICINE
Payer: MEDICARE

## 2024-07-11 ENCOUNTER — ANESTHESIA EVENT (OUTPATIENT)
Facility: HOSPITAL | Age: 79
End: 2024-07-11
Payer: MEDICARE

## 2024-07-11 VITALS
WEIGHT: 204.37 LBS | HEART RATE: 79 BPM | HEIGHT: 61 IN | RESPIRATION RATE: 20 BRPM | TEMPERATURE: 98.7 F | OXYGEN SATURATION: 97 % | SYSTOLIC BLOOD PRESSURE: 116 MMHG | BODY MASS INDEX: 38.58 KG/M2 | DIASTOLIC BLOOD PRESSURE: 68 MMHG

## 2024-07-11 LAB
GLUCOSE BLD STRIP.AUTO-MCNC: 215 MG/DL (ref 65–117)
SERVICE CMNT-IMP: ABNORMAL

## 2024-07-11 PROCEDURE — 2580000003 HC RX 258: Performed by: INTERNAL MEDICINE

## 2024-07-11 PROCEDURE — 82962 GLUCOSE BLOOD TEST: CPT

## 2024-07-11 PROCEDURE — 3700000001 HC ADD 15 MINUTES (ANESTHESIA): Performed by: INTERNAL MEDICINE

## 2024-07-11 PROCEDURE — 6360000002 HC RX W HCPCS: Performed by: NURSE ANESTHETIST, CERTIFIED REGISTERED

## 2024-07-11 PROCEDURE — 3600007512: Performed by: INTERNAL MEDICINE

## 2024-07-11 PROCEDURE — 88305 TISSUE EXAM BY PATHOLOGIST: CPT

## 2024-07-11 PROCEDURE — 7100000011 HC PHASE II RECOVERY - ADDTL 15 MIN: Performed by: INTERNAL MEDICINE

## 2024-07-11 PROCEDURE — 2500000003 HC RX 250 WO HCPCS: Performed by: NURSE ANESTHETIST, CERTIFIED REGISTERED

## 2024-07-11 PROCEDURE — 93005 ELECTROCARDIOGRAM TRACING: CPT | Performed by: ANESTHESIOLOGY

## 2024-07-11 PROCEDURE — 3700000000 HC ANESTHESIA ATTENDED CARE: Performed by: INTERNAL MEDICINE

## 2024-07-11 PROCEDURE — 7100000010 HC PHASE II RECOVERY - FIRST 15 MIN: Performed by: INTERNAL MEDICINE

## 2024-07-11 PROCEDURE — 3600007502: Performed by: INTERNAL MEDICINE

## 2024-07-11 PROCEDURE — 2709999900 HC NON-CHARGEABLE SUPPLY: Performed by: INTERNAL MEDICINE

## 2024-07-11 RX ORDER — EPHEDRINE SULFATE/0.9% NACL/PF 25 MG/5 ML
SYRINGE (ML) INTRAVENOUS PRN
Status: DISCONTINUED | OUTPATIENT
Start: 2024-07-11 | End: 2024-07-11 | Stop reason: SDUPTHER

## 2024-07-11 RX ORDER — FENTANYL CITRATE 50 UG/ML
INJECTION, SOLUTION INTRAMUSCULAR; INTRAVENOUS PRN
Status: DISCONTINUED | OUTPATIENT
Start: 2024-07-11 | End: 2024-07-11 | Stop reason: SDUPTHER

## 2024-07-11 RX ORDER — SODIUM CHLORIDE 9 MG/ML
INJECTION, SOLUTION INTRAVENOUS CONTINUOUS
Status: DISCONTINUED | OUTPATIENT
Start: 2024-07-11 | End: 2024-07-11 | Stop reason: HOSPADM

## 2024-07-11 RX ORDER — GLYCOPYRROLATE 0.2 MG/ML
INJECTION INTRAMUSCULAR; INTRAVENOUS PRN
Status: DISCONTINUED | OUTPATIENT
Start: 2024-07-11 | End: 2024-07-11 | Stop reason: SDUPTHER

## 2024-07-11 RX ORDER — LIDOCAINE HYDROCHLORIDE 20 MG/ML
INJECTION, SOLUTION INTRAVENOUS PRN
Status: DISCONTINUED | OUTPATIENT
Start: 2024-07-11 | End: 2024-07-11 | Stop reason: SDUPTHER

## 2024-07-11 RX ORDER — IBUPROFEN 600 MG/1
400 TABLET ORAL EVERY 8 HOURS PRN
COMMUNITY

## 2024-07-11 RX ORDER — DEXMEDETOMIDINE HYDROCHLORIDE 100 UG/ML
INJECTION, SOLUTION INTRAVENOUS PRN
Status: DISCONTINUED | OUTPATIENT
Start: 2024-07-11 | End: 2024-07-11 | Stop reason: SDUPTHER

## 2024-07-11 RX ORDER — PROPOFOL 10 MG/ML
INJECTION, EMULSION INTRAVENOUS PRN
Status: DISCONTINUED | OUTPATIENT
Start: 2024-07-11 | End: 2024-07-11 | Stop reason: SDUPTHER

## 2024-07-11 RX ADMIN — EPHEDRINE SULFATE 10 MG: 5 INJECTION INTRAVENOUS at 09:44

## 2024-07-11 RX ADMIN — DEXMEDETOMIDINE 12 MCG: 100 INJECTION, SOLUTION INTRAVENOUS at 09:19

## 2024-07-11 RX ADMIN — PROPOFOL 50 MG: 10 INJECTION, EMULSION INTRAVENOUS at 09:23

## 2024-07-11 RX ADMIN — EPHEDRINE SULFATE 15 MG: 5 INJECTION INTRAVENOUS at 09:39

## 2024-07-11 RX ADMIN — PROPOFOL 20 MG: 10 INJECTION, EMULSION INTRAVENOUS at 09:25

## 2024-07-11 RX ADMIN — LIDOCAINE HYDROCHLORIDE 40 MG: 20 INJECTION, SOLUTION INTRAVENOUS at 09:27

## 2024-07-11 RX ADMIN — LIDOCAINE HYDROCHLORIDE 60 MG: 20 INJECTION, SOLUTION INTRAVENOUS at 09:23

## 2024-07-11 RX ADMIN — FENTANYL CITRATE 25 MCG: 50 INJECTION, SOLUTION INTRAMUSCULAR; INTRAVENOUS at 09:27

## 2024-07-11 RX ADMIN — GLYCOPYRROLATE 0.2 MG: 0.2 INJECTION INTRAMUSCULAR; INTRAVENOUS at 09:19

## 2024-07-11 RX ADMIN — FENTANYL CITRATE 50 MCG: 50 INJECTION, SOLUTION INTRAMUSCULAR; INTRAVENOUS at 09:19

## 2024-07-11 RX ADMIN — PROPOFOL 140 MCG/KG/MIN: 10 INJECTION, EMULSION INTRAVENOUS at 09:24

## 2024-07-11 RX ADMIN — PROPOFOL 30 MG: 10 INJECTION, EMULSION INTRAVENOUS at 09:27

## 2024-07-11 RX ADMIN — SODIUM CHLORIDE: 9 INJECTION, SOLUTION INTRAVENOUS at 08:14

## 2024-07-11 RX ADMIN — FENTANYL CITRATE 25 MCG: 50 INJECTION, SOLUTION INTRAMUSCULAR; INTRAVENOUS at 09:32

## 2024-07-11 ASSESSMENT — PAIN SCALES - GENERAL
PAINLEVEL_OUTOF10: 0

## 2024-07-11 ASSESSMENT — PAIN - FUNCTIONAL ASSESSMENT: PAIN_FUNCTIONAL_ASSESSMENT: 0-10

## 2024-07-11 NOTE — ANESTHESIA POSTPROCEDURE EVALUATION
Department of Anesthesiology  Postprocedure Note    Patient: Heidi Jefferson  MRN: [id number]  YOB: 1945  Date of evaluation: 7/11/2024    Procedure Summary       Date: 07/11/24 Room / Location: [facility] ENDO 03 / [facility] ENDOSCOPY    Anesthesia Start: 0917 Anesthesia Stop: 0957    Procedures:       ESOPHAGOGASTRODUODENOSCOPY w/BIOPSY      COLONOSCOPY POLYPECTOMY SNARE/BIOPSY (Lower GI Region) Diagnosis:       Personal history of colonic polyps      Hepatic cirrhosis, unspecified hepatic cirrhosis type, unspecified whether ascites present (HCC)      (Personal history of colonic polyps [Z86.010])      (Hepatic cirrhosis, unspecified hepatic cirrhosis type, unspecified whether ascites present (HCC) [K74.60])    Surgeons: Jose Juarez MD Responsible Provider: Joseph Dawn MD    Anesthesia Type: MAC ASA Status: 3            Anesthesia Type: No value filed.    Saige Phase I: Saige Score: 10    Saige Phase II: Saige Score: 10    Anesthesia Post Evaluation    Patient location during evaluation: PACU  Patient participation: complete - patient participated  Level of consciousness: awake  Airway patency: patent  Nausea & Vomiting: no vomiting and no nausea  Cardiovascular status: hemodynamically stable  Respiratory status: acceptable  Hydration status: stable  Pain management: adequate    No notable events documented.

## 2024-07-11 NOTE — DISCHARGE INSTRUCTIONS
[facility]  Jose Juarez M.D.  [phone]                 COLON and EGD DISCHARGE INSTRUCTIONS    2024    Heidi Jefferson  :  1945  [facility] Medical Record Number:  [id number]      DISCOMFORT:  Sore throat- throat lozenges or warm salt water gargle  Redness at IV site- apply warm compress to area; if redness or soreness persist- contact your physician  There may be a slight amount of blood passed from the rectum  Gaseous discomfort- walking, belching will help relieve any discomfort  You may not operate a vehicle for 12 hours  You may not engage in an occupation involving machinery or appliances for rest of today  You may not drink alcoholic beverages for at least 12 hours  Avoid making any critical decisions for at least 24 hour  DIET:   High fiber diet   - however -  remember your colon is empty and a heavy meal will produce gas.   Avoid these foods:  vegetables, fried / greasy foods, carbonated drinks for today     ACTIVITY:  You may  resume your normal daily activities it is recommended that you spend the remainder of the day resting -  avoid any strenuous activity and driving.    CALL M.D.  ANY SIGN OF:   Increasing pain, nausea, vomiting  Abdominal distension (swelling)  New increased bleeding (oral or rectal)  Fever (chills)  Pain in chest area  Bloody discharge from nose or mouth  Shortness of breath      Follow-up Instructions:   Call Dr. Juarez if any questions at [phone].  Results of procedure / biopsy in 7 to 10 days, we will call you with these results.  Your endoscopy showed mild erythema in the stomach, few gastric polyps, otherwise mucosa within normal.   Your colonoscopy showed a total of 5 polyps were removed and sent to pathology. Will need to stay on a daily bowel regimen to avoid any constipation.

## 2024-07-11 NOTE — OP NOTE
[facility]  Jose Juarez M.D.  [phone]           2024                EGD Operative Report  Heidi Jefferson  :  1945  [facility] Medical Record Number:  [id number]      Indication:  Portal hypertension, epigastric pain    : Jose Juarez MD    Referring Provider:  Venita Franklin APRN - NP      Anesthesia/Sedation:  MAC anesthesia    Airway assessment: No airway problems anticipated    Pre-Procedural Exam:      Airway: clear, no airway problems anticipated  Heart: RRR, without gallops or rubs  Lungs: clear bilaterally without wheezes, crackles, or rhonchi  Abdomen: soft, nontender, nondistended, bowel sounds present  Mental Status: awake, alert and oriented to person, place and time       Procedure Details     After infomed consent was obtained for the procedure, with all risks and benefits of procedure explained the patient was taken to the endoscopy suite and placed in the left lateral decubitus position.  Following sequential administration of sedation as per above, the endoscope was inserted into the mouth and advanced under direct vision to second portion of the duodenum.  A careful inspection was made as the gastroscope was withdrawn, including a retroflexed view of the proximal stomach; findings and interventions are described below.      Findings:   Esophagus:Mucosa within normal throughout esophagus, no varices seen. No stricture or inflammation.  Stomach: Mild erythema in the body of the esophagus, several small polyps seen in the body and fundus. Mild residual bilious material suggestive of mild gastroparesis.  Duodenum/jejunum: normal    Therapies:  none    Specimens:  Stomach and gastric polyps           Complications:   None; patient tolerated the procedure well.    EBL:  None.           Impression:    Gastric polyps and mild erythema in the stomach    Recommendations:    -Await pathology.  -Continue with pantoprazole bryant Juarez MD

## 2024-07-11 NOTE — PROGRESS NOTES
Heidi Jefferson  1945  [id number]    Situation:  Verbal report received from:   JOANN Wheat   Procedure: Procedure(s):  ESOPHAGOGASTRODUODENOSCOPY w/BIOPSY  COLONOSCOPY BIOPSY    Background:    Preoperative diagnosis: Personal history of colonic polyps [Z86.010]  Hepatic cirrhosis, unspecified hepatic cirrhosis type, unspecified whether ascites present (HCC) [K74.60]  Postoperative diagnosis: * No post-op diagnosis entered *    :  Dr. Juarez   Assistant(s): Circulator: Sunny Boston RN  Endoscopy Technician: Zandra Ferreira    Specimens:   ID Type Source Tests Collected by Time Destination   1 : gASTRIC BIOPSY Tissue Gastric SURGICAL PATHOLOGY Jose Juarez MD 7/11/2024 0928    2 : Gastric polyps Tissue Gastric SURGICAL PATHOLOGY Jose Juarez MD 7/11/2024 0929    3 : Ascending colon polyps Tissue Colon-Ascending SURGICAL PATHOLOGY Jose Juarez MD 7/11/2024 0937    4 : Transverse colon polyps Tissue Colon-Transverse SURGICAL PATHOLOGY Jose Juarez MD 7/11/2024 0946      H. Pylori    no    Assessment:  Intra-procedure medications     Anesthesia gave intra-procedure sedation and medications, see anesthesia flow sheet   yes    Intravenous fluids: NS@ KVO     Vital signs stable   yes    Abdominal assessment: round and soft   yes    Recommendation:  Discharge patient per MD order  yes.  Return to floor  outpatient  Family or Friend   friend   Permission to share finding with family or friend   yes

## 2024-07-11 NOTE — ANESTHESIA PRE PROCEDURE
results found for: \"PHART\", \"PO2ART\", \"BSB1IXH\", \"TPC0ETT\", \"BEART\", \"G0MHOWVD\"     Type & Screen (If Applicable):  No results found for: \"LABABO\"    Drug/Infectious Status (If Applicable):  No results found for: \"HIV\", \"HEPCAB\"    COVID-19 Screening (If Applicable):   Lab Results   Component Value Date/Time    COVID19 Not detected 05/17/2021 07:44 AM           Anesthesia Evaluation     no history of anesthetic complications:   Airway: Mallampati: IV     Neck ROM: full  Mouth opening: > = 3 FB   Dental:    (+) implants      Pulmonary: breath sounds clear to auscultation  (+)     sleep apnea:                                  Cardiovascular:  Exercise tolerance: no interval change  (+) hypertension: no interval change, dysrhythmias: PAC        Rhythm: irregular  Rate: normal                    Neuro/Psych:   (+) psychiatric history:             ROS comment: fibromyalgia GI/Hepatic/Renal:   (+) GERD:, PUD, liver disease:, morbid obesity          Endo/Other:    (+) malignancy/cancer.                 Abdominal:             Vascular: negative vascular ROS.         Other Findings:       Anesthesia Plan      MAC     ASA 3       Induction: intravenous.      Anesthetic plan and risks discussed with patient.      Plan discussed with CRNA.                Joseph Dawn MD   7/11/2024

## 2024-07-11 NOTE — H&P
7/9/2024 8:52 AM)  Flu Vaccine   [2023]: 10/2022  COVID-19 vaccine   [2021]: fully vaccinated  Pneumovax   [2016]:        Review of Systems (Vicki Solorzano; 7/9/2024 8:40 AM)  General Not Present- Chronic Fatigue, Poor Appetite, Weight Gain and Weight Loss.  Skin Not Present- Itching, Rash and Skin Color Changes.  HEENT Not Present- Hearing Loss and Vertigo.  Respiratory Not Present- Difficulty Breathing and TB exposure.  Cardiovascular Not Present- Chest Pain, Use of Antibiotics before Dental Procedures and Use of Blood Thinners.  Gastrointestinal Present- See HPI.  Musculoskeletal Not Present- Arthritis, Hip Replacement Surgery and Knee Replacement Surgery.  Neurological Not Present- Weakness.  Psychiatric Not Present- Depression.  Endocrine Not Present- Diabetes and Thyroid Problems.  Hematology Not Present- Anemia.    Vitals (Vicki Solorzano; 7/9/2024 8:49 AM)  7/9/2024 8:43 AM  Weight: 207.2 lb   Height: 61 in   Body Surface Area: 1.92 m²   Body Mass Index: 39.15 kg/m²    Temp.: 98.3° F  (Temporal)    Pulse: 79 (Regular)     BP: 150/81(Sitting, Left Arm, Standard)  passed covid questions            Physical Exam (Aimee BARNES; 7/9/2024 6:54 PM)  General  Mental Status - Alert.  General Appearance - Cooperative, Pleasant and Consistent with stated age, Not Anxious, Not in acute distress.  Orientation - Oriented X3.  Build & Nutrition - Well nourished and Well developed.    Chest and Lung Exam  Chest and lung exam reveals  - normal excursion with symmetric chest walls, quiet, even and easy respiratory effort with no use of accessory muscles and on auscultation, normal breath sounds, no adventitious sounds and normal vocal resonance.    Cardiovascular  Cardiovascular examination reveals  - normal heart sounds, regular rate and rhythm with no murmurs and no digital clubbing, cyanosis, edema, increased warmth or tenderness.    Abdomen  Inspection  Inspection of the abdomen reveals - Soft,

## 2024-07-11 NOTE — OP NOTE
[facility]  Jose Juarez M.D.  [phone]            2024          Colonoscopy Operative Report  Heidi Jefferson  :  1945  [facility] Medical Record Number:  [id number]      Indications:    Personal history of colon polyps (screening only)     :  Jose Juarez MD    Referring Provider: Venita Franklin APRN - NP    Sedation:  MAC anesthesia    Pre-Procedural Exam:      Airway: clear,  No airway problems anticipated  Heart: RRR, without gallops or rubs  Lungs: clear bilaterally without wheezes, crackles, or rhonchi  Abdomen: soft, nontender, nondistended, bowel sounds present  Mental Status: awake, alert and oriented to person, place and time     Procedure Details:  After informed consent was obtained with all risks and benefits of procedure explained and preoperative exam completed, the patient was taken to the endoscopy suite and placed in the left lateral decubitus position.  Upon sequential sedation as per above, a digital rectal exam was performed. The Olympus videocolonoscope  was inserted in the rectum and carefully advanced to the cecum, which was identified by the ileocecal valve and appendiceal orifice.  The quality of preparation was good.  The colonoscope was slowly withdrawn with careful inspection and evaluation between folds. Retroflexion in the rectum was performed.    Findings:   Terminal Ileum: not intubated  Cecum: normal  Ascending Colon: 2  Sessile polyp(s), the largest 4 mm in size;  Transverse Colon: 3  Pedunculated polyp(s), the largest 5 mm in size; mild diverticulosis  Descending Colon: no mucosal lesion appreciated  mild diverticulosis;  Sigmoid: no mucosal lesion appreciated  moderate diverticulosis; Adequate colo-colonic anastomosis  Rectum: no mucosal lesion appreciated  Grade 1 internal hemorrhoid(s);    Interventions:  5 complete polypectomy were performed using cold snare  and the polyps were  retrieved    Specimen Removed:  specimen #1, 3 and 4

## 2024-07-11 NOTE — PROGRESS NOTES
Endoscopy discharge instructions have been reviewed and given to patient.  The patient verbalized understanding and acceptance of instructions.      Dr. Juarez  discussed with patient procedure findings and next steps.

## 2024-07-12 LAB
EKG ATRIAL RATE: 65 BPM
EKG DIAGNOSIS: NORMAL
EKG P AXIS: 37 DEGREES
EKG P-R INTERVAL: 194 MS
EKG Q-T INTERVAL: 442 MS
EKG QRS DURATION: 96 MS
EKG QTC CALCULATION (BAZETT): 459 MS
EKG R AXIS: -23 DEGREES
EKG T AXIS: 21 DEGREES
EKG VENTRICULAR RATE: 65 BPM

## 2024-07-29 NOTE — PERIOP NOTES
TRANSFER - OUT REPORT:    Verbal report given to NURSE SUSAN(name) on Andres Valderrama  being transferred to [facility](unit) for routine post - op       Report consisted of patients Situation, Background, Assessment and   Recommendations(SBAR). Time Pre op antibiotic given:[email or web address] [email or web address]  Anesthesia Stop time: 1656  Law Present on Transfer to [address] for Law on Chart:NO  Discharge Prescriptions with Chart:NO    Information from the following report(s) SBAR, OR Summary, Intake/Output, MAR, Recent Results and Procedure Verification was reviewed with the receiving nurse. Opportunity for questions and clarification was provided. Is the patient on 02? YES       L/Min 2        Is the patient on a monitor? NO    Is the nurse transporting with the patient? NO    Surgical Waiting Area notified of patient's transfer from PACU? YES (Dr. Laith Mitchell updated son, Macario Scherer, who lives in [place] @ [phone])      The following personal items collected during your admission accompanied patient upon transfer:   Dental Appliance: Dental Appliances: None  Vision:    Hearing Aid:    Jewelry: Jewelry: None  Clothing: Clothing: At bedside  Other Valuables:  Other Valuables: None  Valuables sent to safe: No

## 2024-11-04 ENCOUNTER — HOSPITAL ENCOUNTER (OUTPATIENT)
Facility: HOSPITAL | Age: 79
Discharge: HOME | End: 2024-11-07
Payer: MEDICARE

## 2024-11-04 DIAGNOSIS — K74.60 HEPATIC CIRRHOSIS, UNSPECIFIED HEPATIC CIRRHOSIS TYPE, UNSPECIFIED WHETHER ASCITES PRESENT (HCC): ICD-10-CM

## 2024-11-04 PROCEDURE — 76705 ECHO EXAM OF ABDOMEN: CPT

## (undated) DEVICE — INFECTION CONTROL KIT SYS

## (undated) DEVICE — SOLUTION IRRIG 1000ML H2O STRL BLT

## (undated) DEVICE — SURGICAL PROCEDURE PACK BASIN MAJ SET CUST NO CAUT

## (undated) DEVICE — POOLE SUCTION INSTRUMENT WITH REMOVABLE SHEATH: Brand: POOLE

## (undated) DEVICE — BANDAGE,GAUZE,BULKEE II,4.5"X4.1YD,STRL: Brand: MEDLINE

## (undated) DEVICE — NDL PRT INJ NSAF BLNT 18GX1.5 --

## (undated) DEVICE — DRAPE,UTILTY,TAPE,15X26, 4EA/PK: Brand: MEDLINE

## (undated) DEVICE — SOLUTION IV 1000ML 0.9% SOD CHL

## (undated) DEVICE — SUTURE MCRYL SZ 4-0 L27IN ABSRB UD L19MM PS-2 1/2 CIR PRIM Y426H

## (undated) DEVICE — STERILE POLYISOPRENE POWDER-FREE SURGICAL GLOVES WITH EMOLLIENT COATING: Brand: PROTEXIS

## (undated) DEVICE — SOLIDIFIER FLD 2OZ 1500CC N DISINF IN BTL DISP SAFESORB

## (undated) DEVICE — COVER LT HNDL PLAS RIG 1 PER PK

## (undated) DEVICE — DERMACEA GAUZE FLUFF ROLL: Brand: DERMACEA

## (undated) DEVICE — SYR BULB 60ML IRRIGATION -- CONVERT TO ITEM 116413

## (undated) DEVICE — BAG BELONG PT PERS CLEAR HANDL

## (undated) DEVICE — DERMABOND SKIN ADH 0.7ML -- DERMABOND ADVANCED 12/BX

## (undated) DEVICE — DBD-PACK,LAPAROTOMY,2 REINFORCED GOWNS: Brand: MEDLINE

## (undated) DEVICE — SET ADMIN 16ML TBNG L100IN 2 Y INJ SITE IV PIGGY BK DISP

## (undated) DEVICE — HEX-LOCKING BLADE ELECTRODE: Brand: EDGE

## (undated) DEVICE — SYRINGE MEDICAL 3ML CLEAR PLASTIC STANDARD NON CONTROL LUERLOCK TIP DISPOSABLE

## (undated) DEVICE — SUTURE PERMAHAND SZ 3-0 L18IN NONABSORBABLE BLK L26MM SH C013D

## (undated) DEVICE — TOWEL SURG W17XL27IN STD BLU COT NONFENESTRATED PREWASHED

## (undated) DEVICE — SET GRAV CK VLV NEEDLESS ST 3 GANGED 4WAY STPCOCK HI FLO 10

## (undated) DEVICE — SYR 5ML 1/5 GRAD LL NSAF LF --

## (undated) DEVICE — SPONGE LAP 18X18IN STRL -- 5/PK

## (undated) DEVICE — SUTURE PERMAHAND SZ 2-0 L30IN NONABSORBABLE BLK SILK W/O A305H

## (undated) DEVICE — SUTURE PDS II SZ 0 L36IN ABSRB VLT L40MM CT 1/2 CIR Z358T

## (undated) DEVICE — TUBING, SUCTION, 1/4" X 12', STRAIGHT: Brand: MEDLINE

## (undated) DEVICE — SUTURE VCRL SZ 2-0 L27IN ABSRB VLT L26MM SH 1/2 CIR J317H

## (undated) DEVICE — TO REMOVE ADHESIVE TAPE FROM SKIN.: Brand: PDI® ADHESIVE TAPE REMOVER PAD

## (undated) DEVICE — SEALER TISS L20CM DIA13MM ADV BPLR L CRV JAW OPN APPRCH

## (undated) DEVICE — DRAIN SURG 19FR 100% SIL RADPQ RND CHN FULL FLUT

## (undated) DEVICE — ROCKER SWITCH PENCIL BLADE ELECTRODE, HOLSTER: Brand: EDGE

## (undated) DEVICE — STERILE POLYISOPRENE POWDER-FREE SURGICAL GLOVES: Brand: PROTEXIS

## (undated) DEVICE — Device

## (undated) DEVICE — SUTURE VCRL 2-0 L27IN ABSRB CT BRAID COAT UD J275H

## (undated) DEVICE — ADULT SPO2 SENSOR,REMANUFACTURED,REPROCESSED DEVICE FOR SINGLE USE; REPROCESSED BY COVIDIEN LLC: Brand: NELLCOR

## (undated) DEVICE — GARMENT,MEDLINE,DVT,INT,CALF,MED, GEN2: Brand: MEDLINE

## (undated) DEVICE — 1200 GUARD II KIT W/5MM TUBE W/O VAC TUBE: Brand: GUARDIAN

## (undated) DEVICE — ADULT SPO2 SENSOR: Brand: NELLCOR

## (undated) DEVICE — CANNULA CUSH AD W/ 14FT TBG

## (undated) DEVICE — BITEBLOCK ENDOSCP 60FR MAXI WHT POLYETH STURDY W/ VELC WVN

## (undated) DEVICE — BASIN EMSIS 16OZ GRAPHITE PLAS KID SHP MOLD GRAD FOR ORAL

## (undated) DEVICE — DEVICE TRNSF SPIK STL 2008S] MICROTEK MEDICAL INC]

## (undated) DEVICE — WRAP SURG W1.31XL1.34M CARD FOR PT 165-172CM THERMOWRP

## (undated) DEVICE — SOLIDIFIER MEDC 1200ML -- CONVERT TO 356117

## (undated) DEVICE — INSULATED BLADE ELECTRODE: Brand: EDGE

## (undated) DEVICE — TRAY CATH OD16FR SIL URIN M STATLOK STBL DEV SURSTP

## (undated) DEVICE — ELECTRODE,RADIOTRANSLUCENT,FOAM,3PK: Brand: MEDLINE

## (undated) DEVICE — DRAPE,T,LAPARO,TRANS,STERILE: Brand: MEDLINE

## (undated) DEVICE — SUTURE PERMAHAND SZ 3-0 L30IN NONABSORBABLE BLK SILK BRAID A304H

## (undated) DEVICE — CONTAINER SPEC 20 ML LID NEUT BUFF FORMALIN 10 % POLYPR STS

## (undated) DEVICE — KIT COLON W/ 1.1OZ LUB AND 2 END

## (undated) DEVICE — SYR 10ML LUER LOK 1/5ML GRAD --

## (undated) DEVICE — CATH IV AUTOGRD BC BLU 22GA 25 -- INSYTE

## (undated) DEVICE — LARGE, DISPOSABLE ALEXIS O C-SECTION PROTECTOR - RETRACTOR: Brand: ALEXIS ® O C-SECTION PROTECTOR - RETRACTOR

## (undated) DEVICE — SYR 3ML LL TIP 1/10ML GRAD --

## (undated) DEVICE — SET ADMIN 16ML TBNG L100IN 2 Y INJ SITE IV PIGGY BK DISP (ORDER IN MULIPLES OF 48)

## (undated) DEVICE — RELOAD STPL L75MM OPN H3.8MM CLS 1.5MM WIRE DIA0.2MM REG

## (undated) DEVICE — 3000CC GUARDIAN II: Brand: GUARDIAN

## (undated) DEVICE — CUFF RMFG BP INF SZ 11 DISP -- LAWSON OEM ITEM 238915

## (undated) DEVICE — RESERVOIR,SUCTION,100CC,SILICONE: Brand: MEDLINE

## (undated) DEVICE — (D)PREP SKN CHLRAPRP APPL 26ML -- CONVERT TO ITEM 371833

## (undated) DEVICE — SKIN TEMPERATURE SENSOR: Brand: DEROYAL

## (undated) DEVICE — REM POLYHESIVE ADULT PATIENT RETURN ELECTRODE: Brand: VALLEYLAB

## (undated) DEVICE — BLUNTFILL: Brand: MONOJECT

## (undated) DEVICE — CATH IV AUTOGRD BC PNK 20GA 25 -- INSYTE

## (undated) DEVICE — SNARE ENDOSCP M L240CM W27MM SHTH DIA2.4MM CHN 2.8MM OVL

## (undated) DEVICE — SUTURE VCRL SZ 3-0 L27IN ABSRB UD L26MM SH 1/2 CIR J416H

## (undated) DEVICE — DEVON™ KNEE AND BODY STRAP 60" X 3" (1.5 M X 7.6 CM): Brand: DEVON

## (undated) DEVICE — FORCEPS BX L240CM JAW DIA2.8MM L CAP W/ NDL MIC MESH TOOTH

## (undated) DEVICE — DRAPE,REIN 53X77,STERILE: Brand: MEDLINE

## (undated) DEVICE — STAPLER INT L75MM CUT LN L73MM STPL LN L77MM BLU B FRM 8

## (undated) DEVICE — SUTURE PDS II SZ 1 L27IN ABSRB VLT CT-1 L36MM 1/2 CIR Z341H

## (undated) DEVICE — SYRINGE MED 5ML STD CLR PLAS LUERLOCK TIP N CTRL DISP

## (undated) DEVICE — KENDALL RADIOLUCENT FOAM MONITORING ELECTRODE -RECTANGULAR SHAPE: Brand: KENDALL

## (undated) DEVICE — NEEDLE HYPO 22GA L1.5IN BLK S STL HUB POLYPR SHLD REG BVL

## (undated) DEVICE — SUTURE ETHLN SZ 2-0 L30IN NONABSORBABLE BLK L36MM FSLX 3/8 1674H

## (undated) DEVICE — IV STRT KT 3282] LSL INDUSTRIES INC]

## (undated) DEVICE — DRAPE FLD WRM W44XL66IN C6L FOR INTRATEMP SYS THERMABASIN

## (undated) DEVICE — SUTURE PDS II SZ 1 L36IN ABSRB VLT CT L40MM 1/2 CIR TAPR Z359T

## (undated) DEVICE — BLADE ASSEMB CLP HAIR FINE --

## (undated) DEVICE — BAG SPEC BIOHZRD 10 X 10 IN --

## (undated) DEVICE — SUT ETHLN 2-0 18IN FS BLK --

## (undated) DEVICE — SURGICAL PROCEDURE PACK TISS 3X5 IN ABSORBABLE SEPRAFILM

## (undated) DEVICE — NDL FLTR TIP 5 MIC 18GX1.5IN --

## (undated) DEVICE — STRAP,POSITIONING,KNEE/BODY,FOAM,4X60": Brand: MEDLINE

## (undated) DEVICE — Z DUP USE 2275493 DRESSING ALGINATE POST OPERATIVE 10X3.5 IN RECT PRIMASEAL

## (undated) DEVICE — 3M™ IOBAN™ 2 ANTIMICROBIAL INCISE DRAPE 6650EZ: Brand: IOBAN™ 2

## (undated) DEVICE — YANKAUER,POOLE TIP,STERILE,50/CS: Brand: MEDLINE

## (undated) DEVICE — SUTURE ETHBND EXCEL SZ 0 L18IN NONABSORBABLE GRN L36MM CT-1 CX21D

## (undated) DEVICE — CATHETER IV 22GA L1IN OD0.8382-0.9144MM ID0.6096-0.6858MM 382523

## (undated) DEVICE — BLADE ELECTRODE: Brand: EDGE

## (undated) DEVICE — KENDALL SCD EXPRESS SLEEVES, KNEE LENGTH, MEDIUM: Brand: KENDALL SCD

## (undated) DEVICE — 3M™ MEDIPORE™ H SOFT CLOTH TAPE SHORT ROLL TAPE 6INCHES X 2 YARDS 16 ROLLS/CASE 2866S: Brand: 3M™ MEDIPORE™

## (undated) DEVICE — YANKAUER,TAPERED BULBOUS TIP,W/O VENT: Brand: MEDLINE

## (undated) DEVICE — POLYP TRAP: Brand: TRAPEASE®

## (undated) DEVICE — RESOLUTION 360 CLIP

## (undated) DEVICE — CANN NASAL O2 CAPNOGRAPHY AD -- FILTERLINE

## (undated) DEVICE — SUT SLK 2-0SH 30IN BLK --

## (undated) DEVICE — BINDER ABD M/L H12IN FOR 46-62IN WHT 4 SLD PNL DSGN HOOP

## (undated) DEVICE — NEEDLE HYPO 25GA L1.5IN BVL ORIENTED ECLIPSE

## (undated) DEVICE — BLUNTFILL WITH FILTER: Brand: MONOJECT

## (undated) DEVICE — DRAIN SURG 19FR SIL RND HUBLESS W/ 0.25IN BEND TRCR BLAK